# Patient Record
Sex: FEMALE | Race: WHITE | NOT HISPANIC OR LATINO | Employment: OTHER | URBAN - METROPOLITAN AREA
[De-identification: names, ages, dates, MRNs, and addresses within clinical notes are randomized per-mention and may not be internally consistent; named-entity substitution may affect disease eponyms.]

---

## 2017-01-11 ENCOUNTER — OFFICE VISIT (OUTPATIENT)
Dept: BEHAVIORAL HEALTH | Facility: PHYSICIAN GROUP | Age: 77
End: 2017-01-11
Payer: MEDICARE

## 2017-01-11 DIAGNOSIS — F31.77 BIPOLAR DISORDER, IN PARTIAL REMISSION, MOST RECENT EPISODE MIXED (HCC): ICD-10-CM

## 2017-01-11 PROCEDURE — 90834 PSYTX W PT 45 MINUTES: CPT | Performed by: SOCIAL WORKER

## 2017-01-11 NOTE — BH THERAPY
" [facility]  Therapy Progress Note    Patient Name: Azeb Major  Patient MRN: [id number]  Today's Date: 1/11/2017     Type of session:Individual psychotherapy  Length of session: 50  Persons in attendance:Patient    Subjective/New Info: Patient reports no success with getting Norma to respect her wishes or boundaries; believes the only option is to stay quiet or to leave the room.  Still wanting to talk with Dr. MACK About the Seroquel, which she believes is causing her hands to shake and causing her to gain weight, beyond a healthy weight (140# vs. 120#); plans to try to talk with him without her children present.  Also would like children to feel comfortable with her lighting a very stable fancy candle.  Still utilizing Senior , who can drive her places, but feeling lonely and would like to socialize more.  One issue is that \"house is a mess\", so that she doesn't feel comfortable inviting people over; wants to figure out with family how to get the house presentable, is willing to sell some of her jewelry to make the renovations needed.  Has a male friend who calls her and occasionally has taken her out, but for whom she has sexual feelings; unsure what to do with those feelings, feels it would be better not to see him in person.  Less grieving about sister.    Objective/Observations:   Participation: Active, but resistant to suggestions about setting boundaries with Norma.   Grooming: Good   Cognition: Alert and Fully Oriented   Eye contact: Good   Mood: Serious   Affect: Flexible, Full range and Congruent with content   Thought process: Logical and somewhat perseverative   Speech: Rate within normal limits and Volume within normal limits.  Still having trouble hearing.   Other:     Diagnoses: No diagnosis found.     Current risk:   SUICIDE: Low   Homicide: Low   Self-harm: Low   Relapse: Not applicable   Other:    Safety Plan reviewed? Not Indicated   If evidence of imminent risk is " present, intervention/plan:     Therapeutic Intervention(s): Cognitive modification, Communication skills, Conflict resolution skills, Interpersonal effectiveness skills, Leisure and recreation skills, Parenting/familial roles addressed, Stressors assessed and Supportive psychotherapy    Treatment Goal(s)/Objective(s) addressed: Mood stabilization, problem-solving, setting boundaries    Progress toward Treatment Goals: Mild improvement    Plan:  - Continue Individual therapy and Medication management.  Wants to talk with Dr. MACK Re: possibly lowering the Seroquel.  Patient to talk with family about what would be needed to get home in good enough shape to have people over: either on own or in next session.    SELWYN Nicole  1/11/2017

## 2017-01-17 DIAGNOSIS — N39.46 MIXED INCONTINENCE: ICD-10-CM

## 2017-01-18 RX ORDER — LEVOTHYROXINE SODIUM 0.12 MG/1
125 TABLET ORAL
Qty: 90 TAB | Refills: 3 | Status: SHIPPED | OUTPATIENT
Start: 2017-01-18 | End: 2017-02-06 | Stop reason: SDUPTHER

## 2017-01-18 RX ORDER — TOLTERODINE 4 MG/1
4 CAPSULE, EXTENDED RELEASE ORAL
Qty: 90 CAP | Refills: 3 | Status: SHIPPED | OUTPATIENT
Start: 2017-01-18 | End: 2017-02-06 | Stop reason: SDUPTHER

## 2017-01-18 RX ORDER — OMEPRAZOLE 20 MG/1
20 CAPSULE, DELAYED RELEASE ORAL
Qty: 90 CAP | Refills: 3 | Status: SHIPPED | OUTPATIENT
Start: 2017-01-18 | End: 2017-12-12 | Stop reason: SDUPTHER

## 2017-01-18 RX ORDER — QUETIAPINE 200 MG/1
200 TABLET, EXTENDED RELEASE ORAL
Qty: 30 TAB | Refills: 4 | Status: SHIPPED | OUTPATIENT
Start: 2017-01-18 | End: 2017-09-14

## 2017-01-30 ENCOUNTER — NON-PROVIDER VISIT (OUTPATIENT)
Dept: BEHAVIORAL HEALTH | Facility: PHYSICIAN GROUP | Age: 77
End: 2017-01-30
Payer: MEDICARE

## 2017-01-30 PROCEDURE — 90834 PSYTX W PT 45 MINUTES: CPT | Performed by: SOCIAL WORKER

## 2017-02-01 ENCOUNTER — OFFICE VISIT (OUTPATIENT)
Dept: BEHAVIORAL HEALTH | Facility: PHYSICIAN GROUP | Age: 77
End: 2017-02-01
Payer: MEDICARE

## 2017-02-01 VITALS
HEART RATE: 80 BPM | TEMPERATURE: 98.1 F | HEIGHT: 66 IN | SYSTOLIC BLOOD PRESSURE: 140 MMHG | RESPIRATION RATE: 18 BRPM | WEIGHT: 146 LBS | BODY MASS INDEX: 23.46 KG/M2 | DIASTOLIC BLOOD PRESSURE: 72 MMHG

## 2017-02-01 DIAGNOSIS — F31.60 BIPOLAR 1 DISORDER, MIXED (HCC): ICD-10-CM

## 2017-02-01 PROCEDURE — G8420 CALC BMI NORM PARAMETERS: HCPCS | Performed by: PSYCHIATRY & NEUROLOGY

## 2017-02-01 PROCEDURE — 99213 OFFICE O/P EST LOW 20 MIN: CPT | Performed by: PSYCHIATRY & NEUROLOGY

## 2017-02-01 PROCEDURE — 1100F PTFALLS ASSESS-DOCD GE2>/YR: CPT | Performed by: PSYCHIATRY & NEUROLOGY

## 2017-02-01 PROCEDURE — 0518F FALL PLAN OF CARE DOCD: CPT | Mod: 8P | Performed by: PSYCHIATRY & NEUROLOGY

## 2017-02-01 PROCEDURE — 3288F FALL RISK ASSESSMENT DOCD: CPT | Performed by: PSYCHIATRY & NEUROLOGY

## 2017-02-01 PROCEDURE — 1036F TOBACCO NON-USER: CPT | Performed by: PSYCHIATRY & NEUROLOGY

## 2017-02-01 PROCEDURE — G8432 DEP SCR NOT DOC, RNG: HCPCS | Performed by: PSYCHIATRY & NEUROLOGY

## 2017-02-01 PROCEDURE — G8482 FLU IMMUNIZE ORDER/ADMIN: HCPCS | Performed by: PSYCHIATRY & NEUROLOGY

## 2017-02-01 PROCEDURE — 4040F PNEUMOC VAC/ADMIN/RCVD: CPT | Performed by: PSYCHIATRY & NEUROLOGY

## 2017-02-01 RX ORDER — VENLAFAXINE HYDROCHLORIDE 37.5 MG/1
37.5 TABLET, EXTENDED RELEASE ORAL DAILY
Qty: 30 TAB | Refills: 5 | Status: SHIPPED | OUTPATIENT
Start: 2017-02-01 | End: 2017-02-07 | Stop reason: SDUPTHER

## 2017-02-01 NOTE — PROGRESS NOTES
"[facility]  PSYCHIATRIC FOLLOW-UP NOTE    Name: Azeb Major  MRN: [id number]  : 1940  Age: 76 y.o.  Date of assessment: 2017  PCP: Josy Arteaga M.D.  Persons in attendance: Patient    REASON FOR VISIT/CHIEF COMPLAINT (as stated by Patient):  Azeb Major is a 76 y.o., White female, attending follow-up appointment for   Chief Complaint   Patient presents with   • Other     The patient is seen today for follow up and she has mild tremor on both hands. The patient has been emptionally stable.   .      HISTORY OF PRESENT ILLNESS:    The patient was seen today for follow up on her bipolar disorder and she is doing well. The patient has bilateral hand tremor and it could be from depakote. The patient has been stable on her medications. The patient denied depression hypomania, and psychosis.    PSYCHOSOCIAL CHANGES SINCE PREVIOUS CONTACT:  The patient denied depression and hypomania.    RESPONSE TO TREATMENT:  Good.    MEDICATION SIDE EFFECTS:  Tremor.    REVIEW OF SYSTEMS:        Constitutional negative   Eyes negative   Ears/Nose/Mouth/Throat negative   Cardiovascular positive - hypertension.   Respiratory negative   Gastrointestinal negative   Genitourinary positive - stress incontinence and frequent UTI, and chronic kidney disease.   Muscular negative   Integumentary negative   Neurological negative   Endocrine positive - hypothyroid.   Hematologic/Lymphatic negative       PSYCHIATRIC EXAMINATION/MENTAL STATUS  /72 mmHg  Pulse 80  Temp(Src) 36.7 °C (98.1 °F)  Resp 18  Ht 1.676 m (5' 5.98\")  Wt 66.225 kg (146 lb)  BMI 23.58 kg/m2  Participation: Active verbal participation, Attentive and Engaged  Grooming:Casual  Orientation: Alert and Fully Oriented  Eye contact: Good  Behavior:Calm   Mood: Anxious  Affect: Flexible and Full range  Thought process: Logical and Goal-directed  Thought content:  Within normal limits  Speech: Rate within normal limits and Volume " within normal limits  Perception:  Within normal limits  Memory:  No gross evidence of memory deficits  Insight: Good  Judgment: Good  Family/couple interaction observations:   Other:    Current risk:    Suicide: Low   Homicide: Low   Self-harm: Low  Relapse: Low  Other:   Crisis Safety Plan reviewed?Yes  If evidence of imminent risk is present, intervention/plan:    Medical Records/Labs/Diagnostic Tests Reviewed:     The patient did blood work and her LFT unremarkable and VA level 78.    Medical Records/Labs/Diagnostic Tests Ordered: none.    DIAGNOSTIC IMPRESSION(S):  1. Bipolar 1 disorder, mixed (CMS-[facility])           ASSESSMENT AND PLAN:    Bipolar 1    Continue same medications    Seroquel  mg at night  Divalproex  mg three at night  Venlafaxine ER 37.5 mg per day    Follow up in two months.    More than 50% of face-to-face time in this 30 minute visit was spent in psychotherapy/counseling.    Topics addressed include:    Anthony Fowler M.D.

## 2017-02-06 DIAGNOSIS — N39.46 MIXED INCONTINENCE: ICD-10-CM

## 2017-02-06 RX ORDER — VENLAFAXINE HYDROCHLORIDE 37.5 MG/1
37.5 TABLET, EXTENDED RELEASE ORAL DAILY
Qty: 30 TAB | Refills: 4 | Status: CANCELLED | OUTPATIENT
Start: 2017-02-06

## 2017-02-06 RX ORDER — TOLTERODINE 4 MG/1
4 CAPSULE, EXTENDED RELEASE ORAL
Qty: 90 CAP | Refills: 3 | Status: SHIPPED | OUTPATIENT
Start: 2017-02-06 | End: 2018-01-14 | Stop reason: SDUPTHER

## 2017-02-06 RX ORDER — LEVOTHYROXINE SODIUM 0.12 MG/1
125 TABLET ORAL
Qty: 90 TAB | Refills: 4 | Status: SHIPPED | OUTPATIENT
Start: 2017-02-06 | End: 2018-02-06 | Stop reason: SDUPTHER

## 2017-02-06 NOTE — TELEPHONE ENCOUNTER
Was the patient seen in the last year in this department? Yes     Does patient have an active prescription for medications requested? Yes     Received Request Via: Pharmacy      FYI: Pharmacy is requesting a 90 day supply

## 2017-02-07 RX ORDER — VENLAFAXINE HYDROCHLORIDE 37.5 MG/1
37.5 TABLET, EXTENDED RELEASE ORAL DAILY
Qty: 90 TAB | Refills: 1 | Status: SHIPPED | OUTPATIENT
Start: 2017-02-07 | End: 2017-09-28

## 2017-03-20 ENCOUNTER — NON-PROVIDER VISIT (OUTPATIENT)
Dept: BEHAVIORAL HEALTH | Facility: PHYSICIAN GROUP | Age: 77
End: 2017-03-20
Payer: MEDICARE

## 2017-03-20 PROCEDURE — 90834 PSYTX W PT 45 MINUTES: CPT | Performed by: SOCIAL WORKER

## 2017-03-20 RX ORDER — UBIDECARENONE 75 MG
100 CAPSULE ORAL DAILY
COMMUNITY
End: 2020-11-02

## 2017-03-20 NOTE — BH THERAPY
[facility]  Therapy Progress Note    Patient Name: Azeb Major  Patient MRN: [id number]  Today's Date: 3/20/2017     Type of session:Individual psychotherapy  Length of session: *45  Persons in attendance:Patient and Daughter Karissa    Subjective/New Info: Patient reports feeling more comfortable with her current meds, but is again concerned about daughter Norma's anger and demands; does not want to upset her, but wants to find a way to have more comfort with their interaction.  Karissa & I reinforced that Azeb has a right to watch TV where she wishes and to ask Norma to leave.  Azeb says she no longer is feeling urgent with getting some changes in the house, asked for clarification from daughter about status of the trust, option of having a garage sale.    Objective/Observations:   Participation: Active verbal participation, Attentive, Engaged and Open to feedback   Grooming: Good   Cognition: Alert and Fully Oriented   Eye contact: Good   Mood: Euthymic and Anxious   Affect: Flexible, Full range and Congruent with content   Thought process: Logical and Goal-directed   Speech: Rate within normal limits and Volume within normal limits   Other:     Diagnoses: No diagnosis found.     Current risk:   SUICIDE: Low   Homicide: Low   Self-harm: Low   Relapse: Not applicable   Other:    Safety Plan reviewed? Not Indicated   If evidence of imminent risk is present, intervention/plan:     Therapeutic Intervention(s): Cognitive modification, Communication skills, Interpersonal effectiveness skills, Parenting/familial roles addressed, Stressors assessed and Supportive psychotherapy    Treatment Goal(s)/Objective(s) addressed: Stabilize mood, increase comfort asserting herself & setting boundaries.    Progress toward Treatment Goals: Mild improvement    Plan:  - Continue Individual therapy and Medication management    SELWYN Nicole  3/20/2017

## 2017-03-27 DIAGNOSIS — N39.46 MIXED INCONTINENCE: ICD-10-CM

## 2017-03-27 RX ORDER — ESTRADIOL 0.1 MG/G
0.5 CREAM VAGINAL
Qty: 1 TUBE | Refills: 3 | Status: SHIPPED | OUTPATIENT
Start: 2017-03-27 | End: 2018-08-15

## 2017-03-27 RX ORDER — ESTRADIOL 0.1 MG/G
0.5 CREAM VAGINAL
Qty: 1 TUBE | Refills: 0 | Status: SHIPPED | OUTPATIENT
Start: 2017-03-27 | End: 2017-03-27 | Stop reason: SDUPTHER

## 2017-04-03 ENCOUNTER — OFFICE VISIT (OUTPATIENT)
Dept: BEHAVIORAL HEALTH | Facility: PHYSICIAN GROUP | Age: 77
End: 2017-04-03
Payer: MEDICARE

## 2017-04-03 ENCOUNTER — APPOINTMENT (OUTPATIENT)
Dept: BEHAVIORAL HEALTH | Facility: PHYSICIAN GROUP | Age: 77
End: 2017-04-03
Payer: MEDICARE

## 2017-04-03 DIAGNOSIS — F31.30 BIPOLAR I DISORDER DEPRESSED WITH ATYPICAL FEATURES (HCC): ICD-10-CM

## 2017-04-03 PROCEDURE — G8432 DEP SCR NOT DOC, RNG: HCPCS | Performed by: SOCIAL WORKER

## 2017-04-03 PROCEDURE — 90834 PSYTX W PT 45 MINUTES: CPT | Performed by: SOCIAL WORKER

## 2017-04-03 PROCEDURE — 1036F TOBACCO NON-USER: CPT | Performed by: SOCIAL WORKER

## 2017-04-03 RX ORDER — DIVALPROEX SODIUM 250 MG/1
TABLET, EXTENDED RELEASE ORAL
Qty: 90 TAB | Refills: 4 | Status: SHIPPED | OUTPATIENT
Start: 2017-04-03 | End: 2017-07-03 | Stop reason: SDUPTHER

## 2017-04-03 NOTE — BH THERAPY
" [facility]  Therapy Progress Note    Patient Name: Azeb Major  Patient MRN: [id number]  Today's Date: 4/3/2017     Type of session:Individual psychotherapy  Length of session: 45  Persons in attendance:Patient and Daughter Karissa    Subjective/New Info: Patient reports relationship with Norma improving, and that Norma is \"felix\" to her, so she doesn't mind listening if Norma needs to talk.  Had Olive & family over for dinner.  Still knitting, has not yet gotten back to [facility].  Daughters Karissa & Olive will take patient and Norma to her sister's memorial in May; hopeful of seeing many family members they haven't seen for awhile, some concern about issues from the past resurfacing.  Patient reports still having some hand tremors, had hoped to talk with Dr. FRANK about possibly reducing the Depakote, but did not get here in time for that appointment; asked about [facility] as an option.    Objective/Observations:   Participation: Active verbal participation, Attentive, Engaged, Open to feedback and Focused somewhat on Norma's problems.   Grooming: Good   Cognition: Alert, fully oriented   Eye contact: Good   Mood: Euthymic, slightly anxious   Affect: Flexible, Full range and Congruent with content   Thought process: Logical and Goal-directed   Speech: Rate within normal limits and Volume within normal limits   Other:     Diagnoses: No diagnosis found.     Current risk:   SUICIDE: Low   Homicide: Low   Self-harm: Low   Relapse: Not applicable   Other:    Safety Plan reviewed? Not Indicated   If evidence of imminent risk is present, intervention/plan:     Therapeutic Intervention(s): Cognitive modification, Interpersonal effectiveness skills, Parenting/familial roles addressed, Stressors assessed and Supportive psychotherapy    Treatment Goal(s)/Objective(s) addressed: Mood stabilization, boundary setting, communication skills    Progress toward Treatment Goals: Moderate " improvement    Plan:  - Continue Individual therapy and Medication management    Linda Rivera-SELWYN Frazier  4/3/2017

## 2017-04-04 ENCOUNTER — HOSPITAL ENCOUNTER (OUTPATIENT)
Facility: MEDICAL CENTER | Age: 77
End: 2017-04-04
Attending: FAMILY MEDICINE
Payer: MEDICARE

## 2017-04-04 ENCOUNTER — OFFICE VISIT (OUTPATIENT)
Dept: INTERNAL MEDICINE | Facility: IMAGING CENTER | Age: 77
End: 2017-04-04
Payer: MEDICARE

## 2017-04-04 VITALS
HEIGHT: 66 IN | TEMPERATURE: 97.4 F | BODY MASS INDEX: 23.78 KG/M2 | HEART RATE: 74 BPM | WEIGHT: 148 LBS | OXYGEN SATURATION: 95 % | DIASTOLIC BLOOD PRESSURE: 68 MMHG | RESPIRATION RATE: 14 BRPM | SYSTOLIC BLOOD PRESSURE: 132 MMHG

## 2017-04-04 DIAGNOSIS — E53.8 VITAMIN B12 DEFICIENCY: ICD-10-CM

## 2017-04-04 DIAGNOSIS — E03.9 HYPOTHYROIDISM, UNSPECIFIED TYPE: ICD-10-CM

## 2017-04-04 DIAGNOSIS — R25.2 MUSCLE CRAMPS: ICD-10-CM

## 2017-04-04 DIAGNOSIS — M81.0 OSTEOPOROSIS: ICD-10-CM

## 2017-04-04 DIAGNOSIS — L57.0 AK (ACTINIC KERATOSIS): ICD-10-CM

## 2017-04-04 LAB
25(OH)D3 SERPL-MCNC: 26 NG/ML (ref 30–100)
ANION GAP SERPL CALC-SCNC: 4 MMOL/L (ref 0–11.9)
BUN SERPL-MCNC: 28 MG/DL (ref 8–22)
CALCIUM SERPL-MCNC: 9.4 MG/DL (ref 8.5–10.5)
CHLORIDE SERPL-SCNC: 105 MMOL/L (ref 96–112)
CO2 SERPL-SCNC: 26 MMOL/L (ref 20–33)
CREAT SERPL-MCNC: 1.09 MG/DL (ref 0.5–1.4)
GFR SERPL CREATININE-BSD FRML MDRD: 48 ML/MIN/1.73 M 2
GLUCOSE SERPL-MCNC: 88 MG/DL (ref 65–99)
POTASSIUM SERPL-SCNC: 5.3 MMOL/L (ref 3.6–5.5)
SODIUM SERPL-SCNC: 135 MMOL/L (ref 135–145)
T4 FREE SERPL-MCNC: 0.95 NG/DL (ref 0.53–1.43)
TSH SERPL DL<=0.005 MIU/L-ACNC: 0.53 UIU/ML (ref 0.3–3.7)
VIT B12 SERPL-MCNC: >1500 PG/ML (ref 211–911)

## 2017-04-04 PROCEDURE — 17000 DESTRUCT PREMALG LESION: CPT | Performed by: FAMILY MEDICINE

## 2017-04-04 PROCEDURE — 4040F PNEUMOC VAC/ADMIN/RCVD: CPT | Performed by: FAMILY MEDICINE

## 2017-04-04 PROCEDURE — 17003 DESTRUCT PREMALG LES 2-14: CPT | Performed by: FAMILY MEDICINE

## 2017-04-04 PROCEDURE — 0518F FALL PLAN OF CARE DOCD: CPT | Mod: 8P | Performed by: FAMILY MEDICINE

## 2017-04-04 PROCEDURE — 80048 BASIC METABOLIC PNL TOTAL CA: CPT

## 2017-04-04 PROCEDURE — G8432 DEP SCR NOT DOC, RNG: HCPCS | Performed by: FAMILY MEDICINE

## 2017-04-04 PROCEDURE — G8420 CALC BMI NORM PARAMETERS: HCPCS | Performed by: FAMILY MEDICINE

## 2017-04-04 PROCEDURE — 3288F FALL RISK ASSESSMENT DOCD: CPT | Performed by: FAMILY MEDICINE

## 2017-04-04 PROCEDURE — 84439 ASSAY OF FREE THYROXINE: CPT

## 2017-04-04 PROCEDURE — 82607 VITAMIN B-12: CPT

## 2017-04-04 PROCEDURE — 84443 ASSAY THYROID STIM HORMONE: CPT

## 2017-04-04 PROCEDURE — 1036F TOBACCO NON-USER: CPT | Performed by: FAMILY MEDICINE

## 2017-04-04 PROCEDURE — 82306 VITAMIN D 25 HYDROXY: CPT

## 2017-04-04 PROCEDURE — 99214 OFFICE O/P EST MOD 30 MIN: CPT | Mod: 25 | Performed by: FAMILY MEDICINE

## 2017-04-04 PROCEDURE — 1100F PTFALLS ASSESS-DOCD GE2>/YR: CPT | Performed by: FAMILY MEDICINE

## 2017-04-04 RX ORDER — VENLAFAXINE HYDROCHLORIDE 37.5 MG/1
CAPSULE, EXTENDED RELEASE ORAL
COMMUNITY
Start: 2017-01-17 | End: 2017-04-04

## 2017-04-04 NOTE — MR AVS SNAPSHOT
"        Azeb Sanders Pedrito   2017 8:30 AM   Office Visit   MRN: [id number]    Department:  [facility]   Dept Phone:  [phone]    Description:  Female : 1940   Provider:  Josy Arteaga M.D.           Reason for Visit     Follow-Up 3 month, thyroid, skin      Allergies as of 2017     Allergen Noted Reactions    Banana 2014       Stomach upset.    [place] Oil 2014       Anything with corn; stomach upset.    Levofloxacin 2014       Unidentified reaction.    Sorbitol 2014       Stomach upset.      You were diagnosed with     Osteoporosis   [4249642]       Vitamin B12 deficiency   [702978]       Hypothyroidism, unspecified type   [6164655]       Muscle cramps   [600948]       AK (actinic keratosis)   [645767]         Vital Signs     Blood Pressure Pulse Temperature Respirations Height Weight    132/68 mmHg 74 36.3 °C (97.4 °F) 14 1.676 m (5' 5.98\") 67.132 kg (148 lb)    Body Mass Index Oxygen Saturation Smoking Status             23.90 kg/m2 95% Former Smoker         Basic Information     Date Of Birth Sex Race Ethnicity Preferred Language    1940 Female White Non- English      Your appointments     May 15, 2017  9:00 AM   Individual Therapy with Linda Rick [facility]   BEHAVIORAL HEALTH [address] [place] [address])    [address] [place] [address]   [phone]            2017  9:00 AM   Individual Therapy with Linda Rick [facility]   BEHAVIORAL HEALTH [address] [facility])    [address] [place] [address]   [phone]            2017 10:00 AM   Follow Up Med Management with Anthony Fowler M.D.   BEHAVIORAL HEALTH [address] [place] [address]   [phone]            2017  4:00 PM   Individual Therapy with Linda Rick [facility]   BEHAVIORAL HEALTH [address] [place] [address])    [address] [place] [address]   [phone]              Problem " List              ICD-10-CM Priority Class Noted - Resolved    Bipolar affective disorder (CMS-HCC) F31.9 Low  12/1/2010 - Present    UI (urinary incontinence) R32   12/1/2010 - Present    Frequent UTI N39.0   12/1/2010 - Present    Hypothyroid E03.9 Low  12/1/2010 - Present    Seasonal allergies J30.2   12/1/2010 - Present    Hypertension I10 Low  6/20/2011 - Present    CKD (chronic kidney disease) stage 3, GFR 30-59 ml N18.3 Low  9/18/2011 - Present    Osteoporosis M81.0   12/7/2011 - Present    Hyperparathyroidism, secondary (CMS-HCC) N25.81   2/8/2012 - Present    Poor balance R26.89   8/7/2014 - Present    Hard of hearing H91.90   12/12/2014 - Present    Macular degeneration H35.30   12/11/2016 - Present      Health Maintenance        Date Due Completion Dates    IMM ZOSTER VACCINE 6/4/1998 ---    BONE DENSITY 11/16/2016 11/16/2011    IMM DTaP/Tdap/Td Vaccine (2 - Td) 9/9/2024 9/9/2014            Current Immunizations     13-VALENT PCV PREVNAR 12/7/2016    Influenza TIV (IM) 1/1/2012    Influenza Vaccine Adult HD 12/7/2016, 10/20/2015, 1/22/2014    Influenza Vaccine Quad Inj (Preserved) 12/9/2014    Pneumococcal polysaccharide vaccine (PPSV-23) 1/1/2010    Tdap Vaccine 9/9/2014  9:16 AM      Below and/or attached are the medications your provider expects you to take. Review all of your home medications and newly ordered medications with your provider and/or pharmacist. Follow medication instructions as directed by your provider and/or pharmacist. Please keep your medication list with you and share with your provider. Update the information when medications are discontinued, doses are changed, or new medications (including over-the-counter products) are added; and carry medication information at all times in the event of emergency situations     Allergies:  BANANA - (reactions not documented)     CORN OIL - (reactions not documented)     LEVOFLOXACIN - (reactions not documented)     SORBITOL - (reactions not  documented)               Medications  Valid as of: April 04, 2017 -  1:14 PM    Generic Name Brand Name Tablet Size Instructions for use    Ascorbic Acid (Tab) VITAMIN C 500 MG Take 1 Tab by mouth 2 Times a Day.        Cyanocobalamin (Tab) VITAMIN B-12 100 MCG Take 100 mcg by mouth every day.        Divalproex Sodium (TABLET SR 24 HR) DEPAKOTE  MG TAKE 3 TABLETS AT BEDTIME        Estradiol (Cream) ESTRACE 0.1 MG/GM Insert 0.5 g in vagina every 3 days.        Levothyroxine Sodium (Tab) SYNTHROID 125 MCG Take 1 Tab by mouth Every morning on an empty stomach.        Omeprazole (CAPSULE DELAYED RELEASE) PRILOSEC 20 MG Take 1 Cap by mouth every day.        QUEtiapine Fumarate (TABLET SR 24 HR) SEROQUEL  MG Take 1 Tab by mouth every day.        Tolterodine Tartrate (CAPSULE SR 24 HR) DETROL LA 4 MG Take 1 Cap by mouth every day.        Venlafaxine HCl (TABLET SR 24 HR) Venlafaxine HCl 37.5 MG Take 1 Tab by mouth every day.        .                 Medicines prescribed today were sent to:     [facility] - [place], [place] - [address] [place], [place] [id number]    Phone: [phone] Fax: [phone]    Open 24 Hours?: No    [id number] [place] - [address] [address]    Phone: [phone] Fax: [phone]    Open 24 Hours?: No    [facility] [place] - [address] [address]    Phone: [phone] Fax: [phone]    Open 24 Hours?: No    [facility] - [place], [place] - [address] [address]    Phone: [phone] Fax: [phone]    Open 24 Hours?: No    [id number] - [place] - [address] [address]    Phone: [phone] Fax: [phone]    Open 24 Hours?: No      Medication refill instructions:       If your prescription bottle indicates you have medication refills left, it is not  necessary to call your provider’s office. Please contact your pharmacy and they will refill your medication.    If your prescription bottle indicates you do not have any refills left, you may request refills at any time through one of the following ways: The online Sw[employer] system (except Urgent Care), by calling your provider’s office, or by asking your pharmacy to contact your provider’s office with a refill request. Medication refills are processed only during regular business hours and may not be available until the next business day. Your provider may request additional information or to have a follow-up visit with you prior to refilling your medication.   *Please Note: Medication refills are assigned a new Rx number when refilled electronically. Your pharmacy may indicate that no refills were authorized even though a new prescription for the same medication is available at the pharmacy. Please request the medicine by name with the pharmacy before contacting your provider for a refill.        Your To Do List     Future Labs/Procedures Complete By Expires    BASIC METABOLIC PANEL  As directed 4/5/2018    DS-BONE DENSITY STUDY (DEXA)  As directed 10/5/2017    FREE THYROXINE  As directed 4/5/2018    TSH  As directed 4/5/2018    VITAMIN B12  As directed 4/5/2018    VITAMIN D,25 HYDROXY  As directed 4/5/2018      Other Notes About Your Plan     DAUGHTER PAPA [phone]  Psych:  Dr Sim  Nephrology:  [facility]  Urology: Dr. Nitin Golden Access Code: Activation code not generated  Current Sw[employer]board Status: Active

## 2017-04-04 NOTE — PROGRESS NOTES
Chief Complaint   Patient presents with   • Follow-Up     3 month, thyroid, skin       HISTORY OF PRESENT ILLNESS: Patient is a 76 y.o. female established patient who presents today for three-month follow-up on thyroid and some other issues.    She is generally doing well. She still lives independently. She has a senior caregiver who spends a couple days with her and then she has family in town. She is here today with her daughter Becca.    At her last visit her TSH was suppressed. We decreased her Synthroid to 125. She feels good at that level. Denies palpitations. No diarrhea. Her weight has been stable.    She also did a little bit of physical therapy to work on her balance. She is trying to do some of the home exercises. She has had no recent falls.    She does have history of osteoporosis. Her last DEXA scan was 2011. She did have osteoporosis at that time. She has not taken medication. No history of fracture.    Also has a history of GERD. At one time she was having quite a few GI symptoms. She has been on omeprazole but is having no heartburn. Appetite has been good. Bowels are mostly normal. No dark stools. No blood.    She has a couple spots on her nose and on the backs of her hands that she would like checked. They're red and scaly.        Patient Active Problem List    Diagnosis Date Noted   • CKD (chronic kidney disease) stage 3, GFR 30-59 ml 09/18/2011     Priority: Low   • Hypertension 06/20/2011     Priority: Low   • Bipolar affective disorder (CMS-HCC) 12/01/2010     Priority: Low   • Hypothyroid 12/01/2010     Priority: Low   • Macular degeneration 12/11/2016   • Hard of hearing 12/12/2014   • Poor balance 08/07/2014   • Hyperparathyroidism, secondary (CMS-HCC) 02/08/2012   • Osteoporosis 12/07/2011   • UI (urinary incontinence) 12/01/2010   • Frequent UTI 12/01/2010   • Seasonal allergies 12/01/2010     Current Outpatient Prescriptions on File Prior to Visit   Medication Sig Dispense Refill   •  divalproex ER (DEPAKOTE ER) 250 MG TABLET SR 24 HR TAKE 3 TABLETS AT BEDTIME 90 Tab 4   • Venlafaxine HCl 37.5 MG TABLET SR 24 HR Take 1 Tab by mouth every day. 90 Tab 1   • levothyroxine (SYNTHROID) 125 MCG Tab Take 1 Tab by mouth Every morning on an empty stomach. 90 Tab 4   • tolterodine ER (DETROL LA) 4 MG CAPSULE SR 24 HR Take 1 Cap by mouth every day. 90 Cap 3   • omeprazole (PRILOSEC) 20 MG delayed-release capsule Take 1 Cap by mouth every day. 90 Cap 3   • SEROQUEL  MG XR tablet Take 1 Tab by mouth every day. 30 Tab 4   • ascorbic acid (VITAMIN C) 500 MG tablet Take 1 Tab by mouth 2 Times a Day. 30 Tab 3   • estradiol (ESTRACE) 0.1 MG/GM vaginal cream Insert 0.5 g in vagina every 3 days. 1 Tube 3   • cyanocobalamin (VITAMIN B-12) 100 MCG Tab Take 100 mcg by mouth every day.       No current facility-administered medications on file prior to visit.       Past medical, surgical, family, and social history is reviewed and updated in Epic chart by me today.   Medications and allergies reviewed and updated in Epic chart by me today.     REVIEW OF SYSTEMS:  GENERAL: No fatigue, no weight loss.  HEENT:  Ears--no earache, no change in hearing, no dizziness, no tinnitus.                 Eyes--no blurred vision, no discharge or pain                 Throat--No sore throat, no dysphagia, no hoarseness  CV:  No chest pain,dyspnea,palpitations or edema.  RESP:  No sob,cough,wheezing or hemoptysis.  GI: No dysphagia, heartburn,abdominal pain, nausea, vomiting, diarrhea or constipation.       No melena, jaundice, bleeding, incontinence or change in bowel habits.  :  No dysuria, polyuria, hematuria, incontinence, or nocturia.  MS:  No joint swelling, myalgias, or arthralgias. She does complain of muscle cramps in her legs, worse at night.  NEURO:  No seizures, syncope, paralysis, tremor, or weakness.  SKIN: As above.  PSYCH: Mood fine.    Filed Vitals:    04/04/17 0800   BP: 132/68   Pulse: 74   Temp: 36.3 °C (97.4  "°F)   Resp: 14   Height: 1.676 m (5' 5.98\")   Weight: 67.132 kg (148 lb)   SpO2: 95%     Physical Exam:  Gen: Well developed, well nourished. No acute distress.  Alert and oriented x 3.  Neck:  Supple, no adenopathy or thyromegaly.  Heart:  Regular rate and rhythm.  Normal S1, S2. No murmur, gallop or rub.  Lungs:  Clear, No wheezes,rales or rhonchi.  Extremities:  No edema.  Psych: Mood and affect are appropriate.  Skin: She has 2 small lesions on her nose, both prior to proximally 2 mm in size slightly erythematous and rough. Consistent with actinic keratoses  She has 3 lesions on her right hand which are also approximately 2-3 mm in size, erythematous and scaling consistent with actinic keratoses. One lesion 3 mm in size on her left hand also consistent with an actinic keratosis.              Assessment/Plan:  1. Osteoporosis . Will monitor vitamin D. Get an updated DEXA scan. Encouraged her to continue with diet, exercise. Consider treatment.  VITAMIN D,25 HYDROXY    DS-BONE DENSITY STUDY (DEXA)   2. Vitamin B12 deficiency  VITAMIN B12   3. Hypothyroidism, unspecified type . Presently on 125 µg of levothyroxine. Monitor lab work.  TSH    FREE THYROXINE   4. Muscle cramps . Encouraged adequate fluids. Electrolytes and December were fine. She does have a history of hyperparathyroidism . Her calcium level has been fine.   BASIC METABOLIC PANEL   5. AK (actinic keratosis) . All above lesions were treated today with nitrogen. She is instructed in wound care. Follow-up in 3 months        6. GERD. She is having no symptoms. We'll have her taper off the omeprazole over the next few weeks. If her GERD symptoms return she may resume.  "

## 2017-05-16 ENCOUNTER — OFFICE VISIT (OUTPATIENT)
Dept: INTERNAL MEDICINE | Facility: IMAGING CENTER | Age: 77
End: 2017-05-16
Payer: MEDICARE

## 2017-05-16 ENCOUNTER — HOSPITAL ENCOUNTER (OUTPATIENT)
Facility: MEDICAL CENTER | Age: 77
End: 2017-05-16
Attending: FAMILY MEDICINE
Payer: MEDICARE

## 2017-05-16 DIAGNOSIS — R31.9 HEMATURIA: ICD-10-CM

## 2017-05-16 LAB
APPEARANCE UR: NORMAL
BILIRUB UR STRIP-MCNC: NEGATIVE MG/DL
COLOR UR AUTO: YELLOW
GLUCOSE UR STRIP.AUTO-MCNC: NEGATIVE MG/DL
KETONES UR STRIP.AUTO-MCNC: NEGATIVE MG/DL
LEUKOCYTE ESTERASE UR QL STRIP.AUTO: NORMAL
NITRITE UR QL STRIP.AUTO: NEGATIVE
PH UR STRIP.AUTO: 6.5 [PH] (ref 5–8)
PROT UR QL STRIP: 300 MG/DL
RBC UR QL AUTO: NORMAL
SP GR UR STRIP.AUTO: 1.02
UROBILINOGEN UR STRIP-MCNC: NEGATIVE MG/DL

## 2017-05-16 PROCEDURE — 87086 URINE CULTURE/COLONY COUNT: CPT

## 2017-05-16 PROCEDURE — 87186 SC STD MICRODIL/AGAR DIL: CPT

## 2017-05-16 PROCEDURE — 81002 URINALYSIS NONAUTO W/O SCOPE: CPT | Performed by: FAMILY MEDICINE

## 2017-05-16 PROCEDURE — 87077 CULTURE AEROBIC IDENTIFY: CPT

## 2017-05-16 RX ORDER — NITROFURANTOIN 25; 75 MG/1; MG/1
100 CAPSULE ORAL 2 TIMES DAILY
Qty: 12 CAP | Refills: 0 | Status: SHIPPED | OUTPATIENT
Start: 2017-05-16 | End: 2017-09-02

## 2017-05-16 NOTE — MR AVS SNAPSHOT
Azeb Sanders Pedrito   2017 10:30 AM   Office Visit   MRN: [id number]    Department:  [facility]   Dept Phone:  [phone]    Description:  Female : 1940   Provider:  Josy Arteaga M.D.           Reason for Visit     Dysuria           Allergies as of 2017     Allergen Noted Reactions    Banana 2014       Stomach upset.    [place] Oil 2014       Anything with corn; stomach upset.    Levofloxacin 2014       Unidentified reaction.    Sorbitol 2014       Stomach upset.      You were diagnosed with     Hematuria   [[id number]]         Vital Signs     Smoking Status                   Former Smoker           Basic Information     Date Of Birth Sex Race Ethnicity Preferred Language    1940 Female White Non- English      Your appointments     2017  9:00 AM   Individual Therapy with Linda Rick [facility]   BEHAVIORAL HEALTH [address] [facility])    [address] [place] [id number]   [phone]            2017 10:00 AM   Follow Up Med Management with Anthony Fowler M.D.   BEHAVIORAL HEALTH [address] [place] [address])    [address] [place] [address]   [phone]            2017  4:00 PM   Individual Therapy with Linda Rick [facility]   BEHAVIORAL HEALTH [address] [place] [address])    [address] [place] [address]   [phone]              Problem List              ICD-10-CM Priority Class Noted - Resolved    Bipolar affective disorder (CMS-[facility]) F31.9 Low  2010 - Present    UI (urinary incontinence) R32   2010 - Present    Frequent UTI N39.0   2010 - Present    Hypothyroid E03.9 Low  2010 - Present    Seasonal allergies J30.2   2010 - Present    Hypertension I10 Low  2011 - Present    CKD (chronic kidney disease) stage 3, GFR 30-59 ml N18.3 Low  2011 - Present    Osteoporosis M81.0   2011 - Present    Hyperparathyroidism, secondary (CMS-HCC) N25.81    2/8/2012 - Present    Poor balance R26.89   8/7/2014 - Present    Hard of hearing H91.90   12/12/2014 - Present    Macular degeneration H35.30   12/11/2016 - Present      Health Maintenance        Date Due Completion Dates    IMM ZOSTER VACCINE 6/4/1998 ---    BONE DENSITY 11/16/2016 11/16/2011    IMM DTaP/Tdap/Td Vaccine (2 - Td) 9/9/2024 9/9/2014            Results     POCT Urinalysis      Component Value Standard Range & Units    POC Color yellow Negative    POC Appearance cloudy Negative    POC Leukocyte Esterase large Negative    POC Nitrites negative Negative    POC Urobiligen negative Negative (0.2) mg/dL    POC Protein 300 Negative mg/dL    POC Urine PH 6.5 5.0 - 8.0    POC Blood large Negative    POC Specific Gravity 1.025 <1.005 - >1.030    POC Ketones negative Negative mg/dL    POC Biliruben negative Negative mg/dL    POC Glucose negative Negative mg/dL                        Current Immunizations     13-VALENT PCV PREVNAR 12/7/2016    Influenza TIV (IM) 1/1/2012    Influenza Vaccine Adult HD 12/7/2016, 10/20/2015, 1/22/2014    Influenza Vaccine Quad Inj (Preserved) 12/9/2014    Pneumococcal polysaccharide vaccine (PPSV-23) 1/1/2010    Tdap Vaccine 9/9/2014  9:16 AM      Below and/or attached are the medications your provider expects you to take. Review all of your home medications and newly ordered medications with your provider and/or pharmacist. Follow medication instructions as directed by your provider and/or pharmacist. Please keep your medication list with you and share with your provider. Update the information when medications are discontinued, doses are changed, or new medications (including over-the-counter products) are added; and carry medication information at all times in the event of emergency situations     Allergies:  BANANA - (reactions not documented)     CORN OIL - (reactions not documented)     LEVOFLOXACIN - (reactions not documented)     SORBITOL - (reactions not documented)                  Medications  Valid as of: May 16, 2017 - 12:37 PM    Generic Name Brand Name Tablet Size Instructions for use    Ascorbic Acid (Tab) VITAMIN C 500 MG Take 1 Tab by mouth 2 Times a Day.        Cyanocobalamin (Tab) VITAMIN B-12 100 MCG Take 100 mcg by mouth every day.        Divalproex Sodium (TABLET SR 24 HR) DEPAKOTE  MG TAKE 3 TABLETS AT BEDTIME        Estradiol (Cream) ESTRACE 0.1 MG/GM Insert 0.5 g in vagina every 3 days.        Levothyroxine Sodium (Tab) SYNTHROID 125 MCG Take 1 Tab by mouth Every morning on an empty stomach.        Nitrofurantoin Monohyd Macro (Cap) MACROBID 100 MG Take 1 Cap by mouth 2 times a day.        Omeprazole (CAPSULE DELAYED RELEASE) PRILOSEC 20 MG Take 1 Cap by mouth every day.        QUEtiapine Fumarate (TABLET SR 24 HR) SEROQUEL  MG Take 1 Tab by mouth every day.        Tolterodine Tartrate (CAPSULE SR 24 HR) DETROL LA 4 MG Take 1 Cap by mouth every day.        Venlafaxine HCl (TABLET SR 24 HR) Venlafaxine HCl 37.5 MG Take 1 Tab by mouth every day.        .                 Medicines prescribed today were sent to:     [facility] - [place], [place] - [address] [place] [address]    Phone: [phone] Fax: [phone]    Open 24 Hours?: No    [id number] - [place] - [address] [address]    Phone: [phone] Fax: [phone]    Open 24 Hours?: No    [facility] - [place] - [address] [address]    Phone: [phone] Fax: [phone]    Open 24 Hours?: No    [facility] - [place], [place] - [address] [place] [address]    Phone: [phone] Fax: [phone]    Open 24 Hours?: No    [id number] - [place] - [address] [address]    Phone: [phone] Fax: [phone]    Open 24 Hours?: No      Medication refill instructions:       If your  prescription bottle indicates you have medication refills left, it is not necessary to call your provider’s office. Please contact your pharmacy and they will refill your medication.    If your prescription bottle indicates you do not have any refills left, you may request refills at any time through one of the following ways: The online Zbird system (except Urgent Care), by calling your provider’s office, or by asking your pharmacy to contact your provider’s office with a refill request. Medication refills are processed only during regular business hours and may not be available until the next business day. Your provider may request additional information or to have a follow-up visit with you prior to refilling your medication.   *Please Note: Medication refills are assigned a new Rx number when refilled electronically. Your pharmacy may indicate that no refills were authorized even though a new prescription for the same medication is available at the pharmacy. Please request the medicine by name with the pharmacy before contacting your provider for a refill.        Your To Do List     Future Labs/Procedures Complete By Expires    URINE CULTURE(NEW)  As directed 5/16/2018      Other Notes About Your Plan     DAUGHTER PAPA [phone]  Psych:  Dr Sim  Nephrology:  [facility]  Urology: Dr. Nitin Golden Access Code: Activation code not generated  Current Zbird Status: Active

## 2017-05-18 LAB
BACTERIA UR CULT: ABNORMAL
SIGNIFICANT IND 70042: ABNORMAL
SOURCE SOURCE: ABNORMAL

## 2017-05-26 ENCOUNTER — TELEPHONE (OUTPATIENT)
Dept: BEHAVIORAL HEALTH | Facility: PHYSICIAN GROUP | Age: 77
End: 2017-05-26

## 2017-05-30 ENCOUNTER — HOSPITAL ENCOUNTER (OUTPATIENT)
Dept: LAB | Facility: MEDICAL CENTER | Age: 77
End: 2017-05-30
Attending: FAMILY MEDICINE
Payer: MEDICARE

## 2017-05-30 DIAGNOSIS — N30.00 ACUTE CYSTITIS WITHOUT HEMATURIA: ICD-10-CM

## 2017-05-30 PROCEDURE — 87086 URINE CULTURE/COLONY COUNT: CPT

## 2017-05-30 PROCEDURE — 87186 SC STD MICRODIL/AGAR DIL: CPT

## 2017-05-30 PROCEDURE — 87077 CULTURE AEROBIC IDENTIFY: CPT

## 2017-05-31 RX ORDER — SULFAMETHOXAZOLE AND TRIMETHOPRIM 800; 160 MG/1; MG/1
1 TABLET ORAL EVERY 12 HOURS
Qty: 14 TAB | Refills: 0 | Status: SHIPPED | OUTPATIENT
Start: 2017-05-31 | End: 2017-09-02

## 2017-06-01 LAB
BACTERIA UR CULT: ABNORMAL
SIGNIFICANT IND 70042: ABNORMAL
SITE SITE: ABNORMAL
SOURCE SOURCE: ABNORMAL

## 2017-06-05 ENCOUNTER — OFFICE VISIT (OUTPATIENT)
Dept: BEHAVIORAL HEALTH | Facility: PHYSICIAN GROUP | Age: 77
End: 2017-06-05
Payer: MEDICARE

## 2017-06-05 VITALS
WEIGHT: 148 LBS | RESPIRATION RATE: 16 BRPM | BODY MASS INDEX: 23.78 KG/M2 | DIASTOLIC BLOOD PRESSURE: 70 MMHG | HEART RATE: 78 BPM | TEMPERATURE: 97.9 F | HEIGHT: 66 IN | SYSTOLIC BLOOD PRESSURE: 136 MMHG

## 2017-06-05 DIAGNOSIS — F31.60 BIPOLAR 1 DISORDER, MIXED (HCC): ICD-10-CM

## 2017-06-05 DIAGNOSIS — F31.30 BIPOLAR I DISORDER DEPRESSED WITH ATYPICAL FEATURES (HCC): ICD-10-CM

## 2017-06-05 PROCEDURE — 1036F TOBACCO NON-USER: CPT | Performed by: SOCIAL WORKER

## 2017-06-05 PROCEDURE — 0518F FALL PLAN OF CARE DOCD: CPT | Mod: 8P | Performed by: PSYCHIATRY & NEUROLOGY

## 2017-06-05 PROCEDURE — 99213 OFFICE O/P EST LOW 20 MIN: CPT | Performed by: PSYCHIATRY & NEUROLOGY

## 2017-06-05 PROCEDURE — 3288F FALL RISK ASSESSMENT DOCD: CPT | Performed by: PSYCHIATRY & NEUROLOGY

## 2017-06-05 PROCEDURE — 90834 PSYTX W PT 45 MINUTES: CPT | Performed by: SOCIAL WORKER

## 2017-06-05 PROCEDURE — G8432 DEP SCR NOT DOC, RNG: HCPCS | Performed by: PSYCHIATRY & NEUROLOGY

## 2017-06-05 PROCEDURE — G8420 CALC BMI NORM PARAMETERS: HCPCS | Performed by: PSYCHIATRY & NEUROLOGY

## 2017-06-05 PROCEDURE — G8432 DEP SCR NOT DOC, RNG: HCPCS | Performed by: SOCIAL WORKER

## 2017-06-05 PROCEDURE — 4040F PNEUMOC VAC/ADMIN/RCVD: CPT | Performed by: PSYCHIATRY & NEUROLOGY

## 2017-06-05 PROCEDURE — 1100F PTFALLS ASSESS-DOCD GE2>/YR: CPT | Performed by: PSYCHIATRY & NEUROLOGY

## 2017-06-05 PROCEDURE — 1036F TOBACCO NON-USER: CPT | Performed by: PSYCHIATRY & NEUROLOGY

## 2017-06-05 NOTE — PROGRESS NOTES
"[facility]  PSYCHIATRIC FOLLOW-UP NOTE    Name: Azeb Major  MRN: [id number]  : 1940  Age: 77 y.o.  Date of assessment: 2017  PCP: Josy Arteaga M.D.  Persons in attendance: Patient      REASON FOR VISIT/CHIEF COMPLAINT (as stated by Patient):  Azeb Major is a 77 y.o., White female, attending follow-up appointment for   Chief Complaint   Patient presents with   • Manic Behavior     The patient had few days of altered mental status and she had urinary trck infection but she is getting better.   • Sleep Problem     The patient has been taking seroquel  mg at night and it is helping with sleep.   .      HISTORY OF PRESENT ILLNESS:    This is 78 yo female, , seen today for follow with her two daughters. The patient has UTI and she is bactrim and it is helping her. The patients hypomania improved. The patient has been sleeping well. The patient denied depression and panic attacks.      PSYCHOSOCIAL CHANGES SINCE PREVIOUS CONTACT:  Stable     RESPONSE TO TREATMENT:  Good.    MEDICATION SIDE EFFECTS:  Denied.    REVIEW OF SYSTEMS:        Constitutional negative   Eyes negative   Ears/Nose/Mouth/Throat negative   Cardiovascular positive - hypertension   Respiratory negative   Gastrointestinal negative   Genitourinary positive - urinary incontinence   Muscular negative   Integumentary negative   Neurological negative   Endocrine positive - hypothyroid   Hematologic/Lymphatic negative       PSYCHIATRIC EXAMINATION/MENTAL STATUS  /70 mmHg  Pulse 78  Temp(Src) 36.6 °C (97.9 °F)  Resp 16  Ht 1.676 m (5' 5.98\")  Wt 67.132 kg (148 lb)  BMI 23.90 kg/m2  Participation: Active verbal participation, Attentive and Engaged  Grooming:Casual  Orientation: Alert and Fully Oriented  Eye contact: Good  Behavior:Calm   Mood: Anxious  Affect: Anxious  Thought process: Logical and Goal-directed  Thought content:  Within normal limits  Speech: Rate within normal limits and " Volume within normal limits  Perception:  Within normal limits  Memory:  Poor memory for chronology of events  Insight: Good  Judgment: Good  Family/couple interaction observations:   Other:    Current risk:    Suicide: Low   Homicide: Low   Self-harm: Low  Relapse: Low  Other:   Crisis Safety Plan reviewed?Yes  If evidence of imminent risk is present, intervention/plan:    Medical Records/Labs/Diagnostic Tests Reviewed: yes.    Medical Records/Labs/Diagnostic Tests Ordered: none.    DIAGNOSTIC IMPRESSION(S):  1. Bipolar 1 disorder, mixed (CMS-[facility])           ASSESSMENT AND PLAN:  Bipolar 1, Mixed.    Continue same medications  Follow up in two months    16 minutes were spent in psychotherapy.  (If greater than 16 minutes, add 43478 code)   Topics addressed in psychotherapy include:    Anthony Fowler M.D.

## 2017-06-05 NOTE — BH THERAPY
" [facility]  Therapy Progress Note    Patient Name: Azeb Major  Patient MRN: [id number]  Today's Date: 6/5/2017     Type of session:Individual psychotherapy  Length of session: 50 minutes  Persons in attendance:Patient    Subjective/New Info: Patient reports a good trip to [place] with her three daughters, to attend the internment of sister's ashes: were able to visit some beautiful places and rekindle some fond memories, and everyone--even her & youngest sister, who are \"opposites\" and with whom there has been conflict--got along.  Feeling better now that she has weaned self off the anti-depressant, with help of daughter and Dr. FRANK.  Still occasionally \"walking on eggshells\" with daughter Norma, but not as bothered by Norma's behavior.  Has also decided she will not call sister, will be cordial if sister calls her.  Reports walking around the block, cooking for self, eating healthfully.    Objective/Observations:   Participation: Active verbal participation, Attentive, Engaged and Open to feedback   Grooming: Good   Cognition: Alert and Fully Oriented   Eye contact: Good   Mood: Euthymic   Affect: Flexible, Full range and Congruent with content   Thought process: Logical and Goal-directed   Speech: Rate within normal limits and Volume within normal limits   Other:     Diagnoses: No diagnosis found.     Current risk:   SUICIDE: Low   Homicide: Low   Self-harm: Low   Relapse: Not applicable   Other:    Safety Plan reviewed? Not Indicated   If evidence of imminent risk is present, intervention/plan:     Therapeutic Intervention(s): Cognitive modification, Communication skills, Interpersonal effectiveness skills, Parenting/familial roles addressed, Positive behavior reinforced, Self-care skills, Stressors assessed and Supportive psychotherapy    Treatment Goal(s)/Objective(s) addressed: Stabilize mood, improve communication skills and boundaries     Progress toward Treatment Goals: Moderate " improvement    Plan:  - Continue Individual therapy and Medication management    Linda Rivera-SELWYN Frazier  6/5/2017

## 2017-06-26 ENCOUNTER — OFFICE VISIT (OUTPATIENT)
Dept: BEHAVIORAL HEALTH | Facility: PHYSICIAN GROUP | Age: 77
End: 2017-06-26
Payer: MEDICARE

## 2017-06-26 DIAGNOSIS — F31.77 BIPOLAR DISORDER, IN PARTIAL REMISSION, MOST RECENT EPISODE MIXED (HCC): ICD-10-CM

## 2017-06-26 PROCEDURE — 90834 PSYTX W PT 45 MINUTES: CPT | Performed by: SOCIAL WORKER

## 2017-06-26 NOTE — BH THERAPY
[facility]  Therapy Progress Note    Patient Name: Azeb Major  Patient MRN: [id number]  Today's Date: 6/26/2017     Type of session:Individual psychotherapy  Length of session: 50 minutes  Persons in attendance:Patient    Subjective/New Info: Patient reports social activities--lunch with friend Luis Enrique, knitting group--but would like to do more, perhaps helping children again.  She & Norma had a mutual birthday celebration earlier this month, but she became upset with Norma, because Norma refused her gifts, even though Norma may need more clothes, etc.  She did say no to Norma once, when Norma wanted to come on an outing at last minute, but wants to help Norma, if Norma would just communicate her needs/wants.  Patient also reported a recent severe nosebleed, which was especially scary because neither granddaughter nor Norma (who live closest) were willing to come help her (although Norma did suggest something which got the nosebleed to stop); will talk with PCP or allergist about this occurrence.  She also reports missing one night of Seroquel, with resultant awakening during the night & inability to return to sleep; however, will often sleep 12 hours with combination of Seroquel & Depakote.    Objective/Observations:   Participation: Active verbal participation, Attentive, Engaged, Open to feedback and had difficulty hearing me.   Grooming: Good   Cognition: Alert and Fully Oriented   Eye contact: Good   Mood: Depressed and Anxious, slightly   Affect: Flexible, Full range and Congruent with content   Thought process: Logical and Goal-directed   Speech: Rate within normal limits and Volume within normal limits   Other:     Diagnoses: No diagnosis found.     Current risk:   SUICIDE: Low   Homicide: Low   Self-harm: Low   Relapse: Not applicable   Other:    Safety Plan reviewed? Not Indicated   If evidence of imminent risk is present, intervention/plan:     Therapeutic Intervention(s): Cognitive  modification, Communication skills, Conflict clarification, Conflict resolution skills, Interpersonal effectiveness skills, Parenting/familial roles addressed, Stressors assessed and Supportive psychotherapy    Treatment Goal(s)/Objective(s) addressed: Stabilize/improve mood & ability to set boundaries, express needs clearly    Progress toward Treatment Goals: Mild improvement    Plan:  - Continue Individual therapy and Medication management    SELWYN Nicole  6/26/2017

## 2017-07-03 RX ORDER — DIVALPROEX SODIUM 250 MG/1
TABLET, EXTENDED RELEASE ORAL
Qty: 90 TAB | Refills: 5 | Status: SHIPPED | OUTPATIENT
Start: 2017-07-03 | End: 2017-12-31 | Stop reason: SDUPTHER

## 2017-08-07 ENCOUNTER — HOSPITAL ENCOUNTER (OUTPATIENT)
Facility: MEDICAL CENTER | Age: 77
End: 2017-08-07
Attending: FAMILY MEDICINE
Payer: MEDICARE

## 2017-08-07 ENCOUNTER — NON-PROVIDER VISIT (OUTPATIENT)
Dept: INTERNAL MEDICINE | Facility: IMAGING CENTER | Age: 77
End: 2017-08-07
Payer: MEDICARE

## 2017-08-07 DIAGNOSIS — N39.0 FREQUENT UTI: ICD-10-CM

## 2017-08-07 LAB
APPEARANCE UR: NORMAL
BILIRUB UR STRIP-MCNC: NEGATIVE MG/DL
COLOR UR AUTO: YELLOW
GLUCOSE UR STRIP.AUTO-MCNC: NEGATIVE MG/DL
KETONES UR STRIP.AUTO-MCNC: NEGATIVE MG/DL
LEUKOCYTE ESTERASE UR QL STRIP.AUTO: NORMAL
NITRITE UR QL STRIP.AUTO: POSITIVE
PH UR STRIP.AUTO: 5 [PH] (ref 5–8)
PROT UR QL STRIP: NORMAL MG/DL
RBC UR QL AUTO: NORMAL
SP GR UR STRIP.AUTO: 1.01
UROBILINOGEN UR STRIP-MCNC: NEGATIVE MG/DL

## 2017-08-07 PROCEDURE — 87086 URINE CULTURE/COLONY COUNT: CPT

## 2017-08-07 PROCEDURE — 81002 URINALYSIS NONAUTO W/O SCOPE: CPT | Performed by: FAMILY MEDICINE

## 2017-08-07 PROCEDURE — 87186 SC STD MICRODIL/AGAR DIL: CPT

## 2017-08-07 PROCEDURE — 87077 CULTURE AEROBIC IDENTIFY: CPT

## 2017-08-07 RX ORDER — SULFAMETHOXAZOLE AND TRIMETHOPRIM 800; 160 MG/1; MG/1
1 TABLET ORAL EVERY 12 HOURS
Qty: 14 TAB | Refills: 0 | Status: SHIPPED | OUTPATIENT
Start: 2017-08-07 | End: 2017-09-02

## 2017-08-10 LAB
BACTERIA UR CULT: ABNORMAL
SIGNIFICANT IND 70042: ABNORMAL
SOURCE SOURCE: ABNORMAL

## 2017-08-10 RX ORDER — NITROFURANTOIN 25; 75 MG/1; MG/1
100 CAPSULE ORAL 2 TIMES DAILY
Qty: 14 CAP | Refills: 0 | OUTPATIENT
Start: 2017-08-10 | End: 2017-09-02

## 2017-08-10 NOTE — PROGRESS NOTES
Colin Becerra,  Can you call Becca --Anne-Marie daughter and let her know Anne-Marie  Urine culture was positive--e coli.  But it is resisitant to the bactrim.  So please call in macrobid as ordered.   I am not sure which pharmacy.  Recheck urine culture in 2-3 weeks.   Thank you.

## 2017-08-21 ENCOUNTER — OFFICE VISIT (OUTPATIENT)
Dept: BEHAVIORAL HEALTH | Facility: PHYSICIAN GROUP | Age: 77
End: 2017-08-21
Payer: MEDICARE

## 2017-08-21 VITALS
HEART RATE: 76 BPM | HEIGHT: 66 IN | DIASTOLIC BLOOD PRESSURE: 70 MMHG | WEIGHT: 150.35 LBS | BODY MASS INDEX: 24.16 KG/M2 | RESPIRATION RATE: 16 BRPM | TEMPERATURE: 98.1 F | SYSTOLIC BLOOD PRESSURE: 134 MMHG

## 2017-08-21 DIAGNOSIS — F31.60 BIPOLAR 1 DISORDER, MIXED (HCC): ICD-10-CM

## 2017-08-21 DIAGNOSIS — F31.30 BIPOLAR I DISORDER DEPRESSED WITH ATYPICAL FEATURES (HCC): ICD-10-CM

## 2017-08-21 PROCEDURE — 90834 PSYTX W PT 45 MINUTES: CPT | Performed by: SOCIAL WORKER

## 2017-08-21 PROCEDURE — 99213 OFFICE O/P EST LOW 20 MIN: CPT | Performed by: PSYCHIATRY & NEUROLOGY

## 2017-08-21 PROCEDURE — 90833 PSYTX W PT W E/M 30 MIN: CPT | Performed by: PSYCHIATRY & NEUROLOGY

## 2017-08-21 RX ORDER — QUETIAPINE 150 MG/1
TABLET, FILM COATED, EXTENDED RELEASE ORAL
Qty: 90 TAB | Refills: 1 | Status: SHIPPED | OUTPATIENT
Start: 2017-08-21 | End: 2018-01-29 | Stop reason: SDUPTHER

## 2017-08-21 NOTE — PROGRESS NOTES
"[facility]  PSYCHIATRIC FOLLOW-UP NOTE    Name: Azeb Major  MRN: [id number]  : 1940  Age: 77 y.o.  Date of assessment: 2017  PCP: Josy Arteaga M.D.  Persons in attendance: Patient and Children  REASON FOR VISIT/CHIEF COMPLAINT (as stated by Patient and Children):  Azeb Major is a 77 y.o., White female, attending follow-up appointment for   Chief Complaint   Patient presents with   • Medication Management     The patient is seen today for follow up with her children and she reported sleeping fourteen hours per day.   .      HISTORY OF PRESENT ILLNESS:    This is a 76 yo female, single, lives alone, seen today for follow up. The patient came with two of her daughters. The patient reported that she has been sleeping twelve hours at night and two hours nap at noon. The patient denied depression. The patient is going to the swimming pool with her helper. The patient is keepin herself active. The patient is recovering from a UTI.       PSYCHOSOCIAL CHANGES SINCE PREVIOUS CONTACT:  sleeping too much but denied depression.    RESPONSE TO TREATMENT:  Good.    MEDICATION SIDE EFFECTS:  Feeling tired and sleeping too much.    REVIEW OF SYSTEMS:        Constitutional negative   Eyes negative   Ears/Nose/Mouth/Throat negative   Cardiovascular positive - hypertension   Respiratory negative   Gastrointestinal negative   Genitourinary positive - frequent UTI   Muscular positive - chronic kidney disease   Integumentary negative   Neurological negative   Endocrine positive - hypothyroid   Hematologic/Lymphatic negative       PSYCHIATRIC EXAMINATION/MENTAL STATUS  /70 mmHg  Pulse 76  Temp(Src) 36.7 °C (98.1 °F)  Resp 16  Ht 1.676 m (5' 5.98\")  Wt 68.2 kg (150 lb 5.7 oz)  BMI 24.28 kg/m2  Participation: Active verbal participation, Attentive and Engaged  Grooming:Casual  Orientation: Alert and Fully Oriented  Eye contact: Good  Behavior:Calm   Mood: Anxious  Affect: Flexible " and Full range  Thought process: Logical and Goal-directed  Thought content:  Within normal limits  Speech: Rate within normal limits and Volume within normal limits  Perception:  Within normal limits  Memory:  No gross evidence of memory deficits  Insight: Good  Judgment: Good  Family/couple interaction observations:   Other:    Current risk:    Suicide: Low   Homicide: Low   Self-harm: Low  Relapse: Low  Other:   Crisis Safety Plan reviewed?No  If evidence of imminent risk is present, intervention/plan:    Medical Records/Labs/Diagnostic Tests Reviewed: yes.    Medical Records/Labs/Diagnostic Tests Ordered: none.    DIAGNOSTIC IMPRESSION(S):  1. Bipolar 1 disorder, mixed (CMS-[facility])           ASSESSMENT AND PLAN:    1. Bipolar 1, Mixed.  Divalproex  mg three at night.  Decrease seroquel 150 mg one at night.    2. Follow up in three months.    16 minutes were spent in psychotherapy.  (If greater than 16 minutes, add 11652 code)   Topics addressed in psychotherapy include:  The patient is getting help from her children.  The patients mood changes if happen they will call the writer.  The patient has a daily routine and her helper takes for shopping and other activities.  The patient has good coping skills and manage her stress very well.  Anthony Fowler M.D.

## 2017-08-21 NOTE — BH THERAPY
" [facility]  Therapy Progress Note    Patient Name: Azeb Major  Patient MRN: [id number]  Today's Date: 8/21/2017     Type of session:Individual psychotherapy  Length of session: 45 minutes  Persons in attendance:Patient    Subjective/New Info: Patient reports \"doing terribly\" re: oversleeping (12-14 hours at night, occasional napping) and problem with dentures (causing pain 5-6/10).  She is also \"lonely\" and would like more time with family and/or more opportunities to volunteer, though needs help with transportation.  Walking occasionally, enjoyed recent outing swimming (and used to enjoy going to [employer]), reading, going to [employer] & interacting with some people there.  Got hearing aids adjusted, feeling \"stronger\".    Objective/Observations:   Participation: Active verbal participation, Attentive, Engaged and Open to feedback   Grooming: Good   Cognition: Alert and Fully Oriented   Eye contact: Good   Mood: Euthymic   Affect: Flexible, Full range and Congruent with content   Thought process: Logical and Goal-directed   Speech: Rate within normal limits and Volume within normal limits   Other:     Diagnoses: No diagnosis found.     Current risk:   SUICIDE: Low   Homicide: Low   Self-harm: Low   Relapse: Not applicable   Other:    Safety Plan reviewed? Not Indicated   If evidence of imminent risk is present, intervention/plan:     Therapeutic Intervention(s): Communication skills, Leisure and recreation skills, Pain addressed, Parenting/familial roles addressed, Self-care skills, Stressors assessed and Supportive psychotherapy    Treatment Goal(s)/Objective(s) addressed: Stabilize mood, increase sense of purpose & meaningful activities, communicate needs assertively     Progress toward Treatment Goals: Mild improvement    Plan:  - Continue Individual therapy and Medication management    SELWYN Nicole  8/21/2017                                   "

## 2017-08-22 ENCOUNTER — HOSPITAL ENCOUNTER (OUTPATIENT)
Facility: MEDICAL CENTER | Age: 77
End: 2017-08-22
Attending: FAMILY MEDICINE
Payer: MEDICARE

## 2017-08-22 DIAGNOSIS — N39.0 FREQUENT UTI: ICD-10-CM

## 2017-08-22 LAB
APPEARANCE UR: NORMAL
BILIRUB UR STRIP-MCNC: NEGATIVE MG/DL
COLOR UR AUTO: NORMAL
GLUCOSE UR STRIP.AUTO-MCNC: NEGATIVE MG/DL
KETONES UR STRIP.AUTO-MCNC: NEGATIVE MG/DL
LEUKOCYTE ESTERASE UR QL STRIP.AUTO: NORMAL
NITRITE UR QL STRIP.AUTO: POSITIVE
PH UR STRIP.AUTO: 6 [PH] (ref 5–8)
PROT UR QL STRIP: NEGATIVE MG/DL
RBC UR QL AUTO: NORMAL
SP GR UR STRIP.AUTO: 1.03
UROBILINOGEN UR STRIP-MCNC: NEGATIVE MG/DL

## 2017-08-22 PROCEDURE — 81002 URINALYSIS NONAUTO W/O SCOPE: CPT | Performed by: FAMILY MEDICINE

## 2017-08-22 PROCEDURE — 87186 SC STD MICRODIL/AGAR DIL: CPT

## 2017-08-22 PROCEDURE — 87077 CULTURE AEROBIC IDENTIFY: CPT

## 2017-08-22 PROCEDURE — 87086 URINE CULTURE/COLONY COUNT: CPT

## 2017-08-22 RX ORDER — CEFDINIR 300 MG/1
300 CAPSULE ORAL 2 TIMES DAILY
Qty: 14 CAP | Refills: 0 | Status: SHIPPED | OUTPATIENT
Start: 2017-08-22 | End: 2017-09-02

## 2017-08-24 LAB
BACTERIA UR CULT: ABNORMAL
SIGNIFICANT IND 70042: ABNORMAL
SOURCE SOURCE: ABNORMAL

## 2017-09-02 ENCOUNTER — TELEPHONE (OUTPATIENT)
Dept: INTERNAL MEDICINE | Facility: IMAGING CENTER | Age: 77
End: 2017-09-02

## 2017-09-02 RX ORDER — SULFAMETHOXAZOLE AND TRIMETHOPRIM 800; 160 MG/1; MG/1
1 TABLET ORAL EVERY 12 HOURS
Qty: 14 TAB | Refills: 0 | Status: SHIPPED | OUTPATIENT
Start: 2017-09-02 | End: 2017-09-14

## 2017-09-02 NOTE — TELEPHONE ENCOUNTER
Spoke with Olive.  Azeb just finished cefnidir for UTI. Woke today with sx.   Will start  Bactrim.   Urine culture tuesday if not improved. If sx worse, to UC or ER this weekend.

## 2017-09-06 ENCOUNTER — HOSPITAL ENCOUNTER (OUTPATIENT)
Dept: RADIOLOGY | Facility: MEDICAL CENTER | Age: 77
End: 2017-09-06
Attending: FAMILY MEDICINE
Payer: MEDICARE

## 2017-09-06 DIAGNOSIS — N39.0 FREQUENT UTI: ICD-10-CM

## 2017-09-06 PROCEDURE — 76775 US EXAM ABDO BACK WALL LIM: CPT

## 2017-09-07 ENCOUNTER — TELEPHONE (OUTPATIENT)
Dept: INTERNAL MEDICINE | Facility: IMAGING CENTER | Age: 77
End: 2017-09-07

## 2017-09-07 NOTE — TELEPHONE ENCOUNTER
Discussed renal US results.  Encourage timed bladder emptying and attempt at complete emptying.   Now on bactrim.   Culture when off abx x 7 days and then consider prophylaxis.

## 2017-09-11 ENCOUNTER — OFFICE VISIT (OUTPATIENT)
Dept: BEHAVIORAL HEALTH | Facility: PHYSICIAN GROUP | Age: 77
End: 2017-09-11
Payer: MEDICARE

## 2017-09-11 DIAGNOSIS — F31.77 BIPOLAR DISORDER, IN PARTIAL REMISSION, MOST RECENT EPISODE MIXED (HCC): ICD-10-CM

## 2017-09-11 PROCEDURE — 90834 PSYTX W PT 45 MINUTES: CPT | Performed by: SOCIAL WORKER

## 2017-09-11 NOTE — BH THERAPY
[facility]  Therapy Progress Note    Patient Name: Azeb Major  Patient MRN: [id number]  Today's Date: 9/11/2017     Type of session:Individual psychotherapy  Length of session: 50 minutes  Persons in attendance:Patient    Subjective/New Info: Patient reports some continuing concerns with daughter Norma, as well as concerns about caregiver George.  Still somewhat lonely & over-sleeping, looking for opportunities to volunteer.  Walking. Some continued dental pain, but has plans to see dentist she prefers.    Objective/Observations:   Participation: Active verbal participation, Attentive, Engaged and Open to feedback   Grooming: Good   Cognition: Alert and Fully Oriented   Eye contact: Good   Mood: Euthymic   Affect: Flexible, Full range and Congruent with content   Thought process: Logical and Goal-directed   Speech: Rate within normal limits and Volume within normal limits   Other:     Diagnoses: No diagnosis found.     Current risk:   SUICIDE: Low   Homicide: Low   Self-harm: Low   Relapse: Not applicable   Other:    Safety Plan reviewed? Not Indicated   If evidence of imminent risk is present, intervention/plan:     Therapeutic Intervention(s): Cognitive modification, Communication skills, Interpersonal effectiveness skills, Leisure and recreation skills, Pain addressed, Parenting/familial roles addressed, Positive behavior reinforced, Self-care skills, Stressors assessed and Supportive psychotherapy    Treatment Goal(s)/Objective(s) addressed: Stabilize/improve mood, boundaries, sense of purpose     Progress toward Treatment Goals: Moderate improvement    Plan:  - Continue Individual therapy and Medication management    Linda Rick, SELWYN  9/11/2017

## 2017-09-14 ENCOUNTER — OFFICE VISIT (OUTPATIENT)
Dept: INTERNAL MEDICINE | Facility: IMAGING CENTER | Age: 77
End: 2017-09-14
Payer: MEDICARE

## 2017-09-14 ENCOUNTER — HOSPITAL ENCOUNTER (OUTPATIENT)
Facility: MEDICAL CENTER | Age: 77
End: 2017-09-14
Attending: FAMILY MEDICINE
Payer: MEDICARE

## 2017-09-14 VITALS
TEMPERATURE: 97.3 F | DIASTOLIC BLOOD PRESSURE: 75 MMHG | HEART RATE: 80 BPM | RESPIRATION RATE: 14 BRPM | BODY MASS INDEX: 24.11 KG/M2 | HEIGHT: 66 IN | OXYGEN SATURATION: 97 % | SYSTOLIC BLOOD PRESSURE: 132 MMHG | WEIGHT: 150 LBS

## 2017-09-14 DIAGNOSIS — Z23 NEED FOR INFLUENZA VACCINATION: ICD-10-CM

## 2017-09-14 DIAGNOSIS — N39.0 FREQUENT UTI: ICD-10-CM

## 2017-09-14 PROCEDURE — G0008 ADMIN INFLUENZA VIRUS VAC: HCPCS | Performed by: FAMILY MEDICINE

## 2017-09-14 PROCEDURE — 90662 IIV NO PRSV INCREASED AG IM: CPT | Performed by: FAMILY MEDICINE

## 2017-09-14 PROCEDURE — 99214 OFFICE O/P EST MOD 30 MIN: CPT | Mod: 25 | Performed by: FAMILY MEDICINE

## 2017-09-14 PROCEDURE — 87086 URINE CULTURE/COLONY COUNT: CPT

## 2017-09-14 RX ORDER — QUETIAPINE 150 MG/1
TABLET, FILM COATED, EXTENDED RELEASE ORAL
COMMUNITY
End: 2018-08-15

## 2017-09-14 ASSESSMENT — PATIENT HEALTH QUESTIONNAIRE - PHQ9: CLINICAL INTERPRETATION OF PHQ2 SCORE: 0

## 2017-09-14 NOTE — PROGRESS NOTES
Chief Complaint   Patient presents with   • UTI     started this am       HISTORY OF PRESENT ILLNESS: Patient is a 77 y.o. female established patient who presents todayComplaining of some burning with urination that started this morning. She took a Pyridium and it did help. She has been fighting frequent urinary tract infections. She was treated on September 2 for UTI. Finished Bactrim on September 9. And then symptoms started this morning. Prior to that she had been on Omnicef and nitrofurantoin. She had 2 positive cultures in August. Both positive for Escherichia coli.  She is trying to drink more fluids.  Renal ultrasound showed:  1.  No hydronephrosis.    2.  Atrophy of the right kidney.    3.  Large cyst in the left kidney.    4.  Post void residual in the urinary bladder is 225 mL.    She denies any back pain or fevers.  She is using estrogen cream topically around the urethra 3 times a week.      Patient Active Problem List    Diagnosis Date Noted   • CKD (chronic kidney disease) stage 3, GFR 30-59 ml 09/18/2011     Priority: Low   • Hypertension 06/20/2011     Priority: Low   • Bipolar affective disorder (CMS-HCC) 12/01/2010     Priority: Low   • Hypothyroid 12/01/2010     Priority: Low   • Macular degeneration 12/11/2016   • Hard of hearing 12/12/2014   • Poor balance 08/07/2014   • Hyperparathyroidism, secondary (CMS-HCC) 02/08/2012   • Osteoporosis 12/07/2011   • UI (urinary incontinence) 12/01/2010   • Frequent UTI 12/01/2010   • Seasonal allergies 12/01/2010     Current Outpatient Prescriptions on File Prior to Visit   Medication Sig Dispense Refill   • QUEtiapine Fumarate (SEROQUEL XR) 150 MG TABLET SR 24 HR One tab by mouth at bed time 90 Tab 1   • divalproex ER (DEPAKOTE ER) 250 MG TABLET SR 24 HR TAKE 3 TABLETS AT BEDTIME 90 Tab 5   • estradiol (ESTRACE) 0.1 MG/GM vaginal cream Insert 0.5 g in vagina every 3 days. 1 Tube 3   • cyanocobalamin (VITAMIN B-12) 100 MCG Tab Take 100 mcg by mouth every  "day.     • Venlafaxine HCl 37.5 MG TABLET SR 24 HR Take 1 Tab by mouth every day. 90 Tab 1   • levothyroxine (SYNTHROID) 125 MCG Tab Take 1 Tab by mouth Every morning on an empty stomach. 90 Tab 4   • tolterodine ER (DETROL LA) 4 MG CAPSULE SR 24 HR Take 1 Cap by mouth every day. 90 Cap 3   • omeprazole (PRILOSEC) 20 MG delayed-release capsule Take 1 Cap by mouth every day. 90 Cap 3   • ascorbic acid (VITAMIN C) 500 MG tablet Take 1 Tab by mouth 2 Times a Day. 30 Tab 3     No current facility-administered medications on file prior to visit.        Past medical, surgical, family, and social history is reviewed and updated in Epic chart by me today.   Medications and allergies reviewed and updated in Epic chart by me today.     REVIEW OF SYSTEMS:  GENERAL: No fatigue, no weight loss.  CV:  No chest pain,dyspnea,palpitations or edema.  RESP:  No sob,cough,wheezing or hemoptysis.  GI: Her bowels are normal. She denies diarrhea. She has a bowel movement every 1-2 days. Sometimes 2 in a day. She is wiping appropriately.  : As above. No back pain. No hematuria.  MS:  No joint swelling, myalgias, or arthralgias.  NEURO:  No seizures, syncope, paralysis, tremor, or weakness.  PSYCH: Mood fine.    Vitals:    09/14/17 1300   BP: 132/75   Pulse: 80   Resp: 14   Temp: 36.3 °C (97.3 °F)   SpO2: 97%   Weight: 68 kg (150 lb)   Height: 1.676 m (5' 5.98\")     Physical Exam:  Gen: Well developed, well nourished. No acute distress.  Neck:  Supple, no adenopathy or thyromegaly.  Heart:  Regular rate and rhythm.  Normal S1, S2. No murmur, gallop or rub.  Lungs:  Clear, No wheezes,rales or rhonchi.  Extremities:  No edema.  Psych: Mood and affect are appropriate.        Assessment/Plan:  1. Frequent UTI . Urinalysis is not performed to to Pyridium. Will obtain urine culture. Encouraged her to continue fluids. If she does become more symptomatic we'll add an antibiotic but he would like to wait for the culture. Discussed proper wiping, " increasing fluids, timed voiding. Making sure that she gets herself time to try and empty her bladder completely. Anticipate daily low-dose Macrobid once this infection has cleared.  URINE CULTURE(NEW)   2. Need for influenza vaccination  INFLUENZA VACCINE, HIGH DOSE (65+ ONLY)      27 minutes Was spent face-to-face with patient, greater than 50% of time in counseling, education and developing a treatment plan.  Follow up by phone with culture results.

## 2017-09-17 LAB
BACTERIA UR CULT: NORMAL
SIGNIFICANT IND 70042: NORMAL
SOURCE SOURCE: NORMAL

## 2017-09-18 ENCOUNTER — TELEPHONE (OUTPATIENT)
Dept: INTERNAL MEDICINE | Facility: IMAGING CENTER | Age: 77
End: 2017-09-18

## 2017-09-18 RX ORDER — NITROFURANTOIN MACROCRYSTALS 50 MG/1
50 CAPSULE ORAL DAILY
Qty: 30 CAP | Refills: 2 | Status: SHIPPED | OUTPATIENT
Start: 2017-09-18 | End: 2017-10-24 | Stop reason: SDUPTHER

## 2017-09-18 NOTE — TELEPHONE ENCOUNTER
I spoke with  Dr. Schaefer. Urine culture was negative. Then that is still feeling tired and weak. No other specific symptoms except frequent urination.  Recommend checking some blood work, follow-up tomorrow.  She is to call if anything changes. For worsening symptoms may go to emergency room for immediate care.

## 2017-09-19 ENCOUNTER — TELEPHONE (OUTPATIENT)
Dept: INTERNAL MEDICINE | Facility: IMAGING CENTER | Age: 77
End: 2017-09-19

## 2017-09-19 DIAGNOSIS — E03.9 HYPOTHYROIDISM, UNSPECIFIED TYPE: ICD-10-CM

## 2017-09-19 DIAGNOSIS — R53.83 FATIGUE, UNSPECIFIED TYPE: ICD-10-CM

## 2017-09-19 DIAGNOSIS — R53.1 WEAKNESS: ICD-10-CM

## 2017-09-19 NOTE — TELEPHONE ENCOUNTER
----- Message from Carmela Woodard R.N. sent at 9/19/2017  9:07 AM PDT -----  Azeb does not want to be seen today but she was wondering if you could put some labs in for her and she will get them drawn today.    THanks.

## 2017-09-21 LAB
ALBUMIN SERPL-MCNC: 3.6 G/DL (ref 3.5–4.8)
ALBUMIN/GLOB SERPL: 1.3 {RATIO} (ref 1.2–2.2)
ALP SERPL-CCNC: 77 IU/L (ref 39–117)
ALT SERPL-CCNC: 13 IU/L (ref 0–32)
AST SERPL-CCNC: 21 IU/L (ref 0–40)
BASOPHILS # BLD AUTO: 0 X10E3/UL (ref 0–0.2)
BASOPHILS NFR BLD AUTO: 1 %
BILIRUB SERPL-MCNC: 0.3 MG/DL (ref 0–1.2)
BUN SERPL-MCNC: 29 MG/DL (ref 8–27)
BUN/CREAT SERPL: 30 (ref 12–28)
CALCIUM SERPL-MCNC: 9.1 MG/DL (ref 8.7–10.3)
CHLORIDE SERPL-SCNC: 107 MMOL/L (ref 96–106)
CO2 SERPL-SCNC: 21 MMOL/L (ref 18–29)
CREAT SERPL-MCNC: 0.97 MG/DL (ref 0.57–1)
EOSINOPHIL # BLD AUTO: 0.2 X10E3/UL (ref 0–0.4)
EOSINOPHIL NFR BLD AUTO: 5 %
ERYTHROCYTE [DISTWIDTH] IN BLOOD BY AUTOMATED COUNT: 15.1 % (ref 12.3–15.4)
GLOBULIN SER CALC-MCNC: 2.8 G/DL (ref 1.5–4.5)
GLUCOSE SERPL-MCNC: 75 MG/DL (ref 65–99)
HCT VFR BLD AUTO: 37.7 % (ref 34–46.6)
HGB BLD-MCNC: 12.4 G/DL (ref 11.1–15.9)
IMM GRANULOCYTES # BLD: 0 X10E3/UL (ref 0–0.1)
IMM GRANULOCYTES NFR BLD: 0 %
IMMATURE CELLS  115398: ABNORMAL
LYMPHOCYTES # BLD AUTO: 2 X10E3/UL (ref 0.7–3.1)
LYMPHOCYTES NFR BLD AUTO: 50 %
MCH RBC QN AUTO: 31.2 PG (ref 26.6–33)
MCHC RBC AUTO-ENTMCNC: 32.9 G/DL (ref 31.5–35.7)
MCV RBC AUTO: 95 FL (ref 79–97)
MONOCYTES # BLD AUTO: 0.6 X10E3/UL (ref 0.1–0.9)
MONOCYTES NFR BLD AUTO: 14 %
MORPHOLOGY BLD-IMP: ABNORMAL
NEUTROPHILS # BLD AUTO: 1.2 X10E3/UL (ref 1.4–7)
NEUTROPHILS NFR BLD AUTO: 30 %
NRBC BLD AUTO-RTO: ABNORMAL %
PLATELET # BLD AUTO: 149 X10E3/UL (ref 150–379)
POTASSIUM SERPL-SCNC: 5.1 MMOL/L (ref 3.5–5.2)
PROT SERPL-MCNC: 6.4 G/DL (ref 6–8.5)
RBC # BLD AUTO: 3.98 X10E6/UL (ref 3.77–5.28)
SODIUM SERPL-SCNC: 143 MMOL/L (ref 134–144)
T4 FREE SERPL-MCNC: 1.3 NG/DL (ref 0.82–1.77)
TSH SERPL DL<=0.005 MIU/L-ACNC: 0.91 UIU/ML (ref 0.45–4.5)
WBC # BLD AUTO: 4 X10E3/UL (ref 3.4–10.8)

## 2017-09-28 ENCOUNTER — OFFICE VISIT (OUTPATIENT)
Dept: INTERNAL MEDICINE | Facility: IMAGING CENTER | Age: 77
End: 2017-09-28
Payer: MEDICARE

## 2017-09-28 ENCOUNTER — HOSPITAL ENCOUNTER (OUTPATIENT)
Facility: MEDICAL CENTER | Age: 77
End: 2017-09-28
Attending: PSYCHIATRY & NEUROLOGY
Payer: MEDICARE

## 2017-09-28 VITALS
BODY MASS INDEX: 23.63 KG/M2 | TEMPERATURE: 97.9 F | RESPIRATION RATE: 14 BRPM | HEIGHT: 66 IN | SYSTOLIC BLOOD PRESSURE: 104 MMHG | HEART RATE: 73 BPM | WEIGHT: 147 LBS | OXYGEN SATURATION: 92 % | DIASTOLIC BLOOD PRESSURE: 62 MMHG

## 2017-09-28 DIAGNOSIS — R26.89 POOR BALANCE: ICD-10-CM

## 2017-09-28 DIAGNOSIS — R53.83 FATIGUE, UNSPECIFIED TYPE: ICD-10-CM

## 2017-09-28 DIAGNOSIS — R53.1 WEAKNESS: ICD-10-CM

## 2017-09-28 DIAGNOSIS — N39.0 FREQUENT UTI: ICD-10-CM

## 2017-09-28 LAB
APPEARANCE UR: CLEAR
BILIRUB UR STRIP-MCNC: NEGATIVE MG/DL
COLOR UR AUTO: YELLOW
GLUCOSE UR STRIP.AUTO-MCNC: NEGATIVE MG/DL
KETONES UR STRIP.AUTO-MCNC: NEGATIVE MG/DL
LEUKOCYTE ESTERASE UR QL STRIP.AUTO: NEGATIVE
NITRITE UR QL STRIP.AUTO: NEGATIVE
PH UR STRIP.AUTO: 6 [PH] (ref 5–8)
PROT UR QL STRIP: NEGATIVE MG/DL
RBC UR QL AUTO: NEGATIVE
SP GR UR STRIP.AUTO: 1
UROBILINOGEN UR STRIP-MCNC: NEGATIVE MG/DL

## 2017-09-28 PROCEDURE — 81002 URINALYSIS NONAUTO W/O SCOPE: CPT | Performed by: FAMILY MEDICINE

## 2017-09-28 PROCEDURE — 80164 ASSAY DIPROPYLACETIC ACD TOT: CPT

## 2017-09-28 PROCEDURE — 99214 OFFICE O/P EST MOD 30 MIN: CPT | Performed by: FAMILY MEDICINE

## 2017-09-29 LAB — VALPROATE SERPL-MCNC: 73.5 UG/ML (ref 50–100)

## 2017-09-30 NOTE — PROGRESS NOTES
Chief Complaint   Patient presents with   • Fatigue     feels weaker       HISTORY OF PRESENT ILLNESS: Patient is a 77 y.o. female established patient who presents today With her daughter, Becca, complaining of increased fatigue and weakness over the past 3-4 weeks. No particular other complaints. She has had  Frequent urinary tract infections. Her most recent urine culture was negative. She is now on Macrodantin 50 mg once daily for 3 months. She's trying to drink plenty of fluids.  She states she feels more tired and weaker and her balance is worse. No recent falls. She does walk with a cane. She also has a walker if needed.    Psychiatry has decreased her Seroquel and discontinue the Effexor. She is wondering if she might be depressed. She denies thoughts of hopelessness or suicidal. She is mostly feeling more tired and not as strong.    Lab work has been normal.      Patient Active Problem List    Diagnosis Date Noted   • CKD (chronic kidney disease) stage 3, GFR 30-59 ml 09/18/2011     Priority: Low   • Hypertension 06/20/2011     Priority: Low   • Bipolar affective disorder (CMS-HCC) 12/01/2010     Priority: Low   • Hypothyroid 12/01/2010     Priority: Low   • Macular degeneration 12/11/2016   • Hard of hearing 12/12/2014   • Poor balance 08/07/2014   • Hyperparathyroidism, secondary (CMS-HCC) 02/08/2012   • Osteoporosis 12/07/2011   • UI (urinary incontinence) 12/01/2010   • Frequent UTI 12/01/2010   • Seasonal allergies 12/01/2010     Current Outpatient Prescriptions on File Prior to Visit   Medication Sig Dispense Refill   • nitrofurantoin (MACRODANTIN) 50 MG Cap Take 1 Cap by mouth every day. 30 Cap 2   • QUEtiapine Fumarate (SEROQUEL XR) 150 MG TABLET SR 24 HR Take  by mouth.     • QUEtiapine Fumarate (SEROQUEL XR) 150 MG TABLET SR 24 HR One tab by mouth at bed time 90 Tab 1   • divalproex ER (DEPAKOTE ER) 250 MG TABLET SR 24 HR TAKE 3 TABLETS AT BEDTIME 90 Tab 5   • estradiol (ESTRACE) 0.1 MG/GM  "vaginal cream Insert 0.5 g in vagina every 3 days. 1 Tube 3   • cyanocobalamin (VITAMIN B-12) 100 MCG Tab Take 100 mcg by mouth every day.     • levothyroxine (SYNTHROID) 125 MCG Tab Take 1 Tab by mouth Every morning on an empty stomach. 90 Tab 4   • tolterodine ER (DETROL LA) 4 MG CAPSULE SR 24 HR Take 1 Cap by mouth every day. 90 Cap 3   • omeprazole (PRILOSEC) 20 MG delayed-release capsule Take 1 Cap by mouth every day. 90 Cap 3   • ascorbic acid (VITAMIN C) 500 MG tablet Take 1 Tab by mouth 2 Times a Day. 30 Tab 3     No current facility-administered medications on file prior to visit.          Past medical, surgical, family, and social history is reviewed and updated in Epic chart by me today.   Medications and allergies reviewed and updated in Epic chart by me today.     REVIEW OF SYSTEMS:  GENERAL: increased fatigue, no weight loss.  HEENT:  Ears--no earache, no change in hearing, no dizziness, no tinnitus.                 Eyes--no blurred vision, no discharge or pain                 Throat--No sore throat, no dysphagia, no hoarseness                  She has had issues with her teeth and is working with her dentist for denture adjustment.  CV:  No chest pain,dyspnea,palpitations or edema.  RESP:  No sob,cough,wheezing or hemoptysis.  GI: No dysphagia, heartburn,abdominal pain, nausea, vomiting, diarrhea or constipation.       No melena, jaundice, bleeding, incontinence or change in bowel habits.  :  No dysuria, polyuria, hematuria, incontinence. Wakes x2.   MS:  No joint swelling, myalgias, or arthralgias.  NEURO:  No seizures, syncope, paralysis, tremor. Generalized weakness  SKIN: No new or concerning skin lesions or changes.   PSYCH: Mood as above.     Vitals:    09/28/17 1400   BP: 104/62   Pulse: 73   Resp: 14   Temp: 36.6 °C (97.9 °F)   SpO2: 92%   Weight: 66.7 kg (147 lb)   Height: 1.676 m (5' 5.98\")     Physical Exam:  Gen: Well developed, well nourished. No acute distress.  Alert and oriented x " 3.  Neck:  Supple, no adenopathy or thyromegaly.  Heart:  Regular rate and rhythm.  Normal S1, S2. No murmur, gallop or rub.  Lungs:  Clear, No wheezes,rales or rhonchi.  Abdomen: Soft, nontender, nondistended. Normal bowel sounds. No hepatosplenomegaly or masses, or hernias. No rebound or guarding.  Extremities:  No edema.  Gait: poor balance  Psych: Mood and affect are appropriate.    Lab Results   Component Value Date/Time    CHOLSTRLTOT 162 01/07/2011 11:35 AM    LDL 89 01/07/2011 11:35 AM    HDL 42 01/07/2011 11:35 AM    TRIGLYCERIDE 154 (H) 01/07/2011 11:35 AM       Lab Results   Component Value Date/Time    SODIUM 143 09/20/2017 08:11 AM    SODIUM 135 04/04/2017 09:15 AM    POTASSIUM 5.1 09/20/2017 08:11 AM    POTASSIUM 5.3 04/04/2017 09:15 AM    CHLORIDE 107 (H) 09/20/2017 08:11 AM    CHLORIDE 105 04/04/2017 09:15 AM    CO2 21 09/20/2017 08:11 AM    CO2 26 04/04/2017 09:15 AM    GLUCOSE 75 09/20/2017 08:11 AM    GLUCOSE 88 04/04/2017 09:15 AM    BUN 29 (H) 09/20/2017 08:11 AM    BUN 28 (H) 04/04/2017 09:15 AM    CREATININE 0.97 09/20/2017 08:11 AM    CREATININE 1.09 04/04/2017 09:15 AM    CREATININE 1.38 (H) 03/14/2011 11:05 AM    BUNCREATRAT 30 (H) 09/20/2017 08:11 AM    BUNCREATRAT 22 03/14/2011 11:05 AM    GLOMRATE 38 (L) 03/14/2011 11:05 AM     Lab Results   Component Value Date/Time    ALKPHOSPHAT 77 09/20/2017 08:11 AM    ALKPHOSPHAT 70 12/07/2016 09:50 AM    ASTSGOT 21 09/20/2017 08:11 AM    ASTSGOT 16 12/07/2016 09:50 AM    ALTSGPT 13 09/20/2017 08:11 AM    ALTSGPT 12 12/07/2016 09:50 AM    TBILIRUBIN 0.3 09/20/2017 08:11 AM    TBILIRUBIN 0.3 12/07/2016 09:50 AM        Lab Results   Component Value Date/Time    WBC 4.0 09/20/2017 08:11 AM    WBC 3.8 (L) 12/07/2016 09:50 AM    WBC 4.8 03/14/2011 11:05 AM    RBC 3.98 09/20/2017 08:11 AM    RBC 4.36 12/07/2016 09:50 AM    RBC 4.34 03/14/2011 11:05 AM    HEMOGLOBIN 12.4 09/20/2017 08:11 AM    HEMOGLOBIN 13.6 12/07/2016 09:50 AM    HEMATOCRIT 37.7  09/20/2017 08:11 AM    HEMATOCRIT 42.1 12/07/2016 09:50 AM    MCV 95 09/20/2017 08:11 AM    MCV 96.6 12/07/2016 09:50 AM    MCV 95 03/14/2011 11:05 AM    MCH 31.2 09/20/2017 08:11 AM    MCH 31.2 12/07/2016 09:50 AM    MCH 31.6 03/14/2011 11:05 AM    MCHC 32.9 09/20/2017 08:11 AM    MCHC 32.3 (L) 12/07/2016 09:50 AM    MPV 11.5 12/07/2016 09:50 AM    NEUTSPOLYS 30 09/20/2017 08:11 AM    NEUTSPOLYS 47.70 12/07/2016 09:50 AM    LYMPHOCYTES 50 09/20/2017 08:11 AM    LYMPHOCYTES 34.60 12/07/2016 09:50 AM    MONOCYTES 14 09/20/2017 08:11 AM    MONOCYTES 14.60 (H) 12/07/2016 09:50 AM    EOSINOPHILS 5 09/20/2017 08:11 AM    EOSINOPHILS 1.60 12/07/2016 09:50 AM    BASOPHILS 1 09/20/2017 08:11 AM    BASOPHILS 1.00 12/07/2016 09:50 AM    ANISOCYTOSIS 1+ 01/08/2015 03:00 AM      TSH:  0.910      Assessment/Plan:  1. Frequent UTI . Urinalysis today is clear. Continue Macrodantin, 50 mg daily for 3 months. Monitor urinary tract infections. Increase fluids. POCT Urinalysis   2. Weakness . Referral to physical therapy to work on strengthening and balance. REFERRAL TO PHYSICAL THERAPY Reason for Therapy: Eval/Treat/Report   3. Poor balance  REFERRAL TO PHYSICAL THERAPY Reason for Therapy: Eval/Treat/Report   4. Fatigue, unspecified type . Uncertain etiology. Suspect multifactorial. She does have improved with psychiatry in the next month as well. Encouraged her to call for any change or worsening of her symptoms.    30 minutes was spent face to face with patient, > 50% of that time spent in counseling, education and developing a treatment plan.    Followup here three-month

## 2017-10-02 ENCOUNTER — OFFICE VISIT (OUTPATIENT)
Dept: BEHAVIORAL HEALTH | Facility: PHYSICIAN GROUP | Age: 77
End: 2017-10-02
Payer: MEDICARE

## 2017-10-02 DIAGNOSIS — F31.30 BIPOLAR I DISORDER DEPRESSED WITH ATYPICAL FEATURES (HCC): ICD-10-CM

## 2017-10-02 PROCEDURE — 90834 PSYTX W PT 45 MINUTES: CPT | Performed by: SOCIAL WORKER
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
## 2017-10-23 ENCOUNTER — OFFICE VISIT (OUTPATIENT)
Dept: BEHAVIORAL HEALTH | Facility: PHYSICIAN GROUP | Age: 77
End: 2017-10-23
Payer: MEDICARE

## 2017-10-23 VITALS
HEART RATE: 74 BPM | DIASTOLIC BLOOD PRESSURE: 68 MMHG | HEIGHT: 66 IN | TEMPERATURE: 97.9 F | RESPIRATION RATE: 16 BRPM | WEIGHT: 150 LBS | BODY MASS INDEX: 24.11 KG/M2 | SYSTOLIC BLOOD PRESSURE: 112 MMHG

## 2017-10-23 DIAGNOSIS — F31.60 BIPOLAR 1 DISORDER, MIXED (HCC): ICD-10-CM

## 2017-10-23 PROCEDURE — 90833 PSYTX W PT W E/M 30 MIN: CPT | Performed by: PSYCHIATRY & NEUROLOGY

## 2017-10-23 PROCEDURE — 99213 OFFICE O/P EST LOW 20 MIN: CPT | Performed by: PSYCHIATRY & NEUROLOGY

## 2017-10-23 ASSESSMENT — PATIENT HEALTH QUESTIONNAIRE - PHQ9
8. MOVING OR SPEAKING SO SLOWLY THAT OTHER PEOPLE COULD HAVE NOTICED. OR THE OPPOSITE, BEING SO FIGETY OR RESTLESS THAT YOU HAVE BEEN MOVING AROUND A LOT MORE THAN USUAL: 0
7. TROUBLE CONCENTRATING ON THINGS, SUCH AS READING THE NEWSPAPER OR WATCHING TELEVISION: 1
2. FEELING DOWN, DEPRESSED, IRRITABLE, OR HOPELESS: 1
5. POOR APPETITE OR OVEREATING: 1
4. FEELING TIRED OR HAVING LITTLE ENERGY: 1
9. THOUGHTS THAT YOU WOULD BE BETTER OFF DEAD, OR OF HURTING YOURSELF: 0
1. LITTLE INTEREST OR PLEASURE IN DOING THINGS: 1
6. FEELING BAD ABOUT YOURSELF - OR THAT YOU ARE A FAILURE OR HAVE LET YOURSELF OR YOUR FAMILY DOWN: 1
SUM OF ALL RESPONSES TO PHQ QUESTIONS 1-9: 7
SUM OF ALL RESPONSES TO PHQ9 QUESTIONS 1 AND 2: 2
3. TROUBLE FALLING OR STAYING ASLEEP OR SLEEPING TOO MUCH: 1

## 2017-10-23 NOTE — PROGRESS NOTES
"[facility]  PSYCHIATRIC FOLLOW-UP NOTE    Name: Azeb Major  MRN: [id number]  : 1940  Age: 77 y.o.  Date of assessment: 10/23/2017  PCP: Josy Arteaga M.D.  Persons in attendance: Patient and Children  REASON FOR VISIT/CHIEF COMPLAINT (as stated by Patient and Children):  Azeb Major is a 77 y.o., White female, attending follow-up appointment for   Chief Complaint   Patient presents with   • Medication Management     The patient came with her two daughters and she is doing well on medications.   .      HISTORY OF PRESENT ILLNESS:    The patient is seen today for follow up with her two daughters. The patient has been taking depakote and quetiapine. The patient has bene sleeping well and she denied depression. The patient has been suffering from UTI and she is on antibiotics. The patient has been sleeping alone at home and her children live close to her home.     PSYCHOSOCIAL CHANGES SINCE PREVIOUS CONTACT:  The patient denied depression, and hypomania.    RESPONSE TO TREATMENT:  The patient is taking Divalproex  mg three at night, seroquel   mg one at night.    MEDICATION SIDE EFFECTS:  Denied.    REVIEW OF SYSTEMS:        Constitutional negative   Eyes negative   Ears/Nose/Mouth/Throat negative   Cardiovascular positive - hypertension   Respiratory negative   Gastrointestinal negative   Genitourinary positive - frequent UTI, chronic kidney disease   Muscular negative   Integumentary negative   Neurological negative   Endocrine positive - hypothyroidism.   Hematologic/Lymphatic negative       PSYCHIATRIC EXAMINATION/MENTAL STATUS  /68   Pulse 74   Temp 36.6 °C (97.9 °F)   Resp 16   Ht 1.676 m (5' 5.98\")   Wt 68 kg (150 lb)   BMI 24.22 kg/m²   Participation: Active verbal participation, Attentive and Engaged  Grooming:Neat  Orientation: Alert and Fully Oriented  Eye contact: Good  Behavior:Calm   Mood: Anxious  Affect: Flexible and Full range  Thought " process: Logical and Goal-directed  Thought content:  Within normal limits  Speech: Rate within normal limits and Volume within normal limits  Perception:  Within normal limits  Memory:  Poor memory for chronology of events  Insight: Good  Judgment: Good  Family/couple interaction observations:   Other:    Current risk:    Suicide: Low   Homicide: Low   Self-harm: Low  Relapse: Low  Other:   Crisis Safety Plan reviewed?Yes  If evidence of imminent risk is present, intervention/plan:    Medical Records/Labs/Diagnostic Tests Reviewed: yes.    Medical Records/Labs/Diagnostic Tests Ordered: none.    DIAGNOSTIC IMPRESSION(S):  1. Bipolar 1 disorder, mixed (CMS-[facility])           ASSESSMENT AND PLAN:    1. Bipolar 1, disorder mixed.  Divalproex  mg three at night  Seroquel  mg one at night.    2. Follow up in two months.    16 minutes were spent in psychotherapy.  (If greater than 16 minutes, add 67976 code)   Topics addressed in psychotherapy include:  she is able to live alone and her children are helping with her meals and medications.   The patient is able to keep up a daily routine and a good sleep cycle.  The patient has emergency phone numbers to call for help if she needed.  Anthony Fowler M.D.

## 2017-10-24 RX ORDER — NITROFURANTOIN MACROCRYSTALS 50 MG/1
50 CAPSULE ORAL 4 TIMES DAILY
Qty: 40 CAP | Refills: 0 | Status: SHIPPED | OUTPATIENT
Start: 2017-10-24 | End: 2018-01-08 | Stop reason: SDUPTHER

## 2017-12-01 DIAGNOSIS — L60.0 INGROWN TOENAIL: ICD-10-CM

## 2017-12-11 ENCOUNTER — OFFICE VISIT (OUTPATIENT)
Dept: BEHAVIORAL HEALTH | Facility: PHYSICIAN GROUP | Age: 77
End: 2017-12-11
Payer: MEDICARE

## 2017-12-11 VITALS
HEIGHT: 66 IN | SYSTOLIC BLOOD PRESSURE: 110 MMHG | RESPIRATION RATE: 16 BRPM | WEIGHT: 150 LBS | BODY MASS INDEX: 24.11 KG/M2 | HEART RATE: 78 BPM | TEMPERATURE: 97.5 F | DIASTOLIC BLOOD PRESSURE: 68 MMHG

## 2017-12-11 DIAGNOSIS — F31.77 BIPOLAR DISORDER, IN PARTIAL REMISSION, MOST RECENT EPISODE MIXED (HCC): ICD-10-CM

## 2017-12-11 DIAGNOSIS — F31.9 BIPOLAR 1 DISORDER, DEPRESSED (HCC): ICD-10-CM

## 2017-12-11 PROCEDURE — 90833 PSYTX W PT W E/M 30 MIN: CPT | Performed by: PSYCHIATRY & NEUROLOGY

## 2017-12-11 PROCEDURE — 90834 PSYTX W PT 45 MINUTES: CPT | Performed by: SOCIAL WORKER

## 2017-12-11 PROCEDURE — 99213 OFFICE O/P EST LOW 20 MIN: CPT | Performed by: PSYCHIATRY & NEUROLOGY

## 2017-12-11 RX ORDER — VENLAFAXINE HYDROCHLORIDE 37.5 MG/1
37.5 TABLET, EXTENDED RELEASE ORAL DAILY
Qty: 30 TAB | Refills: 0 | Status: SHIPPED | OUTPATIENT
Start: 2017-12-11 | End: 2018-02-07

## 2017-12-11 RX ORDER — VENLAFAXINE HYDROCHLORIDE 75 MG/1
75 CAPSULE, EXTENDED RELEASE ORAL DAILY
Qty: 90 CAP | Refills: 1 | Status: SHIPPED | OUTPATIENT
Start: 2017-12-11 | End: 2018-02-07

## 2017-12-11 ASSESSMENT — PATIENT HEALTH QUESTIONNAIRE - PHQ9
7. TROUBLE CONCENTRATING ON THINGS, SUCH AS READING THE NEWSPAPER OR WATCHING TELEVISION: 1
SUM OF ALL RESPONSES TO PHQ9 QUESTIONS 1 AND 2: 4
6. FEELING BAD ABOUT YOURSELF - OR THAT YOU ARE A FAILURE OR HAVE LET YOURSELF OR YOUR FAMILY DOWN: 2
SUM OF ALL RESPONSES TO PHQ QUESTIONS 1-9: 12
1. LITTLE INTEREST OR PLEASURE IN DOING THINGS: 2
2. FEELING DOWN, DEPRESSED, IRRITABLE, OR HOPELESS: 2
4. FEELING TIRED OR HAVING LITTLE ENERGY: 1
9. THOUGHTS THAT YOU WOULD BE BETTER OFF DEAD, OR OF HURTING YOURSELF: 0
8. MOVING OR SPEAKING SO SLOWLY THAT OTHER PEOPLE COULD HAVE NOTICED. OR THE OPPOSITE, BEING SO FIGETY OR RESTLESS THAT YOU HAVE BEEN MOVING AROUND A LOT MORE THAN USUAL: 1
3. TROUBLE FALLING OR STAYING ASLEEP OR SLEEPING TOO MUCH: 1
5. POOR APPETITE OR OVEREATING: 2

## 2017-12-11 NOTE — PROGRESS NOTES
"[facility]  PSYCHIATRIC FOLLOW-UP NOTE    Name: Azeb Major  MRN: [id number]  : 1940  Age: 77 y.o.  Date of assessment: 2017  PCP: Josy Arteaga M.D.  Persons in attendance: Patient and Children  REASON FOR VISIT/CHIEF COMPLAINT (as stated by Patient and Children):  Azeb Major is a 77 y.o., White female, attending follow-up appointment for   Chief Complaint   Patient presents with   • Medication Management     I have been dpressed all the time.    .      HISTORY OF PRESENT ILLNESS:    This is a 78 yo female, , disabled, seen today for follow up with her two daughters. The patient reported that she has no motivation and sleeping all the time. The patient lives alone and her daughter and son  Are helping her at home. The patient has a care taker coming home to help her. The patient denied hypomania.     PSYCHOSOCIAL CHANGES SINCE PREVIOUS CONTACT:  The patient has bene feeling tired all the time and feeling alone and depressed.    RESPONSE TO TREATMENT:  The patient takes divalproex  mg three at night, seroquel  mg one at night.    MEDICATION SIDE EFFECTS:  Denied.    REVIEW OF SYSTEMS:        Constitutional negative   Eyes negative   Ears/Nose/Mouth/Throat negative   Cardiovascular positive - hypertension   Respiratory negative   Gastrointestinal negative   Genitourinary positive - frequent UTI, chronic kidney disease.   Muscular negative   Integumentary positive - none.   Neurological negative   Endocrine positive - hypothyroidism   Hematologic/Lymphatic negative       PSYCHIATRIC EXAMINATION/MENTAL STATUS  /68   Pulse 78   Temp 36.4 °C (97.5 °F)   Resp 16   Ht 1.676 m (5' 5.98\")   Wt 68 kg (150 lb)   BMI 24.22 kg/m²   Participation: Active verbal participation, Attentive and Engaged  Grooming:Neat  Orientation: Alert and Fully Oriented  Eye contact: Good  Behavior:Calm   Mood: Anxious  Affect: Flexible and Full range  Thought process: " Logical  Thought content:  Within normal limits  Speech: Rate within normal limits  Perception:  Within normal limits  Memory:  Poor memory for chronology of events  Insight: Good  Judgment: Good  Family/couple interaction observations:   Other:    Current risk:    Suicide: Low   Homicide: Low   Self-harm: Low  Relapse: Low  Other:   Crisis Safety Plan reviewed?Yes  If evidence of imminent risk is present, intervention/plan:    Medical Records/Labs/Diagnostic Tests Reviewed: yes.    Medical Records/Labs/Diagnostic Tests Ordered:   CBC, Comp Panel, and BILL.    DIAGNOSTIC IMPRESSION(S):  1. Bipolar 1 disorder, depressed (CMS-[facility])           ASSESSMENT AND PLAN:    1. Bipolar 1, depressed.  Start venlafaxine ER 37.5 mg one a day for one month # 30 NR  Then venlafaxine ER 75 mg one a day # 90 refills one.  Divalproex  mg Three at night.  seroquel  mg one at night.    2. CBC, Comp Panel, and Valproic acid level.    3. Follow up in six weeks.      16 minutes were spent in psychotherapy.  (If greater than 16 minutes, add 93831 code)   Topics addressed in psychotherapy include:  The patient is living alone but has good family support.  She has a daily routine and exercise.  Improving her self esteem  Anthony Fowler M.D.

## 2017-12-11 NOTE — BH THERAPY
" [facility]  Therapy Progress Note    Patient Name: Azeb Major  Patient MRN: [id number]  Today's Date: 12/11/2017     Type of session:Individual psychotherapy  Length of session: 50 minutes  Persons in attendance:Patient    Subjective/New Info: Patient reports doing \"terribly\": has had mice in her living room & kitchen, so doesn't feel comfortable sitting in her chair or using the kitchen unless someone else is there; son sprayed D-Con and  will be coming tomorrow.  Also having trouble with her balance, perhaps re: macular degeneration and need for bifocals to be adjusted, so that she has not felt able to get out and walk; plans to start using walker and also to resume physical therapy.  Somewhat lonely, mildly depressed; children have offered to come if she calls them, but she hates to bother them.  Was able to enjoy Thanksgiving.  Relationship with Norma improving.  Dentures fitting better.    Objective/Observations:   Participation: Active verbal participation, Attentive, Engaged and Open to feedback   Grooming: Good   Cognition: Alert and Fully Oriented   Eye contact: Good   Mood: Slightly anxious, slightly depressed.   Affect: Flexible, Full range and Congruent with content   Thought process: Logical and Goal-directed   Speech: Rate within normal limits and Volume within normal limits   Other:     Diagnoses: No diagnosis found. Bipolar I, depressed.    Current risk:   SUICIDE: Low   Homicide: Low   Self-harm: Low   Relapse: Not applicable   Other:    Safety Plan reviewed? Not Indicated   If evidence of imminent risk is present, intervention/plan:     Therapeutic Intervention(s): Clarify:  Clarify feelings and Clarify thoughts, Cognitive modification, Communication skills, Distress tolerance skills, Leisure and recreation skills, Parenting/familial roles addressed, Self-care skills, Stressors assessed and Supportive psychotherapy    Treatment Goal(s)/Objective(s) addressed: " Stabilize mood, communicate needs more effectively, resolve old traumas    Progress toward Treatment Goals: Mild improvement    Plan:  - Continue Individual therapy and Medication management; [facility]; walk more with walker.    Linda Rick  12/11/2017

## 2017-12-13 RX ORDER — OMEPRAZOLE 20 MG/1
CAPSULE, DELAYED RELEASE ORAL
Qty: 90 CAP | Refills: 3 | Status: SHIPPED | OUTPATIENT
Start: 2017-12-13 | End: 2018-11-19 | Stop reason: SDUPTHER

## 2017-12-20 ENCOUNTER — HOSPITAL ENCOUNTER (OUTPATIENT)
Facility: MEDICAL CENTER | Age: 77
End: 2017-12-20
Attending: PSYCHIATRY & NEUROLOGY
Payer: MEDICARE

## 2017-12-20 ENCOUNTER — NON-PROVIDER VISIT (OUTPATIENT)
Dept: INTERNAL MEDICINE | Facility: IMAGING CENTER | Age: 77
End: 2017-12-20
Payer: MEDICARE

## 2017-12-20 DIAGNOSIS — F31.9 BIPOLAR 1 DISORDER, DEPRESSED (HCC): ICD-10-CM

## 2017-12-20 LAB
ALBUMIN SERPL BCP-MCNC: 3.9 G/DL (ref 3.2–4.9)
ALBUMIN/GLOB SERPL: 1.2 G/DL
ALP SERPL-CCNC: 74 U/L (ref 30–99)
ALT SERPL-CCNC: 13 U/L (ref 2–50)
ANION GAP SERPL CALC-SCNC: 7 MMOL/L (ref 0–11.9)
AST SERPL-CCNC: 20 U/L (ref 12–45)
BASOPHILS # BLD AUTO: 0.9 % (ref 0–1.8)
BASOPHILS # BLD: 0.04 K/UL (ref 0–0.12)
BILIRUB SERPL-MCNC: 0.3 MG/DL (ref 0.1–1.5)
BUN SERPL-MCNC: 30 MG/DL (ref 8–22)
CALCIUM SERPL-MCNC: 9 MG/DL (ref 8.5–10.5)
CHLORIDE SERPL-SCNC: 107 MMOL/L (ref 96–112)
CO2 SERPL-SCNC: 23 MMOL/L (ref 20–33)
CREAT SERPL-MCNC: 1.11 MG/DL (ref 0.5–1.4)
EOSINOPHIL # BLD AUTO: 0.08 K/UL (ref 0–0.51)
EOSINOPHIL NFR BLD: 1.7 % (ref 0–6.9)
ERYTHROCYTE [DISTWIDTH] IN BLOOD BY AUTOMATED COUNT: 46.5 FL (ref 35.9–50)
GFR SERPL CREATININE-BSD FRML MDRD: 47 ML/MIN/1.73 M 2
GLOBULIN SER CALC-MCNC: 3.2 G/DL (ref 1.9–3.5)
GLUCOSE SERPL-MCNC: 99 MG/DL (ref 65–99)
HCT VFR BLD AUTO: 42.6 % (ref 37–47)
HGB BLD-MCNC: 13.6 G/DL (ref 12–16)
IMM GRANULOCYTES # BLD AUTO: 0.01 K/UL (ref 0–0.11)
IMM GRANULOCYTES NFR BLD AUTO: 0.2 % (ref 0–0.9)
LYMPHOCYTES # BLD AUTO: 1.72 K/UL (ref 1–4.8)
LYMPHOCYTES NFR BLD: 37 % (ref 22–41)
MCH RBC QN AUTO: 31 PG (ref 27–33)
MCHC RBC AUTO-ENTMCNC: 31.9 G/DL (ref 33.6–35)
MCV RBC AUTO: 97 FL (ref 81.4–97.8)
MONOCYTES # BLD AUTO: 0.49 K/UL (ref 0–0.85)
MONOCYTES NFR BLD AUTO: 10.5 % (ref 0–13.4)
NEUTROPHILS # BLD AUTO: 2.31 K/UL (ref 2–7.15)
NEUTROPHILS NFR BLD: 49.7 % (ref 44–72)
NRBC # BLD AUTO: 0 K/UL
NRBC BLD-RTO: 0 /100 WBC
PLATELET # BLD AUTO: 125 K/UL (ref 164–446)
PMV BLD AUTO: 11.7 FL (ref 9–12.9)
POTASSIUM SERPL-SCNC: 5.2 MMOL/L (ref 3.6–5.5)
PROT SERPL-MCNC: 7.1 G/DL (ref 6–8.2)
RBC # BLD AUTO: 4.39 M/UL (ref 4.2–5.4)
SODIUM SERPL-SCNC: 137 MMOL/L (ref 135–145)
VALPROATE SERPL-MCNC: 88.6 UG/ML (ref 50–100)
WBC # BLD AUTO: 4.7 K/UL (ref 4.8–10.8)

## 2017-12-20 PROCEDURE — 80164 ASSAY DIPROPYLACETIC ACD TOT: CPT

## 2017-12-20 PROCEDURE — 80053 COMPREHEN METABOLIC PANEL: CPT

## 2017-12-20 PROCEDURE — 85025 COMPLETE CBC W/AUTO DIFF WBC: CPT

## 2017-12-29 ENCOUNTER — TELEPHONE (OUTPATIENT)
Dept: BEHAVIORAL HEALTH | Facility: PHYSICIAN GROUP | Age: 77
End: 2017-12-29

## 2018-01-02 RX ORDER — DIVALPROEX SODIUM 250 MG/1
TABLET, EXTENDED RELEASE ORAL
Qty: 90 TAB | Refills: 5 | OUTPATIENT
Start: 2018-01-02 | End: 2018-01-05 | Stop reason: SDUPTHER

## 2018-01-05 RX ORDER — DIVALPROEX SODIUM 250 MG/1
TABLET, EXTENDED RELEASE ORAL
Qty: 270 TAB | Refills: 1 | Status: SHIPPED | OUTPATIENT
Start: 2018-01-05 | End: 2018-07-04 | Stop reason: SDUPTHER

## 2018-01-08 ENCOUNTER — OFFICE VISIT (OUTPATIENT)
Dept: BEHAVIORAL HEALTH | Facility: PHYSICIAN GROUP | Age: 78
End: 2018-01-08
Payer: MEDICARE

## 2018-01-08 DIAGNOSIS — N39.0 FREQUENT UTI: ICD-10-CM

## 2018-01-08 DIAGNOSIS — F31.9 BIPOLAR 1 DISORDER, DEPRESSED (HCC): ICD-10-CM

## 2018-01-08 PROCEDURE — 90834 PSYTX W PT 45 MINUTES: CPT | Performed by: SOCIAL WORKER

## 2018-01-08 RX ORDER — NITROFURANTOIN MACROCRYSTALS 50 MG/1
50 CAPSULE ORAL DAILY
Qty: 90 CAP | Refills: 0 | Status: SHIPPED | OUTPATIENT
Start: 2018-01-08 | End: 2018-04-24 | Stop reason: SDUPTHER

## 2018-01-08 NOTE — BH THERAPY
" [facility]  Therapy Progress Note    Patient Name: Azeb Major  Patient MRN: [id number]  Today's Date: 1/8/2018     Type of session:Individual psychotherapy  Length of session: 50 minutes  Persons in attendance:Patient and daughter Karissa at end.    Subjective/New Info: Patient reports doing \"terribly\": more depressed, having more trouble with teeth (difficulty putting in lower teeth, eating), feeling too weak & weather too bad to walk,  available only twice/week.  She plans to call dentist today, admits she could call the [employer] re: having someone visit and/or getting involved with activities there; might be willing to explore [facility], but not until teeth issue resolved.  Also admits feeling more anxious with son Wilfred (who lives across the street & is readily available) gone for several days. Daughter Karissa reinforced readiness to help Azeb get to dentist and activities, if  not available; encouraged mother to spend more time on the porch (in the sun, where can interact with neighbors).   Objective/Observations:   Participation: Active verbal participation, Attentive, Engaged and Open to feedback   Grooming: Good   Cognition: Alert and Fully Oriented   Eye contact: Good   Mood: Depressed   Affect: Constricted   Thought process: Logical and Goal-directed   Speech: Rate within normal limits and Volume within normal limits   Other:     Diagnoses: No diagnosis found. Bipolar disorder, depressed    Current risk:   SUICIDE: Low   Homicide: Low   Self-harm: Low   Relapse: Not applicable   Other:    Safety Plan reviewed? Not Indicated   If evidence of imminent risk is present, intervention/plan:     Therapeutic Intervention(s): Behavior:  Behavior analysis, Clarify:  Clarify feelings and Clarify thoughts, Cognitive modification, Goal-setting, Leisure and recreation skills, Self-care skills, Stressors assessed and Supportive psychotherapy    Treatment " Goal(s)/Objective(s) addressed: Stabilize/improve mood & sense of purpose     Progress toward Treatment Goals: No change    Plan:  - Continue Individual therapy and Medication management    Linda Rick  1/8/2018

## 2018-01-14 DIAGNOSIS — N39.46 MIXED INCONTINENCE: ICD-10-CM

## 2018-01-15 RX ORDER — TOLTERODINE 4 MG/1
CAPSULE, EXTENDED RELEASE ORAL
Qty: 90 CAP | Refills: 1 | Status: SHIPPED | OUTPATIENT
Start: 2018-01-15 | End: 2018-07-14 | Stop reason: SDUPTHER

## 2018-01-29 ENCOUNTER — OFFICE VISIT (OUTPATIENT)
Dept: BEHAVIORAL HEALTH | Facility: PHYSICIAN GROUP | Age: 78
End: 2018-01-29
Payer: MEDICARE

## 2018-01-29 VITALS
BODY MASS INDEX: 24.11 KG/M2 | RESPIRATION RATE: 16 BRPM | HEIGHT: 66 IN | HEART RATE: 76 BPM | WEIGHT: 150 LBS | SYSTOLIC BLOOD PRESSURE: 108 MMHG | TEMPERATURE: 98.1 F | DIASTOLIC BLOOD PRESSURE: 66 MMHG

## 2018-01-29 DIAGNOSIS — F31.9 BIPOLAR 1 DISORDER, DEPRESSED (HCC): ICD-10-CM

## 2018-01-29 PROCEDURE — 90833 PSYTX W PT W E/M 30 MIN: CPT | Performed by: PSYCHIATRY & NEUROLOGY

## 2018-01-29 PROCEDURE — 99213 OFFICE O/P EST LOW 20 MIN: CPT | Performed by: PSYCHIATRY & NEUROLOGY

## 2018-01-29 RX ORDER — VENLAFAXINE HYDROCHLORIDE 150 MG/1
150 CAPSULE, EXTENDED RELEASE ORAL DAILY
Qty: 90 CAP | Refills: 1 | Status: SHIPPED | OUTPATIENT
Start: 2018-01-29 | End: 2018-03-07

## 2018-01-29 ASSESSMENT — PATIENT HEALTH QUESTIONNAIRE - PHQ9
9. THOUGHTS THAT YOU WOULD BE BETTER OFF DEAD, OR OF HURTING YOURSELF: 0
4. FEELING TIRED OR HAVING LITTLE ENERGY: 2
6. FEELING BAD ABOUT YOURSELF - OR THAT YOU ARE A FAILURE OR HAVE LET YOURSELF OR YOUR FAMILY DOWN: 2
SUM OF ALL RESPONSES TO PHQ9 QUESTIONS 1 AND 2: 3
2. FEELING DOWN, DEPRESSED, IRRITABLE, OR HOPELESS: 2
7. TROUBLE CONCENTRATING ON THINGS, SUCH AS READING THE NEWSPAPER OR WATCHING TELEVISION: 1
5. POOR APPETITE OR OVEREATING: 1
1. LITTLE INTEREST OR PLEASURE IN DOING THINGS: 1
8. MOVING OR SPEAKING SO SLOWLY THAT OTHER PEOPLE COULD HAVE NOTICED. OR THE OPPOSITE, BEING SO FIGETY OR RESTLESS THAT YOU HAVE BEEN MOVING AROUND A LOT MORE THAN USUAL: 1
SUM OF ALL RESPONSES TO PHQ QUESTIONS 1-9: 11
3. TROUBLE FALLING OR STAYING ASLEEP OR SLEEPING TOO MUCH: 1

## 2018-01-29 NOTE — PROGRESS NOTES
"[facility]  PSYCHIATRIC FOLLOW-UP NOTE    Name: Azeb Major  MRN: [id number]  : 1940  Age: 77 y.o.  Date of assessment: 2018  PCP: Josy Arteaga M.D.  Persons in attendance: Patient and Children  REASON FOR VISIT/CHIEF COMPLAINT (as stated by Patient and Children):  Azeb Major is a 77 y.o., White female, attending follow-up appointment for   Chief Complaint   Patient presents with   • Medication Management     I am feeling down and depressed all the time.    .      HISTORY OF PRESENT ILLNESS:    This is a 76 yo female, came with her children for follow up. The patient has bene feeling depressed, sad for no reason and feeling anxiety att the time. The patient lives alone and her two daughters are helping her to get through the day. The patient is on antibiotic every day for her chronic UTI.The patient is taking he rmedications as prescribed.       PSYCHOSOCIAL CHANGES SINCE PREVIOUS CONTACT:  The patient ha sbene feeling depressed and anxious all the time.    RESPONSE TO TREATMENT:  She is taking Divalproex  mg three at night, seroquel  mg one at night.    MEDICATION SIDE EFFECTS:  Denied.    REVIEW OF SYSTEMS:        Constitutional negative   Eyes negative   Ears/Nose/Mouth/Throat negative   Cardiovascular positive - hypertension   Respiratory positive - frequent UTI, chronic kidney disease   Gastrointestinal negative   Genitourinary negative   Muscular negative   Integumentary negative   Neurological negative   Endocrine positive - hypothyroidism.   Hematologic/Lymphatic negative       PSYCHIATRIC EXAMINATION/MENTAL STATUS  /66   Pulse 76   Temp 36.7 °C (98.1 °F)   Resp 16   Ht 1.676 m (5' 5.98\")   Wt 68 kg (150 lb)   BMI 24.22 kg/m²   Participation: Active verbal participation, Attentive and Engaged  Grooming:Neat  Orientation: Alert and Fully Oriented  Eye contact: Good  Behavior:Calm   Mood: Depressed and Anxious  Affect: Sad and " Anxious  Thought process: Logical and Goal-directed  Thought content:  Within normal limits  Speech: Rate within normal limits and Volume within normal limits  Perception:  Within normal limits  Memory:  Poor memory for chronology of events  Insight: Good  Judgment: Good  Family/couple interaction observations:   Other:    Current risk:    Suicide: Low   Homicide: Low   Self-harm: Low  Relapse: Low  Other:   Crisis Safety Plan reviewed?Yes  If evidence of imminent risk is present, intervention/plan:    Medical Records/Labs/Diagnostic Tests Reviewed: yes.  Depakote level 88, abnormal kidney functions    Medical Records/Labs/Diagnostic Tests Ordered: none.    DIAGNOSTIC IMPRESSION(S):  1. Bipolar 1 disorder, depressed (CMS-[facility])           ASSESSMENT AND PLAN:    1. Bipolar disorder, depressed.  Increase Venlafaxine  mg per day.  Divalproex  mg three at night.  Seroquel  mg at night.    2. follow up in four weeks.    16 minutes were spent in psychotherapy.  (If greater than 16 minutes, add 20347 code)   Topics addressed in psychotherapy include:  The patient has a good family support.  She is able to take care of herself.  She has a daily routine and good sleep cycle.     Anthony Fowler M.D.

## 2018-01-30 RX ORDER — QUETIAPINE 150 MG/1
TABLET, EXTENDED RELEASE ORAL
Qty: 90 TAB | Refills: 1 | Status: SHIPPED | OUTPATIENT
Start: 2018-01-30 | End: 2018-08-15

## 2018-02-01 ENCOUNTER — NON-PROVIDER VISIT (OUTPATIENT)
Dept: INTERNAL MEDICINE | Facility: IMAGING CENTER | Age: 78
End: 2018-02-01
Payer: MEDICARE

## 2018-02-01 ENCOUNTER — HOSPITAL ENCOUNTER (OUTPATIENT)
Facility: MEDICAL CENTER | Age: 78
End: 2018-02-01
Attending: FAMILY MEDICINE
Payer: MEDICARE

## 2018-02-01 DIAGNOSIS — N39.0 FREQUENT UTI: ICD-10-CM

## 2018-02-01 LAB
APPEARANCE UR: NORMAL
BILIRUB UR STRIP-MCNC: NEGATIVE MG/DL
COLOR UR AUTO: YELLOW
GLUCOSE UR STRIP.AUTO-MCNC: NEGATIVE MG/DL
KETONES UR STRIP.AUTO-MCNC: NEGATIVE MG/DL
LEUKOCYTE ESTERASE UR QL STRIP.AUTO: NORMAL
NITRITE UR QL STRIP.AUTO: NEGATIVE
PH UR STRIP.AUTO: 6 [PH] (ref 5–8)
PROT UR QL STRIP: NEGATIVE MG/DL
RBC UR QL AUTO: NORMAL
SP GR UR STRIP.AUTO: 1
UROBILINOGEN UR STRIP-MCNC: NEGATIVE MG/DL

## 2018-02-01 PROCEDURE — 81002 URINALYSIS NONAUTO W/O SCOPE: CPT | Performed by: FAMILY MEDICINE

## 2018-02-01 PROCEDURE — 87077 CULTURE AEROBIC IDENTIFY: CPT

## 2018-02-01 PROCEDURE — 87086 URINE CULTURE/COLONY COUNT: CPT

## 2018-02-01 PROCEDURE — 87186 SC STD MICRODIL/AGAR DIL: CPT

## 2018-02-01 RX ORDER — SULFAMETHOXAZOLE AND TRIMETHOPRIM 800; 160 MG/1; MG/1
1 TABLET ORAL EVERY 12 HOURS
Qty: 14 TAB | Refills: 0 | Status: SHIPPED | OUTPATIENT
Start: 2018-02-01 | End: 2018-02-03

## 2018-02-03 ENCOUNTER — TELEPHONE (OUTPATIENT)
Dept: INTERNAL MEDICINE | Facility: IMAGING CENTER | Age: 78
End: 2018-02-03

## 2018-02-03 LAB
BACTERIA UR CULT: ABNORMAL
BACTERIA UR CULT: ABNORMAL
SIGNIFICANT IND 70042: ABNORMAL
SITE SITE: ABNORMAL
SOURCE SOURCE: ABNORMAL

## 2018-02-03 RX ORDER — CEFDINIR 300 MG/1
300 CAPSULE ORAL 2 TIMES DAILY
Qty: 14 CAP | Refills: 0 | Status: SHIPPED | OUTPATIENT
Start: 2018-02-03 | End: 2018-02-08 | Stop reason: SDUPTHER

## 2018-02-05 ENCOUNTER — TELEPHONE (OUTPATIENT)
Dept: INTERNAL MEDICINE | Facility: IMAGING CENTER | Age: 78
End: 2018-02-05

## 2018-02-05 NOTE — TELEPHONE ENCOUNTER
Good morning,     My mom is very depressed and weak.  She said she’s not sure she could go to outpatient PT.  Is she eligible for home health PT?   Although, I was thinking maybe it would be good to push her a little to try outpatient PT.  I think a lot of it is depression.    Could you let me know if she still has an active referral for PT?    Thanks so much!    Olive Boateng

## 2018-02-05 NOTE — TELEPHONE ENCOUNTER
I replied back:  Gustavo Schaefer,  Did you get my message yesterday about changing her antibiotic?  She probably needs a new PT referral---her last one was September.  If she is too week to go out, I will need to see her to order home PT. Let me know and we will get things going.  Geo

## 2018-02-07 ENCOUNTER — OFFICE VISIT (OUTPATIENT)
Dept: INTERNAL MEDICINE | Facility: IMAGING CENTER | Age: 78
End: 2018-02-07
Payer: MEDICARE

## 2018-02-07 VITALS
BODY MASS INDEX: 23.19 KG/M2 | WEIGHT: 143.6 LBS | DIASTOLIC BLOOD PRESSURE: 72 MMHG | HEART RATE: 70 BPM | OXYGEN SATURATION: 95 % | TEMPERATURE: 98.7 F | SYSTOLIC BLOOD PRESSURE: 118 MMHG

## 2018-02-07 DIAGNOSIS — Z91.81 AT RISK FOR FALLS: ICD-10-CM

## 2018-02-07 DIAGNOSIS — F31.75 BIPOLAR DISORDER, IN PARTIAL REMISSION, MOST RECENT EPISODE DEPRESSED (HCC): ICD-10-CM

## 2018-02-07 DIAGNOSIS — R53.1 WEAKNESS: ICD-10-CM

## 2018-02-07 DIAGNOSIS — N30.00 ACUTE CYSTITIS WITHOUT HEMATURIA: ICD-10-CM

## 2018-02-07 DIAGNOSIS — R26.89 POOR BALANCE: ICD-10-CM

## 2018-02-07 PROCEDURE — 99213 OFFICE O/P EST LOW 20 MIN: CPT | Performed by: FAMILY MEDICINE

## 2018-02-07 RX ORDER — LEVOTHYROXINE SODIUM 0.12 MG/1
TABLET ORAL
Qty: 90 TAB | Refills: 4 | Status: SHIPPED | OUTPATIENT
Start: 2018-02-07 | End: 2019-05-03 | Stop reason: SDUPTHER

## 2018-02-08 ENCOUNTER — PHYSICAL THERAPY (OUTPATIENT)
Dept: PHYSICAL THERAPY | Facility: REHABILITATION | Age: 78
End: 2018-02-08
Attending: FAMILY MEDICINE
Payer: MEDICARE

## 2018-02-08 DIAGNOSIS — Z91.81 PERSONAL HISTORY OF FALL: ICD-10-CM

## 2018-02-08 DIAGNOSIS — R53.1 WEAKNESS GENERALIZED: ICD-10-CM

## 2018-02-08 DIAGNOSIS — R26.89 SCISSOR GAIT: ICD-10-CM

## 2018-02-08 PROCEDURE — G8978 MOBILITY CURRENT STATUS: HCPCS | Mod: CL

## 2018-02-08 PROCEDURE — G8979 MOBILITY GOAL STATUS: HCPCS | Mod: CK

## 2018-02-08 PROCEDURE — 97110 THERAPEUTIC EXERCISES: CPT

## 2018-02-08 PROCEDURE — 97162 PT EVAL MOD COMPLEX 30 MIN: CPT

## 2018-02-08 RX ORDER — CEFDINIR 300 MG/1
300 CAPSULE ORAL 2 TIMES DAILY
Qty: 14 CAP | Refills: 0 | Status: SHIPPED | OUTPATIENT
Start: 2018-02-08 | End: 2018-02-26 | Stop reason: SDUPTHER

## 2018-02-08 ASSESSMENT — ACTIVITIES OF DAILY LIVING (ADL): POOR_BALANCE: 1

## 2018-02-08 NOTE — OP THERAPY EVALUATION
Outpatient Physical Therapy  INITIAL EVALUATION    [facility] Physical Therapy [place] [address]  [address]  [place] [address]  Phone:  [phone]  Fax:  [phone]    Date of Evaluation: 02/08/2018    Patient: Azeb Major  YOB: 1940  MRN: [id number]     Referring Provider: Josy Arteaga M.D.  [address] [place] [address] [place] [address]   Referring Diagnosis Weakness [R53.1];At risk for falls [Z91.81];Poor balance [R26.89]     Time Calculation  Start time: 1100  Stop time: 1145 Time Calculation (min): 45 minutes     Physical Therapy Occurrence Codes    Date physical therapy care plan established or reviewed:  2/8/18          Chief Complaint: Loss Of Balance and Difficulty Walking    Visit Diagnoses     ICD-10-CM   1. Weakness generalized R53.1   2. Personal history of fall Z91.81   3. Scissor gait R26.89         Subjective:   History of Present Illness:     Mechanism of injury:  68 y/o female reports she has been experiencing gradually increasing weakness especially since major depressive episode 12/25 17 Denies fall to floor but has had near falls  Social Support:     Lives with:  Alone  Activities of Daily Living:     Patient reported ADL status: Multiple steps into home. States occasional trouble getting in/out shower  Patient Goals:     Other patient goals:  Inprove strength and balance      Past Medical History:   Diagnosis Date   • Bipolar disorder (CMS-[facility])    • Bronchitis 02-26-13    in the past, not recent   • CATARACT     bilat removed    • Dental disorder    • Frequent UTI 12/1/2010   • Heart burn    • History of falling     r/t vertigo   • Hypertension    • Hypothyroid 12/1/2010   • Other specified symptom associated with female genital organs    • Pneumonia     x2   • Psychiatric disorder     bipolar   • Renal disorder     daughter reports some renal insufficiency   • Seasonal allergic reaction    • Stress incontinence    • Urinary bladder disorder      Past  "Surgical History:   Procedure Laterality Date   • HYSTERECTOMY ROBOTIC  3/18/2013    Performed by Juju Gonzales M.D. at [facility]   • COLPOSACROPEXY ROBOTIC  3/18/2013    Performed by Juju Gonzales M.D. at [facility]   • BLADDER SLING FEMALE  3/18/2013    Performed by Juju Gonzales M.D. at [facility]   • CYSTOSCOPY  3/18/2013    Performed by Juju Gonzales M.D. at [facility]   • VENTRAL HERNIA REPAIR  3/18/2010    Performed by TRVAIS PATTERSON at [facility]   • TONSILLECTOMY       Social History   Substance Use Topics   • Smoking status: Former Smoker     Packs/day: 0.25     Years: 1.00   • Smokeless tobacco: Never Used   • Alcohol use No     Family and Occupational History     Social History   • Marital status:      Spouse name: N/A   • Number of children: N/A   • Years of education: N/A       Objective     Static Posture     Comments  Mild tremors. Genu varus (R)> (L)knee. ROM grossly WFL. Strength grossly 4/5 but endurance limited. Sensation grossly intact. Impaired hearing  And states hx macular degeneration. Rhomberg Mod sway E/C TUG score 25\" ( fall risk) SaO2 95%, HR 83  Ambulation     Comments   Ambulates SPC but has walker        Therapeutic Exercises (CPT 26849): , All in standing 10 for 10\" 1-2 / day    2. Toe/heel raise    3. Marching    4. Hip abd    5. Hip ext    6. Mini squats        Assessment, Response and Plan:   Impairments: abnormal gait, activity intolerance, impaired functional mobility, impaired balance, impaired physical strength, lacks appropriate home exercise program, limited ADL's and limited mobility    Barriers to therapy:  Psychosocial  Prognosis: fair    Goals:   Short Term Goals:   1) return demonstration HEP 2) Pt will be able get in/out chair easier 3) Pt will be able get in/out car easier 4) improve TUG score by 3-5\"  Short term goal time span:  2-4 weeks      Long Term Goals:    1) " Improve LEFI score by 10% points 2) fall risk per improved TUG score 3) less episodes of near falls 4) Pt will be able to perform ADL safer  Long term goal time span:  4-6 weeks    Plan:   Planned therapy interventions:  Gait Training (CPT 42455), Neuromuscular Re-education (CPT 63593) and Therapeutic Exercise (CPT 95279)  Plan details:  2x4-5 weeks      Functional Limitation G-Codes and Severity Modifiers  PT Functional Assessment Tool Used: LEFI  PT Functional Assessment Score: 78%  Current: Mobility: Current (): CL   Goal: Mobility: Goal (): CK     Referring provider co-signature:  I have reviewed this plan of care and my co-signature certifies the need for services.  Certification Dates:   From 18    To 18    Physician Signature: ________________________________ Date: ______________

## 2018-02-09 ENCOUNTER — TELEPHONE (OUTPATIENT)
Dept: BEHAVIORAL HEALTH | Facility: PHYSICIAN GROUP | Age: 78
End: 2018-02-09

## 2018-02-09 NOTE — PROGRESS NOTES
Chief Complaint   Patient presents with   • Fatigue     weakness and depression       HISTORY OF PRESENT ILLNESS: Patient is a 77 y.o. female established patient who presents today with her daughter Olive complaining of increased fatigue, weakness and depression. All of these are worse over the past month. She felt she was doing better at [employer].    She has been less active. She is feeling more depressed and therefore doing less. She is followed closely by Dr. Mcgowan and is has been started on effexor and is slowly increasing the dose. She is not sure if it is helping. She admits to depression and feelings of hopelessness. No thoughts of suicide. She has a good support system. But finds that she is less motivated to exercise or get out much.  In the process, she is feeling weaker --more difficult to get out of chair. Balance is poor. Sometimes using cane. No recent falls.     Recent UTI and is now on omnicef. Symptoms have improved.     labwork in December reviewd.       Patient Active Problem List    Diagnosis Date Noted   • CKD (chronic kidney disease) stage 3, GFR 30-59 ml 09/18/2011     Priority: Low   • Hypertension 06/20/2011     Priority: Low   • Bipolar affective disorder (CMS-HCC) 12/01/2010     Priority: Low   • Hypothyroid 12/01/2010     Priority: Low   • Macular degeneration 12/11/2016   • Hard of hearing 12/12/2014   • Poor balance 08/07/2014   • Hyperparathyroidism, secondary (CMS-HCC) 02/08/2012   • Osteoporosis 12/07/2011   • UI (urinary incontinence) 12/01/2010   • Frequent UTI 12/01/2010   • Seasonal allergies 12/01/2010       Current Outpatient Prescriptions:   •  cefdinir (OMNICEF) 300 MG Cap, Take 1 Cap by mouth 2 times a day., Disp: 14 Cap, Rfl: 0  •  levothyroxine (SYNTHROID) 125 MCG Tab, TAKE 1 TABLET EVERY MORNING ON AN EMPTY STOMACH, Disp: 90 Tab, Rfl: 4  •  SEROQUEL  MG TABLET SR 24 HR, TAKE 1 TABLET AT BEDTIME, Disp: 90 Tab, Rfl: 1  •  venlafaxine (EFFEXOR-XR) 150 MG  extended-release capsule, Take 1 Cap by mouth every day., Disp: 90 Cap, Rfl: 1  •  tolterodine ER (DETROL LA) 4 MG CAPSULE SR 24 HR, TAKE 1 CAPSULE DAILY, Disp: 90 Cap, Rfl: 1  •  nitrofurantoin (MACRODANTIN) 50 MG Cap, Take 1 Cap by mouth every day., Disp: 90 Cap, Rfl: 0  •  divalproex ER (DEPAKOTE ER) 250 MG TABLET SR 24 HR, TAKE 3 TABLETS AT BEDTIME, Disp: 270 Tab, Rfl: 1  •  omeprazole (PRILOSEC) 20 MG delayed-release capsule, TAKE 1 CAPSULE DAILY, Disp: 90 Cap, Rfl: 3  •  QUEtiapine Fumarate (SEROQUEL XR) 150 MG TABLET SR 24 HR, Take  by mouth., Disp: , Rfl:   •  estradiol (ESTRACE) 0.1 MG/GM vaginal cream, Insert 0.5 g in vagina every 3 days., Disp: 1 Tube, Rfl: 3  •  cyanocobalamin (VITAMIN B-12) 100 MCG Tab, Take 100 mcg by mouth every day., Disp: , Rfl:   •  ascorbic acid (VITAMIN C) 500 MG tablet, Take 1 Tab by mouth 2 Times a Day., Disp: 30 Tab, Rfl: 3      Past medical, surgical, family, and social history is reviewed and updated in Epic chart by me today.   Medications and allergies reviewed and updated in Epic chart by me today.     REVIEW OF SYSTEMS:  GENERAL: increased fatigue, Weight down 5 lbs. ----also still having problems with her dentures.  HEENT:  Ears--no earache, no change in hearing, no dizziness, no tinnitus.                 Eyes--no blurred vision, no discharge or pain                 Throat--No sore throat, no dysphagia, no hoarseness  CV:  No chest pain,dyspnea,palpitations or edema.  RESP:  No sob,cough,wheezing or hemoptysis.  GI: No dysphagia, heartburn,abdominal pain, nausea, vomiting, diarrhea or constipation.       No melena, jaundice, bleeding, incontinence or change in bowel habits.  :  Uti sx improved  MS:  No joint swelling, myalgias, or arthralgias.  NEURO:  No seizures, syncope, paralysis, tremor, or weakness.  SKIN: No new or concerning skin lesions or changes.   PSYCH: Mood as above    Vitals:    02/07/18 1041   BP: 118/72   Pulse: 70   Temp: 37.1 °C (98.7 °F)   SpO2:  95%   Weight: 65.1 kg (143 lb 9.6 oz)     Physical Exam:  Gen: Well developed, well nourished. No acute distress.  Alert and oriented x 3.  Neck:  Supple, no adenopathy or thyromegaly.  Heart:  Regular rate and rhythm.  Normal S1, S2. No murmur, gallop or rub.  Lungs:  Clear, No wheezes,rales or rhonchi.  Extremities:  No edema.  MS: Gait slow and deliberate. Poor balance.  Psych: Mood and affect are appropriate.      Assessment/Plan:  1. Weakness . I recommend PT for strengthening and balance.  REFERRAL TO PHYSICAL THERAPY Reason for Therapy: Eval/Treat/Report   2. At risk for falls  REFERRAL TO PHYSICAL THERAPY Reason for Therapy: Eval/Treat/Report   3. Poor balance  REFERRAL TO PHYSICAL THERAPY Reason for Therapy: Eval/Treat/Report   4. Bipolar disorder, in partial remission, most recent episode depressed (CMS-HCC) . Continue with present medications and follow up with psych. She has both psychologist and psychiatrist.     5. Acute cystitis without hematuria . Finish omnicef.  Recheck urine in 2 weeks.       followup  1 months

## 2018-02-12 ENCOUNTER — OFFICE VISIT (OUTPATIENT)
Dept: BEHAVIORAL HEALTH | Facility: PHYSICIAN GROUP | Age: 78
End: 2018-02-12
Payer: MEDICARE

## 2018-02-12 DIAGNOSIS — F31.9 BIPOLAR 1 DISORDER, DEPRESSED (HCC): ICD-10-CM

## 2018-02-12 PROCEDURE — 90834 PSYTX W PT 45 MINUTES: CPT | Performed by: SOCIAL WORKER

## 2018-02-12 NOTE — BH THERAPY
[facility]  Therapy Progress Note    Patient Name: Azeb Major  Patient MRN: [id number]  Today's Date: 2/12/2018     Type of session:Individual psychotherapy  Length of session: 50 minutes  Persons in attendance:Patient and Daughter Karissa at end    Subjective/New Info: Patient reports feeling weak and depressed, though is making an effort to eat more (Ensure, peanut butter) and to use walker, so she doesn't get weaker. Also having issues with hearing aids (which will be resolved tomorrow, when new hearing aids are put in) and teeth still not comfortable, so that she hasn't been going to [employer] for last couple weeks and doesn't feel up to getting to [employer].  Had a meeting with all the children, and agreement was reached that someone from the family will be with her for dinner & evening, to make sure she eats & gets some exercise.  Reports increased trouble falling back to sleep, if she wakes up, although gets to sleep without difficulty.  Somewhat hopeful that increase in Effexor will help her depression.  No falls, though reports some near-falls.  Did call a couple people when she needed help.    Objective/Observations:   Participation: Active verbal participation, Attentive, Engaged and Open to feedback   Grooming: Good   Cognition: Alert and Fully Oriented   Eye contact: Good   Mood: Depressed   Affect: Flexible, Full range and Congruent with content   Thought process: Logical and Goal-directed   Speech: Rate within normal limits and Volume within normal limits   Other:     Diagnoses: No diagnosis found. Bipolar I, depressed    Current risk:   SUICIDE: Low   Homicide: Low   Self-harm: Low   Relapse: Not applicable   Other:    Safety Plan reviewed? Not Indicated   If evidence of imminent risk is present, intervention/plan:     Therapeutic Intervention(s): Behavior:  Behavior analysis and Behavior modification, Clarify:  Clarify feelings and Clarify thoughts, Cognitive modification,  Communication skills, Interpersonal effectiveness skills, Positive behavior reinforced, Self-care skills, Stressors assessed and Supportive psychotherapy    Treatment Goal(s)/Objective(s) addressed: Improve mood, sense of purpose, communication skills    Progress toward Treatment Goals: Mild improvement    Plan:  - Continue Individual therapy and Medication management    Linda Rick  2/12/2018

## 2018-02-15 ENCOUNTER — PHYSICAL THERAPY (OUTPATIENT)
Dept: PHYSICAL THERAPY | Facility: REHABILITATION | Age: 78
End: 2018-02-15
Attending: FAMILY MEDICINE
Payer: MEDICARE

## 2018-02-15 DIAGNOSIS — Z91.81 PERSONAL HISTORY OF FALL: ICD-10-CM

## 2018-02-15 DIAGNOSIS — R26.89 SCISSOR GAIT: ICD-10-CM

## 2018-02-15 DIAGNOSIS — R53.1 WEAKNESS GENERALIZED: ICD-10-CM

## 2018-02-15 PROCEDURE — 97110 THERAPEUTIC EXERCISES: CPT

## 2018-02-15 NOTE — OP THERAPY DAILY TREATMENT
Outpatient Physical Therapy  DAILY TREATMENT     [facility] Physical [address]  [address]  [place] [address]  Phone:  [phone]  Fax:  [phone]    Date: 02/15/2018    Patient: Azeb Major  YOB: 1940  MRN: [id number]     Time Calculation  Start time: 0930  Stop time: 1000 Time Calculation (min): 30 minutes     Chief Complaint: Loss Of Balance    Visit #: 2    SUBJECTIVE:  Gets  A little dizzy sit to stand. Advised wait a few seconds before walking    OBJECTIVE:  Current objective measures: Pt was able to perform all exercises with appropriate rest. Mild tremors         Therapeutic Exercises (CPT 89396):     Total treatment time spent on Therapeutic Exercises: 30 minutes.      1. Supine bridge with ball, 10x2    2. Swiss ball roll ups, 20    3.  hooklying t-band abd, 20    4.  nu step , (1) 10'              ASSESSMENT:   Response to treatment:  Pt was willing to participate al exercises    PLAN/RECOMMENDATIONS:   Plan for treatment: therapy treatment to continue next visit.  Planned interventions for next visit: gait training (CPT 67110), neuromuscular re-education (CPT 39424) and therapeutic exercise (CPT 72574).

## 2018-02-20 ENCOUNTER — APPOINTMENT (OUTPATIENT)
Dept: PHYSICAL THERAPY | Facility: REHABILITATION | Age: 78
End: 2018-02-20
Attending: FAMILY MEDICINE
Payer: MEDICARE

## 2018-02-22 ENCOUNTER — PHYSICAL THERAPY (OUTPATIENT)
Dept: PHYSICAL THERAPY | Facility: REHABILITATION | Age: 78
End: 2018-02-22
Attending: FAMILY MEDICINE
Payer: MEDICARE

## 2018-02-22 DIAGNOSIS — R53.1 WEAKNESS GENERALIZED: ICD-10-CM

## 2018-02-22 DIAGNOSIS — R26.89 SCISSOR GAIT: ICD-10-CM

## 2018-02-22 DIAGNOSIS — Z91.81 PERSONAL HISTORY OF FALL: ICD-10-CM

## 2018-02-22 PROCEDURE — 97112 NEUROMUSCULAR REEDUCATION: CPT

## 2018-02-22 PROCEDURE — 97110 THERAPEUTIC EXERCISES: CPT

## 2018-02-22 NOTE — OP THERAPY DAILY TREATMENT
"  Outpatient Physical Therapy  DAILY TREATMENT     [facility] Physical Therapy [address]  [address]  [place] [address]  Phone:  [phone]  Fax:  [phone]    Date: 02/22/2018    Patient: Azeb Major  YOB: 1940  MRN: [id number]     Time Calculation  Start time: 0815  Stop time: 0855 Time Calculation (min): 40 minutes     Chief Complaint: Dizziness; Loss Of Balance; and Hip Injury    Visit #: 3    SUBJECTIVE:  States having trouble eating b/c of her teeth. Somewhat complient with HEP. States her bipolar/depression is interfferring today    OBJECTIVE:  Current objective measures:  Mild tremors with exercise. Able to perform all ther ex without fatigue         Therapeutic Exercises (CPT 02677):     1. Nu step , level 3, 10'    2. 1/2 foam step overs for pro-active balance, 6'    3. Disc balance , 5'    4. Shuttle for LE strength, 5 cords, 8'    5. 4\" lateral step ups , 25x, 6'    6. Marching, 20x, 5'      Time-based treatments/modalities:          Pain rating before treatment:  No pain, but states depressed      ASSESSMENT:   Response to treatment: willing to participate with all exercises    PLAN/RECOMMENDATIONS:   Plan for treatment: therapy treatment to continue next visit.  Planned interventions for next visit: gait training (CPT 03823), neuromuscular re-education (CPT 44325) and therapeutic exercise (CPT 72522).      "

## 2018-02-26 ENCOUNTER — TELEPHONE (OUTPATIENT)
Dept: INTERNAL MEDICINE | Facility: IMAGING CENTER | Age: 78
End: 2018-02-26

## 2018-02-26 RX ORDER — CEFDINIR 300 MG/1
300 CAPSULE ORAL 2 TIMES DAILY
Qty: 14 CAP | Refills: 0 | OUTPATIENT
Start: 2018-02-26 | End: 2018-03-07

## 2018-02-26 NOTE — TELEPHONE ENCOUNTER
Becca (daughter) called stating that she is severely symptomatic with a UTI.  Burning with urination and frequency.  Becca stated that they forgot to put her on her maintenance dose of macrodantin after she finished her Omnicef from a recent UTI.  She was finished on those antibiotics on 2/15/18.  She will bring in a urine sample before starting any new antibiotics.

## 2018-02-27 ENCOUNTER — HOSPITAL ENCOUNTER (OUTPATIENT)
Facility: MEDICAL CENTER | Age: 78
End: 2018-02-27
Attending: FAMILY MEDICINE
Payer: MEDICARE

## 2018-02-27 ENCOUNTER — NON-PROVIDER VISIT (OUTPATIENT)
Dept: INTERNAL MEDICINE | Facility: IMAGING CENTER | Age: 78
End: 2018-02-27
Payer: MEDICARE

## 2018-02-27 DIAGNOSIS — N39.0 FREQUENT UTI: ICD-10-CM

## 2018-02-27 LAB
APPEARANCE UR: NORMAL
BILIRUB UR STRIP-MCNC: NEGATIVE MG/DL
COLOR UR AUTO: NORMAL
GLUCOSE UR STRIP.AUTO-MCNC: NEGATIVE MG/DL
KETONES UR STRIP.AUTO-MCNC: NEGATIVE MG/DL
LEUKOCYTE ESTERASE UR QL STRIP.AUTO: NORMAL
NITRITE UR QL STRIP.AUTO: POSITIVE
PH UR STRIP.AUTO: 6 [PH] (ref 5–8)
PROT UR QL STRIP: 30 MG/DL
RBC UR QL AUTO: NORMAL
SP GR UR STRIP.AUTO: 1.01
UROBILINOGEN UR STRIP-MCNC: NEGATIVE MG/DL

## 2018-02-27 PROCEDURE — 81002 URINALYSIS NONAUTO W/O SCOPE: CPT | Performed by: FAMILY MEDICINE

## 2018-02-27 PROCEDURE — 87086 URINE CULTURE/COLONY COUNT: CPT

## 2018-02-27 PROCEDURE — 87186 SC STD MICRODIL/AGAR DIL: CPT

## 2018-02-27 PROCEDURE — 87077 CULTURE AEROBIC IDENTIFY: CPT

## 2018-03-01 ENCOUNTER — APPOINTMENT (OUTPATIENT)
Dept: PHYSICAL THERAPY | Facility: REHABILITATION | Age: 78
End: 2018-03-01
Attending: FAMILY MEDICINE
Payer: MEDICARE

## 2018-03-05 ENCOUNTER — APPOINTMENT (OUTPATIENT)
Dept: BEHAVIORAL HEALTH | Facility: PHYSICIAN GROUP | Age: 78
End: 2018-03-05
Payer: MEDICARE

## 2018-03-05 ENCOUNTER — OFFICE VISIT (OUTPATIENT)
Dept: BEHAVIORAL HEALTH | Facility: PHYSICIAN GROUP | Age: 78
End: 2018-03-05
Payer: MEDICARE

## 2018-03-05 VITALS
HEART RATE: 72 BPM | HEIGHT: 66 IN | DIASTOLIC BLOOD PRESSURE: 70 MMHG | RESPIRATION RATE: 18 BRPM | SYSTOLIC BLOOD PRESSURE: 110 MMHG | WEIGHT: 143 LBS | BODY MASS INDEX: 22.98 KG/M2 | TEMPERATURE: 98.2 F

## 2018-03-05 DIAGNOSIS — F31.9 BIPOLAR 1 DISORDER, DEPRESSED (HCC): ICD-10-CM

## 2018-03-05 PROCEDURE — 99213 OFFICE O/P EST LOW 20 MIN: CPT | Performed by: PSYCHIATRY & NEUROLOGY

## 2018-03-05 PROCEDURE — 90833 PSYTX W PT W E/M 30 MIN: CPT | Performed by: PSYCHIATRY & NEUROLOGY

## 2018-03-05 RX ORDER — VENLAFAXINE HYDROCHLORIDE 225 MG/1
225 TABLET, EXTENDED RELEASE ORAL DAILY
Qty: 90 TAB | Refills: 1 | Status: SHIPPED | OUTPATIENT
Start: 2018-03-05 | End: 2018-08-15

## 2018-03-05 ASSESSMENT — PATIENT HEALTH QUESTIONNAIRE - PHQ9
5. POOR APPETITE OR OVEREATING: 1
2. FEELING DOWN, DEPRESSED, IRRITABLE, OR HOPELESS: 1
6. FEELING BAD ABOUT YOURSELF - OR THAT YOU ARE A FAILURE OR HAVE LET YOURSELF OR YOUR FAMILY DOWN: 1
8. MOVING OR SPEAKING SO SLOWLY THAT OTHER PEOPLE COULD HAVE NOTICED. OR THE OPPOSITE, BEING SO FIGETY OR RESTLESS THAT YOU HAVE BEEN MOVING AROUND A LOT MORE THAN USUAL: 1
7. TROUBLE CONCENTRATING ON THINGS, SUCH AS READING THE NEWSPAPER OR WATCHING TELEVISION: 1
9. THOUGHTS THAT YOU WOULD BE BETTER OFF DEAD, OR OF HURTING YOURSELF: 0
3. TROUBLE FALLING OR STAYING ASLEEP OR SLEEPING TOO MUCH: 1
4. FEELING TIRED OR HAVING LITTLE ENERGY: 1
1. LITTLE INTEREST OR PLEASURE IN DOING THINGS: 1
SUM OF ALL RESPONSES TO PHQ QUESTIONS 1-9: 8
SUM OF ALL RESPONSES TO PHQ9 QUESTIONS 1 AND 2: 2

## 2018-03-05 NOTE — PROGRESS NOTES
"[facility]  PSYCHIATRIC FOLLOW-UP NOTE    Name: Azeb Major  MRN: [id number]  : 1940  Age: 77 y.o.  Date of assessment: 3/5/2018  PCP: Josy Arteaga M.D.  Persons in attendance: Patient and Children  REASON FOR VISIT/CHIEF COMPLAINT (as stated by Patient and Children):  Azeb Major is a 77 y.o., White female, attending follow-up appointment for   Chief Complaint   Patient presents with   • Medication Management     The patient came with her two daughters for follow up and the patient is stressed out and depressed.   .      HISTORY OF PRESENT ILLNESS:    This is 78 yo female, lives alone, came with her two daughters, seen today for follow up. The patients daughter called few weeks ago about her moms depression and venlafaxine  mg increased. The patients kids are checking on her very day. The patient is suffering from frequent UTI and she is referred to a urologist. The patient is not eating well because she can not use her dentures. The patient is taking her medications.      PSYCHOSOCIAL CHANGES SINCE PREVIOUS CONTACT:  The patient is concerned about her depression and the patients children are overwhelmed.    RESPONSE TO TREATMENT:  She is taking divalproex  mg three at night, seroquel 150 mg one at night, venlafaxine  mg every day.    MEDICATION SIDE EFFECTS:  Denied.    REVIEW OF SYSTEMS:        Constitutional negative   Eyes negative   Ears/Nose/Mouth/Throat negative   Cardiovascular  hypertension   Respiratory negative   Gastrointestinal negative   Genitourinary positive - frequent UTI, and chronic kidney disease   Muscular negative   Integumentary negative   Neurological negative   Endocrine positive - hypothyroidism.   Hematologic/Lymphatic negative       PSYCHIATRIC EXAMINATION/MENTAL STATUS  /70   Pulse 72   Temp 36.8 °C (98.2 °F)   Resp 18   Ht 1.676 m (5' 5.98\")   Wt 64.9 kg (143 lb)   BMI 23.09 kg/m²   Participation: Active verbal " participation, Attentive and Engaged  Grooming:Neat  Orientation: Alert and Fully Oriented  Eye contact: Good  Behavior:Calm   Mood: Anxious  Affect: Flexible and Full range  Thought process: Logical and Goal-directed  Thought content:  Within normal limits  Speech: Rate within normal limits and Volume within normal limits  Perception:  Within normal limits  Memory:  Poor memory for chronology of events  Insight: Good  Judgment: Good  Family/couple interaction observations:   Other:    Current risk:    Suicide: Low   Homicide: Low   Self-harm: Low  Relapse: Low  Other:   Crisis Safety Plan reviewed?Yes  If evidence of imminent risk is present, intervention/plan:    Medical Records/Labs/Diagnostic Tests Reviewed: yes.    Medical Records/Labs/Diagnostic Tests Ordered: none.    DIAGNOSTIC IMPRESSION(S):  1. Bipolar 1 disorder, depressed (CMS-[facility])           ASSESSMENT AND PLAN:    1. Bipolar 1 disorder, depressed.  Increase venlafaxine  mg one a day # 90 refills one.  Divalproex  mg three at night   Seroquel  mg one at night.    2. The patient is referred to [facility] and her daughter agree to call.  The patient agreed to go if her symptoms do not improve    3. Follow up up in four weeks.  Call if needed.    16 minutes were spent in psychotherapy.  (If greater than 16 minutes, add 41454 code)   Topics addressed in psychotherapy include:  The patient lives alone and her children are helping her.  She has a daily routine and a good sleep cycle.  Cognitive restructuring and behavioral changes.  Anthony Fowler M.D.

## 2018-03-07 ENCOUNTER — OFFICE VISIT (OUTPATIENT)
Dept: INTERNAL MEDICINE | Facility: IMAGING CENTER | Age: 78
End: 2018-03-07
Payer: MEDICARE

## 2018-03-07 ENCOUNTER — TELEPHONE (OUTPATIENT)
Dept: PHYSICAL THERAPY | Facility: REHABILITATION | Age: 78
End: 2018-03-07

## 2018-03-07 VITALS
TEMPERATURE: 98.2 F | SYSTOLIC BLOOD PRESSURE: 112 MMHG | HEIGHT: 66 IN | RESPIRATION RATE: 14 BRPM | BODY MASS INDEX: 23.63 KG/M2 | DIASTOLIC BLOOD PRESSURE: 70 MMHG | WEIGHT: 147 LBS | HEART RATE: 76 BPM | OXYGEN SATURATION: 94 %

## 2018-03-07 DIAGNOSIS — R53.1 GENERALIZED WEAKNESS: ICD-10-CM

## 2018-03-07 DIAGNOSIS — N39.0 FREQUENT UTI: ICD-10-CM

## 2018-03-07 DIAGNOSIS — F31.75 BIPOLAR DISORDER, IN PARTIAL REMISSION, MOST RECENT EPISODE DEPRESSED (HCC): ICD-10-CM

## 2018-03-07 DIAGNOSIS — L82.1 SK (SEBORRHEIC KERATOSIS): ICD-10-CM

## 2018-03-07 DIAGNOSIS — R26.89 POOR BALANCE: ICD-10-CM

## 2018-03-07 DIAGNOSIS — K06.8 PAIN IN GUMS: ICD-10-CM

## 2018-03-07 PROCEDURE — 99214 OFFICE O/P EST MOD 30 MIN: CPT | Performed by: FAMILY MEDICINE

## 2018-03-07 NOTE — PROGRESS NOTES
Chief Complaint   Patient presents with   • Extremity Weakness     doing pt   • Depression     Saw Dr. FRANK and he increased effexor   • Oral Pain     struggling with dentures   • UTI     finished omnicef today       HISTORY OF PRESENT ILLNESS: Patient is a 77 y.o. female established patient who presents today to follow-up on the above concerns.    She has started physical therapy. Unfortunately she has only gone to a couple of visits. She does feel like it's going to be helpful. She states it easier to get up out of a chair. She has some home exercises as well.  No falls. She uses either a cane or a walker.    She also saw her psychiatrist for bipolar with depression. He increased her Effexor to 225 mg. She continues also on Seroquel and Depakote. He thinks her mood is a little better. She lives alone, has good family involvement. She has a senior helper who comes in 3 days a week. She does feel like she does better when she is out more and getting more social stimulation.    She also was recently treated for urinary tract infection. She does have frequent urinary tract infections. She had Escherichia coli and was treated with Omnicef. She feels like her symptoms are better. She took her last pill today. She will then start low-dose nitrofurantoin tomorrow. Denies back pain. No fevers or chills.    She also struggles with her dentures. This has been an ongoing problem for the past several months. Her gums become very tender. She has been wearing her dentures less and less. That makes her more self-conscious getting out. She has maintained her weight and has even gained a couple pounds.    She has a small skin lesion on her left jawline that itches. She has scratched it a few times.      Patient Active Problem List    Diagnosis Date Noted   • CKD (chronic kidney disease) stage 3, GFR 30-59 ml 09/18/2011     Priority: Low   • Hypertension 06/20/2011     Priority: Low   • Bipolar affective disorder (CMS-[facility]) 12/01/2010      Priority: Low   • Hypothyroid 12/01/2010     Priority: Low   • Macular degeneration 12/11/2016   • Hard of hearing 12/12/2014   • Poor balance 08/07/2014   • Hyperparathyroidism, secondary (CMS-HCC) 02/08/2012   • Osteoporosis 12/07/2011   • UI (urinary incontinence) 12/01/2010   • Frequent UTI 12/01/2010   • Seasonal allergies 12/01/2010       Current Outpatient Prescriptions:   •  venlafaxine ER (EFFEXOR) 225 MG TABLET SR 24 HR tablet, Take 1 Tab by mouth every day., Disp: 90 Tab, Rfl: 1  •  levothyroxine (SYNTHROID) 125 MCG Tab, TAKE 1 TABLET EVERY MORNING ON AN EMPTY STOMACH, Disp: 90 Tab, Rfl: 4  •  SEROQUEL  MG TABLET SR 24 HR, TAKE 1 TABLET AT BEDTIME, Disp: 90 Tab, Rfl: 1  •  tolterodine ER (DETROL LA) 4 MG CAPSULE SR 24 HR, TAKE 1 CAPSULE DAILY, Disp: 90 Cap, Rfl: 1  •  nitrofurantoin (MACRODANTIN) 50 MG Cap, Take 1 Cap by mouth every day., Disp: 90 Cap, Rfl: 0  •  divalproex ER (DEPAKOTE ER) 250 MG TABLET SR 24 HR, TAKE 3 TABLETS AT BEDTIME, Disp: 270 Tab, Rfl: 1  •  omeprazole (PRILOSEC) 20 MG delayed-release capsule, TAKE 1 CAPSULE DAILY, Disp: 90 Cap, Rfl: 3  •  QUEtiapine Fumarate (SEROQUEL XR) 150 MG TABLET SR 24 HR, Take  by mouth., Disp: , Rfl:   •  estradiol (ESTRACE) 0.1 MG/GM vaginal cream, Insert 0.5 g in vagina every 3 days., Disp: 1 Tube, Rfl: 3  •  cyanocobalamin (VITAMIN B-12) 100 MCG Tab, Take 100 mcg by mouth every day., Disp: , Rfl:   •  ascorbic acid (VITAMIN C) 500 MG tablet, Take 1 Tab by mouth 2 Times a Day., Disp: 30 Tab, Rfl: 3      Past medical, surgical, family, and social history is reviewed and updated in Epic chart by me today.   Medications and allergies reviewed and updated in Epic chart by me today.     REVIEW OF SYSTEMS:  GENERAL: No fatigue, no weight loss.  HEENT:  Ears--no earache, no change in hearing, no dizziness, no tinnitus.                 Eyes--no blurred vision, no discharge or pain                 Throat--No sore throat, no dysphagia, no hoarseness      "             Gum tenderness. Difficulty wearing her dentures.  CV:  No chest pain,dyspnea,palpitations or edema.  RESP:  No sob,cough,wheezing or hemoptysis.  GI: No dysphagia, heartburn,abdominal pain, nausea, vomiting, diarrhea or constipation.       No melena, jaundice, bleeding, incontinence or change in bowel habits.  :  No dysuria, polyuria, hematuria, incontinence, or nocturia.  MS:  No joint swelling, myalgias, or arthralgias.  NEURO:  No seizures, syncope, paralysis, tremor. Generalized weakness as above.  SKIN: Lesion left jawline.  PSYCH: Mood as above. She states her motivation is a little better. Her mood is a little better. Her daughter agrees. No thoughts of hopelessness or suicide.    Vitals:    03/07/18 1300   BP: 112/70   Pulse: 76   Resp: 14   Temp: 36.8 °C (98.2 °F)   SpO2: 94%   Weight: 66.7 kg (147 lb)   Height: 1.676 m (5' 5.98\")     Physical Exam:  Gen: Well developed, well nourished. No acute distress.  Alert and oriented x 3.  Mouth:  She does have some erythema around the post sites on her lower gums.  Neck:  Supple, no adenopathy or thyromegaly.  Heart:  Regular rate and rhythm.  Normal S1, S2. No murmur, gallop or rub.  Lungs:  Clear, No wheezes,rales or rhonchi.  Extremities:  No edema.  Her gait does seem a little steadier today.  Psych: Mood and affect are appropriate.  Skin: She has a 3 mm rough grey lesion on her left cheek, consistent with a seborrheic keratosis.        Assessment/Plan:  1. Frequent UTI . She finished Omnicef. Will check a urine culture in 2 weeks. Encouraged frequent fluids. Discussed prevention. Resume nitrofurantoin 50 mg tomorrow evening.     2. Bipolar disorder, in partial remission, most recent episode depressed (CMS-HCC) . Continue with both psychology and psychiatry. I did encourage her to try and get out a little more. We discussed the [facility], going out to lunch with her caregiver.     3. Pain in gums . Encouraged Orajel prior to her dentures " and some retraining on her dentures were she tries to keep them in for 3 hours at a time and slowly increasing this.     4. Generalized weakness . Continue physical therapy.     5. Poor balance. Continue physical therapy.     6. SK (seborrheic keratosis). Lesion on her left cheek at the jaw line. Treated today with liquid nitrogen. She is instructed in wound care and will call for any concerns.      Follow-up in 6 weeks.

## 2018-03-08 ENCOUNTER — TELEPHONE (OUTPATIENT)
Dept: PHYSICAL THERAPY | Facility: REHABILITATION | Age: 78
End: 2018-03-08

## 2018-03-08 ENCOUNTER — PHYSICAL THERAPY (OUTPATIENT)
Dept: PHYSICAL THERAPY | Facility: REHABILITATION | Age: 78
End: 2018-03-08
Attending: FAMILY MEDICINE
Payer: MEDICARE

## 2018-03-08 DIAGNOSIS — R53.1 WEAKNESS GENERALIZED: ICD-10-CM

## 2018-03-08 DIAGNOSIS — Z91.81 PERSONAL HISTORY OF FALL: ICD-10-CM

## 2018-03-08 PROCEDURE — 97110 THERAPEUTIC EXERCISES: CPT

## 2018-03-08 PROCEDURE — 97112 NEUROMUSCULAR REEDUCATION: CPT

## 2018-03-08 NOTE — OP THERAPY DAILY TREATMENT
"  Outpatient Physical Therapy  DAILY TREATMENT     [facility] Physical [address].  [address]  [place] [address]  Phone:  [phone]  Fax:  [phone]    Date: 03/08/2018    Patient: Azeb Major  YOB: 1940  MRN: [id number]     Time Calculation    1300  1330  30'     Chief Complaint: Loss Of Balance and Difficulty Walking    Visit #: 5    SUBJECTIVE:  States went for long walk ay [employer] yesterday. Now tired    OBJECTIVE:  Current objective measures: TUG 16\"          Therapeutic Exercises (CPT 31077):     1. Nu step (3) , 10'    2. Balance board, 20    3. Foam step overs, 20    4. TUG score 16\"    5. Shuttle, 5 cords, 30      Time-based treatments/modalities: ther ex.15', Neuro 15' [id number]               ASSESSMENT:   Response to treatment: Slightly improved TUG    PLAN/RECOMMENDATIONS:   Plan for treatment: therapy treatment to continue next visit.  Planned interventions for next visit: gait training (CPT 84768), neuromuscular re-education (CPT 73277) and therapeutic exercise (CPT 73293).      "

## 2018-03-09 ENCOUNTER — APPOINTMENT (OUTPATIENT)
Dept: BEHAVIORAL HEALTH | Facility: PHYSICIAN GROUP | Age: 78
End: 2018-03-09
Payer: MEDICARE

## 2018-03-15 ENCOUNTER — PHYSICAL THERAPY (OUTPATIENT)
Dept: PHYSICAL THERAPY | Facility: REHABILITATION | Age: 78
End: 2018-03-15
Attending: FAMILY MEDICINE
Payer: MEDICARE

## 2018-03-15 DIAGNOSIS — R53.1 WEAKNESS GENERALIZED: ICD-10-CM

## 2018-03-15 DIAGNOSIS — Z91.81 PERSONAL HISTORY OF FALL: ICD-10-CM

## 2018-03-15 PROCEDURE — 97110 THERAPEUTIC EXERCISES: CPT

## 2018-03-15 NOTE — OP THERAPY DAILY TREATMENT
Outpatient Physical Therapy  DAILY TREATMENT     [facility] Physical [address].  [address]  [place] [address]  Phone:  [phone]  Fax:  [phone]    Date: 03/15/2018    Patient: Azeb Major  YOB: 1940  MRN: [id number]     Time Calculation  Start time: 1030  Stop time: 1100 Time Calculation (min): 30 minutes     Chief Complaint: Loss Of Balance    Visit #: 5    SUBJECTIVE:  No reported falls    OBJECTIVE:  Current objective measures: Little rest between exercise          Therapeutic Exercises (CPT 77511):     1. Nu step , 3 10'    2. Rocker board A/p lateral, for reactive balance    3. Foam step overs for proactive balace    4. Swiss ball seated leg lifts    5. Shuttle , 5 cords      Time-based treatments/modalities:  Therapeutic exercise minutes (CPT 61743): 30 minutes             ASSESSMENT:   Response to treatment:  S/ contact A    PLAN/RECOMMENDATIONS:   Plan for treatment: therapy treatment to continue next visit.  Planned interventions for next visit: gait training (CPT 41127) Ther ex.

## 2018-03-22 ENCOUNTER — APPOINTMENT (OUTPATIENT)
Dept: PHYSICAL THERAPY | Facility: REHABILITATION | Age: 78
End: 2018-03-22
Attending: FAMILY MEDICINE
Payer: MEDICARE

## 2018-03-29 ENCOUNTER — PHYSICAL THERAPY (OUTPATIENT)
Dept: PHYSICAL THERAPY | Facility: REHABILITATION | Age: 78
End: 2018-03-29
Attending: FAMILY MEDICINE
Payer: MEDICARE

## 2018-03-29 DIAGNOSIS — Z91.81 PERSONAL HISTORY OF FALL: ICD-10-CM

## 2018-03-29 DIAGNOSIS — R53.1 WEAKNESS GENERALIZED: ICD-10-CM

## 2018-03-29 PROCEDURE — 97110 THERAPEUTIC EXERCISES: CPT

## 2018-03-29 NOTE — OP THERAPY DAILY TREATMENT
Outpatient Physical Therapy  DAILY TREATMENT     [facility] Physical Therapy [place] [address] [address]  [address]  [place] [address]  Phone:  [phone]  Fax:  [phone]    Date: 03/29/2018    Patient: Azeb Major  YOB: 1940  MRN: [id number]     Time Calculation  Start time: 1015  Stop time: 1100 Time Calculation (min): 45 minutes     Chief Complaint: Difficulty Walking    Visit #: 6    SUBJECTIVE:  States doing some exercise at home. Wearing her teeth more often so eating more    OBJECTIVE:  Current objective measures: states occasional dizziness. Testing for BPPV (-)          Therapeutic Exercises (CPT 08799):     1. Nu step 10'  4    2. Balance board with head turns    3. Balance board E/C    4. Shuttle 6 cords    5. HEP: D1 with t-band    6. Triceps ext t-band      Time-based treatments/modalities:  Therapeutic exercise minutes (CPT 64801): 45 minutes           ASSESSMENT:   Response to treatment: Only minimal rest required today.    PLAN/RECOMMENDATIONS:   Plan for treatment: therapy treatment to continue next visit.  Planned interventions for next visit: gait training (CPT 96522) and therapeutic exercise (CPT 71305).

## 2018-04-02 ENCOUNTER — OFFICE VISIT (OUTPATIENT)
Dept: BEHAVIORAL HEALTH | Facility: PHYSICIAN GROUP | Age: 78
End: 2018-04-02
Payer: MEDICARE

## 2018-04-02 DIAGNOSIS — F31.9 BIPOLAR 1 DISORDER, DEPRESSED (HCC): ICD-10-CM

## 2018-04-02 PROCEDURE — 90834 PSYTX W PT 45 MINUTES: CPT | Performed by: SOCIAL WORKER

## 2018-04-02 NOTE — BH THERAPY
[facility]  Therapy Progress Note    Patient Name: Azeb aMjor  Patient MRN: [id number]  Today's Date: 4/2/2018     Type of session:Individual psychotherapy  Length of session: 45 minutes  Persons in attendance:Patient    Subjective/New Info: Patient reports feeling better, doing better.  She is now going to physical therapy every week: likes the therapist and feels she is getting stronger.  She also is able to tolerate having her teeth in for three hours at a time, so is eating a more varied diet and gaining some weight.  She plans to resume her [employer] group tomorrow, reports enjoying reading & knitting & sports on TV, but would like more to do and more social interaction; [facility]. Her senior aide is now available three days/week, so she is able to go out more.    Objective/Observations:   Participation: Active verbal participation   Grooming: Good   Cognition: Alert and Fully Oriented   Eye contact: Good   Mood: Euthymic   Affect: Flexible, Full range and Congruent with content   Thought process: Logical and Goal-directed   Speech: Rate within normal limits and Volume within normal limits   Other:     Diagnoses:  Bipolar I    Current risk:   SUICIDE: Low   Homicide: Low   Self-harm: Low   Relapse: Not applicable   Other:    Safety Plan reviewed? Not Indicated   If evidence of imminent risk is present, intervention/plan:     Therapeutic Intervention(s): Behavior:  Behavior analysis, Clarify:  Clarify feelings and Clarify thoughts, Communication skills, Interpersonal effectiveness skills, Leisure and recreation skills, Parenting/familial roles addressed, Positive behavior reinforced, Self-care skills, Stressors assessed and Supportive psychotherapy    Treatment Goal(s)/Objective(s) addressed: Stabilize/improve mood, boundaries, and sense of worth/purpose.     Progress toward Treatment Goals: Moderate improvement    Plan:  - Continue Individual therapy and Medication  Problem: Physical Therapy Goal  Goal: Physical Therapy Goal  Goals to be met by: 17     Patient will increase functional independence with mobility by performin. Gait > 300 feet independently.   2. Ascend/descend 12 stairs with unilateral handrails with supervision.   -    Comments:   Good participation in therapy.  Pt appears safe.  Will continue to follow.   management    Linda Rick  4/2/2018

## 2018-04-05 ENCOUNTER — PHYSICAL THERAPY (OUTPATIENT)
Dept: PHYSICAL THERAPY | Facility: REHABILITATION | Age: 78
End: 2018-04-05
Attending: FAMILY MEDICINE
Payer: MEDICARE

## 2018-04-05 DIAGNOSIS — Z91.81 PERSONAL HISTORY OF FALL: ICD-10-CM

## 2018-04-05 DIAGNOSIS — R53.1 WEAKNESS GENERALIZED: ICD-10-CM

## 2018-04-05 PROCEDURE — 97112 NEUROMUSCULAR REEDUCATION: CPT

## 2018-04-05 PROCEDURE — 97110 THERAPEUTIC EXERCISES: CPT

## 2018-04-05 NOTE — OP THERAPY DAILY TREATMENT
"  Outpatient Physical Therapy  DAILY TREATMENT     [facility] Physical Therapy [address].  [address]  [place] [address]  Phone:  [phone]  Fax:  [phone]    Date: 04/05/2018    Patient: Azeb Major  YOB: 1940  MRN: [id number]     Time Calculation  Start time: 1030  Stop time: 1100 Time Calculation (min): 30 minutes     Chief Complaint: Loss Of Balance    Visit #: 7    SUBJECTIVE:  Pt has new dentures so she is eating more and feels a little stronger    OBJECTIVE:  Current objective measures: Able to foam stand with (S) vs. Contact           Therapeutic Exercises (CPT 72127):     1. Nu step , 3, 6'    2. 6\" step ups, 10x2 each side    3. Foam balance E/C    4. Braiding    5. Cone walking    6. Cone steps      Time-based treatments/modalities:  Therapeutic exercise minutes (CPT 87370): 15 minutes  Neuromusc re-ed, balance, coor, post minutes (CPT 92548): 15 minutes      ASSESSMENT:   Response to treatment: occasional contact (A) with balance activities    PLAN/RECOMMENDATIONS:   Plan for treatment: therapy treatment to continue next visit.         "

## 2018-04-12 ENCOUNTER — PHYSICAL THERAPY (OUTPATIENT)
Dept: PHYSICAL THERAPY | Facility: REHABILITATION | Age: 78
End: 2018-04-12
Attending: FAMILY MEDICINE
Payer: MEDICARE

## 2018-04-12 DIAGNOSIS — R53.1 WEAKNESS GENERALIZED: ICD-10-CM

## 2018-04-12 DIAGNOSIS — Z91.81 PERSONAL HISTORY OF FALL: ICD-10-CM

## 2018-04-12 PROCEDURE — 97110 THERAPEUTIC EXERCISES: CPT

## 2018-04-12 PROCEDURE — 97112 NEUROMUSCULAR REEDUCATION: CPT

## 2018-04-12 NOTE — OP THERAPY DAILY TREATMENT
Outpatient Physical Therapy  DAILY TREATMENT     [facility] Physical [address].  [address]  [place] [address]  Phone:  [phone]  Fax:  [phone]    Date: 04/12/2018    Patient: Azeb Major  YOB: 1940  MRN: [id number]     Time Calculation  Start time: 1030  Stop time: 1100 Time Calculation (min): 30 minutes     Chief Complaint: Difficulty Walking    Visit #: 8    SUBJECTIVE:  No falls    OBJECTIVE:  Current objective measures:           Therapeutic Exercises (CPT 63775):     1. Nu step , 8' level 4    2. Towel kick for balance    3. Shuttle with duel task ball toss, 5cords    4. Tramp walking    5. 1/2 foam balance tandem      Time-based treatments/modalities:  Therapeutic exercise minutes (CPT 17708): 15 minutes  Neuromusc re-ed, balance, coor, post minutes (CPT 32901): 15 minutes           ASSESSMENT:   Response to treatment: Pt reports she feels noticibly stronger  PLAN/RECOMMENDATIONS:   Plan for treatment: therapy treatment to continue next visit.  Planned interventions for next visit: gait training (CPT 58386), neuromuscular re-education (CPT 96761) and therapeutic exercise (CPT 07091).

## 2018-04-13 DIAGNOSIS — N39.0 FREQUENT UTI: ICD-10-CM

## 2018-04-19 ENCOUNTER — PHYSICAL THERAPY (OUTPATIENT)
Dept: PHYSICAL THERAPY | Facility: REHABILITATION | Age: 78
End: 2018-04-19
Attending: FAMILY MEDICINE
Payer: MEDICARE

## 2018-04-19 DIAGNOSIS — R53.1 WEAKNESS GENERALIZED: ICD-10-CM

## 2018-04-19 DIAGNOSIS — Z91.81 PERSONAL HISTORY OF FALL: ICD-10-CM

## 2018-04-19 PROCEDURE — 97112 NEUROMUSCULAR REEDUCATION: CPT

## 2018-04-19 PROCEDURE — 97110 THERAPEUTIC EXERCISES: CPT

## 2018-04-23 ENCOUNTER — OFFICE VISIT (OUTPATIENT)
Dept: BEHAVIORAL HEALTH | Facility: PHYSICIAN GROUP | Age: 78
End: 2018-04-23
Payer: MEDICARE

## 2018-04-23 DIAGNOSIS — F31.9 BIPOLAR 1 DISORDER, DEPRESSED (HCC): ICD-10-CM

## 2018-04-23 PROCEDURE — 90834 PSYTX W PT 45 MINUTES: CPT | Performed by: SOCIAL WORKER

## 2018-04-23 NOTE — BH THERAPY
" [facility]  Therapy Progress Note    Patient Name: Azeb Major  Patient MRN: [id number]  Today's Date: 4/23/2018     Type of session:Individual psychotherapy  Length of session: 45 minutes  Persons in attendance:Patient    Subjective/New Info: Patient reports doing \"much better\", feeling more positive, more able to get self up each day; attributes the change, at least in part, to the current medications.  Also reports that the children have been very helpful: bringing her food several days/week, taking her places.  She resumed [employer] group and is enjoying it.  Went back to the dentist (preferred one) and feels more comfortable with her dentures.  Doing physical therapy weekly, plus doing exercises at home 1-2x/day; would like to get strong enough to walk around the block.  Only concern is that her children don't get along and sometimes have fought (verbally) in her presence; she says she tried once to get them to stop, but was unsuccessful.  We talked about additional communication skills, and I offered to meet with the whole family (which she said she would like, but wasn't sure they would come).     Objective/Observations:   Participation: Active verbal participation, responsive to feedback   Grooming: Good   Cognition: Alert and Fully Oriented   Eye contact: Good   Mood: Euthymic and Anxious   Affect: Flexible, Full range and Congruent with content   Thought process: Logical and Goal-directed   Speech: Rate within normal limits and Volume within normal limits   Other:     Diagnoses:  Bipolar I    Current risk:   SUICIDE: Low   Homicide: Low   Self-harm: Low   Relapse: Not applicable   Other:    Safety Plan reviewed? Not Indicated   If evidence of imminent risk is present, intervention/plan:     Therapeutic Intervention(s): Behavior:  Behavior analysis and Behavior modification, Clarify:  Clarify feelings and Clarify thoughts, Communication skills, Conflict resolution skills, Leisure and " recreation skills, Parenting/familial roles addressed, Self-care skills, Stressors assessed and Supportive psychotherapy    Treatment Goal(s)/Objective(s) addressed: Improve/stabilize mood and sense of purpose; improve ability to set boundaries & take care of self.     Progress toward Treatment Goals: Moderate improvement    Plan:  - Continue Individual therapy and Medication management    Linda Rick  4/23/2018

## 2018-04-24 DIAGNOSIS — N39.0 FREQUENT UTI: ICD-10-CM

## 2018-04-24 RX ORDER — NITROFURANTOIN MACROCRYSTALS 50 MG/1
CAPSULE ORAL
Qty: 90 CAP | Refills: 1 | Status: SHIPPED | OUTPATIENT
Start: 2018-04-24 | End: 2018-11-03 | Stop reason: SDUPTHER

## 2018-05-03 ENCOUNTER — PHYSICAL THERAPY (OUTPATIENT)
Dept: PHYSICAL THERAPY | Facility: REHABILITATION | Age: 78
End: 2018-05-03
Attending: FAMILY MEDICINE
Payer: MEDICARE

## 2018-05-03 DIAGNOSIS — R53.1 WEAKNESS GENERALIZED: ICD-10-CM

## 2018-05-03 DIAGNOSIS — Z91.81 PERSONAL HISTORY OF FALL: ICD-10-CM

## 2018-05-03 PROCEDURE — G8979 MOBILITY GOAL STATUS: HCPCS | Mod: CK

## 2018-05-03 PROCEDURE — G8980 MOBILITY D/C STATUS: HCPCS | Mod: CK

## 2018-05-03 PROCEDURE — 97112 NEUROMUSCULAR REEDUCATION: CPT

## 2018-05-03 NOTE — OP THERAPY DAILY TREATMENT
"  Outpatient Physical Therapy  DAILY TREATMENT     [facility] Physical [address].  [address]  [place] [address]  Phone:  [phone]  Fax:  [phone]    Date: 05/03/2018    Patient: Azeb Major  YOB: 1940  MRN: [id number]     Time Calculation  Start time: 1045  Stop time: 1115 Time Calculation (min): 30 minutes     Chief Complaint: Difficulty Walking    Visit #: 10    SUBJECTIVE:  States she has not had any falls. States doing HEP    OBJECTIVE:  Current objective measures: TUG 13\"(decreased fall risk) 5x sit to stand 12\"( WNL for her age)          Therapeutic Exercises (CPT 64214):     1. Beam step overs for balance, 20x2    2. Duel task ball toss, walking, 100'x2    3. 6\" step ups    4. Nu step 8\"  (3)    5. Retest tug, 5xsit  stand      Time-based treatments/modalities:  Neuromusc re-ed, balance, coor, post minutes (CPT 49688): 30 minutes             ASSESSMENT:   Response to treatment: Goals met   PLAN/RECOMMENDATIONS: [id number]  Plan for treatment: discharge patient due to accomplished goals.      "

## 2018-05-03 NOTE — OP THERAPY DISCHARGE SUMMARY
"[facility]  Outpatient Physical Therapy  DISCHARGE SUMMARY NOTE      [facility] Physical Therapy [address].  [address]  [place] [address]  Phone:  [phone]  Fax:  [phone]    Date of Visit: 05/03/2018    Patient: Azeb Major  YOB: 1940  MRN: [id number]     Referring Provider: Josy Arteaga M.D.  [address] [facility]  [address]  [place] [address]   Referring Diagnosis Weakness [R53.1];History of falling [Z91.81];Other abnormalities of gait and mobility [R26.89]     Physical Therapy Occurrence Codes    Date of onset of impairment:  2/8/18   Date physical therapy care plan established or reviewed:  5/3/18   Date physical therapy treatment started:  2/8/18          Functional Limitation G-Codes and Severity Modifiers      Goal:  [id number]   Discharge:  [id number]       Your patient is being discharged from Physical Therapy with the following comments:   · Goals met    Comments:  SEE daily note. TUG 13\" (decreased fall risk) 5x sit to stand 12\" Denies any recent falls    Limitations Remaining:HR is frequently elevated      Recommendations:  D/C with MITZI Blevins, PT    Date: 5/3/2018  "

## 2018-05-21 ENCOUNTER — OFFICE VISIT (OUTPATIENT)
Dept: BEHAVIORAL HEALTH | Facility: PHYSICIAN GROUP | Age: 78
End: 2018-05-21
Payer: MEDICARE

## 2018-05-21 VITALS
RESPIRATION RATE: 16 BRPM | HEART RATE: 74 BPM | TEMPERATURE: 97.9 F | DIASTOLIC BLOOD PRESSURE: 68 MMHG | WEIGHT: 147 LBS | HEIGHT: 66 IN | BODY MASS INDEX: 23.63 KG/M2 | SYSTOLIC BLOOD PRESSURE: 116 MMHG

## 2018-05-21 DIAGNOSIS — F31.9 BIPOLAR 1 DISORDER, DEPRESSED (HCC): ICD-10-CM

## 2018-05-21 PROCEDURE — 90834 PSYTX W PT 45 MINUTES: CPT | Performed by: SOCIAL WORKER

## 2018-05-21 PROCEDURE — 99213 OFFICE O/P EST LOW 20 MIN: CPT | Performed by: PSYCHIATRY & NEUROLOGY

## 2018-05-21 RX ORDER — VENLAFAXINE HYDROCHLORIDE 150 MG/1
150 CAPSULE, EXTENDED RELEASE ORAL DAILY
Qty: 90 CAP | Refills: 0 | Status: SHIPPED | OUTPATIENT
Start: 2018-05-21 | End: 2018-08-15

## 2018-05-21 ASSESSMENT — PATIENT HEALTH QUESTIONNAIRE - PHQ9
1. LITTLE INTEREST OR PLEASURE IN DOING THINGS: 0
2. FEELING DOWN, DEPRESSED, IRRITABLE, OR HOPELESS: 0
SUM OF ALL RESPONSES TO PHQ9 QUESTIONS 1 AND 2: 0

## 2018-05-21 NOTE — PROGRESS NOTES
"[facility]  PSYCHIATRIC FOLLOW-UP NOTE    Name: Azeb Major  MRN: [id number]  : 1940  Age: 77 y.o.  Date of assessment: 2018  PCP: Josy Arteaga M.D.  Persons in attendance: Patient and Children  REASON FOR VISIT/CHIEF COMPLAINT (as stated by Patient and Children):  Azeb Major is a 77 y.o., White female, attending follow-up appointment for   Chief Complaint   Patient presents with   • Medication Management     I have been feeling well on my medications.   .      HISTORY OF PRESENT ILLNESS:    This is a 78 yo female, lives alone, seen today for follow up. The patient came with her two daughters and the patient did not go to [facility] [employer]. The patient started to feel better with a higher dose of venlafaxine. The patient call her children before and after she takes a shower. The patients children are checking on her and the patient started to feel better faster. The patient is eating better and she is sleeping well.     PSYCHOSOCIAL CHANGES SINCE PREVIOUS CONTACT:  The patients depression improved and she is sleeping well at night.     RESPONSE TO TREATMENT:  Divalproex  mg three at night, seroquel  mg one at night, venlafaxine  mg one a day.    MEDICATION SIDE EFFECTS:  Denied.    REVIEW OF SYSTEMS:        Constitutional negative   Eyes negative   Ears/Nose/Mouth/Throat negative   Cardiovascular positive - hypertension.   Respiratory negative   Gastrointestinal negative   Genitourinary positive - history of frequent UTI, chronic kidney disease.   Muscular negative   Integumentary negative   Neurological negative   Endocrine positive - hypothyroidism.   Hematologic/Lymphatic negative       PSYCHIATRIC EXAMINATION/MENTAL STATUS  /68   Pulse 74   Temp 36.6 °C (97.9 °F)   Resp 16   Ht 1.676 m (5' 5.98\")   Wt 66.7 kg (147 lb)   BMI 23.74 kg/m²   Participation: Active verbal participation and Attentive  Grooming:Neat  Orientation: Alert and Fully " Oriented  Eye contact: Good  Behavior:Calm   Mood: Anxious  Affect: Flexible and Full range  Thought process: Logical and Goal-directed  Thought content:  Within normal limits  Speech: Rate within normal limits and Volume within normal limits  Perception:  Within normal limits  Memory:  No gross evidence of memory deficits  Insight: Good  Judgment: Good  Family/couple interaction observations:   Other:    Current risk:    Suicide: Low   Homicide: Low   Self-harm: Low  Relapse: Low  Other:   Crisis Safety Plan reviewed?Yes  If evidence of imminent risk is present, intervention/plan:    Medical Records/Labs/Diagnostic Tests Reviewed: yes.    Medical Records/Labs/Diagnostic Tests Ordered:   CBC, Com Panel, and valproic acid level.    DIAGNOSTIC IMPRESSION(S):  1. Bipolar 1 disorder, depressed (HCC)           ASSESSMENT AND PLAN:    1. Bipolar 1 disorder.  Decrease venlafaxine  mg one a day # 90 NR.  Divalproex  mg three at night.  Seroquel  mg one a day .    2. CBC,  Panel, and Valproic acid level in two weeks.    3. Follow up in two months     16. minutes were spent in psychotherapy.  (If greater than 16 minutes, add 62027 code)   Topics addressed in psychotherapy include:  The patient has regular activities and a good sleep cycle.  The patient is able to work through her daily stress.  She has good family support.   Anthony Fowler M.D.

## 2018-05-21 NOTE — BH THERAPY
" [facility]  Therapy Progress Note    Patient Name: Azeb Major  Patient MRN: [id number]  Today's Date: 5/21/2018     Type of session:Individual psychotherapy  Length of session: 45 minutes  Persons in attendance:Patient    Subjective/New Info: Patient reports \"I have been happy for four weeks\", attributes improvement mostly to medication \"kicking in\".  She also graduated from physical therapy, is doing home exercises and some walking, though still for short distances.  She continues knitting and going to [employer] group, also enjoys alone time to read.  Children have continued to be supportive, and she now has a second senior helper.  [facility] and looks forward to participating in some of their activities.  No more flashbacks about 's behavior.  Objective/Observations:   Participation: Active verbal participation, Attentive, Engaged and Open to feedback   Grooming: Good   Cognition: Alert and Fully Oriented   Eye contact: Good   Mood: Euthymic   Affect: Flexible, Full range and Congruent with content   Thought process: Logical and Goal-directed   Speech: Rate within normal limits and Volume within normal limits   Other:     Diagnoses:  Bipolar I, most recently depressed    Current risk:   SUICIDE: Low   Homicide: Low   Self-harm: Low   Relapse: Not applicable   Other:    Safety Plan reviewed? Not Indicated   If evidence of imminent risk is present, intervention/plan:     Therapeutic Intervention(s): Behavior:  Behavior analysis, Clarify:  Clarify feelings and Clarify thoughts, Communication skills, Interpersonal effectiveness skills, Leisure and recreation skills, Parenting/familial roles addressed, Positive behavior reinforced, Self-care skills, Stressors assessed and Supportive psychotherapy    Treatment Goal(s)/Objective(s) addressed: Stabilize/improve mood and ability to set boundaries, find more purpose in life.  Resolve issues from marriage.    Progress toward " Treatment Goals: Significant improvement    Plan:  - Continue Individual therapy and Medication management    Linda Rick  5/21/2018

## 2018-06-05 ENCOUNTER — TELEPHONE (OUTPATIENT)
Dept: BEHAVIORAL HEALTH | Facility: PHYSICIAN GROUP | Age: 78
End: 2018-06-05

## 2018-06-26 ENCOUNTER — TELEPHONE (OUTPATIENT)
Dept: BEHAVIORAL HEALTH | Facility: PHYSICIAN GROUP | Age: 78
End: 2018-06-26

## 2018-07-05 RX ORDER — DIVALPROEX SODIUM 250 MG/1
TABLET, EXTENDED RELEASE ORAL
Qty: 270 TAB | Refills: 1 | Status: SHIPPED | OUTPATIENT
Start: 2018-07-05 | End: 2019-01-03 | Stop reason: SDUPTHER

## 2018-07-13 ENCOUNTER — OFFICE VISIT (OUTPATIENT)
Dept: BEHAVIORAL HEALTH | Facility: PHYSICIAN GROUP | Age: 78
End: 2018-07-13
Payer: MEDICARE

## 2018-07-13 VITALS
HEART RATE: 76 BPM | DIASTOLIC BLOOD PRESSURE: 68 MMHG | WEIGHT: 148 LBS | HEIGHT: 66 IN | BODY MASS INDEX: 23.78 KG/M2 | RESPIRATION RATE: 16 BRPM | TEMPERATURE: 97.9 F | SYSTOLIC BLOOD PRESSURE: 114 MMHG

## 2018-07-13 DIAGNOSIS — F31.60 BIPOLAR 1 DISORDER, MIXED (HCC): ICD-10-CM

## 2018-07-13 PROCEDURE — 99213 OFFICE O/P EST LOW 20 MIN: CPT | Performed by: PSYCHIATRY & NEUROLOGY

## 2018-07-13 PROCEDURE — 90833 PSYTX W PT W E/M 30 MIN: CPT | Performed by: PSYCHIATRY & NEUROLOGY

## 2018-07-13 RX ORDER — QUETIAPINE 200 MG/1
200 TABLET, FILM COATED, EXTENDED RELEASE ORAL
Qty: 30 TAB | Refills: 1 | Status: SHIPPED | OUTPATIENT
Start: 2018-07-13 | End: 2018-10-30 | Stop reason: SDUPTHER

## 2018-07-13 NOTE — PROGRESS NOTES
"[facility]  PSYCHIATRIC FOLLOW-UP NOTE    Name: Azeb Major  MRN: [id number]  : 1940  Age: 78 y.o.  Date of assessment: 2018  PCP: Josy Arteaga M.D.  Persons in attendance: Patient  REASON FOR VISIT/CHIEF COMPLAINT (as stated by Patient):  Azeb Major is a 78 y.o., White female, attending follow-up appointment for   Chief Complaint   Patient presents with   • Agitation     The patient came with her daughter and senior . The patient is getting agitated easily and not sleeping well.   .      HISTORY OF PRESENT ILLNESS:    This is a 79 yo female, lives alone, seen today for follow up with her daughter. The patient has been agitated and not sleeping well for two months and her venlafaxine was discontinued. The patient   continue to be agitated and has been waking up early in the morning. The patient gained weight recently and she is eating better.      PSYCHOSOCIAL CHANGES SINCE PREVIOUS CONTACT:  Less sleep and frequent agitation.    RESPONSE TO TREATMENT:  Divalproex  mg three at bed time, seroquel  mg one at bed time, stopped effexor.    MEDICATION SIDE EFFECTS:  Denied.    REVIEW OF SYSTEMS:        Constitutional negative   Eyes negative   Ears/Nose/Mouth/Throat negative   Cardiovascular positive - hypertension   Respiratory negative   Gastrointestinal negative   Genitourinary positive - chronic kidney disease.   Muscular negative   Integumentary negative   Neurological negative   Endocrine positive - hypothyroidism.   Hematologic/Lymphatic negative       PSYCHIATRIC EXAMINATION/MENTAL STATUS  /68   Pulse 76   Temp 36.6 °C (97.9 °F)   Resp 16   Ht 1.676 m (5' 5.98\")   Wt 67.1 kg (148 lb)   BMI 23.90 kg/m²   Participation: Active verbal participation and Attentive  Grooming:Neat  Orientation: Alert and Fully Oriented  Eye contact: Good  Behavior:Tense   Mood: Anxious  Affect: Flexible and Full range  Thought process: Logical and " Goal-directed  Thought content:  Within normal limits  Speech: Hypertalkative  Perception:  Within normal limits  Memory:  No gross evidence of memory deficits  Insight: Good  Judgment: Good  Family/couple interaction observations:   Other:    Current risk:    Suicide: Low   Homicide: Low   Self-harm: Low  Relapse: Low  Other:   Crisis Safety Plan reviewed?Yes  If evidence of imminent risk is present, intervention/plan:    Medical Records/Labs/Diagnostic Tests Reviewed: yes.    Medical Records/Labs/Diagnostic Tests Ordered: none.    DIAGNOSTIC IMPRESSION(S):  1. Bipolar 1 disorder, mixed ([facility])           ASSESSMENT AND PLAN:    1. Bipolar 1 disorder, mixed.  Increase seroquel  mg one at night # 30 refills one.  Divalproex  mg three at bed time.  Stopped venlafaxine .    2. CBC, Comp Panel, and BILL in one week    3. Follow up in three weeks.      16 minutes were spent in psychotherapy.  (If greater than 16 minutes, add 47431 code)   Topics addressed in psychotherapy include:  The patient agreed to keep a daily routine and a good sleep cycle.  Agreed to use meditation and relaxation method.  Good family support.  Anthony Fowler M.D.

## 2018-07-14 DIAGNOSIS — N39.46 MIXED INCONTINENCE: ICD-10-CM

## 2018-07-16 RX ORDER — TOLTERODINE TARTRATE 4 MG
CAPSULE, EXT RELEASE 24 HR ORAL
Qty: 90 CAP | Refills: 1 | Status: SHIPPED | OUTPATIENT
Start: 2018-07-16 | End: 2019-01-11 | Stop reason: SDUPTHER

## 2018-07-26 ENCOUNTER — HOSPITAL ENCOUNTER (OUTPATIENT)
Dept: LAB | Facility: MEDICAL CENTER | Age: 78
End: 2018-07-26
Attending: PSYCHIATRY & NEUROLOGY
Payer: MEDICARE

## 2018-07-26 DIAGNOSIS — F31.9 BIPOLAR 1 DISORDER, DEPRESSED (HCC): ICD-10-CM

## 2018-07-26 LAB
ALBUMIN SERPL BCP-MCNC: 3.8 G/DL (ref 3.2–4.9)
ALBUMIN/GLOB SERPL: 1.5 G/DL
ALP SERPL-CCNC: 58 U/L (ref 30–99)
ALT SERPL-CCNC: 17 U/L (ref 2–50)
ANION GAP SERPL CALC-SCNC: 11 MMOL/L (ref 0–11.9)
AST SERPL-CCNC: 23 U/L (ref 12–45)
BASOPHILS # BLD AUTO: 0.4 % (ref 0–1.8)
BASOPHILS # BLD: 0.02 K/UL (ref 0–0.12)
BILIRUB SERPL-MCNC: 0.4 MG/DL (ref 0.1–1.5)
BUN SERPL-MCNC: 35 MG/DL (ref 8–22)
CALCIUM SERPL-MCNC: 9 MG/DL (ref 8.5–10.5)
CHLORIDE SERPL-SCNC: 111 MMOL/L (ref 96–112)
CO2 SERPL-SCNC: 20 MMOL/L (ref 20–33)
CREAT SERPL-MCNC: 1.49 MG/DL (ref 0.5–1.4)
EOSINOPHIL # BLD AUTO: 0.07 K/UL (ref 0–0.51)
EOSINOPHIL NFR BLD: 1.4 % (ref 0–6.9)
ERYTHROCYTE [DISTWIDTH] IN BLOOD BY AUTOMATED COUNT: 47.3 FL (ref 35.9–50)
GLOBULIN SER CALC-MCNC: 2.6 G/DL (ref 1.9–3.5)
GLUCOSE SERPL-MCNC: 104 MG/DL (ref 65–99)
HCT VFR BLD AUTO: 38.3 % (ref 37–47)
HGB BLD-MCNC: 12.3 G/DL (ref 12–16)
IMM GRANULOCYTES # BLD AUTO: 0.02 K/UL (ref 0–0.11)
IMM GRANULOCYTES NFR BLD AUTO: 0.4 % (ref 0–0.9)
LYMPHOCYTES # BLD AUTO: 1.69 K/UL (ref 1–4.8)
LYMPHOCYTES NFR BLD: 34.5 % (ref 22–41)
MCH RBC QN AUTO: 31.2 PG (ref 27–33)
MCHC RBC AUTO-ENTMCNC: 32.1 G/DL (ref 33.6–35)
MCV RBC AUTO: 97.2 FL (ref 81.4–97.8)
MONOCYTES # BLD AUTO: 0.49 K/UL (ref 0–0.85)
MONOCYTES NFR BLD AUTO: 10 % (ref 0–13.4)
NEUTROPHILS # BLD AUTO: 2.61 K/UL (ref 2–7.15)
NEUTROPHILS NFR BLD: 53.3 % (ref 44–72)
NRBC # BLD AUTO: 0 K/UL
NRBC BLD-RTO: 0 /100 WBC
PLATELET # BLD AUTO: 94 K/UL (ref 164–446)
PMV BLD AUTO: 11.3 FL (ref 9–12.9)
POTASSIUM SERPL-SCNC: 5.3 MMOL/L (ref 3.6–5.5)
PROT SERPL-MCNC: 6.4 G/DL (ref 6–8.2)
RBC # BLD AUTO: 3.94 M/UL (ref 4.2–5.4)
SODIUM SERPL-SCNC: 142 MMOL/L (ref 135–145)
VALPROATE SERPL-MCNC: 91.5 UG/ML (ref 50–100)
WBC # BLD AUTO: 4.9 K/UL (ref 4.8–10.8)

## 2018-07-26 PROCEDURE — 80053 COMPREHEN METABOLIC PANEL: CPT

## 2018-07-26 PROCEDURE — 85025 COMPLETE CBC W/AUTO DIFF WBC: CPT

## 2018-07-26 PROCEDURE — 36415 COLL VENOUS BLD VENIPUNCTURE: CPT

## 2018-07-26 PROCEDURE — 80164 ASSAY DIPROPYLACETIC ACD TOT: CPT

## 2018-07-30 ENCOUNTER — OFFICE VISIT (OUTPATIENT)
Dept: BEHAVIORAL HEALTH | Facility: PHYSICIAN GROUP | Age: 78
End: 2018-07-30
Payer: MEDICARE

## 2018-07-30 VITALS
HEART RATE: 78 BPM | BODY MASS INDEX: 23.46 KG/M2 | TEMPERATURE: 98.1 F | RESPIRATION RATE: 14 BRPM | HEIGHT: 66 IN | WEIGHT: 146 LBS | SYSTOLIC BLOOD PRESSURE: 110 MMHG | DIASTOLIC BLOOD PRESSURE: 70 MMHG

## 2018-07-30 DIAGNOSIS — F31.60 BIPOLAR 1 DISORDER, MIXED (HCC): ICD-10-CM

## 2018-07-30 PROCEDURE — 99213 OFFICE O/P EST LOW 20 MIN: CPT | Performed by: PSYCHIATRY & NEUROLOGY

## 2018-07-30 PROCEDURE — 90833 PSYTX W PT W E/M 30 MIN: CPT | Performed by: PSYCHIATRY & NEUROLOGY

## 2018-07-30 PROCEDURE — 90834 PSYTX W PT 45 MINUTES: CPT | Performed by: SOCIAL WORKER

## 2018-07-30 ASSESSMENT — PATIENT HEALTH QUESTIONNAIRE - PHQ9
2. FEELING DOWN, DEPRESSED, IRRITABLE, OR HOPELESS: 0
1. LITTLE INTEREST OR PLEASURE IN DOING THINGS: 0
SUM OF ALL RESPONSES TO PHQ9 QUESTIONS 1 AND 2: 0

## 2018-07-30 NOTE — PROGRESS NOTES
"[facility]  PSYCHIATRIC FOLLOW-UP NOTE    Name: Azeb Major  MRN: [id number]  : 1940  Age: 78 y.o.  Date of assessment: 2018  PCP: Josy Arteaga M.D.  Persons in attendance: Patient and her children  REASON FOR VISIT/CHIEF COMPLAINT (as stated by Patient and her children.):  Azeb Major is a 78 y.o., White female, attending follow-up appointment for   Chief Complaint   Patient presents with   • Medication Management     I have been feeling tired and I forgot to turn of stove few days ago.   .      HISTORY OF PRESENT ILLNESS:    This is a 79 yo female, lives alone, seen today for follow up with her two daughters. The patient is taking depakote and quetiapine and her seroquel was increased to seroquel  mg one at bed time few weeks ago. The patient is sleeping well but she is reported feeling tired during the day. The patinet forgot to turn of the stove few days ago and her son woke her up. The patient denied depression and her anxiety improved.      PSYCHOSOCIAL CHANGES SINCE PREVIOUS CONTACT:  The patient felt tired during the day. She denied depression.     RESPONSE TO TREATMENT:  She takes divalproex  mg three at bed time, quetiapine  mg one at bed time, and stopped venlafaxine.      MEDICATION SIDE EFFECTS:  Denied.    REVIEW OF SYSTEMS:        Constitutional negative   Eyes negative   Ears/Nose/Mouth/Throat negative   Cardiovascular positive - hypertension   Respiratory negative   Gastrointestinal negative   Genitourinary positive - chronic kidney disease.   Muscular negative   Integumentary negative   Neurological negative   Endocrine positive - hypothyroidism.   Hematologic/Lymphatic negative       PSYCHIATRIC EXAMINATION/MENTAL STATUS  /70   Pulse 78   Temp 36.7 °C (98.1 °F)   Resp 14   Ht 1.676 m (5' 5.98\")   Wt 66.2 kg (146 lb)   BMI 23.58 kg/m²   Participation: Active verbal participation and Attentive  Grooming:Neat  Orientation: " Alert and Fully Oriented  Eye contact: Good  Behavior:Calm   Mood: Anxious  Affect: Flexible and Full range  Thought process: Logical and Goal-directed  Thought content:  Within normal limits  Speech: Rate within normal limits and Volume within normal limits  Perception:  Within normal limits  Memory:  No gross evidence of memory deficits  Insight: Good  Judgment: Good  Family/couple interaction observations:   Other:    Current risk:    Suicide: Low   Homicide: Low   Self-harm: Low  Relapse: Low  Other:   Crisis Safety Plan reviewed?Yes  If evidence of imminent risk is present, intervention/plan:    Medical Records/Labs/Diagnostic Tests Reviewed: yes.  Change in renal functions, unremarkable LFT and BILL 90.    Medical Records/Labs/Diagnostic Tests Ordered: none.    DIAGNOSTIC IMPRESSION(S):  1. Bipolar 1 disorder, mixed ([facility])           ASSESSMENT AND PLAN:    1. Bipolar 1 disorder, mixed.  Divalproex  mg three at bed time.  quetiapine  m gone at bed time.    2. Follow up in one month.    16 minutes were spent in psychotherapy.  (If greater than 16 minutes, add 85584 code)   Topics addressed in psychotherapy include:  We discussed her unresolved emotional issues and helped her with emotional skills.  Helped her with cognitive clarifications and emotional clarifications.  Agreed to keep her daily routine and her sleep cycle.  Anthony Fowler M.D.

## 2018-07-30 NOTE — BH THERAPY
" [facility]  Therapy Progress Note    Patient Name: Azeb Major  Patient MRN: [id number]  Today's Date: 7/30/2018     Type of session:Individual psychotherapy  Length of session: 50 minutes  Persons in attendance:Patient    Subjective/New Info: Patient reports concern about her medication: sleeping \"too much at night\", falling asleep watching TV, feeling \"like a zombie\", having \"weird dreams\"; also having more allergy problems.  Otherwise doing ok, continuing knitting group, doing her PT exercises, walking around neighborhood, meeting more people, appreciating family support.  Making effort to get along with only sister. Getting house more in order.  Objective/Observations:   Participation: Active verbal participation, but had trouble hearing, unsure about much of her medication.   Grooming: Good   Cognition: Alert and Fully Oriented   Eye contact: Good   Mood: Serious, euthymic   Affect: Flexible, Full range and Congruent with content   Thought process: Logical and Goal-directed   Speech: Rate within normal limits and Volume within normal limits   Other:     Diagnoses:  Bipolar I    Current risk:   SUICIDE: Low   Homicide: Low   Self-harm: Low   Relapse: Not applicable   Other:    Safety Plan reviewed? Not Indicated   If evidence of imminent risk is present, intervention/plan:     Therapeutic Intervention(s): Behavior:  Behavior analysis, Clarify:  Clarify feelings, Clarify thoughts and Clarify values, Cognitive modification, Leisure and recreation skills, Positive behavior reinforced, Self-care skills, Stressors assessed and Supportive psychotherapy    Treatment Goal(s)/Objective(s) addressed: Stabilize/improve mood, sense of purpose, ability to set boundaries and communicate needs clearly.     Progress toward Treatment Goals: Moderate improvement    Plan:  - Continue Individual therapy and Medication management; will talk with Dr. FRANK and Dr. Arteaga about concerns.    Linda RODAS" Prabhjot  7/30/2018

## 2018-08-15 ENCOUNTER — OFFICE VISIT (OUTPATIENT)
Dept: INTERNAL MEDICINE | Facility: IMAGING CENTER | Age: 78
End: 2018-08-15
Payer: MEDICARE

## 2018-08-15 VITALS
HEART RATE: 80 BPM | HEIGHT: 66 IN | DIASTOLIC BLOOD PRESSURE: 75 MMHG | OXYGEN SATURATION: 94 % | RESPIRATION RATE: 14 BRPM | TEMPERATURE: 97.8 F | BODY MASS INDEX: 23.63 KG/M2 | SYSTOLIC BLOOD PRESSURE: 118 MMHG | WEIGHT: 147 LBS

## 2018-08-15 DIAGNOSIS — E53.8 B12 DEFICIENCY: ICD-10-CM

## 2018-08-15 DIAGNOSIS — E55.9 VITAMIN D DEFICIENCY: ICD-10-CM

## 2018-08-15 DIAGNOSIS — N28.9 RENAL INSUFFICIENCY: ICD-10-CM

## 2018-08-15 DIAGNOSIS — I10 ESSENTIAL HYPERTENSION: ICD-10-CM

## 2018-08-15 DIAGNOSIS — E03.9 HYPOTHYROIDISM, UNSPECIFIED TYPE: ICD-10-CM

## 2018-08-15 DIAGNOSIS — Z78.0 POSTMENOPAUSE: ICD-10-CM

## 2018-08-15 DIAGNOSIS — R53.1 WEAKNESS: ICD-10-CM

## 2018-08-15 DIAGNOSIS — F31.60 BIPOLAR 1 DISORDER, MIXED (HCC): ICD-10-CM

## 2018-08-15 DIAGNOSIS — M81.0 AGE RELATED OSTEOPOROSIS, UNSPECIFIED PATHOLOGICAL FRACTURE PRESENCE: ICD-10-CM

## 2018-08-15 DIAGNOSIS — N39.0 FREQUENT UTI: ICD-10-CM

## 2018-08-15 PROCEDURE — 99214 OFFICE O/P EST MOD 30 MIN: CPT | Performed by: FAMILY MEDICINE

## 2018-08-15 NOTE — PROGRESS NOTES
Chief Complaint   Patient presents with   • Other     followup on weakness. finished PT   • Depression     doing better         HISTORY OF PRESENT ILLNESS: Patient is a 78 y.o. female established patient who presents today to follow-up on several chronic conditions.      At her last visit she is feeling Quite weak and depressed.  She has done physical therapy and they have discharged her.  She is stronger.  She is moving much easier.  She does use a walker intermittently.    She also is feeling quite depressed.  She is followed closely by Dr. Lewis  He has discontinued Effexor and increased her Seroquel.  She does have a follow-up with him next month.  Her daughter thinks her mood is doing  better as well.    She has been having some problems with her teeth.  She got dentures that were quite uncomfortable.  She has been working with that.  She questions what she can take for pain.    He has a history of frequent urinary tract infections.  Is now on nitrofurantoin preventatively and has not had a infection for several months.    She is overdue for a DEXA scan.  Her last one was 2011 it did show osteoporosis.  She has not fractured.  She is taking calcium and vitamin D.  Has not had prescription treatment.    She also has hypothyroidism.  She is taking levothyroxine 125 mcg daily.  She feels like she is at a pretty good place.  No palpitations.  No constipation.  Her daughter does think she is a little manic and she is working with Dr. Schwartz on that.              Patient Active Problem List    Diagnosis Date Noted   • CKD (chronic kidney disease) stage 3, GFR 30-59 ml 09/18/2011     Priority: Low   • Hypertension 06/20/2011     Priority: Low   • Hypothyroid 12/01/2010     Priority: Low   • Macular degeneration 12/11/2016   • Bipolar 1 disorder, mixed ([facility]) 01/04/2015   • Hard of hearing 12/12/2014   • Poor balance 08/07/2014   • Hyperparathyroidism, secondary ([facility]) 02/08/2012   • Osteoporosis 12/07/2011   • UI  "(urinary incontinence) 12/01/2010   • Frequent UTI 12/01/2010   • Seasonal allergies 12/01/2010         Past medical, surgical, family, and social history is reviewed and updated in Epic chart by me today.   Medications and allergies reviewed and updated in Epic chart by me today.     REVIEW OF SYSTEMS:  GENERAL: No fatigue, no weight loss.  HEENT:  Ears--no earache, no change in hearing, no dizziness, no tinnitus.                 Eyes--no blurred vision, no discharge or pain                 Throat--No sore throat, no dysphagia, no hoarseness.  Sore gums due to dentures  CV:  No chest pain,dyspnea,palpitations or edema.  RESP:  No sob,cough,wheezing or hemoptysis.  GI: No dysphagia, heartburn,abdominal pain, nausea, vomiting, diarrhea or constipation.       No melena, jaundice, bleeding, incontinence or change in bowel habits.  :  No dysuria, polyuria, hematuria, incontinence, or nocturia.  MS:  No joint swelling, myalgias, or arthralgias.  NEURO:  No seizures, syncope, paralysis, tremor, or weakness.  SKIN: Check skin lesions on her nose.  PSYCH: Mood fine.--She states her mood is improved.  Again her daughter thinks she may be a little on the manic side.  She denies thoughts of hopelessness or suicide.    Vitals:    08/15/18 1000   BP: 118/75   Pulse: 80   Resp: 14   Temp: 36.6 °C (97.8 °F)   SpO2: 94%   Weight: 66.7 kg (147 lb)   Height: 1.676 m (5' 5.98\")     Physical Exam:  Gen: Well developed, well nourished. No acute distress.  Alert and oriented x 3.  Neck:  Supple, no adenopathy or thyromegaly.  Heart:  Regular rate and rhythm.  Normal S1, S2. No murmur, gallop or rub.  Lungs:  Clear, No wheezes,rales or rhonchi.  Extremities:  No edema.  Psych: Mood and affect are appropriate.    Reviewed labs from 7/26.  Her creatinine is a little elevated at 1.49.  GFR of 34.    Assessment/Plan:  1. Postmenopause.  Recommend updated bone density scan and then discussed treatment.  Continue calcium and vitamin D. " DS-BONE DENSITY STUDY (DEXA)   2. Essential hypertension.  Off medication.  Monitor lipids. COMP METABOLIC PANEL    LIPID PROFILE   3. Hypothyroidism, unspecified type.  Continue same replacement therapy. TSH    FREE THYROXINE    T3 FREE   4. B12 deficiency.  Monitor lab work.    5. Renal insufficiency.  Monitor lab work.  Avoid anti-inflammatories.  She may take Tylenol for pain.  Encouraged fluids. COMP METABOLIC PANEL    VITAMIN B12   6. Vitamin D deficiency.  Continue same replacement therapy. VITAMIN D,25 HYDROXY   7. Age related osteoporosis, unspecified pathological fracture presence     8. Bipolar 1 disorder, mixed (HCC)     9. Weakness.  Much improved.  Encouraged her to continue a home exercise program.  We also talked about some senior classes that are available if she is interested.    10. Frequent UTI.  Continue nitrofurantoin 50 mg daily.      Follow-up in 3 months.

## 2018-08-27 ENCOUNTER — OFFICE VISIT (OUTPATIENT)
Dept: BEHAVIORAL HEALTH | Facility: PHYSICIAN GROUP | Age: 78
End: 2018-08-27
Payer: MEDICARE

## 2018-08-27 DIAGNOSIS — F43.10 PTSD (POST-TRAUMATIC STRESS DISORDER): ICD-10-CM

## 2018-08-27 DIAGNOSIS — F31.60 BIPOLAR 1 DISORDER, MIXED (HCC): ICD-10-CM

## 2018-08-27 PROCEDURE — 90834 PSYTX W PT 45 MINUTES: CPT | Performed by: SOCIAL WORKER
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
## 2018-08-29 ENCOUNTER — OFFICE VISIT (OUTPATIENT)
Dept: BEHAVIORAL HEALTH | Facility: PHYSICIAN GROUP | Age: 78
End: 2018-08-29
Payer: MEDICARE

## 2018-08-29 VITALS
BODY MASS INDEX: 23.46 KG/M2 | HEART RATE: 78 BPM | SYSTOLIC BLOOD PRESSURE: 116 MMHG | DIASTOLIC BLOOD PRESSURE: 72 MMHG | TEMPERATURE: 97.9 F | WEIGHT: 146 LBS | RESPIRATION RATE: 14 BRPM | HEIGHT: 66 IN

## 2018-08-29 DIAGNOSIS — F31.60 BIPOLAR 1 DISORDER, MIXED (HCC): ICD-10-CM

## 2018-08-29 PROCEDURE — 99213 OFFICE O/P EST LOW 20 MIN: CPT | Performed by: PSYCHIATRY & NEUROLOGY

## 2018-08-29 PROCEDURE — 90833 PSYTX W PT W E/M 30 MIN: CPT | Performed by: PSYCHIATRY & NEUROLOGY

## 2018-08-29 ASSESSMENT — PATIENT HEALTH QUESTIONNAIRE - PHQ9
SUM OF ALL RESPONSES TO PHQ9 QUESTIONS 1 AND 2: 0
2. FEELING DOWN, DEPRESSED, IRRITABLE, OR HOPELESS: 0
1. LITTLE INTEREST OR PLEASURE IN DOING THINGS: 0

## 2018-08-29 NOTE — PROGRESS NOTES
"[facility]  PSYCHIATRIC FOLLOW-UP NOTE    Name: Azeb Major  MRN: [id number]  : 1940  Age: 78 y.o.  Date of assessment: 2018  PCP: Josy Arteaga M.D.  Persons in attendance: Patient  REASON FOR VISIT/CHIEF COMPLAINT (as stated by Patient):  Azeb Major is a 78 y.o., White female, attending follow-up appointment for   Chief Complaint   Patient presents with   • Sleep Problem     I am sleeping too much and feel tired all the time.    .      HISTORY OF PRESENT ILLNESS:    This is a 79 yo female, lives alone, came with her daughter, seen today for follow up after one month. The patient complained of sleeping too much and she reported that she is sleeping 11 to 12 hours every day. The patient denied any depression and she is not taking venlafaxine. The patient is concerned about changing her therapist and going back to her previous therapist. The patient is getting help from her kids and she is able to manage her meals.     PSYCHOSOCIAL CHANGES SINCE PREVIOUS CONTACT:  The patient denied depression but sleeping 11 to 12 hours.    RESPONSE TO TREATMENT:  She is taking divalproex  mg three at bed time, quetiapine  mg at bed time.    MEDICATION SIDE EFFECTS:  Denied.    REVIEW OF SYSTEMS:        Constitutional negative   Eyes negative   Ears/Nose/Mouth/Throat negative   Cardiovascular positive - hypertension.   Respiratory negative   Gastrointestinal negative   Genitourinary positive - chronic kidney disease.   Muscular negative   Integumentary negative   Neurological negative   Endocrine positive - hypothyroidism.   Hematologic/Lymphatic negative       PSYCHIATRIC EXAMINATION/MENTAL STATUS  /72   Pulse 78   Temp 36.6 °C (97.9 °F)   Resp 14   Ht 1.676 m (5' 5.98\")   Wt 66.2 kg (146 lb)   BMI 23.58 kg/m²   Participation: Active verbal participation, Attentive and Engaged  Grooming:Neat  Orientation: Alert and Fully Oriented  Eye contact: " Good  Behavior:Calm   Mood: Anxious  Affect: Flexible and Full range  Thought process: Logical and Goal-directed  Thought content:  Within normal limits  Speech: Rate within normal limits and Volume within normal limits  Perception:  Within normal limits  Memory:  No gross evidence of memory deficits  Insight: Good  Judgment: Good  Family/couple interaction observations:   Other:    Current risk:    Suicide: Low   Homicide: Low   Self-harm: Low  Relapse: Low  Other:   Crisis Safety Plan reviewed?Yes  If evidence of imminent risk is present, intervention/plan:    Medical Records/Labs/Diagnostic Tests Reviewed: yes.    Medical Records/Labs/Diagnostic Tests Ordered: none.    DIAGNOSTIC IMPRESSION(S):  1. Bipolar 1 disorder, mixed (HCC)           ASSESSMENT AND PLAN:    1. Bipolar 1 disorder, mixed  Divalproex  mg three at bed time.  Decrease quetiapine  mg at bed time on patients request.    2. Follow up in one month.    16. minutes were spent in psychotherapy.  (If greater than 16 minutes, add 41222 code)   Topics addressed in psychotherapy include:  Psycho education and cognitive clarifications.  We discussed her negative automatic thoughts and helped her to process it.  Continue meditation and relaxation method.  Anthony Fowler M.D.

## 2018-09-11 RX ORDER — QUETIAPINE 150 MG/1
TABLET, EXTENDED RELEASE ORAL
Qty: 90 TAB | Refills: 1 | Status: SHIPPED | OUTPATIENT
Start: 2018-09-11 | End: 2019-01-30

## 2018-10-05 ENCOUNTER — OFFICE VISIT (OUTPATIENT)
Dept: BEHAVIORAL HEALTH | Facility: CLINIC | Age: 78
End: 2018-10-05
Payer: MEDICARE

## 2018-10-05 VITALS
HEART RATE: 78 BPM | WEIGHT: 148 LBS | SYSTOLIC BLOOD PRESSURE: 116 MMHG | BODY MASS INDEX: 23.78 KG/M2 | HEIGHT: 66 IN | RESPIRATION RATE: 16 BRPM | DIASTOLIC BLOOD PRESSURE: 74 MMHG

## 2018-10-05 DIAGNOSIS — F31.60 BIPOLAR 1 DISORDER, MIXED (HCC): ICD-10-CM

## 2018-10-05 PROCEDURE — 99213 OFFICE O/P EST LOW 20 MIN: CPT | Performed by: PSYCHIATRY & NEUROLOGY

## 2018-10-05 PROCEDURE — 90833 PSYTX W PT W E/M 30 MIN: CPT | Performed by: PSYCHIATRY & NEUROLOGY

## 2018-10-05 ASSESSMENT — PATIENT HEALTH QUESTIONNAIRE - PHQ9: CLINICAL INTERPRETATION OF PHQ2 SCORE: 0

## 2018-10-05 NOTE — BH THERAPY
"[facility]  PSYCHIATRIC FOLLOW-UP NOTE    Name: Azeb Major  MRN: [id number]  : 1940  Age: 78 y.o.  Date of assessment: 10/5/2018  PCP: Josy Arteaga M.D.  Persons in attendance: Patient and Children  REASON FOR VISIT/CHIEF COMPLAINT (as stated by Spouse/Partner and Children):  Azeb Major is a 78 y.o., White female, attending follow-up appointment for   Chief Complaint   Patient presents with   • Medication Management     I am doing well and my tiredness improved.   .      HISTORY OF PRESENT ILLNESS:    This is 79 yo female, came with her two daughters for follow up. The patient is taking seroquel  mg at bed time and her tiredness improved. The patients one daughter reported that the patient has mild confusion in the morning for some times and it clears by itself. The patient denied any confusion. The patient admitted to taking a nap during the day as needed. The patient is sleeping well at nit and she denied depression.     PSYCHOSOCIAL CHANGES SINCE PREVIOUS CONTACT:  The patient is less tired and denied any confusion.    RESPONSE TO TREATMENT:  Seroquel  mg one at bed time, divalproex  mg three at bed time.    MEDICATION SIDE EFFECTS:  Denied.    REVIEW OF SYSTEMS:        Constitutional negative   Eyes negative   Ears/Nose/Mouth/Throat negative   Cardiovascular positive - hypertension   Respiratory negative   Gastrointestinal negative   Genitourinary positive - chronic kidney disease.   Muscular negative   Integumentary negative   Neurological negative   Endocrine positive - hypothyroidism.   Hematologic/Lymphatic negative       PSYCHIATRIC EXAMINATION/MENTAL STATUS  /74   Pulse 78   Resp 16   Ht 1.676 m (5' 5.98\")   Wt 67.1 kg (148 lb)   BMI 23.90 kg/m²   Participation: Active verbal participation, Attentive and Engaged  Grooming:Neat  Orientation: Alert and Fully Oriented  Eye contact: Good  Behavior:Calm   Mood: Anxious  Affect: Flexible " and Full range  Thought process: Logical and Goal-directed  Thought content:  Within normal limits  Speech: Rate within normal limits and Volume within normal limits  Perception:  Within normal limits  Memory:  No gross evidence of memory deficits  Insight: Good  Judgment: Good  Family/couple interaction observations:   Other:    Current risk:    Suicide: Low   Homicide: Low   Self-harm: Low  Relapse: Low  Other:   Crisis Safety Plan reviewed?Yes  If evidence of imminent risk is present, intervention/plan:    Medical Records/Labs/Diagnostic Tests Reviewed: yes.    Medical Records/Labs/Diagnostic Tests Ordered: none.    DIAGNOSTIC IMPRESSION(S):  1. Bipolar 1 disorder, mixed (HCC)           ASSESSMENT AND PLAN:    1. Bipolar 1 disorder, mixed.  Divalproex  mg three at bed time.  Seroquel  mg one at bed time.    2. Follow up in six weeks.    16 minutes were spent in psychotherapy.  (If greater than 16 minutes, add 22116 code)   Topics addressed in psychotherapy include:  We focused on family session today and educated the children .  Cognitive restructuring and behavioral changes.  Agreed to keep her daily routine and a good sleep cycle.   Anthony Fowler M.D.

## 2018-10-09 ENCOUNTER — OFFICE VISIT (OUTPATIENT)
Dept: BEHAVIORAL HEALTH | Facility: CLINIC | Age: 78
End: 2018-10-09
Payer: MEDICARE

## 2018-10-09 DIAGNOSIS — F31.60 BIPOLAR 1 DISORDER, MIXED (HCC): ICD-10-CM

## 2018-10-09 PROCEDURE — 90834 PSYTX W PT 45 MINUTES: CPT | Performed by: PSYCHOLOGIST

## 2018-10-09 NOTE — BH THERAPY
[facility]  Therapy Progress Note    Patient Name: Azeb Major  Patient MRN: [id number]  Today's Date: 10/9/2018      Type of session:Individual psychotherapy  Length of session: 45 minutes  Persons in attendance:Patient    Subjective/New Info: Pt transferred [facility] and was a former patient of this therapist before that. Pt disc life events that have happened: the releif she felt after her husb , the process of cleaning up his hoarding problem in the house, the stress of not having enough in finances, and the desire to get  again and find work or something meaningful to do in life. Pt expressed some concern over conflict between her children - there seems to be some resentment that some are doing most of the work caring for Pt. 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 Pt is frail and her balance and gait is not as good these days, although this is not recognized by Pt.     Objective/Observations:   Participation: Active verbal participation and Engaged   Grooming: Neat   Cognition: Alert and Fully Oriented   Eye contact: Good   Mood: Euthymic   Affect: Full range   Thought process: Logical and Goal-directed   Speech: Hypertalkative   Other:     Diagnoses:   1. Bipolar 1 disorder, mixed (HCC)         Current risk:   SUICIDE: Not applicable   Homicide: Not applicable   Self-harm: Not applicable   Relapse: Not applicable   Other:    Safety Plan reviewed? Not Indicated   If evidence of imminent risk is present, intervention/plan:     Therapeutic Intervention(s): Establish rapport, Goal-setting, Stressors assessed and Supportive psychotherapy    Treatment Goal(s)/Objective(s) addressed: Tx Goals:  - Learn to successfully challenge & change distortions in thinking  - Identify goals/values and cultivate a meaningful life  - Learn to be more emotionally flexible/resilient in times of stress/challenges  - Successfully learn to regulate impulsive  behaviors  - Process and reduce the effects of past trauma on life, functioning, & sense of self       Progress toward Treatment Goals: No change    Plan:  - Continue Individual therapy and Medication management    Linda Iglesias, Ph.D.  10/9/2018

## 2018-10-25 ENCOUNTER — OFFICE VISIT (OUTPATIENT)
Dept: BEHAVIORAL HEALTH | Facility: CLINIC | Age: 78
End: 2018-10-25
Payer: MEDICARE

## 2018-10-25 VITALS
BODY MASS INDEX: 23.63 KG/M2 | RESPIRATION RATE: 18 BRPM | DIASTOLIC BLOOD PRESSURE: 76 MMHG | HEART RATE: 80 BPM | HEIGHT: 66 IN | WEIGHT: 147 LBS | SYSTOLIC BLOOD PRESSURE: 118 MMHG

## 2018-10-25 DIAGNOSIS — F31.60 BIPOLAR 1 DISORDER, MIXED (HCC): ICD-10-CM

## 2018-10-25 PROCEDURE — 90833 PSYTX W PT W E/M 30 MIN: CPT | Performed by: PSYCHIATRY & NEUROLOGY

## 2018-10-25 PROCEDURE — 99213 OFFICE O/P EST LOW 20 MIN: CPT | Performed by: PSYCHIATRY & NEUROLOGY

## 2018-10-25 ASSESSMENT — PATIENT HEALTH QUESTIONNAIRE - PHQ9: CLINICAL INTERPRETATION OF PHQ2 SCORE: 0

## 2018-10-26 NOTE — BH THERAPY
"[facility]  PSYCHIATRIC FOLLOW-UP NOTE    Name: Azeb Major  MRN: [id number]  : 1940  Age: 78 y.o.  Date of assessment: 10/25/2018  PCP: Josy Arteaga M.D.  Persons in attendance: Patient  REASON FOR VISIT/CHIEF COMPLAINT (as stated by Patient):  Azeb Major is a 78 y.o., White female, attending follow-up appointment for   Chief Complaint   Patient presents with   • Agitation     The patient is seen today for follow up on emergency with her daughter. The patient is getting more agitated and her thioughts are disorganized.   .      HISTORY OF PRESENT ILLNESS:    This is a 79 yo female, , lives alone, seen today on emergency. The patients daughter made this appointment because the patient is getting more agitated and unreasonable in her thinking. The patient denied depression and she is sleeping well at night. The patient reported that she is taking her medications as prescribed. The patient reported that she is getting agitated very easily.    PSYCHOSOCIAL CHANGES SINCE PREVIOUS CONTACT:  patient is getting agitated very easily and her thoughts are disorganized.    RESPONSE TO TREATMENT:  Divalproex  mg one in am and two at bed time.  Seroquel  mg one at bed time.      MEDICATION SIDE EFFECTS:  Denied.    REVIEW OF SYSTEMS:        Constitutional negative   Eyes negative   Ears/Nose/Mouth/Throat negative   Cardiovascular positive - hypertension   Respiratory negative   Gastrointestinal negative   Genitourinary positive - chronic kidney disease.   Muscular negative   Integumentary negative   Neurological negative   Endocrine positive - hypothyroidism.   Hematologic/Lymphatic negative       PSYCHIATRIC EXAMINATION/MENTAL STATUS  /76   Pulse 80   Resp 18   Ht 1.676 m (5' 5.98\")   Wt 66.7 kg (147 lb)   BMI 23.74 kg/m²   Participation: Active verbal participation and Attentive  Grooming:Neat  Orientation: Alert and Fully Oriented  Eye contact: " Good  Behavior:Tense   Mood: Anxious  Affect: Flexible and Full range  Thought process: Logical and Goal-directed  Thought content:  Within normal limits  Speech: Rate within normal limits and Volume within normal limits  Perception:  Within normal limits  Memory:  No gross evidence of memory deficits  Insight: Good  Judgment: Good  Family/couple interaction observations:   Other:    Current risk:    Suicide: Low   Homicide: Low   Self-harm: Low  Relapse: Low  Other:   Crisis Safety Plan reviewed?Yes  If evidence of imminent risk is present, intervention/plan:    Medical Records/Labs/Diagnostic Tests Reviewed: yes.    Medical Records/Labs/Diagnostic Tests Ordered: none.    DIAGNOSTIC IMPRESSION(S):  1. Bipolar 1 disorder, mixed.       ASSESSMENT AND PLAN:    1. Bipolar 1 disorder, mixed.  Increase seroquel  mg one at bed time  Divalproex  mg one in am and two at bed time.    2. Follow up in two weeks.      16 minutes were spent in psychotherapy.  (If greater than 16 minutes, add 72055 code)   Topics addressed in psychotherapy include:  Focused on family session and the patients daughter agreed to check on her every day.  Psycho education and cognitive clarification  Agreed to keep a daily routine and a good sleep cycle.  Anthony Fowler M.D.

## 2018-10-31 RX ORDER — QUETIAPINE 200 MG/1
TABLET, EXTENDED RELEASE ORAL
Qty: 90 TAB | Refills: 0 | Status: SHIPPED | OUTPATIENT
Start: 2018-10-31 | End: 2019-02-01 | Stop reason: SDUPTHER

## 2018-11-03 DIAGNOSIS — N39.0 FREQUENT UTI: ICD-10-CM

## 2018-11-05 RX ORDER — QUETIAPINE 200 MG/1
200 TABLET, FILM COATED, EXTENDED RELEASE ORAL
Qty: 7 TAB | Refills: 0 | Status: SHIPPED | OUTPATIENT
Start: 2018-11-05 | End: 2018-11-12

## 2018-11-05 RX ORDER — NITROFURANTOIN MACROCRYSTALS 50 MG/1
CAPSULE ORAL
Qty: 90 CAP | Refills: 1 | Status: SHIPPED | OUTPATIENT
Start: 2018-11-05 | End: 2019-04-12 | Stop reason: SDUPTHER

## 2018-11-15 ENCOUNTER — HOSPITAL ENCOUNTER (OUTPATIENT)
Facility: MEDICAL CENTER | Age: 78
End: 2018-11-15
Attending: FAMILY MEDICINE
Payer: MEDICARE

## 2018-11-15 ENCOUNTER — NON-PROVIDER VISIT (OUTPATIENT)
Dept: INTERNAL MEDICINE | Facility: IMAGING CENTER | Age: 78
End: 2018-11-15
Payer: MEDICARE

## 2018-11-15 DIAGNOSIS — N39.0 FREQUENT UTI: ICD-10-CM

## 2018-11-15 LAB
APPEARANCE UR: ABNORMAL
BACTERIA #/AREA URNS HPF: ABNORMAL /HPF
BILIRUB UR QL STRIP.AUTO: NEGATIVE
COLOR UR: YELLOW
EPI CELLS #/AREA URNS HPF: NEGATIVE /HPF
GLUCOSE UR STRIP.AUTO-MCNC: NEGATIVE MG/DL
HYALINE CASTS #/AREA URNS LPF: ABNORMAL /LPF
KETONES UR STRIP.AUTO-MCNC: NEGATIVE MG/DL
LEUKOCYTE ESTERASE UR QL STRIP.AUTO: ABNORMAL
MICRO URNS: ABNORMAL
NITRITE UR QL STRIP.AUTO: POSITIVE
PH UR STRIP.AUTO: 6 [PH]
PROT UR QL STRIP: NEGATIVE MG/DL
RBC # URNS HPF: ABNORMAL /HPF
RBC UR QL AUTO: ABNORMAL
SP GR UR STRIP.AUTO: 1.01
UROBILINOGEN UR STRIP.AUTO-MCNC: 0.2 MG/DL
WBC #/AREA URNS HPF: ABNORMAL /HPF

## 2018-11-15 PROCEDURE — 87077 CULTURE AEROBIC IDENTIFY: CPT

## 2018-11-15 PROCEDURE — 87186 SC STD MICRODIL/AGAR DIL: CPT

## 2018-11-15 PROCEDURE — 81001 URINALYSIS AUTO W/SCOPE: CPT

## 2018-11-15 PROCEDURE — 87086 URINE CULTURE/COLONY COUNT: CPT

## 2018-11-16 RX ORDER — CEFDINIR 300 MG/1
300 CAPSULE ORAL 2 TIMES DAILY
Qty: 10 CAP | Refills: 0 | Status: SHIPPED | OUTPATIENT
Start: 2018-11-16 | End: 2019-01-30

## 2018-11-20 RX ORDER — OMEPRAZOLE 20 MG/1
CAPSULE, DELAYED RELEASE ORAL
Qty: 90 CAP | Refills: 0 | Status: SHIPPED | OUTPATIENT
Start: 2018-11-20 | End: 2019-02-18 | Stop reason: SDUPTHER

## 2018-11-30 ENCOUNTER — HOSPITAL ENCOUNTER (OUTPATIENT)
Facility: MEDICAL CENTER | Age: 78
End: 2018-11-30
Attending: FAMILY MEDICINE
Payer: MEDICARE

## 2018-11-30 ENCOUNTER — NON-PROVIDER VISIT (OUTPATIENT)
Dept: INTERNAL MEDICINE | Facility: IMAGING CENTER | Age: 78
End: 2018-11-30
Payer: MEDICARE

## 2018-11-30 DIAGNOSIS — N39.0 FREQUENT UTI: ICD-10-CM

## 2018-11-30 LAB
APPEARANCE UR: NORMAL
BILIRUB UR STRIP-MCNC: NEGATIVE MG/DL
COLOR UR AUTO: YELLOW
GLUCOSE UR STRIP.AUTO-MCNC: NEGATIVE MG/DL
KETONES UR STRIP.AUTO-MCNC: NEGATIVE MG/DL
LEUKOCYTE ESTERASE UR QL STRIP.AUTO: NORMAL
NITRITE UR QL STRIP.AUTO: NEGATIVE
PH UR STRIP.AUTO: 6 [PH] (ref 5–8)
PROT UR QL STRIP: NEGATIVE MG/DL
RBC UR QL AUTO: NORMAL
SP GR UR STRIP.AUTO: 1.01
UROBILINOGEN UR STRIP-MCNC: 0.2 MG/DL

## 2018-11-30 PROCEDURE — 81002 URINALYSIS NONAUTO W/O SCOPE: CPT | Performed by: FAMILY MEDICINE

## 2018-11-30 PROCEDURE — 87077 CULTURE AEROBIC IDENTIFY: CPT

## 2018-11-30 PROCEDURE — 87086 URINE CULTURE/COLONY COUNT: CPT

## 2018-11-30 PROCEDURE — 87186 SC STD MICRODIL/AGAR DIL: CPT

## 2018-12-03 ENCOUNTER — TELEPHONE (OUTPATIENT)
Dept: INTERNAL MEDICINE | Facility: IMAGING CENTER | Age: 78
End: 2018-12-03

## 2018-12-03 DIAGNOSIS — N30.00 ACUTE CYSTITIS WITHOUT HEMATURIA: ICD-10-CM

## 2018-12-03 RX ORDER — CIPROFLOXACIN 500 MG/1
500 TABLET, FILM COATED ORAL 2 TIMES DAILY
Qty: 14 TAB | Refills: 0 | Status: SHIPPED | OUTPATIENT
Start: 2018-12-03 | End: 2019-01-30

## 2018-12-03 NOTE — TELEPHONE ENCOUNTER
Spoke with Olive regarding her mom's urine culture.  Did come back positive for E. coli.  Recently on Omnicef.  Resistant to both nitrofurantoin and Bactrim.  Recommend Cipro for 7 days.  She has not nonspecific allergy to Levaquin.  Her daughter thinks she just did not like the way she feels on it..  Will try Cipro twice daily for 7 days and watch for any side effects.

## 2018-12-06 ENCOUNTER — OFFICE VISIT (OUTPATIENT)
Dept: BEHAVIORAL HEALTH | Facility: CLINIC | Age: 78
End: 2018-12-06
Payer: MEDICARE

## 2018-12-06 VITALS
DIASTOLIC BLOOD PRESSURE: 74 MMHG | HEIGHT: 66 IN | SYSTOLIC BLOOD PRESSURE: 122 MMHG | WEIGHT: 147 LBS | HEART RATE: 82 BPM | BODY MASS INDEX: 23.63 KG/M2 | RESPIRATION RATE: 16 BRPM

## 2018-12-06 DIAGNOSIS — F31.61 BIPOLAR DISORDER, CURRENT EPISODE MIXED, MILD (HCC): ICD-10-CM

## 2018-12-06 PROCEDURE — 99213 OFFICE O/P EST LOW 20 MIN: CPT | Performed by: PSYCHIATRY & NEUROLOGY

## 2018-12-06 PROCEDURE — 90833 PSYTX W PT W E/M 30 MIN: CPT | Performed by: PSYCHIATRY & NEUROLOGY

## 2018-12-06 ASSESSMENT — PATIENT HEALTH QUESTIONNAIRE - PHQ9
5. POOR APPETITE OR OVEREATING: 2 - MORE THAN HALF THE DAYS
SUM OF ALL RESPONSES TO PHQ QUESTIONS 1-9: 12
CLINICAL INTERPRETATION OF PHQ2 SCORE: 4

## 2018-12-06 NOTE — BH THERAPY
"[facility]  PSYCHIATRIC FOLLOW-UP NOTE    Name: Azeb Major  MRN: [id number]  : 1940  Age: 78 y.o.  Date of assessment: 2018  PCP: Josy Arteaga M.D.  Persons in attendance: Patient and daughter  Total face-to-face time: 30 minutes    REASON FOR VISIT/CHIEF COMPLAINT (as stated by Patient and daughter):  Azeb Major is a 78 y.o., White female, attending follow-up appointment for   Chief Complaint   Patient presents with   • Medication Management     The patient is seen today for follow up on her bipolar disorder, depression, irritability, and insomnia.   .      HISTORY OF PRESENT ILLNESS:    This is a 77 yo female, , lives alone, seen today for follow up after one month. The patient reported that she was hypomanic for some times and now she is feeling down and depressed. The patient is going through urinary tract infection and she is on antibiotics. The patient is taking seroquel  mg at night and it is helping her to calm down. The patient has been sleeping well and her symptoms of infections are getting better.    PSYCHOSOCIAL CHANGES SINCE PREVIOUS CONTACT:  Depressed and suffering from UTI.    RESPONSE TO TREATMENT:  She is taking divalproex  mg one in am and two at bed time, seroquel 200 mg one at bed time.    MEDICATION SIDE EFFECTS:  Denied.    REVIEW OF SYSTEMS:        Constitutional negative   Eyes negative   Ears/Nose/Mouth/Throat negative   Cardiovascular positive - hypertension   Respiratory negative   Gastrointestinal negative   Genitourinary positive - chronic kidney disease and frequent UTI.   Muscular negative   Integumentary negative   Neurological negative   Endocrine positive - hypothyroidism.   Hematologic/Lymphatic negative       PSYCHIATRIC EXAMINATION/MENTAL STATUS  /74   Pulse 82   Resp 16   Ht 1.676 m (5' 5.98\")   Wt 66.7 kg (147 lb)   BMI 23.74 kg/m²   Participation: Active verbal participation, Attentive and " Engaged  Grooming:Neat  Orientation: Alert and Fully Oriented  Eye contact: Good  Behavior:Calm   Mood: Depressed and Anxious  Affect: Sad and Anxious  Thought process: Logical and Goal-directed  Thought content:  Within normal limits  Speech: Rate within normal limits and Volume within normal limits  Perception:  Within normal limits  Memory:  No gross evidence of memory deficits  Insight: Good  Judgment: Good  Family/couple interaction observations:   Other:    Current risk:    Suicide: Low   Homicide: Low   Self-harm: Low  Relapse: Low  Other:   Crisis Safety Plan reviewed?Yes  If evidence of imminent risk is present, intervention/plan:    Medical Records/Labs/Diagnostic Tests Reviewed: yes.    Medical Records/Labs/Diagnostic Tests Ordered: none.    DIAGNOSTIC IMPRESSION(S):  1. Bipolar disorder, current episode mixed, mild (HCC)           ASSESSMENT AND PLAN:    1. Bipolar 1 disorder, mixed.  Divalproex  mg one in am and two at bed time  Seroquel  mg one at bed time.    2. Follow up in one month.    17. minutes were spent in psychotherapy.  (If greater than 16 minutes, add 22417 code)   Topics addressed in psychotherapy include:  Cognitive restructuring and behavioral changes.  We discussed her negative automatic thoughts and helped her to process it.  Helped her to improve self esteem.  Anthony Fowler M.D.

## 2018-12-07 ENCOUNTER — TELEPHONE (OUTPATIENT)
Dept: BEHAVIORAL HEALTH | Facility: CLINIC | Age: 78
End: 2018-12-07

## 2018-12-19 ENCOUNTER — NON-PROVIDER VISIT (OUTPATIENT)
Dept: INTERNAL MEDICINE | Facility: IMAGING CENTER | Age: 78
End: 2018-12-19
Payer: MEDICARE

## 2018-12-19 ENCOUNTER — HOSPITAL ENCOUNTER (OUTPATIENT)
Dept: RADIOLOGY | Facility: MEDICAL CENTER | Age: 78
End: 2018-12-19
Attending: FAMILY MEDICINE
Payer: MEDICARE

## 2018-12-19 DIAGNOSIS — Z23 NEED FOR INFLUENZA VACCINATION: ICD-10-CM

## 2018-12-19 DIAGNOSIS — Z78.0 POSTMENOPAUSE: ICD-10-CM

## 2018-12-19 PROCEDURE — 77080 DXA BONE DENSITY AXIAL: CPT

## 2018-12-19 PROCEDURE — G0008 ADMIN INFLUENZA VIRUS VAC: HCPCS | Performed by: FAMILY MEDICINE

## 2018-12-19 PROCEDURE — 90662 IIV NO PRSV INCREASED AG IM: CPT | Performed by: FAMILY MEDICINE

## 2019-01-11 DIAGNOSIS — N39.46 MIXED INCONTINENCE: ICD-10-CM

## 2019-01-14 ENCOUNTER — TELEPHONE (OUTPATIENT)
Dept: INTERNAL MEDICINE | Facility: IMAGING CENTER | Age: 79
End: 2019-01-14

## 2019-01-14 RX ORDER — TOLTERODINE TARTRATE 4 MG
CAPSULE, EXT RELEASE 24 HR ORAL
Qty: 90 CAP | Refills: 1 | Status: SHIPPED | OUTPATIENT
Start: 2019-01-14 | End: 2019-07-13 | Stop reason: SDUPTHER

## 2019-01-15 ENCOUNTER — APPOINTMENT (OUTPATIENT)
Dept: BEHAVIORAL HEALTH | Facility: CLINIC | Age: 79
End: 2019-01-15
Payer: MEDICARE

## 2019-01-30 ENCOUNTER — OFFICE VISIT (OUTPATIENT)
Dept: INTERNAL MEDICINE | Facility: IMAGING CENTER | Age: 79
End: 2019-01-30
Payer: MEDICARE

## 2019-01-30 VITALS
HEIGHT: 66 IN | DIASTOLIC BLOOD PRESSURE: 78 MMHG | OXYGEN SATURATION: 97 % | WEIGHT: 148 LBS | TEMPERATURE: 98 F | HEART RATE: 75 BPM | BODY MASS INDEX: 23.78 KG/M2 | RESPIRATION RATE: 14 BRPM | SYSTOLIC BLOOD PRESSURE: 138 MMHG

## 2019-01-30 DIAGNOSIS — N95.2 VAGINAL ATROPHY: ICD-10-CM

## 2019-01-30 DIAGNOSIS — N39.46 MIXED INCONTINENCE: ICD-10-CM

## 2019-01-30 DIAGNOSIS — B35.1 ONYCHOMYCOSIS: ICD-10-CM

## 2019-01-30 DIAGNOSIS — N39.0 FREQUENT UTI: ICD-10-CM

## 2019-01-30 DIAGNOSIS — M81.0 AGE-RELATED OSTEOPOROSIS WITHOUT CURRENT PATHOLOGICAL FRACTURE: ICD-10-CM

## 2019-01-30 PROCEDURE — 99214 OFFICE O/P EST MOD 30 MIN: CPT | Performed by: FAMILY MEDICINE

## 2019-01-30 RX ORDER — ALENDRONATE SODIUM 70 MG/1
70 TABLET ORAL
Qty: 12 TAB | Refills: 3 | Status: SHIPPED | OUTPATIENT
Start: 2019-01-30 | End: 2020-04-28

## 2019-01-30 RX ORDER — ESTRADIOL 0.1 MG/G
CREAM VAGINAL
Qty: 2 TUBE | Refills: 1 | Status: SHIPPED | OUTPATIENT
Start: 2019-01-30 | End: 2023-10-30

## 2019-02-01 NOTE — PROGRESS NOTES
Chief Complaint   Patient presents with   • Other     follow up on Dexa scan--osteoporosis   • Nail Problem       HISTORY OF PRESENT ILLNESS: Patient is a 78 y.o. female established patient who presents today with her daughter Olive. She had a Dexa scan done which does show osteoporosis. She is taking calcium and vit D.   No fractures. Uses cane or walker for balance. No recent falls.   Has not taken medication in past.     Complaining of bilat great toe pain. Nails are thickened and sometimes painful. She is unable to trim them herself but has tried. Hx of ingrown toenails in past.    Also follow up on frequent UTI's. On macrodantin prevention.   No symptoms.   Has not been using estrace cream. Needs refill. Does think it helps with urgency and incontinence sx.       Patient Active Problem List    Diagnosis Date Noted   • CKD (chronic kidney disease) stage 3, GFR 30-59 ml 09/18/2011     Priority: Low   • Hypertension 06/20/2011     Priority: Low   • Hypothyroid 12/01/2010     Priority: Low   • Macular degeneration 12/11/2016   • Bipolar 1 disorder, mixed ([facility]) 01/04/2015   • Hard of hearing 12/12/2014   • Poor balance 08/07/2014   • Hyperparathyroidism, secondary ([facility]) 02/08/2012   • Osteoporosis 12/07/2011   • UI (urinary incontinence) 12/01/2010   • Frequent UTI 12/01/2010   • Seasonal allergies 12/01/2010     Current Outpatient Prescriptions on File Prior to Visit   Medication Sig Dispense Refill   • DETROL LA 4 MG CAPSULE SR 24 HR TAKE 1 CAPSULE DAILY 90 Cap 1   • divalproex ER (DEPAKOTE ER) 250 MG TABLET SR 24 HR TAKE 3 TABLETS AT BEDTIME 270 Tab 1   • omeprazole (PRILOSEC) 20 MG delayed-release capsule TAKE 1 CAPSULE DAILY 90 Cap 0   • nitrofurantoin (MACRODANTIN) 50 MG Cap TAKE 1 CAPSULE DAILY 90 Cap 1   • SEROQUEL  MG XR tablet TAKE 1 TABLET DAILY AT BEDTIME 90 Tab 0   • levothyroxine (SYNTHROID) 125 MCG Tab TAKE 1 TABLET EVERY MORNING ON AN EMPTY STOMACH 90 Tab 4   • cyanocobalamin (VITAMIN B-12)  "100 MCG Tab Take 100 mcg by mouth every day.       No current facility-administered medications on file prior to visit.          Past medical, surgical, family, and social history is reviewed and updated in Epic chart by me today.   Medications and allergies reviewed and updated in Epic chart by me today.     REVIEW OF SYSTEMS:  GENERAL: No fatigue, no weight loss.  HEENT:  Ears--no earache, no change in hearing, no dizziness, no tinnitus.                 Eyes--no blurred vision, no discharge or pain                 Throat--No sore throat, no dysphagia, no hoarseness  CV:  No chest pain,dyspnea,palpitations or edema.  RESP:  No sob,cough,wheezing or hemoptysis.  GI: No dysphagia, heartburn,abdominal pain, nausea, vomiting, diarrhea or constipation.       No melena, jaundice, bleeding, incontinence or change in bowel habits.  :  No dysuria, polyuria, hematuria. Does have some urge and stress incont.  MS:  No joint swelling, myalgias, or arthralgias.  NEURO:  No seizures, syncope, paralysis, tremor, or weakness.  SKIN: No new or concerning skin lesions or changes.   PSYCH: Mood fine.--followed closely by psychiatry    Vitals:    01/30/19 0800   BP: 138/78   Pulse: 75   Resp: 14   Temp: 36.7 °C (98 °F)   TempSrc: Temporal   SpO2: 97%   Weight: 67.1 kg (148 lb)   Height: 1.676 m (5' 5.98\")       Physical Exam:  Gen: Well developed, well nourished. No acute distress.  Neck:  Supple, no adenopathy or thyromegaly.  Heart:  Regular rate and rhythm.  Normal S1, S2. No murmur, gallop or rub.  Lungs:  Clear, No wheezes,rales or rhonchi.  Extremities:  No edema.  Toenails:  Several on both feet are thickened and yellowing. No sig of infection.   Psych: Mood and affect are appropriate.    Assessment/Plan:  1. Age-related osteoporosis without current pathological fracture . Continue calcium, vit D, strength and balance exercise .   Reviewed fall prevention. Start alendronate 70mg . Call for concerns. Dexa in 18-24 months    2. " Mixed incontinence  . Resume estradiol three times each week.  estradiol (ESTRACE) 0.1 MG/GM vaginal cream   3. Frequent UTI  estradiol (ESTRACE) 0.1 MG/GM vaginal cream   4. Vaginal atrophy  estradiol (ESTRACE) 0.1 MG/GM vaginal cream   5. Onychomycosis . Referral to [facility] for nurse nail/foot care.        follow up 3 months.

## 2019-02-06 ENCOUNTER — HOSPITAL ENCOUNTER (OUTPATIENT)
Facility: MEDICAL CENTER | Age: 79
End: 2019-02-06
Attending: FAMILY MEDICINE
Payer: MEDICARE

## 2019-02-06 ENCOUNTER — NON-PROVIDER VISIT (OUTPATIENT)
Dept: INTERNAL MEDICINE | Facility: IMAGING CENTER | Age: 79
End: 2019-02-06
Payer: MEDICARE

## 2019-02-06 DIAGNOSIS — R30.0 DYSURIA: ICD-10-CM

## 2019-02-06 LAB
APPEARANCE UR: NORMAL
BILIRUB UR STRIP-MCNC: NEGATIVE MG/DL
COLOR UR AUTO: YELLOW
GLUCOSE UR STRIP.AUTO-MCNC: NEGATIVE MG/DL
KETONES UR STRIP.AUTO-MCNC: NEGATIVE MG/DL
LEUKOCYTE ESTERASE UR QL STRIP.AUTO: NORMAL
NITRITE UR QL STRIP.AUTO: NEGATIVE
PH UR STRIP.AUTO: 6 [PH] (ref 5–8)
PROT UR QL STRIP: 30 MG/DL
RBC UR QL AUTO: NORMAL
SP GR UR STRIP.AUTO: 1.01
UROBILINOGEN UR STRIP-MCNC: 0.2 MG/DL

## 2019-02-06 PROCEDURE — 87086 URINE CULTURE/COLONY COUNT: CPT

## 2019-02-06 PROCEDURE — 87077 CULTURE AEROBIC IDENTIFY: CPT

## 2019-02-06 PROCEDURE — 81002 URINALYSIS NONAUTO W/O SCOPE: CPT | Performed by: FAMILY MEDICINE

## 2019-02-06 PROCEDURE — 87186 SC STD MICRODIL/AGAR DIL: CPT

## 2019-02-06 RX ORDER — CEFDINIR 300 MG/1
300 CAPSULE ORAL 2 TIMES DAILY
Qty: 10 CAP | Refills: 0 | Status: SHIPPED | OUTPATIENT
Start: 2019-02-06 | End: 2019-06-13

## 2019-02-19 ENCOUNTER — OFFICE VISIT (OUTPATIENT)
Dept: BEHAVIORAL HEALTH | Facility: CLINIC | Age: 79
End: 2019-02-19
Payer: MEDICARE

## 2019-02-19 VITALS
BODY MASS INDEX: 23.63 KG/M2 | SYSTOLIC BLOOD PRESSURE: 124 MMHG | WEIGHT: 147 LBS | HEART RATE: 70 BPM | RESPIRATION RATE: 14 BRPM | DIASTOLIC BLOOD PRESSURE: 76 MMHG | HEIGHT: 66 IN

## 2019-02-19 DIAGNOSIS — F31.60 BIPOLAR 1 DISORDER, MIXED (HCC): ICD-10-CM

## 2019-02-19 PROCEDURE — 99213 OFFICE O/P EST LOW 20 MIN: CPT | Performed by: PSYCHIATRY & NEUROLOGY

## 2019-02-19 PROCEDURE — 90833 PSYTX W PT W E/M 30 MIN: CPT | Performed by: PSYCHIATRY & NEUROLOGY

## 2019-02-19 RX ORDER — OMEPRAZOLE 20 MG/1
CAPSULE, DELAYED RELEASE ORAL
Qty: 90 CAP | Refills: 3 | Status: SHIPPED | OUTPATIENT
Start: 2019-02-19 | End: 2020-03-13

## 2019-02-19 ASSESSMENT — PATIENT HEALTH QUESTIONNAIRE - PHQ9
5. POOR APPETITE OR OVEREATING: 1 - SEVERAL DAYS
SUM OF ALL RESPONSES TO PHQ QUESTIONS 1-9: 12
CLINICAL INTERPRETATION OF PHQ2 SCORE: 5

## 2019-02-19 NOTE — BH THERAPY
[facility]  PSYCHIATRIC FOLLOW-UP NOTE    Name: Azeb Major  MRN: [id number]  : 1940  Age: 78 y.o.  Date of assessment: 2019  PCP: Josy Arteaga M.D.  Persons in attendance: Patient and daughter.  Total face-to-face time: 60 minutes    REASON FOR VISIT/CHIEF COMPLAINT (as stated by Patient and daughter.):  Azeb Major is a 78 y.o., White female, attending follow-up appointment for   Chief Complaint   Patient presents with   • Medication Management     The patient is seen today for follow up on her bipolar disorder.   .      HISTORY OF PRESENT ILLNESS:    This is 77 yo female, , lives alone seen today for follow up with her daughter after two months. The patient reported that she was hypomanic for few weeks and she is feeling depressed and tearful now. The patient is recovering from a UTI. The patient has been taking a shower every day now. The patients kids are checking her every day. They are helping her with meals. The patients daughter reported that she wants to wait for one week before starts and antidepressant. The patient has effexor at home. The patient is sleeping well at bed time with quetiapine.        PSYCHOSOCIAL CHANGES SINCE PREVIOUS CONTACT:  Feeling down depressed. The patients wants to see a therapist.    RESPONSE TO TREATMENT:  She is taking divalproex  mg one in am and two at night, quetiapine 200 mg at bed time.     MEDICATION SIDE EFFECTS:  Denied.    REVIEW OF SYSTEMS:        Constitutional negative   Eyes negative   Ears/Nose/Mouth/Throat negative   Cardiovascular positive - hypertension.   Respiratory negative   Gastrointestinal negative   Genitourinary positive - chronic kidney disease and frequent UTI.    Muscular negative   Integumentary negative   Neurological negative   Endocrine positive - hypothyroidism.   Hematologic/Lymphatic positive - none.       PSYCHIATRIC EXAMINATION/MENTAL STATUS  /76   Pulse 70   Resp 14   Ht  "1.676 m (5' 5.98\")   Wt 66.7 kg (147 lb)   BMI 23.74 kg/m²   Participation: Active verbal participation and Attentive  Grooming:Neat  Orientation: Alert and Fully Oriented  Eye contact: Good  Behavior:Calm and Tense   Mood: Depressed and Anxious  Affect: Anxious and Tearful  Thought process: Logical and Goal-directed  Thought content:  Within normal limits  Speech: Rate within normal limits and Volume within normal limits  Perception:  Within normal limits  Memory:  No gross evidence of memory deficits  Insight: Good  Judgment: Good  Family/couple interaction observations:   Other:    Current risk:    Suicide: Low   Homicide: Low   Self-harm: Low  Relapse: Low  Other:   Crisis Safety Plan reviewed?Yes  If evidence of imminent risk is present, intervention/plan:    Medical Records/Labs/Diagnostic Tests Reviewed: yes.    Medical Records/Labs/Diagnostic Tests Ordered:   CBC, Comp Panel, and Valproic acid level.    DIAGNOSTIC IMPRESSION(S):  1. Bipolar 1 disorder, mixed (HCC)           ASSESSMENT AND PLAN:    1. Bipolar disorder, mixed.  Continue Valproex  mg one in amd and two at bed time.  Seroquel 200 mg one at bed time.    2. CBC, Comp Panel,  And valproic acid level.    3. Follow up in four weeks.  Restart therapy.    17. minutes were spent in psychotherapy.  (If greater than 16 minutes, add 71506 code)   Topics addressed in psychotherapy include:  We discussed her unresolved emotional issues and helped her with emotonal skills.  Psycho education and cognitive clarifications.  Helped her to improve social support.  Anthony Fowler M.D.                   "

## 2019-02-26 RX ORDER — VENLAFAXINE HYDROCHLORIDE 75 MG/1
75 CAPSULE, EXTENDED RELEASE ORAL DAILY
Qty: 30 CAP | Refills: 3 | Status: SHIPPED | OUTPATIENT
Start: 2019-02-26 | End: 2019-03-19 | Stop reason: SDUPTHER

## 2019-02-28 ENCOUNTER — HOSPITAL ENCOUNTER (OUTPATIENT)
Dept: LAB | Facility: MEDICAL CENTER | Age: 79
End: 2019-02-28
Attending: PSYCHIATRY & NEUROLOGY
Payer: MEDICARE

## 2019-02-28 DIAGNOSIS — F31.60 BIPOLAR 1 DISORDER, MIXED (HCC): ICD-10-CM

## 2019-02-28 LAB
ALBUMIN SERPL BCP-MCNC: 4 G/DL (ref 3.2–4.9)
ALBUMIN/GLOB SERPL: 1.4 G/DL
ALP SERPL-CCNC: 77 U/L (ref 30–99)
ALT SERPL-CCNC: 8 U/L (ref 2–50)
ANION GAP SERPL CALC-SCNC: 6 MMOL/L (ref 0–11.9)
AST SERPL-CCNC: 16 U/L (ref 12–45)
BASOPHILS # BLD AUTO: 0.9 % (ref 0–1.8)
BASOPHILS # BLD: 0.05 K/UL (ref 0–0.12)
BILIRUB SERPL-MCNC: 0.4 MG/DL (ref 0.1–1.5)
BUN SERPL-MCNC: 33 MG/DL (ref 8–22)
CALCIUM SERPL-MCNC: 9.3 MG/DL (ref 8.5–10.5)
CHLORIDE SERPL-SCNC: 108 MMOL/L (ref 96–112)
CO2 SERPL-SCNC: 24 MMOL/L (ref 20–33)
CREAT SERPL-MCNC: 1.01 MG/DL (ref 0.5–1.4)
EOSINOPHIL # BLD AUTO: 0.11 K/UL (ref 0–0.51)
EOSINOPHIL NFR BLD: 2 % (ref 0–6.9)
ERYTHROCYTE [DISTWIDTH] IN BLOOD BY AUTOMATED COUNT: 49.6 FL (ref 35.9–50)
GLOBULIN SER CALC-MCNC: 2.9 G/DL (ref 1.9–3.5)
GLUCOSE SERPL-MCNC: 80 MG/DL (ref 65–99)
HCT VFR BLD AUTO: 42.6 % (ref 37–47)
HGB BLD-MCNC: 13.5 G/DL (ref 12–16)
IMM GRANULOCYTES # BLD AUTO: 0.01 K/UL (ref 0–0.11)
IMM GRANULOCYTES NFR BLD AUTO: 0.2 % (ref 0–0.9)
LYMPHOCYTES # BLD AUTO: 2.49 K/UL (ref 1–4.8)
LYMPHOCYTES NFR BLD: 46.3 % (ref 22–41)
MCH RBC QN AUTO: 31 PG (ref 27–33)
MCHC RBC AUTO-ENTMCNC: 31.7 G/DL (ref 33.6–35)
MCV RBC AUTO: 97.9 FL (ref 81.4–97.8)
MONOCYTES # BLD AUTO: 0.66 K/UL (ref 0–0.85)
MONOCYTES NFR BLD AUTO: 12.3 % (ref 0–13.4)
NEUTROPHILS # BLD AUTO: 2.06 K/UL (ref 2–7.15)
NEUTROPHILS NFR BLD: 38.3 % (ref 44–72)
NRBC # BLD AUTO: 0 K/UL
NRBC BLD-RTO: 0 /100 WBC
PLATELET # BLD AUTO: 145 K/UL (ref 164–446)
PMV BLD AUTO: 11.4 FL (ref 9–12.9)
POTASSIUM SERPL-SCNC: 4.6 MMOL/L (ref 3.6–5.5)
PROT SERPL-MCNC: 6.9 G/DL (ref 6–8.2)
RBC # BLD AUTO: 4.35 M/UL (ref 4.2–5.4)
SODIUM SERPL-SCNC: 138 MMOL/L (ref 135–145)
VALPROATE SERPL-MCNC: 96.2 UG/ML (ref 50–100)
WBC # BLD AUTO: 5.4 K/UL (ref 4.8–10.8)

## 2019-02-28 PROCEDURE — 85025 COMPLETE CBC W/AUTO DIFF WBC: CPT

## 2019-02-28 PROCEDURE — 80164 ASSAY DIPROPYLACETIC ACD TOT: CPT

## 2019-02-28 PROCEDURE — 36415 COLL VENOUS BLD VENIPUNCTURE: CPT

## 2019-02-28 PROCEDURE — 80053 COMPREHEN METABOLIC PANEL: CPT

## 2019-03-19 ENCOUNTER — OFFICE VISIT (OUTPATIENT)
Dept: BEHAVIORAL HEALTH | Facility: CLINIC | Age: 79
End: 2019-03-19
Payer: MEDICARE

## 2019-03-19 ENCOUNTER — NON-PROVIDER VISIT (OUTPATIENT)
Dept: INTERNAL MEDICINE | Facility: IMAGING CENTER | Age: 79
End: 2019-03-19
Payer: MEDICARE

## 2019-03-19 ENCOUNTER — HOSPITAL ENCOUNTER (OUTPATIENT)
Facility: MEDICAL CENTER | Age: 79
End: 2019-03-19
Attending: FAMILY MEDICINE
Payer: MEDICARE

## 2019-03-19 VITALS
RESPIRATION RATE: 16 BRPM | HEART RATE: 68 BPM | WEIGHT: 146 LBS | BODY MASS INDEX: 23.46 KG/M2 | SYSTOLIC BLOOD PRESSURE: 126 MMHG | DIASTOLIC BLOOD PRESSURE: 74 MMHG | HEIGHT: 66 IN

## 2019-03-19 DIAGNOSIS — R30.0 DYSURIA: ICD-10-CM

## 2019-03-19 DIAGNOSIS — F31.9 BIPOLAR 1 DISORDER (HCC): ICD-10-CM

## 2019-03-19 DIAGNOSIS — R39.15 URINARY URGENCY: ICD-10-CM

## 2019-03-19 DIAGNOSIS — F31.9 BIPOLAR 1 DISORDER, DEPRESSED (HCC): ICD-10-CM

## 2019-03-19 LAB
APPEARANCE UR: CLEAR
BILIRUB UR STRIP-MCNC: NEGATIVE MG/DL
COLOR UR AUTO: YELLOW
GLUCOSE UR STRIP.AUTO-MCNC: NEGATIVE MG/DL
KETONES UR STRIP.AUTO-MCNC: NEGATIVE MG/DL
LEUKOCYTE ESTERASE UR QL STRIP.AUTO: NEGATIVE
NITRITE UR QL STRIP.AUTO: NEGATIVE
PH UR STRIP.AUTO: 6 [PH] (ref 5–8)
PROT UR QL STRIP: NEGATIVE MG/DL
RBC UR QL AUTO: NORMAL
SP GR UR STRIP.AUTO: 1.01
UROBILINOGEN UR STRIP-MCNC: 0.2 MG/DL

## 2019-03-19 PROCEDURE — 99213 OFFICE O/P EST LOW 20 MIN: CPT | Performed by: PSYCHIATRY & NEUROLOGY

## 2019-03-19 PROCEDURE — 81002 URINALYSIS NONAUTO W/O SCOPE: CPT | Performed by: FAMILY MEDICINE

## 2019-03-19 PROCEDURE — 90833 PSYTX W PT W E/M 30 MIN: CPT | Performed by: PSYCHIATRY & NEUROLOGY

## 2019-03-19 PROCEDURE — 87086 URINE CULTURE/COLONY COUNT: CPT

## 2019-03-19 PROCEDURE — 90834 PSYTX W PT 45 MINUTES: CPT | Performed by: PSYCHOLOGIST

## 2019-03-19 RX ORDER — VENLAFAXINE HYDROCHLORIDE 75 MG/1
75 CAPSULE, EXTENDED RELEASE ORAL DAILY
Qty: 90 CAP | Refills: 0 | Status: SHIPPED | OUTPATIENT
Start: 2019-03-19 | End: 2019-06-06 | Stop reason: SDUPTHER

## 2019-03-19 RX ORDER — NITROFURANTOIN 25; 75 MG/1; MG/1
100 CAPSULE ORAL 2 TIMES DAILY
Qty: 14 CAP | Refills: 0 | Status: SHIPPED | OUTPATIENT
Start: 2019-03-19 | End: 2019-06-06

## 2019-03-19 ASSESSMENT — PATIENT HEALTH QUESTIONNAIRE - PHQ9
CLINICAL INTERPRETATION OF PHQ2 SCORE: 3
SUM OF ALL RESPONSES TO PHQ QUESTIONS 1-9: 9
5. POOR APPETITE OR OVEREATING: 1 - SEVERAL DAYS

## 2019-03-19 NOTE — BH THERAPY
" [facility]  Therapy Progress Note    Patient Name: Azeb Major  Patient MRN: [id number]  Today's Date: 3/19/2019     Type of session:Individual psychotherapy  Length of session: 45 minutes  Persons in attendance:Patient    Subjective/New Info: transfer client.  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  This should not present a professional conflict.    Session focused on getting to know client and her concerns which include becoming psychologically engaged with commercials with client later figuring it out; she refers to this as a psychotic episode.  Client denies AVHs.  She also reports this has not happened for two months.  Client reports long hx of UTI's and advised client of connection sometimes between psychosis and UIT's.  Client reports hx of bipolar d/o.  Currently she is having difficulty sleeping achieving sleep ok but waking after about 3 hours.  Talked about getting out of bed after 15 minutes doing an activity then returning to bed to see if it helps with sleep.  Her caffeine use is minimal, does not use etoh.     Client also advises she  someone she should never have  b/c her mother wanted her to  because her bipolar d/o was so hard on the family.  She did not have sex with spouse for 40 years prior to his death 6 years ago.  She reports she is Worship and is working with her  because she has sexual fantacies about a man who is more of a \"bad boy\".   Talked about the her Restorationism beliefs and how they conflict with her human needs of touch and connection.     Objective/Observations:   Participation: Limited verbal participation, poor hearing affecting connnecting thoughts   Grooming: Casual   Cognition: Fully Oriented   Eye contact: Good   Mood: Depressed   Affect: Congruent with content, Sad, Anxious and Happy   Thought process: Logical   Speech: Rate within normal limits   Other:     Diagnoses:   1. Bipolar 1 disorder (HCC)  "        Current risk:   SUICIDE: Low, Ep[employer]opalian    Homicide: Not applicable   Self-harm: Not applicable   Relapse: Not applicable   Other:    Safety Plan reviewed? No   If evidence of imminent risk is present, intervention/plan:          Therapeutic Intervention(s): Reality-testing and Supportive psychotherapy    Treatment Goal(s)/Objective(s) addressed: improved mood. Process thoughts     Progress toward Treatment Goals: Mild improvement    Plan:  - client would like to meet in conjuction with [facility] for check in and realtiy checking    Juanita Vera, Ph.D.  3/19/2019

## 2019-03-19 NOTE — BH THERAPY
[facility]  PSYCHIATRIC FOLLOW-UP NOTE    Name: Azeb Major  MRN: [id number]  : 1940  Age: 78 y.o.  Date of assessment: 3/19/2019  PCP: Josy Arteaga M.D.  Persons in attendance: Patient and two daughters.  Total face-to-face time: 30 minutes    REASON FOR VISIT/CHIEF COMPLAINT (as stated by Patient and daughters.):  Azeb Major is a 78 y.o., White female, attending follow-up appointment for   Chief Complaint   Patient presents with   • Depressed     The patient is seen today for follow up on her bipolar depression. The patient reported thta she is not sleeping well at night and she is feeling anxious all the time.     .      HISTORY OF PRESENT ILLNESS:    This is a 77 yo female, lives alone seen today for follow up after one month. The patient reported that she is sleeping only few at night and she is feeling anxious all the time. The patient dd blood works and her valproic acid was 90 and she took depakote that morning before blood work. The patient also feel anxious and fear of living alone. The patient has trouble with her teeth and she mouth ulcer. The patient is trying to drink Ensure in am.     PSYCHOSOCIAL CHANGES SINCE PREVIOUS CONTACT:  The patient is sleeping only few hours at night, and she is feeling anxious all the time.  She reported fear of being alone.     RESPONSE TO TREATMENT:  She is taking divalproex  mg one in am and two at bed time, seroquel  mg one at bed time, and venlafaxine  mg at bed time.     MEDICATION SIDE EFFECTS:  Denied.    REVIEW OF SYSTEMS:        Constitutional negative   Eyes negative   Ears/Nose/Mouth/Throat negative - none.   Cardiovascular positive - hypertension   Respiratory negative   Gastrointestinal negative   Genitourinary positive - chronic kidney diease, frequent UTI.   Muscular negative   Integumentary negative   Neurological negative   Endocrine positive - hypothyroidism   Hematologic/Lymphatic negative  "      PSYCHIATRIC EXAMINATION/MENTAL STATUS  /74   Pulse 68   Resp 16   Ht 1.676 m (5' 5.98\")   Wt 66.2 kg (146 lb)   BMI 23.58 kg/m²   Participation: Active verbal participation and Attentive  Grooming:Casual  Orientation: Alert and Fully Oriented  Eye contact: Good  Behavior:Calm   Mood: Anxious  Affect: Anxious  Thought process: Logical and Goal-directed  Thought content:  Within normal limits  Speech: Rate within normal limits and Volume within normal limits  Perception:  Within normal limits  Memory:  No gross evidence of memory deficits  Insight: Good  Judgment: Good  Family/couple interaction observations:   Other:    Current risk:    Suicide: Low   Homicide: Low   Self-harm: Low  Relapse: Low  Other:   Crisis Safety Plan reviewed?Yes  If evidence of imminent risk is present, intervention/plan:    Medical Records/Labs/Diagnostic Tests Reviewed:yes.    Medical Records/Labs/Diagnostic Tests Ordered: none.    DIAGNOSTIC IMPRESSION(S):  1. Bipolar 1 disorder, depressed (HCC)           ASSESSMENT AND PLAN:    1. Bipolar 1 disorder, depressed.  Change venlafaxine  mg one in am for one week   then decrease to venlafaxine ER 75  Mg one a day # 90 NR.  Recommended OTC melatonin 3 to 5 mg at bed time for sleep.  Continue.  Divalproex  mg one am and two at bed time.  Seroquel  mg one at bed time    2. Follow up in six weeks.      17 minutes were spent in psychotherapy.  (If greater than 16 minutes, add 36706 code)   Topics addressed in psychotherapy include:  Helped the patient to improve social skills, self control, and problem solving skills.  Helped her with emotional regulations and distress tolerance.    Cognitive restructuring and behavioral changes.  Anthony Fowler M.D.                   "

## 2019-03-22 ENCOUNTER — TELEPHONE (OUTPATIENT)
Dept: INTERNAL MEDICINE | Facility: IMAGING CENTER | Age: 79
End: 2019-03-22

## 2019-03-22 LAB
BACTERIA UR CULT: NORMAL
SIGNIFICANT IND 70042: NORMAL
SITE SITE: NORMAL
SOURCE SOURCE: NORMAL

## 2019-03-22 NOTE — TELEPHONE ENCOUNTER
Informed urine culture was negative for UTI.  Discontinue antibiotic and resume macrodantin 50mg daily & good hydration.  If symptoms continue she will make appointment for Azeb.

## 2019-04-12 DIAGNOSIS — N39.0 FREQUENT UTI: ICD-10-CM

## 2019-04-15 RX ORDER — QUETIAPINE 200 MG/1
TABLET, FILM COATED, EXTENDED RELEASE ORAL
Qty: 90 TAB | Refills: 1 | Status: SHIPPED | OUTPATIENT
Start: 2019-04-15 | End: 2019-09-10 | Stop reason: SDUPTHER

## 2019-04-15 RX ORDER — NITROFURANTOIN MACROCRYSTALS 50 MG/1
CAPSULE ORAL
Qty: 90 CAP | Refills: 1 | Status: SHIPPED | OUTPATIENT
Start: 2019-04-15 | End: 2019-09-06 | Stop reason: SDUPTHER

## 2019-05-03 RX ORDER — LEVOTHYROXINE SODIUM 0.12 MG/1
TABLET ORAL
Qty: 90 TAB | Refills: 3 | Status: SHIPPED | OUTPATIENT
Start: 2019-05-03 | End: 2020-04-28

## 2019-05-07 ENCOUNTER — OFFICE VISIT (OUTPATIENT)
Dept: BEHAVIORAL HEALTH | Facility: CLINIC | Age: 79
End: 2019-05-07
Payer: MEDICARE

## 2019-05-07 VITALS
HEART RATE: 66 BPM | WEIGHT: 147 LBS | RESPIRATION RATE: 16 BRPM | HEIGHT: 66 IN | SYSTOLIC BLOOD PRESSURE: 124 MMHG | BODY MASS INDEX: 23.63 KG/M2 | DIASTOLIC BLOOD PRESSURE: 72 MMHG

## 2019-05-07 DIAGNOSIS — F31.9 BIPOLAR 1 DISORDER, DEPRESSED (HCC): ICD-10-CM

## 2019-05-07 PROCEDURE — 99213 OFFICE O/P EST LOW 20 MIN: CPT | Performed by: PSYCHIATRY & NEUROLOGY

## 2019-05-07 PROCEDURE — 90834 PSYTX W PT 45 MINUTES: CPT | Performed by: SOCIAL WORKER

## 2019-05-07 PROCEDURE — 90833 PSYTX W PT W E/M 30 MIN: CPT | Performed by: PSYCHIATRY & NEUROLOGY

## 2019-05-07 ASSESSMENT — PATIENT HEALTH QUESTIONNAIRE - PHQ9
SUM OF ALL RESPONSES TO PHQ QUESTIONS 1-9: 7
CLINICAL INTERPRETATION OF PHQ2 SCORE: 2
5. POOR APPETITE OR OVEREATING: 1 - SEVERAL DAYS

## 2019-05-07 NOTE — BH THERAPY
[facility]  Therapy Progress Note    Patient Name: Azeb Major  Patient MRN: [id number]  Today's Date: 2019     Type of session:Individual psychotherapy  Length of session: 45 minutes  Persons in attendance:Patient    Subjective/New Info: First therapy session w/this writer.  Pt previously seen by Juanita Christine PhD x 1, Linda Iglesias PhD x 1, and Linda Rivera [facility] for several years.  Pt's two daughters, Karissa and Olive, were with pt in waiting room, and accompanied her to her visit w/Dr FRANK.  Pt states she is depressed, has been feeling this way since . She denies SI. Reports no new stressors, except that her dentures hurt and she takes them out, making eating difficult.  Dentist reportedly wants pt to keep dentures in for two days so dentist can determine how to repair them.  Pt has been unable to do this b/c of the pain.  Pt lives alone in her own home and says she is grateful for this.  A woman from [facility] comes twice a week, takes pt shopping, and does some cooking and housecleaning.  Pt goes to a [employer] group twice a month, but hasn't lately b/c she is depressed.  She also enjoys reading.  Her five adult children visit her, each taking a different day of the week.  Pt's   in .    Pt agrees to return for monthly sessions.    Objective/Observations:   Participation: Active verbal participation, impeded somewhat by pt's hearing loss, necessitating talking loudly and repeating statements/questions   Grooming: Casual and Neat   Cognition: Fully Oriented   Eye contact: Good   Mood: Depressed   Affect: Constricted   Thought process: Goal-directed   Speech: Rate within normal limits and Volume within normal limits   Other:     Diagnoses:   1. Bipolar 1 disorder, depressed (HCC)         Current risk:   SUICIDE: Low   Homicide: Low   Self-harm: Low   Relapse: Not applicable   Other:    Safety Plan reviewed? No   If evidence of imminent risk is present,  intervention/plan:     Therapeutic Intervention(s): Establish rapport    Treatment Goal(s)/Objective(s) addressed: n/a     Progress toward Treatment Goals: n/a    Plan:  - Continue Individual therapy and Medication management    Georgiana Rush L.C.S.W.  5/7/2019

## 2019-05-07 NOTE — BH THERAPY
"[facility]  PSYCHIATRIC FOLLOW-UP NOTE    Name: Azeb Major  MRN: [id number]  : 1940  Age: 78 y.o.  Date of assessment: 2019  PCP: Josy Arteaga M.D.  Persons in attendance: Patient and Children  Total face-to-face time: 30 minutes    REASON FOR VISIT/CHIEF COMPLAINT (as stated by Patient and Children):  Azeb Major is a 78 y.o., White female, attending follow-up appointment for   Chief Complaint   Patient presents with   • Medication Management     The patient is seen today for follow up on her bipolar disorder, depression, and insomnia.   .      HISTORY OF PRESENT ILLNESS:    This is a 77 yo female, lives alone, seen today for follow up after two months. The patient has been sleeping well at night. The patient talked about missing her medications few times and she was unable to sleep well at night. The patient reported that her depression started to get better. The patient has refills on her medications.l    PSYCHOSOCIAL CHANGES SINCE PREVIOUS CONTACT:  The patient reported that her depression and insomnia got better and she is eating better.    RESPONSE TO TREATMENT:  She is taking divalproex  one in am and two at bed time, seroquel  mg one at bed time, venlafaxine  mg one at bed time.    MEDICATION SIDE EFFECTS:  Denied.    REVIEW OF SYSTEMS:        Constitutional negative   Eyes negative   Ears/Nose/Mouth/Throat negative   Cardiovascular positive - hypertension   Respiratory negative   Gastrointestinal negative   Genitourinary positive - chronic kidney disease, frequent UTI.    Muscular negative   Integumentary negative   Neurological negative   Endocrine positive - hypothyroidism.   Hematologic/Lymphatic negative       PSYCHIATRIC EXAMINATION/MENTAL STATUS  /72   Pulse 66   Resp 16   Ht 1.676 m (5' 5.98\")   Wt 66.7 kg (147 lb)   BMI 23.74 kg/m²   Participation: Active verbal participation  Grooming:Casual  Orientation: Alert and Fully " Oriented  Eye contact: Good  Behavior:Calm   Mood: Anxious  Affect: Flexible and Full range  Thought process: Logical and Goal-directed  Thought content:  Within normal limits  Speech: Rate within normal limits and Volume within normal limits  Perception:  Within normal limits  Memory:  No gross evidence of memory deficits  Insight: Good  Judgment: Good  Family/couple interaction observations:   Other:    Current risk:    Suicide: Low   Homicide: Low   Self-harm: Low  Relapse: Low  Other:   Crisis Safety Plan reviewed?Yes  If evidence of imminent risk is present, intervention/plan:    Medical Records/Labs/Diagnostic Tests Reviewed: yes.    Medical Records/Labs/Diagnostic Tests Ordered: none.    DIAGNOSTIC IMPRESSION(S):  1. Bipolar 1 disorder, depressed (HCC)           ASSESSMENT AND PLAN:  1. Bipolar 1 disorder  Venlafaxine ER 75 mg one a day   Divalproex  mg one in am and two at bed time.  Seroquel  mg one at bed time  Melatonin prn sleep.    2. Follow up in four weeks.    17 minutes were spent in psychotherapy.  (If greater than 16 minutes, add 98853 code)   Topics addressed in psychotherapy include:  Educated the family about bipolar disorder and they are there to help with her ADLs.  Psycho education and cognitive clarifications.  Helped her with emotional and behavioral regulations.  Anthony Fowler M.D.

## 2019-06-03 ENCOUNTER — HOSPITAL ENCOUNTER (OUTPATIENT)
Facility: MEDICAL CENTER | Age: 79
End: 2019-06-03
Attending: FAMILY MEDICINE
Payer: MEDICARE

## 2019-06-03 ENCOUNTER — NON-PROVIDER VISIT (OUTPATIENT)
Dept: INTERNAL MEDICINE | Facility: IMAGING CENTER | Age: 79
End: 2019-06-03
Payer: MEDICARE

## 2019-06-03 DIAGNOSIS — N39.0 FREQUENT UTI: ICD-10-CM

## 2019-06-03 LAB
APPEARANCE UR: CLEAR
BILIRUB UR STRIP-MCNC: NEGATIVE MG/DL
COLOR UR AUTO: YELLOW
GLUCOSE UR STRIP.AUTO-MCNC: NEGATIVE MG/DL
KETONES UR STRIP.AUTO-MCNC: NEGATIVE MG/DL
LEUKOCYTE ESTERASE UR QL STRIP.AUTO: NORMAL
NITRITE UR QL STRIP.AUTO: NEGATIVE
PH UR STRIP.AUTO: 7 [PH] (ref 5–8)
PROT UR QL STRIP: NEGATIVE MG/DL
RBC UR QL AUTO: NEGATIVE
SP GR UR STRIP.AUTO: 1.01
UROBILINOGEN UR STRIP-MCNC: 0.2 MG/DL

## 2019-06-03 PROCEDURE — 87186 SC STD MICRODIL/AGAR DIL: CPT

## 2019-06-03 PROCEDURE — 87077 CULTURE AEROBIC IDENTIFY: CPT

## 2019-06-03 PROCEDURE — 87086 URINE CULTURE/COLONY COUNT: CPT

## 2019-06-03 PROCEDURE — 81002 URINALYSIS NONAUTO W/O SCOPE: CPT | Performed by: FAMILY MEDICINE

## 2019-06-06 RX ORDER — VENLAFAXINE HYDROCHLORIDE 75 MG/1
CAPSULE, EXTENDED RELEASE ORAL
Qty: 90 CAP | Refills: 0 | Status: SHIPPED | OUTPATIENT
Start: 2019-06-06 | End: 2019-09-12

## 2019-06-06 RX ORDER — SULFAMETHOXAZOLE AND TRIMETHOPRIM 800; 160 MG/1; MG/1
1 TABLET ORAL EVERY 12 HOURS
Qty: 10 TAB | Refills: 0 | Status: SHIPPED | OUTPATIENT
Start: 2019-06-06 | End: 2019-06-13

## 2019-06-13 ENCOUNTER — OFFICE VISIT (OUTPATIENT)
Dept: INTERNAL MEDICINE | Facility: IMAGING CENTER | Age: 79
End: 2019-06-13
Payer: MEDICARE

## 2019-06-13 ENCOUNTER — HOSPITAL ENCOUNTER (OUTPATIENT)
Facility: MEDICAL CENTER | Age: 79
End: 2019-06-13
Attending: FAMILY MEDICINE
Payer: MEDICARE

## 2019-06-13 VITALS
HEIGHT: 66 IN | BODY MASS INDEX: 24.27 KG/M2 | DIASTOLIC BLOOD PRESSURE: 74 MMHG | SYSTOLIC BLOOD PRESSURE: 110 MMHG | TEMPERATURE: 97.6 F | RESPIRATION RATE: 14 BRPM | WEIGHT: 151 LBS | HEART RATE: 76 BPM | OXYGEN SATURATION: 96 %

## 2019-06-13 DIAGNOSIS — R30.0 DYSURIA: ICD-10-CM

## 2019-06-13 DIAGNOSIS — K13.79 ORAL PAIN: ICD-10-CM

## 2019-06-13 DIAGNOSIS — N39.0 FREQUENT UTI: ICD-10-CM

## 2019-06-13 DIAGNOSIS — K21.9 GASTROESOPHAGEAL REFLUX DISEASE, ESOPHAGITIS PRESENCE NOT SPECIFIED: ICD-10-CM

## 2019-06-13 LAB
APPEARANCE UR: CLEAR
BILIRUB UR STRIP-MCNC: NEGATIVE MG/DL
COLOR UR AUTO: YELLOW
GLUCOSE UR STRIP.AUTO-MCNC: NEGATIVE MG/DL
KETONES UR STRIP.AUTO-MCNC: NEGATIVE MG/DL
LEUKOCYTE ESTERASE UR QL STRIP.AUTO: NORMAL
NITRITE UR QL STRIP.AUTO: NEGATIVE
PH UR STRIP.AUTO: 5.5 [PH] (ref 5–8)
PROT UR QL STRIP: NEGATIVE MG/DL
RBC UR QL AUTO: NEGATIVE
SP GR UR STRIP.AUTO: 1.01
UROBILINOGEN UR STRIP-MCNC: 0.2 MG/DL

## 2019-06-13 PROCEDURE — 87086 URINE CULTURE/COLONY COUNT: CPT

## 2019-06-13 PROCEDURE — 81002 URINALYSIS NONAUTO W/O SCOPE: CPT | Performed by: FAMILY MEDICINE

## 2019-06-13 PROCEDURE — 99214 OFFICE O/P EST MOD 30 MIN: CPT | Performed by: FAMILY MEDICINE

## 2019-06-13 RX ORDER — CEFDINIR 300 MG/1
300 CAPSULE ORAL 2 TIMES DAILY
Qty: 14 CAP | Refills: 0 | Status: SHIPPED
Start: 2019-06-13 | End: 2019-12-19

## 2019-06-15 LAB
BACTERIA UR CULT: NORMAL
SIGNIFICANT IND 70042: NORMAL
SITE SITE: NORMAL
SOURCE SOURCE: NORMAL

## 2019-06-15 NOTE — PROGRESS NOTES
Chief Complaint   Patient presents with   • Dysuria     recent uti   • Pharyngitis       HISTORY OF PRESENT ILLNESS: Patient is a 79 y.o. female established patient who presents today complaining of ongoing symptoms of UTI. She gets these frequently and is on macrodantin 50mg daily.   Recently symptomatic and urine + for citrobacter . Treated with bactrim and just does not feel much better.   No fevers or chills. No blood. No flank pain.   Tired.     Also complaining of worsening GERD sx. Sometimes feels like food sticks. One episode of choking.   Ended up regurgitating. This was about a month ago. She is omeprazole.   She has been taking increased advil due to gum/oral pain from dentures. She has had these dentures for about a year and still uncomfortable.she is working with her dentist. Her weight has stayed stable.     Has had a dry scratchy throat. No pain.         Patient Active Problem List    Diagnosis Date Noted   • CKD (chronic kidney disease) stage 3, GFR 30-59 ml 09/18/2011     Priority: Low   • Hypertension 06/20/2011     Priority: Low   • Hypothyroid 12/01/2010     Priority: Low   • Macular degeneration 12/11/2016   • Bipolar 1 disorder, mixed ([facility]) 01/04/2015   • Hard of hearing 12/12/2014   • Poor balance 08/07/2014   • Hyperparathyroidism, secondary ([facility]) 02/08/2012   • Osteoporosis 12/07/2011   • UI (urinary incontinence) 12/01/2010   • Frequent UTI 12/01/2010   • Seasonal allergies 12/01/2010     Current Outpatient Prescriptions on File Prior to Visit   Medication Sig Dispense Refill   • venlafaxine XR (EFFEXOR XR) 75 MG CAPSULE SR 24 HR TAKE 1 CAPSULE DAILY 90 Cap 0   • levothyroxine (SYNTHROID) 125 MCG Tab TAKE 1 TABLET EVERY MORNING ON AN EMPTY STOMACH 90 Tab 3   • nitrofurantoin (MACRODANTIN) 50 MG Cap TAKE 1 CAPSULE DAILY 90 Cap 1   • quetiapine (SEROQUEL XR) 200 MG XR tablet TAKE 1 TABLET DAILY AT BEDTIME 90 Tab 1   • omeprazole (PRILOSEC) 20 MG delayed-release capsule TAKE 1 CAPSULE DAILY 90  "Cap 3   • alendronate (FOSAMAX) 70 MG Tab Take 1 Tab by mouth every 7 days. 12 Tab 3   • estradiol (ESTRACE) 0.1 MG/GM vaginal cream Apply pea sized amount to genital area three time each week. 2 Tube 1   • DETROL LA 4 MG CAPSULE SR 24 HR TAKE 1 CAPSULE DAILY 90 Cap 1   • divalproex ER (DEPAKOTE ER) 250 MG TABLET SR 24 HR TAKE 3 TABLETS AT BEDTIME 270 Tab 1   • cyanocobalamin (VITAMIN B-12) 100 MCG Tab Take 100 mcg by mouth every day.       No current facility-administered medications on file prior to visit.            Past medical, surgical, family, and social history is reviewed and updated in Epic chart by me today.   Medications and allergies reviewed and updated in Epic chart by me today.     REVIEW OF SYSTEMS:  GENERAL: No fatigue, no weight loss.  HEENT:  Ears--no earache, no change in hearing, no dizziness, no tinnitus.                 Eyes--no blurred vision, no discharge or pain                 Throat--as above.                   Oral pain due to denture irritaion  CV:  No chest pain,dyspnea,palpitations or edema.  RESP:  No sob,cough,wheezing or hemoptysis.  GI: as above. No nausea. No pain. Tends toward constipation. No dark stools. No blood.   :  Still with burning and frequency  MS:  No joint swelling, myalgias, or arthralgias.  NEURO:  No seizures, syncope, paralysis, tremor, or weakness.  SKIN: No new or concerning skin lesions or changes.   PSYCH: Mood down some---her psychiatrist Dr. FRANK is retiring and she is a little anxious about this.   She has been with him for 20 yr.     Vitals:    06/13/19 1500   BP: 110/74   Pulse: 76   Resp: 14   Temp: 36.4 °C (97.6 °F)   TempSrc: Temporal   SpO2: 96%   Weight: 68.5 kg (151 lb)   Height: 1.676 m (5' 5.98\")     Physical Exam:  GENERAL;  WD/WN, No acute distress.  HEENT:  PERRLA, EOMI, no conjuctival injection, no icterus.                 TM's normal bilat.                 Nasal mucosa erythematous and edematous.                 Pharynx clear.. No " exudate.  NECK: Supple with no adenopathy or thyromegaly.  LUNGS: Clear with no rales, rhonchi, or wheezes.  COR: RR with no murmur, gallop or rub.  Abdomen: Soft, nontender, nondistended. Normal bowel sounds. No hepatosplenomegaly or masses, or hernias. No rebound or guarding.  No CVAT  EXT: No edema.  Psych: Mood and affect are appropriate.      Assessment/Plan:  1. Frequent UTI . Start omnicef. Discussed prevention and not treating unless symptomatic.   Encouraged her to use her estrogen cream at least 3 times/week . Encouraged fluids and timed voids during day. POCT Urinalysis    URINE CULTURE(NEW)   2. Dysuria     3. Gastroesophageal reflux disease, esophagitis presence not specified . Continue omeprazole.     4. Oral pain . Encouraged tylenol instead of advil due to GI sx. Continue with dentist.        follow up 3 months.

## 2019-07-02 RX ORDER — DIVALPROEX SODIUM 250 MG/1
TABLET, EXTENDED RELEASE ORAL
Qty: 270 TAB | Refills: 1 | Status: SHIPPED | OUTPATIENT
Start: 2019-07-02 | End: 2019-09-25 | Stop reason: SDUPTHER

## 2019-07-08 ENCOUNTER — OFFICE VISIT (OUTPATIENT)
Dept: BEHAVIORAL HEALTH | Facility: CLINIC | Age: 79
End: 2019-07-08
Payer: MEDICARE

## 2019-07-08 VITALS
HEART RATE: 70 BPM | WEIGHT: 152 LBS | HEIGHT: 66 IN | SYSTOLIC BLOOD PRESSURE: 118 MMHG | BODY MASS INDEX: 24.43 KG/M2 | DIASTOLIC BLOOD PRESSURE: 68 MMHG | RESPIRATION RATE: 16 BRPM

## 2019-07-08 DIAGNOSIS — F31.9 BIPOLAR 1 DISORDER, DEPRESSED (HCC): ICD-10-CM

## 2019-07-08 PROCEDURE — 90833 PSYTX W PT W E/M 30 MIN: CPT | Performed by: PSYCHIATRY & NEUROLOGY

## 2019-07-08 PROCEDURE — 99213 OFFICE O/P EST LOW 20 MIN: CPT | Performed by: PSYCHIATRY & NEUROLOGY

## 2019-07-08 PROCEDURE — 90834 PSYTX W PT 45 MINUTES: CPT | Performed by: SOCIAL WORKER

## 2019-07-08 NOTE — BH THERAPY
[facility]  Therapy Progress Note    Patient Name: Azeb Major  Patient MRN: [id number]  Today's Date: 7/8/2019     Type of session:Individual psychotherapy  Length of session: 45 minutes  Persons in attendance:Patient    Subjective/New Info: Pt states her mood is much improved, less depressed than last visit.  Has had difficulty w/recurrent UTIs but now resolved.  Pt's adult children still visit her daily, each taking a different day of week.  Also has senior  come 3x/week.  Pt enjoying watching [employer] on TV, reading, walking a bit.      Objective/Observations:   Participation: Active verbal participation   Grooming: Casual and Neat   Cognition: Fully Oriented   Eye contact: Good   Mood: Euthymic   Affect: Congruent with content   Thought process: Goal-directed   Speech: Rate within normal limits and Volume within normal limits   Other:     Diagnoses:   1. Bipolar 1 disorder, depressed (HCC)         Current risk:   SUICIDE: Low   Homicide: Low   Self-harm: Low   Relapse: Not applicable   Other:    Safety Plan reviewed? Not Indicated   If evidence of imminent risk is present, intervention/plan:     Therapeutic Intervention(s): Cognitive modification, Positive behavior reinforced and Supportive psychotherapy    Treatment Goal(s)/Objective(s) addressed: Supportive therapy     Progress toward Treatment Goals: Moderate improvement    Plan:  - Continue Individual therapy and Medication management    Georgiana Rush L.C.S.W.  7/8/2019

## 2019-07-08 NOTE — BH THERAPY
"[facility]  PSYCHIATRIC FOLLOW-UP NOTE    Name: Azeb Major  MRN: [id number]  : 1940  Age: 79 y.o.  Date of assessment: 2019  PCP: Josy Arteaga M.D.  Persons in attendance: Patient and Children  Total face-to-face time: 30 minutes    REASON FOR VISIT/CHIEF COMPLAINT (as stated by Patient and Children):  Azeb Major is a 79 y.o., White female, attending follow-up appointment for   Chief Complaint   Patient presents with   • Medication Management     The patient is seen today for ofllow up on her bipolar disorder, insomnia, and irritability.   .      HISTORY OF PRESENT ILLNESS:    This is a 80 yo female, lives alone, seen today for follow up after two months. The patient has been taking venlafaxine ER 75 mg one day . The patient reported that she goes to sleep late and wakes up late at AM. The patient takes seroquel  mg at 8 pm. The patient denied depression. The patient is getting help from her children and they are checking on her The patient reported tremor for long time. The patient had a recent UTI and she recover from that. The patient has refills on her medications.    PSYCHOSOCIAL CHANGES SINCE PREVIOUS CONTACT:  Sleeps late at night but denied depression.    RESPONSE TO TREATMENT:  She is taking divalproex  mg one in am and two at bed time, seroquel  mg one at night, venlafaxine ER 75 mg one a day.    MEDICATION SIDE EFFECTS:  Denied.    REVIEW OF SYSTEMS:        Constitutional negative   Eyes negative   Ears/Nose/Mouth/Throat negative   Cardiovascular positive - hypertension   Respiratory negative   Gastrointestinal negative   Genitourinary positive - chronic kidney disease, frequent UTI.   Muscular negative   Integumentary negative   Neurological negative   Endocrine positive - hypothyroidism.   Hematologic/Lymphatic negative       PSYCHIATRIC EXAMINATION/MENTAL STATUS  /68   Pulse 70   Resp 16   Ht 1.676 m (5' 5.98\")   Wt 68.9 kg " (152 lb)   BMI 24.55 kg/m²   Participation: Active verbal participation  Grooming:Neat  Orientation: Alert and Fully Oriented  Eye contact: Good  Behavior:Calm   Mood: Anxious  Affect: Flexible and Full range  Thought process: Logical and Goal-directed  Thought content:  Within normal limits  Speech: Rate within normal limits and Volume within normal limits  Perception:  Within normal limits  Memory:  Poor memory for chronology of events  Insight: Good  Judgment: Good  Family/couple interaction observations:   Other:    Current risk:    Suicide: Low   Homicide: Low   Self-harm: Low  Relapse: Low  Other:   Crisis Safety Plan reviewed?Yes  If evidence of imminent risk is present, intervention/plan:    Medical Records/Labs/Diagnostic Tests Reviewed: yes.    Medical Records/Labs/Diagnostic Tests Ordered: none.    DIAGNOSTIC IMPRESSION(S):      1. Bipolar 1 disorder, depressed (HCC)           ASSESSMENT AND PLAN:     1. Bipolar 1 disorder, depressed type.  Take seroquel  mg at 6 pm.  Divalproex  mg one in am and two at night after dinner.  Venlafaxine ER 75 mg one a day.  melatonin prn.  encouraged a good sleep hygiene.    2. Follow up in one month.      17 minutes were spent in psychotherapy.  (If greater than 16 minutes, add 12863 code)   Topics addressed in psychotherapy include:  Cognitive restructuring and behavioral changes.  Family education we discussed her unresolved emotional issues and helped her with emotional skills.  Encouraged her to keep a day routine and a good sleep cycle.    Anthony Fowler M.D.

## 2019-07-13 DIAGNOSIS — N39.46 MIXED INCONTINENCE: ICD-10-CM

## 2019-07-15 RX ORDER — TOLTERODINE TARTRATE 4 MG
CAPSULE, EXT RELEASE 24 HR ORAL
Qty: 90 CAP | Refills: 1 | Status: SHIPPED | OUTPATIENT
Start: 2019-07-15 | End: 2020-01-13

## 2019-08-06 ENCOUNTER — OFFICE VISIT (OUTPATIENT)
Dept: BEHAVIORAL HEALTH | Facility: CLINIC | Age: 79
End: 2019-08-06
Payer: MEDICARE

## 2019-08-06 VITALS
DIASTOLIC BLOOD PRESSURE: 66 MMHG | WEIGHT: 152 LBS | BODY MASS INDEX: 24.43 KG/M2 | RESPIRATION RATE: 16 BRPM | SYSTOLIC BLOOD PRESSURE: 116 MMHG | HEART RATE: 72 BPM | HEIGHT: 66 IN

## 2019-08-06 DIAGNOSIS — F31.77 BIPOLAR DISORDER, IN PARTIAL REMISSION, MOST RECENT EPISODE MIXED (HCC): ICD-10-CM

## 2019-08-06 DIAGNOSIS — F31.9 BIPOLAR 1 DISORDER, DEPRESSED (HCC): ICD-10-CM

## 2019-08-06 PROCEDURE — 99213 OFFICE O/P EST LOW 20 MIN: CPT | Performed by: PSYCHIATRY & NEUROLOGY

## 2019-08-06 PROCEDURE — 90834 PSYTX W PT 45 MINUTES: CPT | Performed by: SOCIAL WORKER

## 2019-08-06 PROCEDURE — 90833 PSYTX W PT W E/M 30 MIN: CPT | Performed by: PSYCHIATRY & NEUROLOGY

## 2019-08-06 ASSESSMENT — PATIENT HEALTH QUESTIONNAIRE - PHQ9
SUM OF ALL RESPONSES TO PHQ QUESTIONS 1-9: 7
5. POOR APPETITE OR OVEREATING: 1 - SEVERAL DAYS
CLINICAL INTERPRETATION OF PHQ2 SCORE: 2

## 2019-08-06 NOTE — BH THERAPY
[facility]  Therapy Progress Note    Patient Name: Azeb Major  Patient MRN: [id number]  Today's Date: 8/6/2019     Type of session:Individual psychotherapy  Length of session: 45 minutes  Persons in attendance:Patient    Subjective/New Info: Pt states her depression has improved significantly.  Dr FRANK has adjusted meds since onset of this episode Dec 2018.  Pt making efforts to walk more.  Able to walk around her house w/o cane, uses cane when going out.  Physical therapy sessions were beneficial for balance; pt states she continues home exercises.  Pt's children continue to provide company and assistance.  Pt would like to schedule our next therapy session on same day as psychiatrist appt.      Objective/Observations:   Participation: Active verbal participation   Grooming: Casual and Neat   Cognition: Fully Oriented   Eye contact: Good   Mood: Euthymic   Affect: Congruent with content   Thought process: Goal-directed   Speech: Rate within normal limits and Volume within normal limits   Other:     Diagnoses:   1. Bipolar disorder, in partial remission, most recent episode mixed (HCC)         Current risk:   SUICIDE: Low   Homicide: Low   Self-harm: Low   Relapse: Not applicable   Other:    Safety Plan reviewed? Not Indicated   If evidence of imminent risk is present, intervention/plan:     Therapeutic Intervention(s): Cognitive modification, Positive behavior reinforced and Supportive psychotherapy    Treatment Goal(s)/Objective(s) addressed: Continue w/self-care, eg., moderate exercise, watching sports on TV, time w/family     Progress toward Treatment Goals: Moderate improvement    Plan:  - Continue Individual therapy and Medication management    Georgiana Rush L.C.S.W.  8/6/2019

## 2019-08-06 NOTE — BH THERAPY
[facility]  PSYCHIATRIC FOLLOW-UP NOTE    Name: Azeb Major  MRN: [id number]  : 1940  Age: 79 y.o.  Date of assessment: 2019  PCP: Josy Arteaga M.D.  Persons in attendance: Patient and Children  Total face-to-face time: 30 minutes    REASON FOR VISIT/CHIEF COMPLAINT (as stated by Patient and Children):  Azeb Major is a 79 y.o., White female, attending follow-up appointment for   Chief Complaint   Patient presents with   • Medication Management     The patient is seen today for follow up on her bipolar disorder   .      HISTORY OF PRESENT ILLNESS:    This is a 78 yo female, lives alone, seen today for follow up after one month. The patient has been getting hyper and she is not depressed. The patient has been using melatonin for sleep. The patient has been taking quetiapine and depakote after dinner and she goes to sleep after four hours later. The patient has been on venlafaxine 75 mg every day.  The patient has an appointment with new provider in one month. The patient has been seeing her therapist.     PSYCHOSOCIAL CHANGES SINCE PREVIOUS CONTACT:  The patient reported that her mood has been stable and she denied depression.    The patient is not sleeping well at night and she has pressured speech.    RESPONSE TO TREATMENT:  The patient is taking divalproex  mg one in am and two at bed time, seroquel  mg one at night, venlafaxine ER 75 mg one a day.    MEDICATION SIDE EFFECTS:  Denied.    REVIEW OF SYSTEMS:        Constitutional negative   Eyes negative   Ears/Nose/Mouth/Throat negative   Cardiovascular positive - hypertension   Respiratory negative   Gastrointestinal negative   Genitourinary positive - chronic kidney disease, frequent UTI.   Muscular negative   Integumentary negative   Neurological negative   Endocrine positive - hypothyroidism.   Hematologic/Lymphatic negative       PSYCHIATRIC EXAMINATION/MENTAL STATUS  /66   Pulse 72   Resp 16    "Ht 1.676 m (5' 5.98\")   Wt 68.9 kg (152 lb)   BMI 24.55 kg/m²   Participation: Active verbal participation, Attentive and Engaged  Grooming:Neat  Orientation: Alert and Fully Oriented  Eye contact: Good  Behavior:Calm   Mood: Anxious  Affect: Flexible and Full range  Thought process: Logical and Goal-directed  Thought content:  Within normal limits  Speech: Rate within normal limits and Volume within normal limits  Perception:  Within normal limits  Memory:  Poor memory for chronology of events  Insight: Good  Judgment: Good  Family/couple interaction observations:   Other:    Current risk:    Suicide: Low   Homicide: Low   Self-harm: Low  Relapse: Low  Other:   Crisis Safety Plan reviewed?Yes  If evidence of imminent risk is present, intervention/plan:    Medical Records/Labs/Diagnostic Tests Reviewed: yes.    Medical Records/Labs/Diagnostic Tests Ordered: none.    DIAGNOSTIC IMPRESSION(S):  1. Bipolar 1 disorder, depressed (HCC)           ASSESSMENT AND PLAN:    1. Bipolar 1 disorder, mixed.  Decrease venlafaxine ER 37.5 mg one a day.  Divalproex  mg one in am and two at bed time  Seroquel  mg at 6 pm.  Use melatonin for sleep    2. Follow up with the new provider in two months  We discussed termination of treatment with this provider today.      17 minutes were spent in psychotherapy.  (If greater than 16 minutes, add 57152 code)   Topics addressed in psychotherapy include:  Helped her with social skills, self control, and problem solving skills.  Psycho education and cognitive clarifications.  We discussed her negative automatic thoughts and helped her to process it.  Anthony Fowler M.D.                   "

## 2019-08-13 RX ORDER — VENLAFAXINE HYDROCHLORIDE 37.5 MG/1
37.5 CAPSULE, EXTENDED RELEASE ORAL DAILY
Qty: 30 CAP | Refills: 3 | Status: SHIPPED | OUTPATIENT
Start: 2019-08-13 | End: 2019-09-25 | Stop reason: SDUPTHER

## 2019-08-30 ENCOUNTER — HOSPITAL ENCOUNTER (OUTPATIENT)
Facility: MEDICAL CENTER | Age: 79
End: 2019-08-30
Attending: FAMILY MEDICINE
Payer: MEDICARE

## 2019-08-30 DIAGNOSIS — R30.0 DYSURIA: ICD-10-CM

## 2019-08-30 LAB
APPEARANCE UR: ABNORMAL
BACTERIA #/AREA URNS HPF: ABNORMAL /HPF
BILIRUB UR QL STRIP.AUTO: NEGATIVE
COLOR UR: YELLOW
EPI CELLS #/AREA URNS HPF: ABNORMAL /HPF
GLUCOSE UR STRIP.AUTO-MCNC: NEGATIVE MG/DL
KETONES UR STRIP.AUTO-MCNC: NEGATIVE MG/DL
LEUKOCYTE ESTERASE UR QL STRIP.AUTO: ABNORMAL
MICRO URNS: ABNORMAL
NITRITE UR QL STRIP.AUTO: NEGATIVE
PH UR STRIP.AUTO: 6 [PH] (ref 5–8)
PROT UR QL STRIP: 30 MG/DL
RBC # URNS HPF: ABNORMAL /HPF
RBC UR QL AUTO: ABNORMAL
SP GR UR STRIP.AUTO: 1.02
UROBILINOGEN UR STRIP.AUTO-MCNC: 0.2 MG/DL
WBC #/AREA URNS HPF: ABNORMAL /HPF

## 2019-08-30 PROCEDURE — 81001 URINALYSIS AUTO W/SCOPE: CPT

## 2019-08-30 PROCEDURE — 87086 URINE CULTURE/COLONY COUNT: CPT

## 2019-09-02 LAB
BACTERIA UR CULT: NORMAL
SIGNIFICANT IND 70042: NORMAL
SITE SITE: NORMAL
SOURCE SOURCE: NORMAL

## 2019-09-06 DIAGNOSIS — N39.0 FREQUENT UTI: ICD-10-CM

## 2019-09-06 RX ORDER — NITROFURANTOIN MACROCRYSTALS 50 MG/1
CAPSULE ORAL
Qty: 90 CAP | Refills: 4 | Status: SHIPPED | OUTPATIENT
Start: 2019-09-06 | End: 2020-12-08

## 2019-09-12 ENCOUNTER — OFFICE VISIT (OUTPATIENT)
Dept: INTERNAL MEDICINE | Facility: IMAGING CENTER | Age: 79
End: 2019-09-12
Payer: MEDICARE

## 2019-09-12 ENCOUNTER — HOSPITAL ENCOUNTER (OUTPATIENT)
Facility: MEDICAL CENTER | Age: 79
End: 2019-09-12
Attending: FAMILY MEDICINE
Payer: MEDICARE

## 2019-09-12 VITALS
HEART RATE: 84 BPM | BODY MASS INDEX: 24.59 KG/M2 | SYSTOLIC BLOOD PRESSURE: 124 MMHG | TEMPERATURE: 97.5 F | RESPIRATION RATE: 14 BRPM | HEIGHT: 66 IN | WEIGHT: 153 LBS | DIASTOLIC BLOOD PRESSURE: 75 MMHG | OXYGEN SATURATION: 96 %

## 2019-09-12 DIAGNOSIS — R30.0 DYSURIA: ICD-10-CM

## 2019-09-12 DIAGNOSIS — I10 ESSENTIAL HYPERTENSION: ICD-10-CM

## 2019-09-12 DIAGNOSIS — E03.9 HYPOTHYROIDISM, UNSPECIFIED TYPE: ICD-10-CM

## 2019-09-12 DIAGNOSIS — N39.46 MIXED STRESS AND URGE URINARY INCONTINENCE: ICD-10-CM

## 2019-09-12 DIAGNOSIS — D75.89 MACROCYTOSIS: ICD-10-CM

## 2019-09-12 DIAGNOSIS — M54.50 BILATERAL LOW BACK PAIN WITHOUT SCIATICA, UNSPECIFIED CHRONICITY: ICD-10-CM

## 2019-09-12 DIAGNOSIS — E55.9 VITAMIN D DEFICIENCY: ICD-10-CM

## 2019-09-12 DIAGNOSIS — L57.0 AK (ACTINIC KERATOSIS): ICD-10-CM

## 2019-09-12 LAB
ALBUMIN SERPL BCP-MCNC: 3.7 G/DL (ref 3.2–4.9)
ALBUMIN/GLOB SERPL: 1.2 G/DL
ALP SERPL-CCNC: 53 U/L (ref 30–99)
ALT SERPL-CCNC: 11 U/L (ref 2–50)
ANION GAP SERPL CALC-SCNC: 8 MMOL/L (ref 0–11.9)
APPEARANCE UR: NORMAL
AST SERPL-CCNC: 16 U/L (ref 12–45)
BASOPHILS # BLD AUTO: 0.4 % (ref 0–1.8)
BASOPHILS # BLD: 0.03 K/UL (ref 0–0.12)
BILIRUB SERPL-MCNC: 0.3 MG/DL (ref 0.1–1.5)
BILIRUB UR STRIP-MCNC: NEGATIVE MG/DL
BUN SERPL-MCNC: 31 MG/DL (ref 8–22)
CALCIUM SERPL-MCNC: 8.8 MG/DL (ref 8.5–10.5)
CHLORIDE SERPL-SCNC: 106 MMOL/L (ref 96–112)
CO2 SERPL-SCNC: 22 MMOL/L (ref 20–33)
COLOR UR AUTO: YELLOW
CREAT SERPL-MCNC: 1.02 MG/DL (ref 0.5–1.4)
EOSINOPHIL # BLD AUTO: 0.05 K/UL (ref 0–0.51)
EOSINOPHIL NFR BLD: 0.7 % (ref 0–6.9)
ERYTHROCYTE [DISTWIDTH] IN BLOOD BY AUTOMATED COUNT: 51.3 FL (ref 35.9–50)
GLOBULIN SER CALC-MCNC: 3.1 G/DL (ref 1.9–3.5)
GLUCOSE SERPL-MCNC: 111 MG/DL (ref 65–99)
GLUCOSE UR STRIP.AUTO-MCNC: NEGATIVE MG/DL
HCT VFR BLD AUTO: 40.1 % (ref 37–47)
HGB BLD-MCNC: 12.8 G/DL (ref 12–16)
IMM GRANULOCYTES # BLD AUTO: 0.04 K/UL (ref 0–0.11)
IMM GRANULOCYTES NFR BLD AUTO: 0.6 % (ref 0–0.9)
KETONES UR STRIP.AUTO-MCNC: NEGATIVE MG/DL
LEUKOCYTE ESTERASE UR QL STRIP.AUTO: NORMAL
LYMPHOCYTES # BLD AUTO: 1.49 K/UL (ref 1–4.8)
LYMPHOCYTES NFR BLD: 20.6 % (ref 22–41)
MCH RBC QN AUTO: 32.6 PG (ref 27–33)
MCHC RBC AUTO-ENTMCNC: 31.9 G/DL (ref 33.6–35)
MCV RBC AUTO: 102 FL (ref 81.4–97.8)
MONOCYTES # BLD AUTO: 0.62 K/UL (ref 0–0.85)
MONOCYTES NFR BLD AUTO: 8.6 % (ref 0–13.4)
NEUTROPHILS # BLD AUTO: 5.01 K/UL (ref 2–7.15)
NEUTROPHILS NFR BLD: 69.1 % (ref 44–72)
NITRITE UR QL STRIP.AUTO: NEGATIVE
NRBC # BLD AUTO: 0 K/UL
NRBC BLD-RTO: 0 /100 WBC
PH UR STRIP.AUTO: 5.5 [PH] (ref 5–8)
PLATELET # BLD AUTO: 121 K/UL (ref 164–446)
PMV BLD AUTO: 11.7 FL (ref 9–12.9)
POTASSIUM SERPL-SCNC: 5 MMOL/L (ref 3.6–5.5)
PROT SERPL-MCNC: 6.8 G/DL (ref 6–8.2)
PROT UR QL STRIP: 100 MG/DL
RBC # BLD AUTO: 3.93 M/UL (ref 4.2–5.4)
RBC UR QL AUTO: NORMAL
SODIUM SERPL-SCNC: 136 MMOL/L (ref 135–145)
SP GR UR STRIP.AUTO: 1.02
T4 FREE SERPL-MCNC: 0.95 NG/DL (ref 0.53–1.43)
TSH SERPL DL<=0.005 MIU/L-ACNC: 0.08 UIU/ML (ref 0.38–5.33)
UROBILINOGEN UR STRIP-MCNC: 0.2 MG/DL
WBC # BLD AUTO: 7.2 K/UL (ref 4.8–10.8)

## 2019-09-12 PROCEDURE — 87077 CULTURE AEROBIC IDENTIFY: CPT

## 2019-09-12 PROCEDURE — 85025 COMPLETE CBC W/AUTO DIFF WBC: CPT

## 2019-09-12 PROCEDURE — 82607 VITAMIN B-12: CPT

## 2019-09-12 PROCEDURE — 84443 ASSAY THYROID STIM HORMONE: CPT

## 2019-09-12 PROCEDURE — 81002 URINALYSIS NONAUTO W/O SCOPE: CPT | Performed by: FAMILY MEDICINE

## 2019-09-12 PROCEDURE — 17003 DESTRUCT PREMALG LES 2-14: CPT | Performed by: FAMILY MEDICINE

## 2019-09-12 PROCEDURE — 80053 COMPREHEN METABOLIC PANEL: CPT

## 2019-09-12 PROCEDURE — 87186 SC STD MICRODIL/AGAR DIL: CPT

## 2019-09-12 PROCEDURE — 87086 URINE CULTURE/COLONY COUNT: CPT

## 2019-09-12 PROCEDURE — 99214 OFFICE O/P EST MOD 30 MIN: CPT | Mod: 25 | Performed by: FAMILY MEDICINE

## 2019-09-12 PROCEDURE — 17000 DESTRUCT PREMALG LESION: CPT | Performed by: FAMILY MEDICINE

## 2019-09-12 PROCEDURE — 84439 ASSAY OF FREE THYROXINE: CPT

## 2019-09-12 PROCEDURE — 82306 VITAMIN D 25 HYDROXY: CPT

## 2019-09-12 RX ORDER — CEFDINIR 300 MG/1
300 CAPSULE ORAL 2 TIMES DAILY
Qty: 10 CAP | Refills: 0 | Status: SHIPPED | OUTPATIENT
Start: 2019-09-12 | End: 2019-09-13

## 2019-09-13 LAB
25(OH)D3 SERPL-MCNC: 54 NG/ML (ref 30–100)
VIT B12 SERPL-MCNC: >1500 PG/ML (ref 211–911)

## 2019-09-13 NOTE — PROGRESS NOTES
Chief Complaint   Patient presents with   • Dysuria     past 3 d   • Skin Lesion     dry red spots on nose   • Low Back Pain     worse in am       HISTORY OF PRESENT ILLNESS: Patient is a 79 y.o. female established patient who presents today with her daughter Olive complaining of symptoms of urinary tract infection for the past 3 days.  She does have a history of frequent UTIs.  She is on suppressive therapy with 50 mg of nitrofurantoin daily.  She had symptoms about 2 weeks ago and a culture was negative.  She now has worsening symptoms for the past 3 days with dysuria, frequency, hesitancy and urgency.  She has had some accidents.  Worse at night.  She does have urinary incontinence, both stress and urge and is on  Detrol.  No fevers or chills.  She is drinking fluids well.    She also is complaining of some low back pain and stiffness which is worse in the mornings for about the past 2 weeks.  No trauma.  It hurts to get out of bed.  No radicular symptoms.  When she gets up and starts moving around it feels better.  She denies pain in the flank area    She also has a couple spots on her nose she would like checked.  They have been there for about 6 months.  Dry and red and rough.    Patient Active Problem List    Diagnosis Date Noted   • CKD (chronic kidney disease) stage 3, GFR 30-59 ml 09/18/2011     Priority: Low   • Hypertension 06/20/2011     Priority: Low   • Hypothyroid 12/01/2010     Priority: Low   • Macular degeneration 12/11/2016   • Bipolar 1 disorder, mixed ([facility]) 01/04/2015   • Hard of hearing 12/12/2014   • Poor balance 08/07/2014   • Hyperparathyroidism, secondary ([facility]) 02/08/2012   • Osteoporosis 12/07/2011   • UI (urinary incontinence) 12/01/2010   • Frequent UTI 12/01/2010   • Seasonal allergies 12/01/2010     Current Outpatient Medications on File Prior to Visit   Medication Sig Dispense Refill   • quetiapine (SEROQUEL XR) 200 MG XR tablet Take 1 Tab by mouth every bedtime. 90 Tab 0   •  nitrofurantoin (MACRODANTIN) 50 MG Cap TAKE 1 CAPSULE DAILY 90 Cap 4   • venlafaxine XR (EFFEXOR XR) 37.5 MG CAPSULE SR 24 HR Take 1 Cap by mouth every day. 30 Cap 3   • DETROL LA 4 MG CAPSULE SR 24 HR TAKE 1 CAPSULE DAILY 90 Cap 1   • divalproex ER (DEPAKOTE ER) 250 MG TABLET SR 24 HR TAKE 3 TABLETS AT BEDTIME 270 Tab 1   • levothyroxine (SYNTHROID) 125 MCG Tab TAKE 1 TABLET EVERY MORNING ON AN EMPTY STOMACH 90 Tab 3   • omeprazole (PRILOSEC) 20 MG delayed-release capsule TAKE 1 CAPSULE DAILY 90 Cap 3   • alendronate (FOSAMAX) 70 MG Tab Take 1 Tab by mouth every 7 days. 12 Tab 3   • estradiol (ESTRACE) 0.1 MG/GM vaginal cream Apply pea sized amount to genital area three time each week. 2 Tube 1   • cyanocobalamin (VITAMIN B-12) 100 MCG Tab Take 100 mcg by mouth every day.     • cefdinir (OMNICEF) 300 MG Cap Take 1 Cap by mouth 2 times a day. 14 Cap 0     No current facility-administered medications on file prior to visit.          Past medical, surgical, family, and social history is reviewed and updated in Epic chart by me today.   Medications and allergies reviewed and updated in Epic chart by me today.     REVIEW OF SYSTEMS:  GENERAL: No fatigue, no weight loss.  HEENT:  Ears--no earache, no change in hearing, no dizziness, no tinnitus.                 Eyes--no blurred vision, no discharge or pain                 Throat--No sore throat, no dysphagia, no hoarseness  CV:  No chest pain,dyspnea,palpitations or edema.  RESP:  No sob,cough,wheezing or hemoptysis.  GI: No dysphagia, heartburn,abdominal pain, nausea, vomiting, diarrhea or constipation.       No melena, jaundice, bleeding, incontinence or change in bowel habits.  : as above  MS: low back pain  NEURO:  No seizures, syncope, paralysis, tremor, or weakness.  SKIN: as above  PSYCH: Mood fine.    Vitals:    09/12/19 1000   BP: 124/75   Pulse: 84   Resp: 14   Temp: 36.4 °C (97.5 °F)   TempSrc: Temporal   SpO2: 96%   Weight: 69.4 kg (153 lb)   Height: 1.676  "rimma (5' 5.98\")     Physical Exam:  Gen: Well developed, well nourished. No acute distress.  Alert and oriented x 3.  Neck:  Supple, no adenopathy or thyromegaly.  Heart:  Regular rate and rhythm.  Normal S1, S2. No murmur, gallop or rub.  Lungs:  Clear, No wheezes,rales or rhonchi.  Back: Her LS spine is nontender.  She does have some tenderness over both SI joints.  She has fairly good range of motion.  Neuro exam in her lower extremities is nonfocal and symmetric.  Extremities:  No edema.  Skin: She has 3 small lesions on her nose.  They are approximately 2 to 3 mm in size.  They are all erythematous and scaly, consistent with actinic keratoses.  Psych: Mood and affect are appropriate.          Assessment/Plan:  1. Dysuria.  Recommend starting Omnicef and will culture urine.  She is to call for any worsening of her symptoms. POCT Urinalysis    URINE CULTURE(NEW)   2. Bilateral low back pain without sciatica, unspecified chronicity.  I suspect this is mainly some arthritis.  She may try Tylenol 1000 mg twice daily.  May also try heat and gentle stretches.    3. Hypothyroidism, unspecified type.  She is due for lab work.  Will monitor T4 TSH.  She does continue on replacement therapy. TSH    FREE THYROXINE   4. Essential hypertension.  History of hypertension.  Presently on no medications.  Continue to monitor CBC WITH DIFFERENTIAL    Comp Metabolic Panel   5. Vitamin D deficiency.  History of vitamin D deficiency. VITAMIN D,25 HYDROXY   6. Mixed stress and urge urinary incontinence.  She will continue on Detrol.    7.  Actinic keratoses.  3 lesions above on her nose are treated today with cryotherapy and she is instructed in wound care.  Encouraged to call for any concerns.  Follow-up 3 months, sooner for any concerns  "

## 2019-09-23 NOTE — PROGRESS NOTES
"PSYCHIATRY FOLLOW-UP NOTE    Chief Complaint:    \" They tell me I need a new doctor.\"    History Of Present Illness:  Azeb Major is a 79 y.o. female with bipolar 1 disorder comes in today for follow up, was last seen in this office by Dr. FRANK on 8/6/2019.  She presents with her 2 daughters today.    The patient notes that she has been on her current medication regimen for the last 5 years, and it has seemed to be working well for her without side effects and keeping her out of a \"psychotic break.\"  She notes she has a long history of bipolar disorder since her early teen years and historically, has not done well getting off medications as this has led to detrimental outcomes.    She notes she is feeling only mildly depressed lately.  She gets sad and that she does not have the physical ability to get out of the house and walk like she used to love to do, which gets her down.  She has recently bought an exercise bike, which she has been using from time to time.  Her sleep has been stable lately, getting approximately 10 hours a night regularly.  Her appetite remains stable, and her 5 children make sure she is eating every single night.  Her anxiety runs hand-in-hand with her depression and she only feels anxious when she gets depressed.  Therefore, she is not been anxious lately as she has not really been feeling depressed in her mind.  She denies suicidal ideations today, passive or active.    She notes that she does have several periods of perry in the past, but it is been approximately over a year since she has felt this way.  When she does get manic, she likes to stay up until 3 AM many days in a row sorting out boxes in her house and staying busy with other similar activities.  Today, she denies auditory visual hallucinations, denies paranoia, denies homicidal ideations.    She has been tolerating her medication regimen well without side effects and requests to stay on it its current dosing.  She notes " "that for the last few years she is only had to have mild increases and decreases in her Effexor, which she does not need at this time.  She has been seeing Georgiana Rush in this clinic for therapy regularly, which she finds helpful and plans to continue to do.  Explained the need for metabolic monitoring as well as the need for a new Depakote level.  Patient is agreeable to get these done.    Current Psychotropic Medications:  Divalproex  mg p.o. every morning  Divalproex  mg p.o. Nightly  Seroquel  mg p.o. Nightly  Venlafaxine ER 37.5 mg p.o. Daily  Melatonin at night.    Past Psychotropic Medication Trials:  Thorazine - did not like  Lithium - worked, affected kidneys  Many others in the past    Past Psychiatric History:  Struggling with mental health issues since teen years  Historically, has not done well off of medications  No SAs    Social History (changes since last visit):   The patient lives alone in the house but has 5 children who visit her daily.  She notes each 1 of them takes care of her on a different night of the week.  Her son lives across the street.  She recently had to put her dog down.  She has a 1 week old great grandson whom she is just got to meet.    Substance Use:  Alcohol: denies  Nicotine: denies  Illicit drugs: denies  Caffeine: 2 cups of coffee/day    Depression Screening (PHQ-9 Score):   Depression Screen (PHQ-2/PHQ-9) 5/7/2019 7/8/2019 8/6/2019   PHQ-2 Total Score - - -   PHQ-2 Total Score - - -   PHQ-2 Total Score 2 2 2   PHQ-9 Total Score - - -   PHQ-9 Total Score 7 7 7     Interpretation of PHQ-9 Total Score   Score Severity   1-4 No Depression   5-9 Mild Depression   10-14 Moderate Depression   15-19 Moderately Severe Depression   20-27 Severe Depression    Physical Examination:   9/25/19 10:19 AM      BP  157/84     Pulse  94     Resp  18     Weight   68 kg (150 lb)     Height  1.676 m (5' 6\")      Review of Symptoms:  Constitutional: denies recent chills, " "fevers  Neuro: denies recent weakness, numbness, or headaches  HEENT: History of macular degeneration, hard of hearing, and seasonal allergies  Cardiovascular: History of hypertension  Respiratory:  denies recent shortness of breath, dyspnea, or cough  GI: denies recent nausea, vomiting, or diarrhea.   : History of chronic kidney disease and urinary incontinence, and frequent UTI.  Musculoskeletal: History of a poor balance and osteoporosis.  Walks with cane  Skin: denies recent rash or skin lesions  Endocrine: History of hyperparathyroidism  Hematologic: denies recent abnormal bleeding and bruising easily  Psychiatric: See HPI    Mental Status Examination:   Appearance: 79 y.o.  female, dressed in casual attire, nicely groomed.  2 daughters at side.  Behavior: cooperative, good eye contact, no psychomotor agitation or retardation noted  Participation: active verbal participation, engaged  Speech: spontaneous, regular rate, rhythm, and volume noted.  Language appropriate.  Mood: \" Nervous.\"  Affect: Anxious and mood congruent  Orientation: alert and oriented to person, place, situation, and time.  Attention/concentration: Fair.  Associations/Abstract reasoning:Adequate.   Thought Process: linear, logical, and goal-directed  Thought Content: denies passive/active suicidal or homicidal ideations, intent, or plan, denies homicidal ideations  Perception: denies auditory or visual hallucinations, no delusions noted  Fund of knowledge and vocabulary: Adequate.  Memory: No gross evidence of memory deficits  Insight: Fair.  Judgment: Fair.    Medical Records/Medications/Labs/Diagnostic Tests:   records reviewed, recent relevant provider encounters reviewed, recent relevant labs in record reviewed, all medications patient is taking reviewed.     Results for SHAMEKA TEJADA JOSE ANTONIODEWAYNE (MRN [id number]) as of 9/25/2019 08:42   Ref. Range 2/28/2019 08:15   Valproic Acid Latest Ref Range: 50.0 - 100.0 ug/mL 96.2 "     Results for SHAMEKA TEJADA (MRN [id number]) as of 9/25/2019 08:42   Ref. Range 9/12/2019 11:15   TSH Latest Ref Range: 0.380 - 5.330 uIU/mL 0.080 (L)   Free T-4 Latest Ref Range: 0.53 - 1.43 ng/dL 0.95     Results for SHAMEKA TEJAAD (MRN [id number]) as of 9/25/2019 08:42   Ref. Range 9/12/2019 11:15   Glucose Latest Ref Range: 65 - 99 mg/dL 111 (H)     Strengths/Assets:  Patient strengths identified included insight into problems, evidence of good judgment, self-awareness, family support, stable relationships, optimism, sense of humor, social skills, evidence of effective treatment, hopeful for future      Impression:  Bipolar 1 disorder, mixed  Long-term use of antipsychotic medication    Plan:  1. Medication options, alternatives (including no medications) and medication risks/benefits/side effects were discussed in detail.   2. Continue venlafaxine ER 37.5 mg p.o. Daily for stable depression.   3. Continue divalproex  mg p.o. every morning and 500 mg p.o. nightly for stable mood stability.  4. Continue Seroquel  mg p.o. nightly at 6 PM for stable mood stability.  5. Continue melatonin dosing via pharmacist recommendations for sleep disturbance.  6.   The patient is to continue therapy with their therapist at this clinic.  7.   Labs including, valproic acid level, lipid panel and hemoglobin A1c were ordered, the process for getting labs drawn was explained to the patient.  8.   The patient was counseled on the benefits of engaging in 30 minutes of aerobic physical exercise 3-5 times a week to the patient's ability to help with psychiatric symptoms, verbalized understanding.  9. The patient was advised to call, message provider on [employer], or come in to the clinic if symptoms worsen or if any future questions/issues regarding their medications arise; the patient verbalized understanding and agreement.    10. The patient was educated to call 911, call the suicide hotline, or go to local ER  if having thoughts of suicide or homicide; verbalized understanding.    Return to clinic in 2 months or sooner if symptoms worsen    The proposed treatment plan was discussed with the patient who was provided the opportunity to ask questions and make suggestions regarding alternative treatment. Patient verbalized understanding and expressed agreement with the plan.     Total face-to-face time: 30 minutes with more than 50% of face-to-face time spent in counseling and coordinating care as stated in the above plan.     CARL Bazzi.P.R.N.  09/23/19    This note was created using voice recognition software (Dragon). The accuracy of the dictation is limited by the abilities of the software. I have reviewed the note prior to signing, however some errors in grammar and context are still possible. If you have any questions related to this note please do not hesitate to contact our office.

## 2019-09-25 ENCOUNTER — OFFICE VISIT (OUTPATIENT)
Dept: BEHAVIORAL HEALTH | Facility: CLINIC | Age: 79
End: 2019-09-25
Payer: MEDICARE

## 2019-09-25 VITALS
RESPIRATION RATE: 18 BRPM | WEIGHT: 150 LBS | SYSTOLIC BLOOD PRESSURE: 157 MMHG | HEART RATE: 94 BPM | DIASTOLIC BLOOD PRESSURE: 84 MMHG | BODY MASS INDEX: 24.11 KG/M2 | HEIGHT: 66 IN

## 2019-09-25 DIAGNOSIS — F31.60 BIPOLAR 1 DISORDER, MIXED (HCC): ICD-10-CM

## 2019-09-25 DIAGNOSIS — F31.77 BIPOLAR DISORDER, IN PARTIAL REMISSION, MOST RECENT EPISODE MIXED (HCC): ICD-10-CM

## 2019-09-25 DIAGNOSIS — Z79.899 LONG TERM CURRENT USE OF ANTIPSYCHOTIC MEDICATION: ICD-10-CM

## 2019-09-25 PROCEDURE — 99214 OFFICE O/P EST MOD 30 MIN: CPT | Performed by: NURSE PRACTITIONER

## 2019-09-25 PROCEDURE — 90834 PSYTX W PT 45 MINUTES: CPT | Performed by: SOCIAL WORKER

## 2019-09-25 RX ORDER — DIVALPROEX SODIUM 250 MG/1
TABLET, EXTENDED RELEASE ORAL
Qty: 270 TAB | Refills: 0 | Status: SHIPPED | OUTPATIENT
Start: 2019-09-25 | End: 2019-11-27 | Stop reason: SDUPTHER

## 2019-09-25 RX ORDER — VENLAFAXINE HYDROCHLORIDE 37.5 MG/1
37.5 CAPSULE, EXTENDED RELEASE ORAL DAILY
Qty: 90 CAP | Refills: 0 | Status: SHIPPED | OUTPATIENT
Start: 2019-09-25 | End: 2019-11-27 | Stop reason: SDUPTHER

## 2019-09-25 NOTE — BH THERAPY
[facility]  Therapy Progress Note    Patient Name: Azeb Major  Patient MRN: [id number]  Today's Date: 9/25/2019     Type of session:Individual psychotherapy  Length of session: 45 minutes  Persons in attendance:Patient    Subjective/New Info: Pt says she met with psychiatry resident and this went well.  Pt's mood stable, improved.  Bought stationary bike and has started using it.  Feeling more like cooking lately.   Had difficult day yesterday; accompanied daughter to vet to euthanize daughter's dog.  Found this very upsetting; daughter seems to be doing ok and pt is relieved.      Objective/Observations:   Participation: Active verbal participation   Grooming: Casual and Neat   Cognition: Fully Oriented   Eye contact: Good   Mood: Euthymic   Affect: Congruent with content   Thought process: Goal-directed   Speech: Rate within normal limits and Volume within normal limits   Other:     Diagnoses:   1. Bipolar disorder, in partial remission, most recent episode mixed (HCC)         Current risk:   SUICIDE: Low   Homicide: Low   Self-harm: Low   Relapse: Not applicable   Other:    Safety Plan reviewed? Not Indicated   If evidence of imminent risk is present, intervention/plan:     Therapeutic Intervention(s): Cognitive modification, Positive behavior reinforced, Stressors assessed and Supportive psychotherapy    Treatment Goal(s)/Objective(s) addressed: Commended pt on self-care; supportive therapy     Progress toward Treatment Goals: Moderate improvement    Plan:  - Continue Individual therapy and Medication management    Georgiana Rush L.C.S.W.  9/25/2019

## 2019-09-26 ENCOUNTER — HOSPITAL ENCOUNTER (OUTPATIENT)
Facility: MEDICAL CENTER | Age: 79
End: 2019-09-26
Attending: FAMILY MEDICINE
Payer: MEDICARE

## 2019-09-26 ENCOUNTER — NON-PROVIDER VISIT (OUTPATIENT)
Dept: INTERNAL MEDICINE | Facility: IMAGING CENTER | Age: 79
End: 2019-09-26
Payer: MEDICARE

## 2019-09-26 DIAGNOSIS — N39.0 FREQUENT UTI: ICD-10-CM

## 2019-09-26 DIAGNOSIS — R30.0 DYSURIA: ICD-10-CM

## 2019-09-26 DIAGNOSIS — R82.90 ABNORMAL URINALYSIS: ICD-10-CM

## 2019-09-26 LAB
APPEARANCE UR: NORMAL
BILIRUB UR STRIP-MCNC: NORMAL MG/DL
COLOR UR AUTO: YELLOW
GLUCOSE UR STRIP.AUTO-MCNC: NORMAL MG/DL
KETONES UR STRIP.AUTO-MCNC: NORMAL MG/DL
LEUKOCYTE ESTERASE UR QL STRIP.AUTO: NORMAL
NITRITE UR QL STRIP.AUTO: NORMAL
PH UR STRIP.AUTO: 6 [PH] (ref 5–8)
PROT UR QL STRIP: NORMAL MG/DL
RBC UR QL AUTO: NORMAL
SP GR UR STRIP.AUTO: 1.01
UROBILINOGEN UR STRIP-MCNC: NORMAL MG/DL

## 2019-09-26 PROCEDURE — 87186 SC STD MICRODIL/AGAR DIL: CPT

## 2019-09-26 PROCEDURE — 87077 CULTURE AEROBIC IDENTIFY: CPT

## 2019-09-26 PROCEDURE — 87086 URINE CULTURE/COLONY COUNT: CPT

## 2019-09-26 PROCEDURE — 81002 URINALYSIS NONAUTO W/O SCOPE: CPT | Performed by: FAMILY MEDICINE

## 2019-09-30 ENCOUNTER — TELEPHONE (OUTPATIENT)
Dept: INTERNAL MEDICINE | Facility: IMAGING CENTER | Age: 79
End: 2019-09-30

## 2019-09-30 RX ORDER — SULFAMETHOXAZOLE AND TRIMETHOPRIM 800; 160 MG/1; MG/1
1 TABLET ORAL EVERY 12 HOURS
Qty: 10 TAB | Refills: 0 | Status: SHIPPED
Start: 2019-09-30 | End: 2019-12-19

## 2019-09-30 NOTE — TELEPHONE ENCOUNTER
Please let Azeb and Olive know that she has a mild UTI. I would like her to take bactrim for 5 days.

## 2019-11-14 ENCOUNTER — HOSPITAL ENCOUNTER (OUTPATIENT)
Dept: LAB | Facility: MEDICAL CENTER | Age: 79
End: 2019-11-14
Attending: NURSE PRACTITIONER
Payer: MEDICARE

## 2019-11-14 ENCOUNTER — HOSPITAL ENCOUNTER (OUTPATIENT)
Dept: LAB | Facility: MEDICAL CENTER | Age: 79
End: 2019-11-14
Attending: FAMILY MEDICINE
Payer: MEDICARE

## 2019-11-14 DIAGNOSIS — F31.60 BIPOLAR 1 DISORDER, MIXED (HCC): ICD-10-CM

## 2019-11-14 DIAGNOSIS — D75.89 MACROCYTOSIS: ICD-10-CM

## 2019-11-14 DIAGNOSIS — Z79.899 LONG TERM CURRENT USE OF ANTIPSYCHOTIC MEDICATION: ICD-10-CM

## 2019-11-14 LAB
CHOLEST SERPL-MCNC: 179 MG/DL (ref 100–199)
EST. AVERAGE GLUCOSE BLD GHB EST-MCNC: 105 MG/DL
FASTING STATUS PATIENT QL REPORTED: NORMAL
HBA1C MFR BLD: 5.3 % (ref 0–5.6)
HDLC SERPL-MCNC: 49 MG/DL
LDLC SERPL CALC-MCNC: 102 MG/DL
TRIGL SERPL-MCNC: 138 MG/DL (ref 0–149)
VALPROATE SERPL-MCNC: 84.9 UG/ML (ref 50–100)
VIT B12 SERPL-MCNC: >1500 PG/ML (ref 211–911)

## 2019-11-14 PROCEDURE — 82607 VITAMIN B-12: CPT

## 2019-11-14 PROCEDURE — 36415 COLL VENOUS BLD VENIPUNCTURE: CPT | Mod: GA

## 2019-11-14 PROCEDURE — 83036 HEMOGLOBIN GLYCOSYLATED A1C: CPT | Mod: GA

## 2019-11-14 PROCEDURE — 80061 LIPID PANEL: CPT

## 2019-11-14 PROCEDURE — 80164 ASSAY DIPROPYLACETIC ACD TOT: CPT

## 2019-11-18 NOTE — PROGRESS NOTES
"PSYCHIATRY FOLLOW-UP NOTE    Chief Complaint:    \" I am doing good.\"    History Of Present Illness:  Azeb Major is a 79 y.o. female with bipolar 1 disorder comes in today for follow up.  She presents today with her daughter at her side.     The patient notes she is feeling \"good\" lately.  She denies feeling depressed, she denies anhedonia.  She does admit to feeling lonely.  She notes that she continues to have a caregiver come to her house Monday through Friday, as well as sees family regularly as they live close by.  Her sleep has been regular and intact, getting approximately 10 hours of sleep at night when taking melatonin.  She denies staying up due to hypomania lately.  She notes she recently bought an exercise bike and has been using this every day, which does lift her mood.  She denies any overt symptoms of anxiety today.  She denies suicidal ideations, passive or active.  She continues to attend regular therapy in this clinic with Georgiana Rush.    Today, she denies symptoms of hypomania, auditory/visual hallucinations, paranoia, and homicidal ideations.  She denies irritability, although her daughter does note that she has been a little \"feisty\" at times.  She has gotten her labs drawn since her last meeting with this provider: Depakote level at 84.9, lipid panel grossly within normal limits, hemoglobin A1c 5.13.  She denies side effects from her current medication regimen.  She requests to stay on her same medications and eventually go down on her Seroquel as she feels \"I do not need that much\", but is agreeable that this is probably not the best time to do this due to the holiday season.       Current Psychotropic Medications:  Divalproex  mg p.o. every morning  Divalproex  mg p.o. Nightly  Seroquel  mg p.o. Nightly  Venlafaxine ER 37.5 mg p.o. Daily  Melatonin at night.     Past Psychotropic Medication Trials:  Thorazine - did not like  Lithium - worked, affected " "kidneys  Many others in the past     Past Psychiatric History:  Struggling with mental health issues since teen years  Historically, has not done well off of medications  No SAs     Social History (changes since last visit):   The patient lives alone in the house but has 5 children who visit her daily.  Each one of them takes care of her on a different night of the week.  Her son lives across the street.       Substance Use:  Alcohol: denies  Nicotine: denies  Illicit drugs: denies  Caffeine: 2 cups of coffee/day     Depression Screening (PHQ-9 Score):   Depression Screen (PHQ-2/PHQ-9) 5/7/2019 7/8/2019 8/6/2019   PHQ-2 Total Score - - -   PHQ-2 Total Score - - -   PHQ-2 Total Score 2 2 2   PHQ-9 Total Score - - -   PHQ-9 Total Score 7 7 7      Interpretation of PHQ-9 Total Score   Score Severity   1-4 No Depression   5-9 Mild Depression   10-14 Moderate Depression   15-19 Moderately Severe Depression   20-27 Severe Depression     Physical Examination:   11/27/19 9:40 AM      BP  134/84     Pulse  80     Resp  16     Weight   68 kg (150 lb)     Height  1.676 m (5' 6\")      Musculoskeletal: Slowed gait.  History of a poor balance and osteoporosis.  Walks with cane    Review of Symptoms:  Constitutional: denies recent chills, fevers  Neuro: denies recent weakness, numbness, or headaches  HEENT: History of macular degeneration, hard of hearing, and seasonal allergies  Cardiovascular: History of hypertension  Respiratory:  denies recent shortness of breath, dyspnea, or cough  GI: denies recent nausea, vomiting, or diarrhea.   : History of chronic kidney disease and urinary incontinence\  Skin: denies recent rash or skin lesions  Endocrine: History of hyperparathyroidism  Hematologic: denies recent abnormal bleeding and bruising easily  Psychiatric: See HPI     Mental Status Examination:   Appearance: 79 y.o.  female, dressed in casual attire, nicely groomed.  Behavior: cooperative, good eye contact, no " "psychomotor agitation or retardation noted  Participation: active verbal participation, engaged  Speech: spontaneous, regular rate, rhythm, and volume noted.  Language appropriate.  Mood: \"Good.\"  Affect:  Euthymic and mood congruent  Orientation: alert and oriented to person, place, situation, and time.  Attention/concentration: Fair.  Associations/Abstract reasoning:Adequate.   Thought Process: linear, logical, and goal-directed  Thought Content: denies passive/active suicidal or homicidal ideations, intent, or plan, denies homicidal ideations  Perception: denies auditory or visual hallucinations, no delusions noted  Fund of knowledge and vocabulary: Adequate.  Memory: No gross evidence of memory deficits  Insight: Fair.  Judgment: Fair.     Medical Records/Medications/Labs/Diagnostic Tests:   records reviewed, recent relevant provider encounters reviewed, recent relevant labs in record reviewed, all medications patient is taking reviewed.      Results for SHAMEKA TEJADA (MRN [id number]) as of 9/25/2019 08:42    Ref. Range 9/12/2019 11:15   TSH Latest Ref Range: 0.380 - 5.330 uIU/mL 0.080 (L)   Free T-4 Latest Ref Range: 0.53 - 1.43 ng/dL 0.95      Results for SHAMEKA TEJADA (MRN [id number]) as of 11/18/2019 13:26   Ref. Range 11/14/2019 09:51   Glycohemoglobin Latest Ref Range: 0.0 - 5.6 % 5.3   Estim. Avg Glu Latest Units: mg/dL 105   Fasting Status Unknown Fasting   Cholesterol,Tot Latest Ref Range: 100 - 199 mg/dL 179   Triglycerides Latest Ref Range: 0 - 149 mg/dL 138   HDL Latest Ref Range: >=40 mg/dL 49   LDL Latest Ref Range: <100 mg/dL 102 (H)   Valproic Acid Latest Ref Range: 50.0 - 100.0 ug/mL 84.9      Results for SHAMEKA TEJADA (MRN [id number]) as of 11/18/2019 13:26   Ref. Range 9/12/2019 11:15   Sodium Latest Ref Range: 135 - 145 mmol/L 136   Potassium Latest Ref Range: 3.6 - 5.5 mmol/L 5.0   Chloride Latest Ref Range: 96 - 112 mmol/L 106     Strengths/Assets:  Patient strengths " identified included insight into problems, evidence of good judgment, self-awareness, family support, stable relationships, optimism, sense of humor, social skills, evidence of effective treatment, hopeful for future                            Impression:  Bipolar 1 disorder     Plan:  1. Medication options, alternatives (including no medications) and medication risks/benefits/side effects were discussed in detail.   2. Continue venlafaxine ER 37.5 mg p.o. Daily for stable depression.   3. Continue divalproex  mg p.o. every morning and 500 mg p.o. nightly for stable mood stability.  4. Continue Seroquel  mg p.o. nightly at 6 PM for stable mood stability.  5. Continue melatonin dosing via pharmacist recommendations for sleep disturbance.  6. The patient is to continue therapy with their therapist at this clinic.  7. Labs went over with patient.   8. The patient was advised to call, message provider on [employer], or come in to the clinic if symptoms worsen or if any future questions/issues regarding their medications arise; the patient verbalized understanding and agreement.    9. The patient was educated to call 911, call the suicide hotline, or go to local ER if having thoughts of suicide or homicide; verbalized understanding.    Return to clinic in 4-6 weeks or sooner if symptoms worsen    The proposed treatment plan was discussed with the patient who was provided the opportunity to ask questions and make suggestions regarding alternative treatment. Patient verbalized understanding and expressed agreement with the plan.     17 minutes were spent in psychotherapy including negative automatic thoughts and how to process them better processing recent life events emotional and behavioral regulations    BONY Bazzi.    This note was created using voice recognition software (Dragon). The accuracy of the dictation is limited by the abilities of the software. I have reviewed the note prior to signing,  however some errors in grammar and context are still possible. If you have any questions related to this note please do not hesitate to contact our office.

## 2019-11-21 ENCOUNTER — NON-PROVIDER VISIT (OUTPATIENT)
Dept: INTERNAL MEDICINE | Facility: IMAGING CENTER | Age: 79
End: 2019-11-21
Payer: MEDICARE

## 2019-11-21 DIAGNOSIS — Z23 NEED FOR INFLUENZA VACCINATION: ICD-10-CM

## 2019-11-21 PROCEDURE — G0008 ADMIN INFLUENZA VIRUS VAC: HCPCS | Performed by: FAMILY MEDICINE

## 2019-11-21 PROCEDURE — 90662 IIV NO PRSV INCREASED AG IM: CPT | Performed by: FAMILY MEDICINE

## 2019-11-27 ENCOUNTER — OFFICE VISIT (OUTPATIENT)
Dept: BEHAVIORAL HEALTH | Facility: CLINIC | Age: 79
End: 2019-11-27
Payer: MEDICARE

## 2019-11-27 VITALS
RESPIRATION RATE: 16 BRPM | BODY MASS INDEX: 24.11 KG/M2 | SYSTOLIC BLOOD PRESSURE: 134 MMHG | WEIGHT: 150 LBS | HEIGHT: 66 IN | DIASTOLIC BLOOD PRESSURE: 84 MMHG | HEART RATE: 80 BPM

## 2019-11-27 DIAGNOSIS — F31.60 BIPOLAR 1 DISORDER, MIXED (HCC): ICD-10-CM

## 2019-11-27 PROCEDURE — 99214 OFFICE O/P EST MOD 30 MIN: CPT | Performed by: NURSE PRACTITIONER

## 2019-11-27 PROCEDURE — 90833 PSYTX W PT W E/M 30 MIN: CPT | Performed by: NURSE PRACTITIONER

## 2019-11-27 PROCEDURE — 90834 PSYTX W PT 45 MINUTES: CPT | Performed by: SOCIAL WORKER

## 2019-11-27 RX ORDER — VENLAFAXINE HYDROCHLORIDE 37.5 MG/1
37.5 CAPSULE, EXTENDED RELEASE ORAL DAILY
Qty: 90 CAP | Refills: 0 | Status: SHIPPED | OUTPATIENT
Start: 2019-11-27 | End: 2020-10-27 | Stop reason: SDUPTHER

## 2019-11-27 RX ORDER — DIVALPROEX SODIUM 250 MG/1
TABLET, EXTENDED RELEASE ORAL
Qty: 270 TAB | Refills: 0 | Status: SHIPPED | OUTPATIENT
Start: 2019-11-27 | End: 2020-04-28

## 2019-11-27 RX ORDER — QUETIAPINE 200 MG/1
200 TABLET, FILM COATED, EXTENDED RELEASE ORAL
Qty: 90 TAB | Refills: 0 | Status: SHIPPED | OUTPATIENT
Start: 2019-11-27 | End: 2020-01-14

## 2019-11-27 NOTE — BH THERAPY
[facility]  Therapy Progress Note    Patient Name: Azeb Major  Patient MRN: [id number]  Today's Date: 11/27/2019     Type of session:Individual psychotherapy  Length of session: 45 minutes  Persons in attendance:Patient    Subjective/New Info: Pt states she is doing well.  Cooking more.  Got an exercise bike and rides while she watches TV.  Mood is stable.  C/o dental pain, has been to dentist multiple times and still has pain w/dentures.   Was long time pt of Dr Shelby, started w/[facility] and thinks this will work.    Objective/Observations:   Participation: Active verbal participation   Grooming: Casual and Neat   Cognition: Fully Oriented   Eye contact: Good   Mood: Euthymic   Affect: Congruent with content   Thought process: Goal-directed   Speech: Rate within normal limits and Volume within normal limits   Other:     Diagnoses:   1. Bipolar 1 disorder, mixed (HCC)         Current risk:   SUICIDE: Low   Homicide: Low   Self-harm: Low   Relapse: Not applicable   Other:    Safety Plan reviewed? Not Indicated   If evidence of imminent risk is present, intervention/plan:      Therapeutic Intervention(s): Cognitive modification, positive behavior noted, supportive therapy    Treatment Goal(s)/Objective(s) addressed: Managing bipolar d/o, continue self-care     Progress toward Treatment Goals: No change    Plan:  - Continue Individual therapy and Medication management    Georgiana Rush L.C.S.W.  11/27/2019

## 2019-12-16 ENCOUNTER — HOSPITAL ENCOUNTER (OUTPATIENT)
Facility: MEDICAL CENTER | Age: 79
End: 2019-12-16
Attending: FAMILY MEDICINE
Payer: MEDICARE

## 2019-12-16 ENCOUNTER — NON-PROVIDER VISIT (OUTPATIENT)
Dept: INTERNAL MEDICINE | Facility: IMAGING CENTER | Age: 79
End: 2019-12-16
Payer: MEDICARE

## 2019-12-16 DIAGNOSIS — R30.0 DYSURIA: ICD-10-CM

## 2019-12-16 LAB
APPEARANCE UR: NORMAL
BILIRUB UR STRIP-MCNC: NEGATIVE MG/DL
COLOR UR AUTO: YELLOW
GLUCOSE UR STRIP.AUTO-MCNC: NEGATIVE MG/DL
KETONES UR STRIP.AUTO-MCNC: NEGATIVE MG/DL
LEUKOCYTE ESTERASE UR QL STRIP.AUTO: NORMAL
NITRITE UR QL STRIP.AUTO: NEGATIVE
PH UR STRIP.AUTO: 6 [PH] (ref 5–8)
PROT UR QL STRIP: 100 MG/DL
RBC UR QL AUTO: NORMAL
SP GR UR STRIP.AUTO: 1.02
UROBILINOGEN UR STRIP-MCNC: 0.2 MG/DL

## 2019-12-16 PROCEDURE — 87186 SC STD MICRODIL/AGAR DIL: CPT

## 2019-12-16 PROCEDURE — 87086 URINE CULTURE/COLONY COUNT: CPT

## 2019-12-16 PROCEDURE — 87077 CULTURE AEROBIC IDENTIFY: CPT

## 2019-12-16 PROCEDURE — 81002 URINALYSIS NONAUTO W/O SCOPE: CPT | Performed by: FAMILY MEDICINE

## 2019-12-19 RX ORDER — SULFAMETHOXAZOLE AND TRIMETHOPRIM 800; 160 MG/1; MG/1
1 TABLET ORAL EVERY 12 HOURS
Qty: 10 TAB | Refills: 0 | Status: SHIPPED
Start: 2019-12-19 | End: 2020-01-01

## 2019-12-24 ENCOUNTER — TELEPHONE (OUTPATIENT)
Dept: INTERNAL MEDICINE | Facility: IMAGING CENTER | Age: 79
End: 2019-12-24

## 2019-12-24 RX ORDER — SULFAMETHOXAZOLE AND TRIMETHOPRIM 800; 160 MG/1; MG/1
1 TABLET ORAL 2 TIMES DAILY
Qty: 14 TAB | Refills: 0 | Status: SHIPPED | OUTPATIENT
Start: 2019-12-24 | End: 2020-01-01

## 2019-12-31 ENCOUNTER — OFFICE VISIT (OUTPATIENT)
Dept: INTERNAL MEDICINE | Facility: IMAGING CENTER | Age: 79
End: 2019-12-31
Payer: MEDICARE

## 2019-12-31 VITALS
RESPIRATION RATE: 14 BRPM | OXYGEN SATURATION: 98 % | WEIGHT: 152.12 LBS | SYSTOLIC BLOOD PRESSURE: 128 MMHG | HEART RATE: 87 BPM | DIASTOLIC BLOOD PRESSURE: 85 MMHG | TEMPERATURE: 96.9 F | HEIGHT: 66 IN | BODY MASS INDEX: 24.45 KG/M2

## 2019-12-31 DIAGNOSIS — J02.9 SORE THROAT: ICD-10-CM

## 2019-12-31 DIAGNOSIS — J02.9 VIRAL PHARYNGITIS: ICD-10-CM

## 2019-12-31 DIAGNOSIS — L82.1 SK (SEBORRHEIC KERATOSIS): ICD-10-CM

## 2019-12-31 LAB
INT CON NEG: NORMAL
INT CON POS: NORMAL
S PYO AG THROAT QL: NEGATIVE

## 2019-12-31 PROCEDURE — 17110 DESTRUCTION B9 LES UP TO 14: CPT | Performed by: FAMILY MEDICINE

## 2019-12-31 PROCEDURE — 87880 STREP A ASSAY W/OPTIC: CPT | Performed by: FAMILY MEDICINE

## 2019-12-31 PROCEDURE — 99213 OFFICE O/P EST LOW 20 MIN: CPT | Mod: 25 | Performed by: FAMILY MEDICINE

## 2020-01-01 NOTE — PROGRESS NOTES
Chief Complaint   Patient presents with   • Pharyngitis   • Skin Lesion     right eyebrow       HISTORY OF PRESENT ILLNESS: Patient is a 79 y.o. female established patient who presents today complaining of a sore throat for about the past 5 days.  She has had a little runny nose.  No cough or chest congestion.  She is concerned she might have strep.  She is drinking fluids well.  No fevers or chills.  Recently finished Bactrim for UTI.    She also has a lesion on her left eyebrow that we have treated once before with liquid nitrogen.  She states it got a little smaller but it still there.  It is crusted and itchy.        Patient Active Problem List    Diagnosis Date Noted   • CKD (chronic kidney disease) stage 3, GFR 30-59 ml 09/18/2011     Priority: Low   • Hypertension 06/20/2011     Priority: Low   • Hypothyroid 12/01/2010     Priority: Low   • Macular degeneration 12/11/2016   • Bipolar 1 disorder, mixed ([facility]) 01/04/2015   • Hard of hearing 12/12/2014   • Poor balance 08/07/2014   • Hyperparathyroidism, secondary ([facility]) 02/08/2012   • Osteoporosis 12/07/2011   • UI (urinary incontinence) 12/01/2010   • Frequent UTI 12/01/2010   • Seasonal allergies 12/01/2010     Current Outpatient Medications on File Prior to Visit   Medication Sig Dispense Refill   • venlafaxine XR (EFFEXOR XR) 37.5 MG CAPSULE SR 24 HR Take 1 Cap by mouth every day. 90 Cap 0   • divalproex ER (DEPAKOTE ER) 250 MG TABLET SR 24 HR TAKE 3 TABLETS AT BEDTIME 270 Tab 0   • quetiapine (SEROQUEL XR) 200 MG XR tablet Take 1 Tab by mouth every bedtime. 90 Tab 0   • nitrofurantoin (MACRODANTIN) 50 MG Cap TAKE 1 CAPSULE DAILY 90 Cap 4   • DETROL LA 4 MG CAPSULE SR 24 HR TAKE 1 CAPSULE DAILY 90 Cap 1   • levothyroxine (SYNTHROID) 125 MCG Tab TAKE 1 TABLET EVERY MORNING ON AN EMPTY STOMACH 90 Tab 3   • omeprazole (PRILOSEC) 20 MG delayed-release capsule TAKE 1 CAPSULE DAILY 90 Cap 3   • alendronate (FOSAMAX) 70 MG Tab Take 1 Tab by mouth every 7 days. 12  "Tab 3   • estradiol (ESTRACE) 0.1 MG/GM vaginal cream Apply pea sized amount to genital area three time each week. 2 Tube 1   • cyanocobalamin (VITAMIN B-12) 100 MCG Tab Take 100 mcg by mouth every day.       No current facility-administered medications on file prior to visit.          Past medical, surgical, family, and social history is reviewed and updated in Epic chart by me today.   Medications and allergies reviewed and updated in Epic chart by me today.     REVIEW OF SYSTEMS:  GENERAL: No fatigue, no weight loss.  HEENT:  Ears--no earache, no change in hearing, no dizziness, no tinnitus.                 Eyes--no blurred vision, no discharge or pain                 Throat--moderate sore throat, no dysphagia, no hoarseness  CV:  No chest pain,dyspnea,palpitations or edema.  RESP:  No sob,cough,wheezing or hemoptysis.  GI: No dysphagia, heartburn,abdominal pain, nausea, vomiting, diarrhea or constipation.       No melena, jaundice, bleeding, incontinence or change in bowel habits.  :  No dysuria, polyuria, hematuria, incontinence, or nocturia.  MS:  No joint swelling, myalgias, or arthralgias.  NEURO:  No seizures, syncope, paralysis, tremor, or weakness.  SKIN: Lesion as above.  PSYCH: Mood fine.    Vitals:    12/31/19 1000   BP: 128/85   Pulse: 87   Resp: 14   Temp: 36.1 °C (96.9 °F)   TempSrc: Temporal   SpO2: 98%   Weight: 69 kg (152 lb 1.9 oz)   Height: 1.676 m (5' 5.98\")     Physical Exam:  GENERAL;  WD/WN, No acute distress.  HEENT:  PERRLA, EOMI, no conjuctival injection, no icterus.                 TM's normal bilat.                 Nasal mucosa erythematous and edematous.                 Pharynx injected. No exudate.  NECK: Supple with no adenopathy or thyromegaly.  LUNGS: Clear with no rales, rhonchi, or wheezes.  COR: RR with no murmur, gallop or rub.  Skin: She has a 6 mm crusted lesion on her left eyebrow.  EXT: No edema.      Assessment/Plan:  1. Sore throat.  Rapid strep test is negative.  " Encouraged warm saline gargles.  Tylenol if needed for pain.  Fluids and call for worsening of her symptoms. POCT Rapid Strep A   2. Viral pharyngitis     3. SK (seborrheic keratosis).  The crusted component was easily lifted with a scalpel.  Base was treated today with cryotherapy.  I like to see it again in 1 month.  She is instructed in wound care and will call for any concerns.

## 2020-01-08 NOTE — PROGRESS NOTES
"PSYCHIATRY FOLLOW-UP NOTE    Chief Complaint:    \" I want to get off some of my meds.\"    History Of Present Illness:  Azeb Major is a 79 y.o. female with bipolar 1 disorder comes in today for follow up.  She presents today with her daughter at her side.    The patient notes that she has been feeling \"pretty good\" lately.  She denies all symptoms of depression.  Her sleep has been intact, as has her appetite.  She has been exercising often on her exercise bike.  She denies suicidal ideations, passive or active at this time.  She denies any overt symptoms of anxiety lately, denies panic attacks, denies worrying.  She continues to attend regular therapy in this clinic with Georgiana Rush.  Historically, when her depression has lifted, she has often stopped taking the Effexor in the past.  She insists on being able to do this again today as she feels as though she no longer needs antidepressant at this time.      Today, she denies symptoms of hypomania, psychosis, homicidal ideations, or ideas of reference that she has experienced in the past.  She notes occasionally she does become irritable with her daughters, but this not often.  Her daughters, however, note that she may be experiencing some slight hypomania, as she has been hyper-focused more on presenting well in terms of her appearance and has been fixated on losing weight.  Historically, these are signs that she is going into a hypomanic state.  The patient adamantly denies this.  She again is insistent that she needs to decrease her Seroquel dosing today due to the fact that she has put on too much weight recently and wishes to lose some.    Explored her past psychotic breaks with her and the dangers of decreasing a medication that she has been stable on for some time.  Explored the benefits versus disadvantages of decreasing this medication.  Advised against decreasing her Seroquel at this time.  However, the patient notes that it is her choice and " "she requests to do this today.  Educated on the need for close follow-up with this provider if this is what she wants to do, verbalized understanding and agreement.     Current Psychotropic Medications:  Divalproex  mg p.o. every morning  Divalproex  mg p.o. Nightly  Seroquel  mg p.o. Nightly  Venlafaxine ER 37.5 mg p.o. Daily  Melatonin 3 mg po QHS at night.     Past Psychotropic Medication Trials:  Thorazine - did not like  Lithium - worked, affected kidneys  Many others in the past     Past Psychiatric History:  Struggling with mental health issues since teen years  Historically, has not done well off of medications  No SAs     Social History (changes since last visit):   The patient lives alone in the house but has 5 children who visit her daily.  Each one of them takes care of her on a different night of the week.  Her son lives across the street.       Substance Use:  Alcohol: denies  Nicotine: denies  Illicit drugs: denies  Caffeine: 2 cups of coffee/day     Depression Screening (PHQ-9 Score):   Depression Screen (PHQ-2/PHQ-9) 5/7/2019 7/8/2019 8/6/2019   PHQ-2 Total Score - - -   PHQ-2 Total Score - - -   PHQ-2 Total Score 2 2 2   PHQ-9 Total Score - - -   PHQ-9 Total Score 7 7 7      Interpretation of PHQ-9 Total Score   Score Severity   1-4 No Depression   5-9 Mild Depression   10-14 Moderate Depression   15-19 Moderately Severe Depression   20-27 Severe Depression     Physical Examination:  1/14/20 9:07 AM       BP  124/84     Pulse  82     Resp  16     Weight   68.5 kg (151 lb)     Height  1.676 m (5' 6\")      Musculoskeletal: Slowed gait.  History of a poor balance and osteoporosis.  Walks with cane     Review of Symptoms:  Constitutional: denies recent chills, fevers  Neuro: denies recent weakness, numbness, or headaches  HEENT: History of macular degeneration, [place], and seasonal allergies  Cardiovascular: History of hypertension  Respiratory:  denies recent shortness of breath, " "dyspnea, or cough  GI: denies recent nausea, vomiting, or diarrhea.   : History of chronic kidney disease and urinary incontinence  Skin: recent removal of precancerous skin lesion  Endocrine: History of hyperparathyroidism  Hematologic: denies recent abnormal bleeding and bruising easily  Psychiatric: See HPI     Mental Status Examination:   Appearance: 79 y.o.  female, dressed in casual attire, nicely groomed.  Behavior: cooperative, good eye contact, no psychomotor agitation or retardation noted  Participation: active verbal participation, engaged  Speech: spontaneous, regular rate, rhythm, and volume noted.  Language appropriate.  Mood: \"pretty good.\"  Affect:  Euthymic and mood congruent  Orientation: alert and oriented to person, place, situation, and time.  Attention/concentration: Fair.  Associations/Abstract reasoning:Adequate.   Thought Process: linear, logical, and goal-directed  Thought Content: denies passive/active suicidal or homicidal ideations, intent, or plan, denies homicidal ideations  Perception: denies auditory or visual hallucinations, no delusions noted  Fund of knowledge and vocabulary: Adequate.  Memory: No gross evidence of memory deficits  Insight: Fair.  Judgment: Fair.     Medical Records/Medications/Labs/Diagnostic Tests:   records reviewed, recent relevant provider encounters reviewed, recent relevant labs in record reviewed, all medications patient is taking reviewed.      Results for SHAMEKA TEJADA (MRN [id number]) as of 9/25/2019 08:42    Ref. Range 9/12/2019 11:15   TSH Latest Ref Range: 0.380 - 5.330 uIU/mL 0.080 (L)   Free T-4 Latest Ref Range: 0.53 - 1.43 ng/dL 0.95      Results for SHAMEKA TEJADA (MRN [id number]) as of 11/18/2019 13:26    Ref. Range 11/14/2019 09:51   Glycohemoglobin Latest Ref Range: 0.0 - 5.6 % 5.3   Estim. Avg Glu Latest Units: mg/dL 105   Fasting Status Unknown Fasting   Cholesterol,Tot Latest Ref Range: 100 - 199 mg/dL 179 "   Triglycerides Latest Ref Range: 0 - 149 mg/dL 138   HDL Latest Ref Range: >=40 mg/dL 49   LDL Latest Ref Range: <100 mg/dL 102 (H)   Valproic Acid Latest Ref Range: 50.0 - 100.0 ug/mL 84.9      Results for SHAMEKA TEJADA (MRN [id number]) as of 11/18/2019 13:26    Ref. Range 9/12/2019 11:15   Sodium Latest Ref Range: 135 - 145 mmol/L 136   Potassium Latest Ref Range: 3.6 - 5.5 mmol/L 5.0   Chloride Latest Ref Range: 96 - 112 mmol/L 106      Strengths/Assets:  Patient strengths identified included insight into problems, evidence of good judgment, self-awareness, family support, stable relationships, optimism, sense of humor, social skills, evidence of effective treatment, hopeful for future     Impression:  Bipolar 1 disorder     Plan:  1. Medication options, alternatives (including no medications) and medication risks/benefits/side effects were discussed in detail.   2. Discontinue venlafaxine per patient request.  3. Continue divalproex  mg p.o. every morning and 500 mg p.o. nightly for stable mood stability.  4. Decrease Seroquel  mg p.o. nightly at 6 PM for stable mood stability, despite provider's recommendation against it.  5. Ordered an additional Seroquel XR 50 mg p.o. daily PRN mood instability, if needed, to bring her back up to her current dosing.  6. Continue melatonin dosing via pharmacist recommendations for sleep disturbance.  7. The patient is to continue therapy with their therapist at this clinic.  8. The patient was advised to call, message provider on [employer], or come in to the clinic if symptoms worsen or if any future questions/issues regarding their medications arise; the patient verbalized understanding and agreement.    9. The patient was educated to call 911, call the suicide hotline, or go to local ER if having thoughts of suicide or homicide; verbalized understanding.    Return to clinic in 3-4 weeks or sooner if symptoms worsen    The proposed treatment plan was  discussed with the patient who was provided the opportunity to ask questions and make suggestions regarding alternative treatment. Patient verbalized understanding and expressed agreement with the plan.     BONY Bazzi.    This note was created using voice recognition software (Dragon). The accuracy of the dictation is limited by the abilities of the software. I have reviewed the note prior to signing, however some errors in grammar and context are still possible. If you have any questions related to this note please do not hesitate to contact our office.

## 2020-01-10 DIAGNOSIS — N39.46 MIXED INCONTINENCE: ICD-10-CM

## 2020-01-13 RX ORDER — TOLTERODINE TARTRATE 4 MG
CAPSULE, EXT RELEASE 24 HR ORAL
Qty: 90 CAP | Refills: 4 | Status: SHIPPED | OUTPATIENT
Start: 2020-01-13 | End: 2021-04-07 | Stop reason: SDUPTHER

## 2020-01-14 ENCOUNTER — OFFICE VISIT (OUTPATIENT)
Dept: BEHAVIORAL HEALTH | Facility: CLINIC | Age: 80
End: 2020-01-14
Payer: MEDICARE

## 2020-01-14 VITALS
HEART RATE: 82 BPM | WEIGHT: 151 LBS | HEIGHT: 66 IN | RESPIRATION RATE: 16 BRPM | DIASTOLIC BLOOD PRESSURE: 84 MMHG | SYSTOLIC BLOOD PRESSURE: 124 MMHG | BODY MASS INDEX: 24.27 KG/M2

## 2020-01-14 DIAGNOSIS — F31.0 BIPOLAR DISORDER, CURRENT EPISODE HYPOMANIC (HCC): ICD-10-CM

## 2020-01-14 DIAGNOSIS — F31.60 BIPOLAR 1 DISORDER, MIXED (HCC): ICD-10-CM

## 2020-01-14 PROCEDURE — 99214 OFFICE O/P EST MOD 30 MIN: CPT | Performed by: NURSE PRACTITIONER

## 2020-01-14 PROCEDURE — 90834 PSYTX W PT 45 MINUTES: CPT | Performed by: SOCIAL WORKER

## 2020-01-14 RX ORDER — QUETIAPINE FUMARATE 50 MG/1
50 TABLET, EXTENDED RELEASE ORAL
Qty: 30 TAB | Refills: 1 | Status: SHIPPED | OUTPATIENT
Start: 2020-01-14 | End: 2020-11-02

## 2020-01-14 RX ORDER — QUETIAPINE 150 MG/1
150 TABLET, FILM COATED, EXTENDED RELEASE ORAL EVERY EVENING
Qty: 30 TAB | Refills: 1 | Status: SHIPPED | OUTPATIENT
Start: 2020-01-14 | End: 2020-02-18 | Stop reason: SDUPTHER

## 2020-01-14 NOTE — BH THERAPY
" [facility]  Therapy Progress Note    Patient Name: Azeb Major  Patient MRN: [id number]  Today's Date: 1/14/2020     Type of session:Individual psychotherapy  Length of session: 45 minutes  Persons in attendance:Patient    Subjective/New Info: Pt states she just met w/Helder ROE for med check, and wants to reduce her Seroquel by 50 mg.  Her daughters came into session, both reportedly disagreeing w/this, fearing that pt will have a manic episode.  Pt states \"I think they want me to fail.\"  Discussed w/pt that having/preventing a manic episode is not within her control.  She disagrees, \"I think I have some control.\"  Pt states Helder willing to try reduced dose x 1 month, and gave daughters permission to contact him if they have concerns.  Suggested that pt be mindful of her sleep, and contact Helder if she doesn't sleep for a night.  Otherwise pt states she is doing well.  Riding her exercise bike, cooking more, watching tennis on TV.   Got dentures repaired so they are more comfortable.     Objective/Observations:   Participation: Active verbal participation   Grooming: Casual and Neat   Cognition: Fully Oriented   Eye contact: Good   Mood: Euthymic   Affect: Congruent with content   Thought process: Logical   Speech: Rate within normal limits and Volume within normal limits   Other:     Diagnoses:   1. Bipolar 1 disorder, mixed (HCC)         Current risk:   SUICIDE: Low   Homicide: Low   Self-harm: Low   Relapse: Not applicable   Other:    Safety Plan reviewed? Not Indicated   If evidence of imminent risk is present, intervention/plan:     Therapeutic Intervention(s): Cognitive modification, Problem-solving, Self-care skills, Stressors assessed and Supportive psychotherapy    Treatment Goal(s)/Objective(s) addressed: 1. Take meds as prescribed by Helder ROE; 2.Contact Helder if sleep becomes disturbed; 3.Continue daily self-care, eg., exercise, cooking.     Progress toward Treatment " Goals: No change    Plan:  - Continue Individual therapy and Medication management    Georgiana Rush L.C.S.W.  1/14/2020

## 2020-02-11 NOTE — PROGRESS NOTES
"PSYCHIATRY FOLLOW-UP NOTE    Chief Complaint:    \"I'm really good.\"    History Of Present Illness:  Azeb Major is a 79 y.o. female with bipolar 1 disorder comes in today for follow up.  She presents today with her daughter at her side.     At her last meeting with this provider, the patient discontinued Effexor, as she has done historically in the past when her depression has lifted.  She is tolerating this change well without any adverse reactions.  She denies feeling depressed today.  She denies anhedonia.  She is sleeping well.  She notes when her mood gets low praying or reading helps lift her mood.  Her appetite is intact, although she continues to try to lose weight.  She denies any overt symptoms of anxiety, denies panic attacks.  She denies suicidal ideations, passive or active at this time.  She continues to see Georgiana Rush in this clinic, as she has historically for therapy.    At her last provider, the patient Seroquel was decreased from 200 to 150 mg at night.  She has not needed the extra dose of 50 mg as she has not felt she has needed it.  She is tolerating this change well without any adverse effects as well.  She notes that she feels \"more normal\" on this amount.  She notes historically, when she woke up at night on 200 mg she felt very confused.  She no longer feels that way and feels this is a better amount for her.  She complains of mild irritability, mostly with her children from time to time, \"which is normal for me.\"  She denies any overt symptoms of hypomania, psychosis, ideas of reference, or homicidal ideations.  She wishes to stay on her current medication regimen add its current dosing today.     Current Psychotropic Medications:  Divalproex  mg p.o. every morning  Divalproex  mg p.o. Nightly  Seroquel  mg p.o. Nightly, 50 mg XR as needed if hypomanic  Melatonin 3 mg po QHS at night     Past Psychotropic Medication Trials:  Thorazine - did not like  Lithium " "- worked, affected kidneys  Effexor - on and off for years  Many others in the past     Past Psychiatric History:  Struggling with mental health issues since teen years  Historically, has not done well off of medications  No SAs     Social History (changes since last visit):   The patient lives alone in the house but has 5 children who visit her daily.  Each one of them takes care of her on a different night of the week.  Her son lives across the street.      Assessed, no changes     Substance Use:  Alcohol: denies  Nicotine: denies  Illicit drugs: denies  Caffeine: 2 cups of coffee/day     Depression Screening (PHQ-9 Score):   Depression Screen (PHQ-2/PHQ-9) 5/7/2019 7/8/2019 8/6/2019   PHQ-2 Total Score - - -   PHQ-2 Total Score - - -   PHQ-2 Total Score 2 2 2   PHQ-9 Total Score - - -   PHQ-9 Total Score 7 7 7      Interpretation of PHQ-9 Total Score   Score Severity   1-4 No Depression   5-9 Mild Depression   10-14 Moderate Depression   15-19 Moderately Severe Depression   20-27 Severe Depression     Physical Examination:   2/18/20 9:06 AM      BP  130/74     Pulse  88     Resp  16     Weight   68.9 kg (152 lb)     Height  1.676 m (5' 6\")       Musculoskeletal: Slowed gait.  History of a poor balance and osteoporosis.  Walks with cane     Review of Symptoms:  Constitutional: denies recent chills, fevers  Neuro: denies recent weakness, numbness, or headaches  HEENT: History of macular degeneration, [place], and seasonal allergies  Cardiovascular: History of hypertension  Respiratory:  denies recent shortness of breath, dyspnea, or cough  GI: denies recent nausea, vomiting, or diarrhea.   : History of chronic kidney disease and urinary incontinence  Skin: recent removal of precancerous skin lesion  Endocrine: History of hyperparathyroidism  Hematologic: denies recent abnormal bleeding and bruising easily  Psychiatric: See HPI     Mental Status Examination:   Appearance: 79 y.o.  female, dressed in casual " "attire, nicely groomed.  Behavior: cooperative, good eye contact, no psychomotor agitation or retardation noted  Participation: active verbal participation, engaged  Speech: spontaneous, regular rate, rhythm, and volume noted.  Language appropriate.  Mood: \"good.\"  Affect:  Euthymic and mood congruent  Orientation: alert and oriented to person, place, situation, and time.  Attention/concentration: Fair.  Associations/Abstract reasoning:Adequate.   Thought Process: linear, logical, and goal-directed  Thought Content: denies passive/active suicidal or homicidal ideations, intent, or plan, denies homicidal ideations  Perception: denies auditory or visual hallucinations, no delusions noted  Fund of knowledge and vocabulary: Adequate.  Memory: No gross evidence of memory deficits  Insight: Fair.  Judgment: Fair.     Medical Records/Medications/Labs/Diagnostic Tests:   records reviewed, recent relevant provider encounters reviewed, recent relevant labs in record reviewed, all medications patient is taking reviewed.       Ref. Range 9/12/2019 11:15   TSH Latest Ref Range: 0.380 - 5.330 uIU/mL 0.080 (L)   Free T-4 Latest Ref Range: 0.53 - 1.43 ng/dL 0.95        Ref. Range 11/14/2019 09:51   Glycohemoglobin Latest Ref Range: 0.0 - 5.6 % 5.3   Estim. Avg Glu Latest Units: mg/dL 105   Fasting Status Unknown Fasting   Cholesterol,Tot Latest Ref Range: 100 - 199 mg/dL 179   Triglycerides Latest Ref Range: 0 - 149 mg/dL 138   HDL Latest Ref Range: >=40 mg/dL 49   LDL Latest Ref Range: <100 mg/dL 102 (H)   Valproic Acid Latest Ref Range: 50.0 - 100.0 ug/mL 84.9        Ref. Range 9/12/2019 11:15   Sodium Latest Ref Range: 135 - 145 mmol/L 136   Potassium Latest Ref Range: 3.6 - 5.5 mmol/L 5.0   Chloride Latest Ref Range: 96 - 112 mmol/L 106      Strengths/Assets:  Patient strengths identified included insight into problems, evidence of good judgment, self-awareness, family support, stable relationships, optimism, sense of humor, " social skills, evidence of effective treatment, hopeful for future     Impression:  Bipolar 1 disorder     Plan:  1. Medication options, alternatives (including no medications) and medication risks/benefits/side effects were discussed in detail.   2. Continue divalproex  mg p.o. every morning and 500 mg p.o. nightly for stable mood stability.  3. ContinueSeroquel  mg p.o. nightly at 6 PM for stable mood stability.  4. May take an additional Seroquel XR 50 mg p.o. daily PRN mood instability if feeling manic.  5. Continue melatonin dosing via pharmacist recommendations for sleep disturbance.  6. The patient is to continue therapy with their therapist at this clinic.  7. The patient was advised to call, message provider on [employer], or come in to the clinic if symptoms worsen or if any future questions/issues regarding their medications arise; the patient verbalized understanding and agreement.    8. The patient was educated to call 911, call the suicide hotline, or go to local ER if having thoughts of suicide or homicide; verbalized understanding.     Return to clinic in 4 weeks or sooner if symptoms worsen    The proposed treatment plan was discussed with the patient who was provided the opportunity to ask questions and make suggestions regarding alternative treatment. Patient verbalized understanding and expressed agreement with the plan.     Helder Portillo, A.P.R.N.    This note was created using voice recognition software (Dragon). The accuracy of the dictation is limited by the abilities of the software. I have reviewed the note prior to signing, however some errors in grammar and context are still possible. If you have any questions related to this note please do not hesitate to contact our office.

## 2020-02-13 ENCOUNTER — OFFICE VISIT (OUTPATIENT)
Dept: INTERNAL MEDICINE | Facility: IMAGING CENTER | Age: 80
End: 2020-02-13
Payer: MEDICARE

## 2020-02-13 VITALS
HEIGHT: 66 IN | SYSTOLIC BLOOD PRESSURE: 120 MMHG | TEMPERATURE: 98.4 F | HEART RATE: 93 BPM | RESPIRATION RATE: 18 BRPM | BODY MASS INDEX: 24.45 KG/M2 | DIASTOLIC BLOOD PRESSURE: 80 MMHG | WEIGHT: 152.12 LBS | OXYGEN SATURATION: 95 %

## 2020-02-13 DIAGNOSIS — L30.9 ECZEMA, UNSPECIFIED TYPE: ICD-10-CM

## 2020-02-13 DIAGNOSIS — L82.1 SK (SEBORRHEIC KERATOSIS): ICD-10-CM

## 2020-02-13 PROCEDURE — 99212 OFFICE O/P EST SF 10 MIN: CPT | Performed by: FAMILY MEDICINE

## 2020-02-13 NOTE — PROGRESS NOTES
Chief Complaint   Patient presents with   • Skin Lesion     left eyebrow       HISTORY OF PRESENT ILLNESS: Patient is a 79 y.o. female established patient who presents today to followup on a lesion above her left eyebrow.  She was seen on December 31 and it was treated with liquid nitrogen.  It is much better.  She just has a small area of dryness still.  Little bit of itching.    She also has a few dry itchy red areas on her arms.      Patient Active Problem List    Diagnosis Date Noted   • CKD (chronic kidney disease) stage 3, GFR 30-59 ml 09/18/2011     Priority: Low   • Hypertension 06/20/2011     Priority: Low   • Hypothyroid 12/01/2010     Priority: Low   • Macular degeneration 12/11/2016   • Bipolar disorder, current episode hypomanic ([facility]) 01/04/2015   • Hard of hearing 12/12/2014   • Poor balance 08/07/2014   • Hyperparathyroidism, secondary ([facility]) 02/08/2012   • Osteoporosis 12/07/2011   • UI (urinary incontinence) 12/01/2010   • Frequent UTI 12/01/2010   • Seasonal allergies 12/01/2010     Current Outpatient Medications on File Prior to Visit   Medication Sig Dispense Refill   • QUEtiapine Fumarate 150 MG TABLET SR 24 HR Take 150 mg by mouth every evening. 30 Tab 1   • QUEtiapine Fumarate 50 MG TABLET SR 24 HR Take 50 mg by mouth 1 time daily as needed (mood instability). 30 Tab 1   • DETROL LA 4 MG CAPSULE SR 24 HR TAKE 1 CAPSULE DAILY 90 Cap 4   • venlafaxine XR (EFFEXOR XR) 37.5 MG CAPSULE SR 24 HR Take 1 Cap by mouth every day. 90 Cap 0   • divalproex ER (DEPAKOTE ER) 250 MG TABLET SR 24 HR TAKE 3 TABLETS AT BEDTIME 270 Tab 0   • nitrofurantoin (MACRODANTIN) 50 MG Cap TAKE 1 CAPSULE DAILY 90 Cap 4   • levothyroxine (SYNTHROID) 125 MCG Tab TAKE 1 TABLET EVERY MORNING ON AN EMPTY STOMACH 90 Tab 3   • omeprazole (PRILOSEC) 20 MG delayed-release capsule TAKE 1 CAPSULE DAILY 90 Cap 3   • alendronate (FOSAMAX) 70 MG Tab Take 1 Tab by mouth every 7 days. 12 Tab 3   • estradiol (ESTRACE) 0.1 MG/GM vaginal  "cream Apply pea sized amount to genital area three time each week. 2 Tube 1   • cyanocobalamin (VITAMIN B-12) 100 MCG Tab Take 100 mcg by mouth every day.       No current facility-administered medications on file prior to visit.        Past medical, surgical, family, and social history is reviewed and updated in Epic chart by me today.   Medications and allergies reviewed and updated in Epic chart by me today.     REVIEW OF SYSTEMS:  GENERAL: No fatigue, no weight loss.  CV:  No chest pain,dyspnea,palpitations or edema.  RESP:  No sob,cough,wheezing or hemoptysis.  GI: No dysphagia, heartburn,abdominal pain, nausea, vomiting, diarrhea or constipation.       No melena, jaundice, bleeding, incontinence or change in bowel habits.  :  No dysuria, polyuria, hematuria, incontinence, or nocturia.  MS:  No joint swelling, myalgias, or arthralgias.  NEURO:  No seizures, syncope, paralysis, tremor, or weakness.  SKIN: As above  PSYCH: Mood fine.    Vitals:    02/13/20 1000   BP: 120/80   Pulse: 93   Resp: 18   Temp: 36.9 °C (98.4 °F)   TempSrc: Temporal   SpO2: 95%   Weight: 69 kg (152 lb 1.9 oz)   Height: 1.676 m (5' 5.98\")       Physical Exam:  Gen: Well developed, well nourished. No acute distress.  Neck:  Supple, no adenopathy or thyromegaly.  Heart:  Regular rate and rhythm.  Normal S1, S2. No murmur, gallop or rub.  Lungs:  Clear, No wheezes,rales or rhonchi.  Extremities:  No edema.  Skin: She has a 4 mm area of erythematous scaly skin lateral left eyebrow.  Significantly improved from previous visit.  She has some erythematous dry patches on her arms consistent with eczema.  Psych: Mood and affect are appropriate.        Assessment/Plan:  1.  Seborrheic keratosis.  The area is treated again with cryotherapy.  She is instructed in wound care and will recheck at next visit.  2.  Eczema.  She is given samples of Eucrisa to apply twice daily.  Follow-up in 3 months and as needed  "

## 2020-02-18 ENCOUNTER — OFFICE VISIT (OUTPATIENT)
Dept: BEHAVIORAL HEALTH | Facility: CLINIC | Age: 80
End: 2020-02-18
Payer: MEDICARE

## 2020-02-18 VITALS
RESPIRATION RATE: 16 BRPM | BODY MASS INDEX: 24.43 KG/M2 | DIASTOLIC BLOOD PRESSURE: 74 MMHG | HEART RATE: 88 BPM | WEIGHT: 152 LBS | HEIGHT: 66 IN | SYSTOLIC BLOOD PRESSURE: 130 MMHG

## 2020-02-18 DIAGNOSIS — F31.0 BIPOLAR DISORDER, CURRENT EPISODE HYPOMANIC (HCC): ICD-10-CM

## 2020-02-18 DIAGNOSIS — F31.9 BIPOLAR 1 DISORDER (HCC): ICD-10-CM

## 2020-02-18 PROCEDURE — 99213 OFFICE O/P EST LOW 20 MIN: CPT | Performed by: NURSE PRACTITIONER

## 2020-02-18 PROCEDURE — 90834 PSYTX W PT 45 MINUTES: CPT | Performed by: SOCIAL WORKER

## 2020-02-18 RX ORDER — QUETIAPINE 150 MG/1
150 TABLET, FILM COATED, EXTENDED RELEASE ORAL EVERY EVENING
Qty: 90 TAB | Refills: 0 | Status: SHIPPED | OUTPATIENT
Start: 2020-02-18 | End: 2020-04-28

## 2020-02-18 NOTE — BH THERAPY
" [facility]  Therapy Progress Note    Patient Name: Azeb Major  Patient MRN: [id number]  Today's Date: 2/18/2020     Type of session:Individual psychotherapy  Length of session: 45 minutes  Persons in attendance:Patient    Subjective/New Info: Pt states she is doing well w/med change reducing Seroquel from 200 to 150 mg w/Helder's supervision.  Had last visit w/Helder today, will transition to new psychiatrist next visit.  Overall doing well.  Reports mood is stable.  \"I have cabin fever\", wants to take a trip; plans to ask her senior aide, who is also a friend, if she is interested.  Pt states she does not need regular therapy visits.  Will schedule one more appt to meet after her med check, then plans to change to prn visits, unless need arises.    Objective/Observations:   Participation: Active verbal participation   Grooming: Casual and Neat   Cognition: Fully Oriented   Eye contact: Good   Mood: Euthymic   Affect: Congruent with content   Thought process: Goal-directed   Speech: Rate within normal limits and Volume within normal limits   Other:     Diagnoses:   1. Bipolar 1 disorder (HCC)         Current risk:   SUICIDE: Low   Homicide: Low   Self-harm: Low   Relapse: Not applicable   Other:    Safety Plan reviewed? Not Indicated   If evidence of imminent risk is present, intervention/plan:     Therapeutic Intervention(s): Cognitive modification, Positive behavior reinforced, Stressors assessed and Supportive psychotherapy    Treatment Goal(s)/Objective(s) addressed: 1.  Maintain current medication regimen; meet w/new psychiatrist next visit; 2. Reduce therapy visits to prn status after next appt.     Progress toward Treatment Goals: Significant improvement    Plan:  - Continue Individual therapy and Medication management    Georgiana Rush L.C.S.W.  2/18/2020                                 "

## 2020-02-19 NOTE — BH THERAPY
[facility]  Therapy Progress Note    Patient Name: Azeb Major  Patient MRN: [id number]  Today's Date: 1/30/2017     Type of session:Individual psychotherapy and Family therapy  Length of session: 50  Persons in attendance:Patient and Children (children for last 1/2)    Subjective/New Info: Patient reports generally doing well: can look back on past incidents where she sees she was psychotic, is keeping journal now.  Would like to cut back on the Seroquel, because of hand tremors and bad dreams, possibly also contributing to her dozing during the day & awakening startled and disoriented this week.  Would like to get active again with [employer], but knows she couldn't take on the responsibility of being a leader.  Would like to have people over, but feels embarrassed that some things in her home are not clean (drapes, floor).  Brought two daughters into session: they do not want patient to decrease her Seroquel, because she is doing so much better; also negotiated with her what things they can afford to do or not do at this point, because they are supporting two households and have rentals.  Patient willing to sell some jewelry; daughter agreed to get it appraised, but reiterated that they still might not be able to afford doing the house renovations patient would like.    Objective/Observations:   Participation: Active verbal participation, Attentive, Engaged and Open to feedback   Grooming: Good   Cognition: Alert and Fully Oriented   Eye contact: Good   Mood: Euthymic and Anxious   Affect: Flexible, Full range and Congruent with content   Thought process: Logical and Goal-directed   Speech: Rate within normal limits and Volume within normal limits   Other:     Diagnoses: No diagnosis found.     Current risk:   SUICIDE: Low   Homicide: Low   Self-harm: Low   Relapse: Not applicable   Other:    Safety Plan reviewed? Not Indicated   If evidence of imminent risk is present, intervention/plan:      Therapeutic Intervention(s): Behavior:  Behavior analysis and Behavioral plan developed/modified, Conflict resolution skills, Interpersonal effectiveness skills, Parenting/familial roles addressed, Self-care skills, Stressors assessed and Supportive psychotherapy    Treatment Goal(s)/Objective(s) addressed: Mood and thought stabilization; problem-solving skills; decrease anxiety, increase communication    Progress toward Treatment Goals: Moderate improvement; much more realistic goals    Plan:  - Continue Individual therapy, Medication management and Family therapy    SELWYN Nicole  1/30/2017                                    no

## 2020-03-03 ENCOUNTER — OFFICE VISIT (OUTPATIENT)
Dept: INTERNAL MEDICINE | Facility: IMAGING CENTER | Age: 80
End: 2020-03-03
Payer: MEDICARE

## 2020-03-03 VITALS
TEMPERATURE: 96.6 F | SYSTOLIC BLOOD PRESSURE: 128 MMHG | BODY MASS INDEX: 24.45 KG/M2 | WEIGHT: 152.12 LBS | RESPIRATION RATE: 14 BRPM | HEIGHT: 66 IN | HEART RATE: 87 BPM | DIASTOLIC BLOOD PRESSURE: 72 MMHG | OXYGEN SATURATION: 97 %

## 2020-03-03 DIAGNOSIS — M79.672 LEFT FOOT PAIN: ICD-10-CM

## 2020-03-03 DIAGNOSIS — L57.0 AK (ACTINIC KERATOSIS): ICD-10-CM

## 2020-03-03 DIAGNOSIS — I73.9 PVD (PERIPHERAL VASCULAR DISEASE) (HCC): ICD-10-CM

## 2020-03-03 DIAGNOSIS — H61.21 IMPACTED CERUMEN OF RIGHT EAR: ICD-10-CM

## 2020-03-03 PROCEDURE — 69210 REMOVE IMPACTED EAR WAX UNI: CPT | Performed by: FAMILY MEDICINE

## 2020-03-03 PROCEDURE — 99213 OFFICE O/P EST LOW 20 MIN: CPT | Mod: 25 | Performed by: FAMILY MEDICINE

## 2020-03-03 ASSESSMENT — FIBROSIS 4 INDEX: FIB4 SCORE: 3.23

## 2020-03-04 NOTE — PROGRESS NOTES
Chief Complaint   Patient presents with   • Skin Lesion     followup lesion left eyebrow   • Foot Pain   • Cerumen Impaction     right         HISTORY OF PRESENT ILLNESS: Patient is a 79 y.o. female established patient who presents today to follow-up on a lesion above her left eyebrow.  This is been treated now twice with liquid nitrogen.  It is significantly smaller.  There is still 1 small rough area.    She is also complaining of wax in her right ear.  She went to [facility] to check out hearing aids and they told her her right ear was impacted with cerumen.  She does admit that she has difficulty hearing on that side and it feels full and plugged.  Her left ear is not bothering her.    She is also complaining of left foot pain.  She complains of pain on the ball of her left foot.  It is worse when walking.  She would like a referral to podiatry.  She has seen Dr. Michelle in the past.  She also has thickened toenails and she has difficult time getting to them to trim them.  She is also noticed some discoloration in her feet and lower legs.  She denies pain in her calves when walking.      Patient Active Problem List    Diagnosis Date Noted   • CKD (chronic kidney disease) stage 3, GFR 30-59 ml 09/18/2011     Priority: Low   • Hypertension 06/20/2011     Priority: Low   • Hypothyroid 12/01/2010     Priority: Low   • Macular degeneration 12/11/2016   • Bipolar disorder, current episode hypomanic ([facility]) 01/04/2015   • Hard of hearing 12/12/2014   • Poor balance 08/07/2014   • Hyperparathyroidism, secondary ([facility]) 02/08/2012   • Osteoporosis 12/07/2011   • UI (urinary incontinence) 12/01/2010   • Frequent UTI 12/01/2010   • Seasonal allergies 12/01/2010     Current Outpatient Medications on File Prior to Visit   Medication Sig Dispense Refill   • QUEtiapine Fumarate 150 MG TABLET SR 24 HR Take 150 mg by mouth every evening. 90 Tab 0   • QUEtiapine Fumarate 50 MG TABLET SR 24 HR Take 50 mg by mouth 1 time daily as needed  (mood instability). 30 Tab 1   • DETROL LA 4 MG CAPSULE SR 24 HR TAKE 1 CAPSULE DAILY 90 Cap 4   • venlafaxine XR (EFFEXOR XR) 37.5 MG CAPSULE SR 24 HR Take 1 Cap by mouth every day. 90 Cap 0   • divalproex ER (DEPAKOTE ER) 250 MG TABLET SR 24 HR TAKE 3 TABLETS AT BEDTIME 270 Tab 0   • nitrofurantoin (MACRODANTIN) 50 MG Cap TAKE 1 CAPSULE DAILY 90 Cap 4   • levothyroxine (SYNTHROID) 125 MCG Tab TAKE 1 TABLET EVERY MORNING ON AN EMPTY STOMACH 90 Tab 3   • omeprazole (PRILOSEC) 20 MG delayed-release capsule TAKE 1 CAPSULE DAILY 90 Cap 3   • alendronate (FOSAMAX) 70 MG Tab Take 1 Tab by mouth every 7 days. 12 Tab 3   • estradiol (ESTRACE) 0.1 MG/GM vaginal cream Apply pea sized amount to genital area three time each week. 2 Tube 1   • cyanocobalamin (VITAMIN B-12) 100 MCG Tab Take 100 mcg by mouth every day.       No current facility-administered medications on file prior to visit.          Past medical, surgical, family, and social history is reviewed and updated in Epic chart by me today.   Medications and allergies reviewed and updated in Epic chart by me today.     REVIEW OF SYSTEMS:  GENERAL: No fatigue, no weight loss.  HEENT:  Ears--she is generally noticed decreased hearing.  Cerumen impaction on the right as above.                 Eyes--no blurred vision, no discharge or pain                 Throat--No sore throat, no dysphagia, no hoarseness  CV:  No chest pain,dyspnea,palpitations or edema.  RESP:  No sob,cough,wheezing or hemoptysis.  GI: No dysphagia, heartburn,abdominal pain, nausea, vomiting, diarrhea or constipation.       No melena, jaundice, bleeding, incontinence or change in bowel habits.  :  No dysuria, polyuria, hematuria, incontinence, or nocturia.  MS: Left foot pain.  NEURO:  No seizures, syncope, paralysis, tremor, or weakness.  SKIN: Skin lesion as above.  PSYCH: Mood fine.    Vitals:    03/03/20 0900   BP: 128/72   Pulse: 87   Resp: 14   Temp: 35.9 °C (96.6 °F)   TempSrc: Temporal   SpO2:  "97%   Weight: 69 kg (152 lb 1.9 oz)   Height: 1.676 m (5' 5.98\")     Physical Exam:  GENERAL;  WD/WN, No acute distress.  HEENT:  PERRLA, EOMI, no conjuctival injection, no icterus.                 Ears: Left external canal is normal, left tympanic membrane is normal.  Right external canal is impacted with cerumen.                 Nasal mucosa normal.                 Pharynx clear. No exudate.  NECK: Supple with no adenopathy or thyromegaly.  LUNGS: Clear with no rales, rhonchi, or wheezes.  COR: RR with no murmur, gallop or rub.  EXT: No edema.  She does have some discoloration around both ankles.  She has decreased pulses in both feet.  Thickened toenails.  Left foot is tender over the ball.  No erythema.  Skin is intact.  Skin: 2 mm erythematous scaling area on her left lateral eyebrow area.  This is significantly improved and much decreased in size.          Assessment/Plan:  1. Left foot pain.  Encouraged her to wear good supportive shoes.  Referral to podiatry. REFERRAL TO PODIATRY   2. PVD (peripheral vascular disease) ([facility]).  I suspect she does have some peripheral vascular disease.  Encouraged exercise. REFERRAL TO PODIATRY   3. Impacted cerumen of right ear.  Her ear was irrigated by the nurse and then I reexamined it with the otoscope.  The remaining wax was removed with a curette.  Tympanic membrane is normal    4. AK (actinic keratosis).  The lesion was again treated with liquid nitrogen.  She is instructed in wound care.       followup 3 months.  "

## 2020-03-13 RX ORDER — OMEPRAZOLE 20 MG/1
CAPSULE, DELAYED RELEASE ORAL
Qty: 90 CAP | Refills: 3 | Status: SHIPPED | OUTPATIENT
Start: 2020-03-13 | End: 2021-02-22 | Stop reason: SDUPTHER

## 2020-03-17 ENCOUNTER — APPOINTMENT (OUTPATIENT)
Dept: BEHAVIORAL HEALTH | Facility: CLINIC | Age: 80
End: 2020-03-17
Payer: MEDICARE

## 2020-04-14 ENCOUNTER — APPOINTMENT (OUTPATIENT)
Dept: BEHAVIORAL HEALTH | Facility: CLINIC | Age: 80
End: 2020-04-14
Payer: MEDICARE

## 2020-04-15 ENCOUNTER — HOSPITAL ENCOUNTER (OUTPATIENT)
Facility: MEDICAL CENTER | Age: 80
End: 2020-04-15
Attending: FAMILY MEDICINE
Payer: MEDICARE

## 2020-04-15 ENCOUNTER — NON-PROVIDER VISIT (OUTPATIENT)
Dept: INTERNAL MEDICINE | Facility: IMAGING CENTER | Age: 80
End: 2020-04-15
Payer: MEDICARE

## 2020-04-15 DIAGNOSIS — N39.0 FREQUENT UTI: ICD-10-CM

## 2020-04-15 DIAGNOSIS — R30.0 DYSURIA: ICD-10-CM

## 2020-04-15 LAB
APPEARANCE UR: CLEAR
BILIRUB UR STRIP-MCNC: NEGATIVE MG/DL
COLOR UR AUTO: YELLOW
GLUCOSE UR STRIP.AUTO-MCNC: NEGATIVE MG/DL
KETONES UR STRIP.AUTO-MCNC: NEGATIVE MG/DL
LEUKOCYTE ESTERASE UR QL STRIP.AUTO: NEGATIVE
NITRITE UR QL STRIP.AUTO: NEGATIVE
PH UR STRIP.AUTO: 6.5 [PH] (ref 5–8)
PROT UR QL STRIP: NEGATIVE MG/DL
RBC UR QL AUTO: NEGATIVE
SP GR UR STRIP.AUTO: 1.01
UROBILINOGEN UR STRIP-MCNC: 0.2 MG/DL

## 2020-04-15 PROCEDURE — 81002 URINALYSIS NONAUTO W/O SCOPE: CPT | Performed by: FAMILY MEDICINE

## 2020-04-15 PROCEDURE — 87086 URINE CULTURE/COLONY COUNT: CPT

## 2020-04-17 LAB
BACTERIA UR CULT: NORMAL
SIGNIFICANT IND 70042: NORMAL
SITE SITE: NORMAL
SOURCE SOURCE: NORMAL

## 2020-04-28 RX ORDER — LEVOTHYROXINE SODIUM 0.12 MG/1
TABLET ORAL
Qty: 90 TAB | Refills: 3 | Status: SHIPPED | OUTPATIENT
Start: 2020-04-28 | End: 2021-05-27 | Stop reason: SDUPTHER

## 2020-04-28 RX ORDER — ALENDRONATE SODIUM 70 MG/1
TABLET ORAL
Qty: 12 TAB | Refills: 3 | Status: SHIPPED | OUTPATIENT
Start: 2020-04-28 | End: 2021-05-27 | Stop reason: SDUPTHER

## 2020-07-08 ENCOUNTER — HOSPITAL ENCOUNTER (OUTPATIENT)
Facility: MEDICAL CENTER | Age: 80
End: 2020-07-08
Attending: FAMILY MEDICINE
Payer: MEDICARE

## 2020-07-08 ENCOUNTER — NON-PROVIDER VISIT (OUTPATIENT)
Dept: INTERNAL MEDICINE | Facility: IMAGING CENTER | Age: 80
End: 2020-07-08
Payer: MEDICARE

## 2020-07-08 DIAGNOSIS — R30.0 DYSURIA: ICD-10-CM

## 2020-07-08 LAB
APPEARANCE UR: NORMAL
BILIRUB UR STRIP-MCNC: NEGATIVE MG/DL
COLOR UR AUTO: YELLOW
GLUCOSE UR STRIP.AUTO-MCNC: NEGATIVE MG/DL
KETONES UR STRIP.AUTO-MCNC: NEGATIVE MG/DL
LEUKOCYTE ESTERASE UR QL STRIP.AUTO: NORMAL
NITRITE UR QL STRIP.AUTO: NEGATIVE
PH UR STRIP.AUTO: 6 [PH] (ref 5–8)
PROT UR QL STRIP: 30 MG/DL
RBC UR QL AUTO: NORMAL
SP GR UR STRIP.AUTO: 1.02
UROBILINOGEN UR STRIP-MCNC: 0.2 MG/DL

## 2020-07-08 PROCEDURE — 87186 SC STD MICRODIL/AGAR DIL: CPT

## 2020-07-08 PROCEDURE — 87086 URINE CULTURE/COLONY COUNT: CPT

## 2020-07-08 PROCEDURE — 81002 URINALYSIS NONAUTO W/O SCOPE: CPT | Performed by: FAMILY MEDICINE

## 2020-07-08 PROCEDURE — 87077 CULTURE AEROBIC IDENTIFY: CPT

## 2020-07-08 RX ORDER — SULFAMETHOXAZOLE AND TRIMETHOPRIM 800; 160 MG/1; MG/1
1 TABLET ORAL EVERY 12 HOURS
Qty: 10 TAB | Refills: 0 | Status: SHIPPED | OUTPATIENT
Start: 2020-07-08 | End: 2020-07-13 | Stop reason: SDUPTHER

## 2020-07-13 RX ORDER — SULFAMETHOXAZOLE AND TRIMETHOPRIM 800; 160 MG/1; MG/1
1 TABLET ORAL EVERY 12 HOURS
Qty: 10 TAB | Refills: 0 | Status: SHIPPED
Start: 2020-07-13 | End: 2020-11-02

## 2020-08-07 DIAGNOSIS — F31.9 BIPOLAR 1 DISORDER (HCC): ICD-10-CM

## 2020-08-10 RX ORDER — QUETIAPINE 150 MG/1
TABLET, FILM COATED, EXTENDED RELEASE ORAL
Qty: 30 TAB | Refills: 2 | Status: SHIPPED | OUTPATIENT
Start: 2020-08-10 | End: 2020-09-23 | Stop reason: SDUPTHER

## 2020-09-23 ENCOUNTER — OFFICE VISIT (OUTPATIENT)
Dept: BEHAVIORAL HEALTH | Facility: CLINIC | Age: 80
End: 2020-09-23
Payer: MEDICARE

## 2020-09-23 VITALS — DIASTOLIC BLOOD PRESSURE: 78 MMHG | SYSTOLIC BLOOD PRESSURE: 138 MMHG

## 2020-09-23 DIAGNOSIS — F31.9 BIPOLAR 1 DISORDER (HCC): ICD-10-CM

## 2020-09-23 DIAGNOSIS — F31.75 BIPOLAR DISORDER, IN PARTIAL REMISSION, MOST RECENT EPISODE DEPRESSED (HCC): ICD-10-CM

## 2020-09-23 PROCEDURE — 99204 OFFICE O/P NEW MOD 45 MIN: CPT | Performed by: PSYCHIATRY & NEUROLOGY

## 2020-09-23 RX ORDER — DIVALPROEX SODIUM 250 MG/1
750 TABLET, EXTENDED RELEASE ORAL
Qty: 270 TAB | Refills: 1 | Status: SHIPPED | OUTPATIENT
Start: 2020-09-23 | End: 2021-03-01

## 2020-09-23 RX ORDER — QUETIAPINE 150 MG/1
1 TABLET, FILM COATED, EXTENDED RELEASE ORAL
Qty: 90 TAB | Refills: 1 | Status: SHIPPED | OUTPATIENT
Start: 2020-09-23 | End: 2021-03-09

## 2020-09-23 NOTE — PROGRESS NOTES
"INITIAL PSYCHIATRIC EVALUATION      This provider informed the patient their medical records are totally confidential except for the use by other providers involved in their care, or if the patient signs a release, or to report instances of child or elder abuse, or if it is determined they are an immediate risk to harm themselves or others.      CHIEF COMPLAINT  \"doing good josh\"    HISTORY OF PRESENT ILLNESS  zAeb Major is a 80 y.o. old female comes in today to establish care and for evaluation of bipolar disorder.  I did reviewed all outpatient psychiatry follow up notes over last 3 years. Patient was following with Helder Portillo in this clinic, with following recommendation done during last session in February 2020:  1. Continue divalproex  mg p.o. every morning and 500 mg p.o. nightly for stable mood stability.  2. ContinueSeroquel  mg p.o. nightly at 6 PM for stable mood stability.  3. May take an additional Seroquel XR 50 mg p.o. daily PRN mood instability if feeling manic.  4. Continue melatonin dosing via pharmacist recommendations for sleep disturbance.  5. The patient is to continue therapy with their therapist at this clinic.    Patient was accompanied by her daughter because patient is hard of hearing and was reading my lips and needing confirmation by her daughter.  Patient with diagnosis of bipolar disorder with evident perry, psychosis and depression episode in the past.  Patient and daughter agreed that she is stable from a mood standpoint and is able to engage in daily functioning.  Patient remained appropriate during entire evaluation and talked in length about her psychiatric and medical diagnosis.  Patient agreed with plan of not titrating medications further.  Patient have shakes noted on examination on both hand but more on the left side.      Abnormal Involuntary Movement Scale (AIMS)    Facial and Oral Movements   1. Muscles of Facial Expression: 0 (None)  2. Lips and " Perioral area: 0 (None)  3. Jaw: 0 (None)  4. Tongue: 0 (None)  Extremity Movements  5. Upper (arms, wrists, hands, fingers): 2 (Mild): muscle twitching noted in arm extension.  6. Lower (legs, knees, ankles, toes): 0 (None)  Trunk Movements  7. Neck, shoulders, hips: 0 (None)  Global Judgments  8. Severity of abnormal movements: 2 (Mild)  9. Incapacitation due to abnormal movements: 1 (Minimal, may be extreme normal)  10. Patient's awareness of abnormal movements: 1 (Minimal, may be extreme normal)  Dental Status  11. Current problems with teeth and/or dentures No     We discussed the risk of abnormal commitment with Seroquel but both patient and her daughter verbalized that she has done best on this combination and the benefit of further mood stability outweighs this risk of abnormal movement at this time.  Patient is currently engaging in physical therapy for weakness and history of vertigo.  Patient denies any history of feeling dizzy while standing up.  Both patient and her daughter were educated on risk of orthostatic hypotension with Seroquel.  Patient reports she felt dizziness at 250 mg of Seroquel in past but 150 mg total dose is helpful for mood stabilization, anxiety and sleep management.    Patient remained appropriate during entire evaluation and agreed with plan of continuing treatment as discussed below.    PSYCHIATRIC REVIEW OF SYSTEMS: denies depressive symptoms, denies symptoms of anxiety that interfere with functioning or are overwhelming, denies manic symptoms, denies psychotic symptoms including AH / VH, denies OCD symptoms, denies restrictive eating or purging and denies trauma related symptoms      MEDICAL REVIEW OF SYSTEMS:   Constitutional negative   Eyes positive - decrease in vision   Ears/Nose/Mouth/Throat negative   Cardiovascular negative   Respiratory negative   Gastrointestinal negative   Genitourinary negative   Muscular positive - weakness   Integumentary negative   Neurological  negative   Endocrine negative   Hematologic/Lymphatic negative     CURRENT MEDICATIONS:  Current Outpatient Medications   Medication Sig Dispense Refill   • QUEtiapine Fumarate 150 MG TABLET SR 24 HR TAKE 1 TABLET EVERY EVENING 30 Tab 2   • sulfamethoxazole-trimethoprim (BACTRIM DS) 800-160 MG tablet Take 1 Tab by mouth every 12 hours. 10 Tab 0   • divalproex ER (DEPAKOTE ER) 250 MG TABLET SR 24 HR TAKE 3 TABLETS AT BEDTIME 90 Tab 1   • levothyroxine (SYNTHROID) 125 MCG Tab TAKE 1 TABLET EVERY MORNING ON AN EMPTY STOMACH 90 Tab 3   • alendronate (FOSAMAX) 70 MG Tab TAKE 1 TABLET EVERY 7 DAYS 12 Tab 3   • omeprazole (PRILOSEC) 20 MG delayed-release capsule TAKE 1 CAPSULE DAILY 90 Cap 3   • QUEtiapine Fumarate 50 MG TABLET SR 24 HR Take 50 mg by mouth 1 time daily as needed (mood instability). 30 Tab 1   • DETROL LA 4 MG CAPSULE SR 24 HR TAKE 1 CAPSULE DAILY 90 Cap 4   • venlafaxine XR (EFFEXOR XR) 37.5 MG CAPSULE SR 24 HR Take 1 Cap by mouth every day. 90 Cap 0   • nitrofurantoin (MACRODANTIN) 50 MG Cap TAKE 1 CAPSULE DAILY 90 Cap 4   • estradiol (ESTRACE) 0.1 MG/GM vaginal cream Apply pea sized amount to genital area three time each week. 2 Tube 1   • cyanocobalamin (VITAMIN B-12) 100 MCG Tab Take 100 mcg by mouth every day.       No current facility-administered medications for this visit.        ALLERGIES:  Banana, Corn oil, Levofloxacin, and Sorbitol    PAST PSYCHIATRIC HISTORY  Patient with psychiatric illness since teenage year  Prior Self harm/suicide attempt: none   Prior Diagnosis: bipolar 1 diorder    PAST PSYCHIATRIC MEDICATIONS  · Historically, has not done well off of medications  · Thorazine - did not like  · Lithium - worked, affected kidneys  · Effexor - on and off for years  · Many others in the past     FAMILY HISTORY  Psychiatric diagnosis:  Not known    SUBSTANCE USE HISTORY:  ALCOHOL: no  TOBACCO: no  CANNABIS: no  OPIOIDS: no  PRESCRIPTION MEDICATIONS: no  OTHERS: no  History of  "inpatient/outpatient rehab treatment: no    SOCIAL HISTORY  The patient lives alone in the house but has 5 children who visit her daily.    Each one of them takes care of her on a different night of the week.    Her son lives across the street.  People from [facility] visit her 3 days/week    MEDICAL HISTORY  Past Medical History:   Diagnosis Date   • Bipolar disorder (HCC)    • Bronchitis 02-26-13    in the past, not recent   • CATARACT     bilat removed    • Dental disorder    • Frequent UTI 12/1/2010   • Heart burn    • History of falling     r/t vertigo   • Hypertension    • Hypothyroid 12/1/2010   • Other specified symptom associated with female genital organs    • Pneumonia     x2   • Psychiatric disorder     bipolar   • Renal disorder     daughter reports some renal insufficiency   • Seasonal allergic reaction    • Stress incontinence    • Urinary bladder disorder      Past Surgical History:   Procedure Laterality Date   • HYSTERECTOMY ROBOTIC  3/18/2013    Performed by Juju Gonzales M.D. at [facility]ERY [facility]   • COLPOSACROPEXY ROBOTIC  3/18/2013    Performed by Juju Gonzales M.D. at SURGERY [facility]   • BLADDER SLING FEMALE  3/18/2013    Performed by Juju Gonzales M.D. at SUR[facility]ERY [facility]   • CYSTOSCOPY  3/18/2013    Performed by Juju Gonzales M.D. at SUR[facility]ERY [facility]   • VENTRAL HERNIA REPAIR  3/18/2010    Performed by TRAVIS PATTERSON at SURGERY [facility]   • TONSILLECTOMY           PHYSICAL EXAMINAION:  Vital signs: /78   Musculoskeletal: slow with cane  Abnormal movements: see AIMS above    MENTAL STATUS EXAMINATION      General:   - Grooming and hygiene: Casual,   - Apparent distress: none,   - Behavior: Calm  - Eye Contact:  Good,   - no psychomotor agitation or retardation    - Participation: Active verbal participation  Orientation: Alert and Fully Oriented to person, place and time  Mood: Euthymic  Affect: Flexible and Full range,  Thought " Process: Logical  Thought Content: Denies suicidal or homicidal ideations, intent or plan Within normal limits  Perception: Denies auditory or visual hallucinations. No delusions noted Within normal limits  Attention span and concentration: fair  Speech:Rate within normal limits and Volume within normal limits  Language: Appropriate   Insight: Adequate  Judgment: Adequate  Recent and remote memory: Poor memory for chronology of events      DEPRESSION SCREENING:  Depression Screen (PHQ-2/PHQ-9) 5/7/2019 7/8/2019 8/6/2019   PHQ-2 Total Score - - -   PHQ-2 Total Score - - -   PHQ-2 Total Score 2 2 2   PHQ-9 Total Score - - -   PHQ-9 Total Score 7 7 7       Interpretation of PHQ-9 Total Score   Score Severity   1-4 No Depression   5-9 Mild Depression   10-14 Moderate Depression   15-19 Moderately Severe Depression   20-27 Severe Depression      SAFETY ASSESSMENT - SELF:    Does patient acknowledge current or past symptoms of dangerousness to self? no  History of suicide by family member: no  History of suicide by friend/significant other: no  Recent change in amount/specificity/intensity of suicidal thoughts or self-harm behavior? no  Current access to firearms, medications, or other identified means of suicide/self-harm? no  Protective factors present: strong family support       SAFETY ASSESSMENT - OTHERS:    Does patient acknowledge current or past symptoms of aggressive behavior or risk to others? no  Recent change in amount/specificity/intensity of thoughts or threats to harm others? no  Current access to firearms/other identified means of harm? no      CURRENT RISK:       Suicidal: Low       Homicidal: Low       Self-Harm: Low       Relapse: Low       Crisis Safety Plan Reviewed Not Indicated    MEDICAL RECORDS/LABS/DIAGNOSTIC TESTS REVIEWED:  Component      Latest Ref Rng & Units 7/26/2018 2/28/2019 11/14/2019           2:27 PM  8:15 AM  9:51 AM   Valproic Acid      50.0 - 100.0 ug/mL 91.5 96.2 84.9      Component      Latest Ref Rng & Units 11/14/2019           9:51 AM   Glycohemoglobin      0.0 - 5.6 % 5.3   Estim. Avg Glu      mg/dL 105     Component      Latest Ref Rng & Units 11/14/2019           9:51 AM   Cholesterol,Tot      100 - 199 mg/dL 179   Triglycerides      0 - 149 mg/dL 138   HDL      >=40 mg/dL 49   LDL      <100 mg/dL 102 (H)     Component      Latest Ref Rng & Units 9/12/2019          11:15 AM   Sodium      135 - 145 mmol/L 136   Potassium      3.6 - 5.5 mmol/L 5.0   Chloride      96 - 112 mmol/L 106   Co2      20 - 33 mmol/L 22   Anion Gap      0.0 - 11.9 8.0   Glucose      65 - 99 mg/dL 111 (H)   Bun      8 - 22 mg/dL 31 (H)   Creatinine      0.50 - 1.40 mg/dL 1.02   Calcium      8.5 - 10.5 mg/dL 8.8   AST(SGOT)      12 - 45 U/L 16   ALT(SGPT)      2 - 50 U/L 11   Alkaline Phosphatase      30 - 99 U/L 53   Total Bilirubin      0.1 - 1.5 mg/dL 0.3   Albumin      3.2 - 4.9 g/dL 3.7   Total Protein      6.0 - 8.2 g/dL 6.8   Globulin      1.9 - 3.5 g/dL 3.1   A-G Ratio      g/dL 1.2         [place]  records -   reviewed      ASSESSMENT  Patient is a 80-year-old female with history of bipolar 1 disorder is currently stable on combination of Depakote and quetiapine.      DIFFERENTIAL DIAGNOSES  · Bipolar 1 disorder      PLAN:  (1) Bipolar 1 disorder  • Stable  • Continue Depakote ER to 50 mg 3 tablets daily after dinner for mood stabilization.  Given 90-day supply with 1 refill.  • Continue quetiapine  mg at bedtime for mood stabilization.  Given 90-day supply with 1 refill.  • Continue psychotherapy for mood and anxiety management.  • Medication options, alternatives (including no medications) and medication risks/benefits/side effects were discussed in detail.  • The patient was advised to call, message provider on MyChart, or come in to the clinic if symptoms worsen or if any future questions/issues regarding their medications arise; the patient verbalized understanding and agreement.     • The patient was educated to call 911, call the suicide hotline, or go to local ER if having thoughts of suicide or homicide; verbalized understanding.        Return to clinic in 6 months or sooner if symptoms worsen.  Next Appointment:  instruction provided on how to make the next appointment.     The proposed treatment plan was discussed with the patient who was provided the opportunity to ask questions and make suggestions regarding alternative treatment. Patient verbalized understanding and expressed agreement with the plan.     Thank you for allowing me to participate in the care of this patient.    James Ray M.D.  09/23/20    CC:   Josy Arteaga M.D.    This note was created using voice recognition software (Dragon). The accuracy of the dictation is limited by the abilities of the software. I have reviewed the note prior to signing, however some errors in grammar and context are still possible. If you have any questions related to this note please do not hesitate to contact our office.

## 2020-10-27 DIAGNOSIS — F31.9 BIPOLAR 1 DISORDER (HCC): Primary | ICD-10-CM

## 2020-10-27 RX ORDER — VENLAFAXINE HYDROCHLORIDE 37.5 MG/1
37.5 CAPSULE, EXTENDED RELEASE ORAL DAILY
Qty: 90 CAP | Refills: 0 | Status: SHIPPED | OUTPATIENT
Start: 2020-10-27 | End: 2020-12-08 | Stop reason: SDUPTHER

## 2020-10-29 ENCOUNTER — OFFICE VISIT (OUTPATIENT)
Dept: INTERNAL MEDICINE | Facility: IMAGING CENTER | Age: 80
End: 2020-10-29
Payer: MEDICARE

## 2020-10-29 VITALS
SYSTOLIC BLOOD PRESSURE: 140 MMHG | RESPIRATION RATE: 14 BRPM | HEIGHT: 66 IN | WEIGHT: 148 LBS | DIASTOLIC BLOOD PRESSURE: 60 MMHG | OXYGEN SATURATION: 93 % | BODY MASS INDEX: 23.78 KG/M2 | HEART RATE: 89 BPM | TEMPERATURE: 97.9 F

## 2020-10-29 DIAGNOSIS — R42 DIZZINESS: ICD-10-CM

## 2020-10-29 DIAGNOSIS — R53.1 WEAKNESS: ICD-10-CM

## 2020-10-29 DIAGNOSIS — H61.23 BILATERAL IMPACTED CERUMEN: ICD-10-CM

## 2020-10-29 DIAGNOSIS — Z23 NEED FOR INFLUENZA VACCINATION: ICD-10-CM

## 2020-10-29 DIAGNOSIS — F31.75 BIPOLAR DISORDER, IN PARTIAL REMISSION, MOST RECENT EPISODE DEPRESSED (HCC): ICD-10-CM

## 2020-10-29 DIAGNOSIS — Z91.81 AT RISK FOR FALLS: ICD-10-CM

## 2020-10-29 PROCEDURE — 90662 IIV NO PRSV INCREASED AG IM: CPT | Performed by: FAMILY MEDICINE

## 2020-10-29 PROCEDURE — G0008 ADMIN INFLUENZA VIRUS VAC: HCPCS | Performed by: FAMILY MEDICINE

## 2020-10-29 PROCEDURE — 99214 OFFICE O/P EST MOD 30 MIN: CPT | Mod: 25 | Performed by: FAMILY MEDICINE

## 2020-10-29 ASSESSMENT — FIBROSIS 4 INDEX: FIB4 SCORE: 3.27

## 2020-11-02 NOTE — PROGRESS NOTES
Chief Complaint   Patient presents with   • Dizziness     vertigo   • Depression       HISTORY OF PRESENT ILLNESS: Patient is a 80 y.o. female established patient who presents today with her daughter Karissa and her caregiver George.    Azeb has been feeling more fatigued, weak over the past month.  She has had decreased appetite.  She has lost about 5 pounds.  She is both more depressed and more anxious.  She lives alone but family checks on her daily.  However over the past month family members have been diagnosed with Covid so she has had much less social contact.  She has a long history of bipolar with depression.  Recently saw a new psychiatrist, Dr. Ray.  She continues on Depakote and Seroquel.  Effexor was just reordered at daughter's request due to her increased depression.  She has been on it before.  She admits to thoughts of hopelessness but denies that she would ever do anything to hurt herself.  She is sleeping.    She is also had some vertigo.  They attempted to irrigate her ears and that seemed to aggravate things.  She does wear hearing aids.  She has had more difficulty with her hearing.  She has had no recent falls she does use a walker.    She denies any urinary symptoms.  No cough or chest congestion.  No fevers.      Patient Active Problem List    Diagnosis Date Noted   • CKD (chronic kidney disease) stage 3, GFR 30-59 ml 09/18/2011     Priority: Low   • Hypertension 06/20/2011     Priority: Low   • Hypothyroid 12/01/2010     Priority: Low   • Macular degeneration 12/11/2016   • Bipolar disorder, in partial remission, most recent episode depressed ([facility]) 01/04/2015   • Hard of hearing 12/12/2014   • Poor balance 08/07/2014   • Hyperparathyroidism, secondary ([facility]) 02/08/2012   • Osteoporosis 12/07/2011   • UI (urinary incontinence) 12/01/2010   • Frequent UTI 12/01/2010   • Seasonal allergies 12/01/2010     Current Outpatient Medications on File Prior to Visit   Medication Sig Dispense Refill    • venlafaxine XR (EFFEXOR XR) 37.5 MG CAPSULE SR 24 HR Take 1 Cap by mouth every day. 90 Cap 0   • divalproex ER (DEPAKOTE ER) 250 MG TABLET SR 24 HR Take 3 Tabs by mouth ONE-HALF HOUR AFTER DINNER for 90 days. 270 Tab 1   • QUEtiapine Fumarate 150 MG TABLET SR 24 HR Take 1 Tab by mouth every bedtime for 90 days. 90 Tab 1   • sulfamethoxazole-trimethoprim (BACTRIM DS) 800-160 MG tablet Take 1 Tab by mouth every 12 hours. 10 Tab 0   • levothyroxine (SYNTHROID) 125 MCG Tab TAKE 1 TABLET EVERY MORNING ON AN EMPTY STOMACH 90 Tab 3   • alendronate (FOSAMAX) 70 MG Tab TAKE 1 TABLET EVERY 7 DAYS 12 Tab 3   • omeprazole (PRILOSEC) 20 MG delayed-release capsule TAKE 1 CAPSULE DAILY 90 Cap 3   • QUEtiapine Fumarate 50 MG TABLET SR 24 HR Take 50 mg by mouth 1 time daily as needed (mood instability). 30 Tab 1   • DETROL LA 4 MG CAPSULE SR 24 HR TAKE 1 CAPSULE DAILY 90 Cap 4   • nitrofurantoin (MACRODANTIN) 50 MG Cap TAKE 1 CAPSULE DAILY 90 Cap 4   • estradiol (ESTRACE) 0.1 MG/GM vaginal cream Apply pea sized amount to genital area three time each week. 2 Tube 1   • cyanocobalamin (VITAMIN B-12) 100 MCG Tab Take 100 mcg by mouth every day.       No current facility-administered medications on file prior to visit.          Past medical, surgical, family, and social history is reviewed and updated in Epic chart by me today.   Medications and allergies reviewed and updated in Epic chart by me today.     REVIEW OF SYSTEMS:  GENERAL: Increased fatigue, 5 pound weight loss.  HEENT:  Ears--as above                 Eyes--no blurred vision, no discharge or pain                 Throat--No sore throat, no dysphagia, no hoarseness  CV:  No chest pain,dyspnea,palpitations or edema.  RESP:  No sob,cough,wheezing or hemoptysis.  GI: No dysphagia, heartburn,abdominal pain, nausea, vomiting, diarrhea or constipation.       No melena, jaundice, bleeding, incontinence or change in bowel habits.  :  No dysuria, polyuria, hematuria,  "incontinence, or nocturia.  MS:  No joint swelling, myalgias, or arthralgias.  NEURO:  No seizures, syncope, paralysis, tremor, or weakness.  SKIN: No new or concerning skin lesions or changes.   PSYCH: Mood as above    Vitals:    10/29/20 0959   BP: 140/60   BP Location: Left arm   Patient Position: Sitting   BP Cuff Size: Adult   Pulse: 89   Resp: 14   Temp: 36.6 °C (97.9 °F)   TempSrc: Temporal   SpO2: 93%   Weight: 67.1 kg (148 lb)   Height: 1.676 m (5' 6\")     Physical Exam:  GENERAL;  WD/WN, No acute distress.  HEENT:  PERRLA, EOMI, no conjuctival injection, no icterus.                 Ears: She has bilateral cerumen impaction.  NECK: Supple with no adenopathy or thyromegaly.  LUNGS: Clear with no rales, rhonchi, or wheezes.  COR: RR with no murmur, gallop or rub.  EXT: No edema.  Psych: Mood as above.      Assessment/Plan:  1. Bipolar disorder, in partial remission, most recent episode depressed (HCC).  Agree with the addition of Effexor and she will follow up with psychiatry.  She has not had consistent counseling since the pandemic.  I suspect a fair amount of her increased depression is due to increased social isolation.  If family is making effort to get back in more frequently. REFERRAL TO HOME HEALTH   2. Weakness.  She is encouraged to always walk with her walker.  I think she would benefit from home health. REFERRAL TO HOME HEALTH   3. At risk for falls  REFERRAL TO HOME HEALTH   4. Dizziness.  Vertiginous.  Will monitor closely.  Hopefully clearing her ears and getting her hearing aids working again will help. REFERRAL TO HOME HEALTH   5. Bilateral impacted cerumen.  Both ears were irrigated by the nurse.    6. Need for influenza vaccination  INFLUENZA VACCINE, HIGH DOSE (65+ ONLY)     Follow-up in 1 month  "

## 2020-12-02 ENCOUNTER — OFFICE VISIT (OUTPATIENT)
Dept: INTERNAL MEDICINE | Facility: IMAGING CENTER | Age: 80
End: 2020-12-02
Payer: MEDICARE

## 2020-12-02 VITALS
DIASTOLIC BLOOD PRESSURE: 65 MMHG | WEIGHT: 146 LBS | RESPIRATION RATE: 14 BRPM | OXYGEN SATURATION: 94 % | BODY MASS INDEX: 23.46 KG/M2 | HEART RATE: 78 BPM | HEIGHT: 66 IN | TEMPERATURE: 98 F | SYSTOLIC BLOOD PRESSURE: 102 MMHG

## 2020-12-02 DIAGNOSIS — S93.402A SPRAIN OF LEFT ANKLE, UNSPECIFIED LIGAMENT, INITIAL ENCOUNTER: ICD-10-CM

## 2020-12-02 DIAGNOSIS — F31.75 BIPOLAR DISORDER, IN PARTIAL REMISSION, MOST RECENT EPISODE DEPRESSED (HCC): ICD-10-CM

## 2020-12-02 PROCEDURE — 99213 OFFICE O/P EST LOW 20 MIN: CPT | Performed by: FAMILY MEDICINE

## 2020-12-02 ASSESSMENT — FIBROSIS 4 INDEX: FIB4 SCORE: 3.27

## 2020-12-08 DIAGNOSIS — N39.0 FREQUENT UTI: ICD-10-CM

## 2020-12-08 DIAGNOSIS — F31.9 BIPOLAR 1 DISORDER (HCC): ICD-10-CM

## 2020-12-08 RX ORDER — VENLAFAXINE HYDROCHLORIDE 37.5 MG/1
37.5 CAPSULE, EXTENDED RELEASE ORAL DAILY
Qty: 90 CAP | Refills: 0 | Status: SHIPPED | OUTPATIENT
Start: 2020-12-08 | End: 2021-03-11

## 2020-12-08 RX ORDER — NITROFURANTOIN MACROCRYSTALS 50 MG/1
CAPSULE ORAL
Qty: 90 CAP | Refills: 3 | Status: SHIPPED | OUTPATIENT
Start: 2020-12-08 | End: 2022-01-03

## 2020-12-08 NOTE — PROGRESS NOTES
Chief Complaint   Patient presents with   • Depression     doing better   • Ankle Pain     left       HISTORY OF PRESENT ILLNESS: Patient is a 80 y.o. female established patient who presents today with both daughters Olive and Norma to follow-up on her previous visit.  She was seen 10/29 with increased symptoms of fatigue, weakness and depression.  She had contacted the psychiatrist and he restarted her on Effexor.  She does have a history of bipolar disorder maintained on Depakote and Seroquel.  However at times when she becomes more depressed she responds well to low-dose Effexor.  There were some situational conditions.  Family had been diagnosed with Covid and they were staying away from [facility].  She is now seeing more of her family.  They check on her daily.  She also has home health and will be starting home physical therapy.  Her appetite is improved.  And she is in better spirits.    Her new complaint is left ankle pain for about the past week.  She remembers no injury.  No swelling or redness.  It just hurts intermittently throughout the day.  Worse with weightbearing.      Patient Active Problem List    Diagnosis Date Noted   • CKD (chronic kidney disease) stage 3, GFR 30-59 ml 09/18/2011     Priority: Low   • Hypertension 06/20/2011     Priority: Low   • Hypothyroid 12/01/2010     Priority: Low   • Macular degeneration 12/11/2016   • Bipolar disorder, in partial remission, most recent episode depressed ([facility]) 01/04/2015   • Hard of hearing 12/12/2014   • Poor balance 08/07/2014   • Hyperparathyroidism, secondary ([facility]) 02/08/2012   • Osteoporosis 12/07/2011   • UI (urinary incontinence) 12/01/2010   • Frequent UTI 12/01/2010   • Seasonal allergies 12/01/2010     Current Outpatient Medications on File Prior to Visit   Medication Sig Dispense Refill   • venlafaxine XR (EFFEXOR XR) 37.5 MG CAPSULE SR 24 HR Take 1 Cap by mouth every day. 90 Cap 0   • divalproex ER (DEPAKOTE ER) 250 MG TABLET SR 24 HR Take 3  "Tabs by mouth ONE-HALF HOUR AFTER DINNER for 90 days. 270 Tab 1   • QUEtiapine Fumarate 150 MG TABLET SR 24 HR Take 1 Tab by mouth every bedtime for 90 days. 90 Tab 1   • levothyroxine (SYNTHROID) 125 MCG Tab TAKE 1 TABLET EVERY MORNING ON AN EMPTY STOMACH 90 Tab 3   • alendronate (FOSAMAX) 70 MG Tab TAKE 1 TABLET EVERY 7 DAYS 12 Tab 3   • omeprazole (PRILOSEC) 20 MG delayed-release capsule TAKE 1 CAPSULE DAILY 90 Cap 3   • DETROL LA 4 MG CAPSULE SR 24 HR TAKE 1 CAPSULE DAILY 90 Cap 4   • nitrofurantoin (MACRODANTIN) 50 MG Cap TAKE 1 CAPSULE DAILY 90 Cap 4   • estradiol (ESTRACE) 0.1 MG/GM vaginal cream Apply pea sized amount to genital area three time each week. 2 Tube 1     No current facility-administered medications on file prior to visit.          Past medical, surgical, family, and social history is reviewed and updated in Epic chart by me today.   Medications and allergies reviewed and updated in Epic chart by me today.     REVIEW OF SYSTEMS:  GENERAL: Improved fatigue, no weight loss.  HEENT:  Ears--no earache, no change in hearing, no dizziness, no tinnitus.  She wears hearing aids.                 Eyes--no blurred vision, no discharge or pain                 Throat--No sore throat, no dysphagia, no hoarseness  CV:  No chest pain,dyspnea,palpitations or edema.  RESP:  No sob,cough,wheezing or hemoptysis.  GI: No dysphagia, heartburn,abdominal pain, nausea, vomiting, diarrhea or constipation.       No melena, jaundice, bleeding, incontinence or change in bowel habits.  :  No dysuria, polyuria, hematuria, incontinence, or nocturia.  MS: Ankle as above.  NEURO:  No seizures, syncope, paralysis, tremor, or weakness.  SKIN: No new or concerning skin lesions or changes.   PSYCH: Mood fine.    Vitals:    12/02/20 1100   BP: 102/65   Pulse: 78   Resp: 14   Temp: 36.7 °C (98 °F)   TempSrc: Temporal   SpO2: 94%   Weight: 66.2 kg (146 lb)   Height: 1.676 m (5' 5.98\")     Physical Exam:  Gen: Well developed, well " nourished. No acute distress.  Alert and oriented x 3.  Neck:  Supple, no adenopathy or thyromegaly.  Heart:  Regular rate and rhythm.  Normal S1, S2. No murmur, gallop or rub.  Lungs:  Clear, No wheezes,rales or rhonchi.  Extremities:  No edema.  Left ankle with no discoloration or deformity.  She has good range of motion.  She is little tender over the proximal dorsum of her foot.    Psych: Mood and affect are appropriate.      Assessment/Plan:  1. Bipolar disorder, in partial remission, most recent episode depressed (HCC)   her mood has improved.  Continue same.  Follow-up with psychiatry.  Call for any concerns.   2. Sprain of left ankle, unspecified ligament, initial encounter   she will be starting with home physical therapy and will have them evaluate her ankle as well.     Follow-up in 3 months and as needed.

## 2021-01-11 DIAGNOSIS — Z23 NEED FOR VACCINATION: ICD-10-CM

## 2021-02-18 DIAGNOSIS — B02.9 HERPES ZOSTER WITHOUT COMPLICATION: ICD-10-CM

## 2021-02-18 RX ORDER — VALACYCLOVIR HYDROCHLORIDE 1 G/1
1000 TABLET, FILM COATED ORAL 3 TIMES DAILY
Qty: 21 TABLET | Refills: 0 | Status: SHIPPED | OUTPATIENT
Start: 2021-02-18 | End: 2021-02-25

## 2021-02-22 RX ORDER — OMEPRAZOLE 20 MG/1
CAPSULE, DELAYED RELEASE ORAL
Qty: 90 CAPSULE | Refills: 3 | Status: SHIPPED | OUTPATIENT
Start: 2021-02-22 | End: 2022-03-30

## 2021-03-09 DIAGNOSIS — F31.9 BIPOLAR 1 DISORDER (HCC): ICD-10-CM

## 2021-03-09 RX ORDER — QUETIAPINE 150 MG/1
TABLET, FILM COATED, EXTENDED RELEASE ORAL
Qty: 90 TABLET | Refills: 3 | Status: SHIPPED | OUTPATIENT
Start: 2021-03-09 | End: 2021-06-30 | Stop reason: SDUPTHER

## 2021-03-24 ENCOUNTER — APPOINTMENT (OUTPATIENT)
Dept: BEHAVIORAL HEALTH | Facility: CLINIC | Age: 81
End: 2021-03-24
Payer: MEDICARE

## 2021-04-07 DIAGNOSIS — N39.46 MIXED INCONTINENCE: ICD-10-CM

## 2021-04-07 RX ORDER — TOLTERODINE 4 MG/1
CAPSULE, EXTENDED RELEASE ORAL
Qty: 90 CAPSULE | Refills: 4 | Status: SHIPPED | OUTPATIENT
Start: 2021-04-07 | End: 2022-07-01

## 2021-04-14 ENCOUNTER — OFFICE VISIT (OUTPATIENT)
Dept: INTERNAL MEDICINE | Facility: IMAGING CENTER | Age: 81
End: 2021-04-14
Payer: MEDICARE

## 2021-04-14 ENCOUNTER — HOSPITAL ENCOUNTER (OUTPATIENT)
Facility: MEDICAL CENTER | Age: 81
End: 2021-04-14
Attending: FAMILY MEDICINE
Payer: MEDICARE

## 2021-04-14 VITALS
DIASTOLIC BLOOD PRESSURE: 72 MMHG | SYSTOLIC BLOOD PRESSURE: 156 MMHG | OXYGEN SATURATION: 95 % | HEIGHT: 66 IN | TEMPERATURE: 98.7 F | HEART RATE: 84 BPM | BODY MASS INDEX: 25.07 KG/M2 | WEIGHT: 156 LBS | RESPIRATION RATE: 14 BRPM

## 2021-04-14 DIAGNOSIS — M81.0 AGE-RELATED OSTEOPOROSIS WITHOUT CURRENT PATHOLOGICAL FRACTURE: ICD-10-CM

## 2021-04-14 DIAGNOSIS — N39.0 FREQUENT UTI: ICD-10-CM

## 2021-04-14 DIAGNOSIS — H91.93 BILATERAL HEARING LOSS, UNSPECIFIED HEARING LOSS TYPE: ICD-10-CM

## 2021-04-14 DIAGNOSIS — E03.9 HYPOTHYROIDISM, UNSPECIFIED TYPE: ICD-10-CM

## 2021-04-14 DIAGNOSIS — Z00.00 LABORATORY EXAMINATION ORDERED AS PART OF A ROUTINE GENERAL MEDICAL EXAMINATION: ICD-10-CM

## 2021-04-14 DIAGNOSIS — F31.75 BIPOLAR DISORDER, IN PARTIAL REMISSION, MOST RECENT EPISODE DEPRESSED (HCC): ICD-10-CM

## 2021-04-14 DIAGNOSIS — R26.89 POOR BALANCE: ICD-10-CM

## 2021-04-14 DIAGNOSIS — N18.30 STAGE 3 CHRONIC KIDNEY DISEASE, UNSPECIFIED WHETHER STAGE 3A OR 3B CKD: ICD-10-CM

## 2021-04-14 DIAGNOSIS — H61.23 BILATERAL IMPACTED CERUMEN: ICD-10-CM

## 2021-04-14 DIAGNOSIS — I10 ESSENTIAL HYPERTENSION: ICD-10-CM

## 2021-04-14 PROCEDURE — 82607 VITAMIN B-12: CPT

## 2021-04-14 PROCEDURE — 84443 ASSAY THYROID STIM HORMONE: CPT

## 2021-04-14 PROCEDURE — 85025 COMPLETE CBC W/AUTO DIFF WBC: CPT

## 2021-04-14 PROCEDURE — 82306 VITAMIN D 25 HYDROXY: CPT

## 2021-04-14 PROCEDURE — 84481 FREE ASSAY (FT-3): CPT

## 2021-04-14 PROCEDURE — 80061 LIPID PANEL: CPT

## 2021-04-14 PROCEDURE — 80053 COMPREHEN METABOLIC PANEL: CPT

## 2021-04-14 PROCEDURE — 99215 OFFICE O/P EST HI 40 MIN: CPT | Performed by: FAMILY MEDICINE

## 2021-04-14 ASSESSMENT — FIBROSIS 4 INDEX: FIB4 SCORE: 3.27

## 2021-04-14 NOTE — ASSESSMENT & PLAN NOTE
Elevated today   currently not on medications  Family will check BP at home and send me a few readings if they are elevated

## 2021-04-14 NOTE — PROGRESS NOTES
"Chief Complaint   Patient presents with   • Establish Care       Subjective:     HPI:   Azeb Major is a 80 y.o. female here to establish care and to discuss the evaluation and management of:       Problem   Hypertension   Hypothyroid    Currently on Synthroid 125 mcg daily     Bipolar Disorder, in Partial Remission, Most Recent Episode Depressed (Hcc)    She sees psychiatry at [facility]  She is supported by all 5 kids who live in town and they keep a close eye on her     Hard of Hearing   Osteoporosis    Declines bone density scan     Frequent Uti    Has been stable on current regimen               Objective:     /72   Pulse 84   Temp 37.1 °C (98.7 °F) (Temporal)   Resp 14   Ht 1.676 m (5' 5.98\")   Wt 70.8 kg (156 lb)   SpO2 95%  Body mass index is 25.19 kg/m².    Physical Exam:  Physical Exam  Constitutional: Well-developed and well-nourished. Not diaphoretic. No distress.   Skin: Skin is warm and dry. No rash noted.  Head: Atraumatic without lesions.  Eyes: Conjunctivae and extraocular motions are normal.   Ears:  External ears unremarkable.  TMs with cerumen impaction bilaterally        Neck: Supple, No thyromegaly present. No JVD  Cardiovascular: Regular rate and rhythm.   Chest: Effort normal. Clear to auscultation throughout. No adventitious sounds.   Abdomen:  without distention.  .  Extremities: No cyanosis, clubbing, erythema, nor edema.   Neurological: Alert and oriented x 3.    Psychiatric:  Behavior, mood, and affect are appropriate     Assessment and Plan:     The following treatment plan was discussed/researched:  Bipolar disorder, in partial remission, most recent episode depressed (HCC)  She has been stable on current regimen  Refill meds      Hard of hearing  Has hearing aids    Hypothyroid  Check TSH today    Osteoporosis  Currently on Fosamax which can cause GI irritation for her  She is interested in starting Prolia    Hypertension  Elevated today   currently not on " medications  Family will check BP at home and send me a few readings if they are elevated        Frequent UTI  Continue with Macrobid 50 mg daily and Estrace vaginal cream    CKD (chronic kidney disease) stage 3, GFR 30-59 ml  Avoid nephrotoxic agents   check metabolic panel    Poor balance  Continue working on strengthening skills    1. Bipolar disorder, in partial remission, most recent episode depressed (HCC)     2. Hypothyroidism, unspecified type  Lipid Profile    TSH    T3 FREE   3. Frequent UTI     4. Age-related osteoporosis without current pathological fracture     5. Laboratory examination ordered as part of a routine general medical examination  CBC WITH DIFFERENTIAL    Comp Metabolic Panel    VITAMIN B12    CANCELED: VITAMIN D,25 HYDROXY   6. Bilateral hearing loss, unspecified hearing loss type     7. Essential hypertension     8. Bilateral impacted cerumen  Ear Cerumen Removal   9. Stage 3 chronic kidney disease, unspecified whether stage 3a or 3b CKD (HCC)     10. Poor balance                                                                                                                                                                                      Any change or worsening of signs or symptoms, patient encouraged to follow-up or report to emergency room for further evaluation. Patient verbalizes understanding and agrees.    Follow-Up: No follow-ups on file.      PLEASE NOTE: This dictation was created using voice recognition software. I have made every reasonable attempt to correct obvious errors, but I expect that there are errors of grammar and possibly content that I did not discover before finalizing the note.      My total time spent caring for the patient on the day of the encounter was  greater than 40 minutes.   This includes obtaining history, reviewing chart, physical exam, patient education, reviewing outside records, placing orders, interpreting tests and coordinating care.

## 2021-04-15 LAB
ALBUMIN SERPL BCP-MCNC: 4.3 G/DL (ref 3.2–4.9)
ALBUMIN/GLOB SERPL: 1.3 G/DL
ALP SERPL-CCNC: 59 U/L (ref 30–99)
ALT SERPL-CCNC: 17 U/L (ref 2–50)
ANION GAP SERPL CALC-SCNC: 9 MMOL/L (ref 7–16)
AST SERPL-CCNC: 31 U/L (ref 12–45)
BASOPHILS # BLD AUTO: 0.9 % (ref 0–1.8)
BASOPHILS # BLD: 0.05 K/UL (ref 0–0.12)
BILIRUB SERPL-MCNC: 0.3 MG/DL (ref 0.1–1.5)
BUN SERPL-MCNC: 39 MG/DL (ref 8–22)
CALCIUM SERPL-MCNC: 9.4 MG/DL (ref 8.5–10.5)
CHLORIDE SERPL-SCNC: 101 MMOL/L (ref 96–112)
CHOLEST SERPL-MCNC: 221 MG/DL (ref 100–199)
CO2 SERPL-SCNC: 24 MMOL/L (ref 20–33)
CREAT SERPL-MCNC: 1.33 MG/DL (ref 0.5–1.4)
EOSINOPHIL # BLD AUTO: 0.06 K/UL (ref 0–0.51)
EOSINOPHIL NFR BLD: 1.1 % (ref 0–6.9)
ERYTHROCYTE [DISTWIDTH] IN BLOOD BY AUTOMATED COUNT: 54.7 FL (ref 35.9–50)
GLOBULIN SER CALC-MCNC: 3.3 G/DL (ref 1.9–3.5)
GLUCOSE SERPL-MCNC: 81 MG/DL (ref 65–99)
HCT VFR BLD AUTO: 45.6 % (ref 37–47)
HDLC SERPL-MCNC: 53 MG/DL
HGB BLD-MCNC: 14.5 G/DL (ref 12–16)
IMM GRANULOCYTES # BLD AUTO: 0.02 K/UL (ref 0–0.11)
IMM GRANULOCYTES NFR BLD AUTO: 0.4 % (ref 0–0.9)
LDLC SERPL CALC-MCNC: 123 MG/DL
LYMPHOCYTES # BLD AUTO: 2.01 K/UL (ref 1–4.8)
LYMPHOCYTES NFR BLD: 36.9 % (ref 22–41)
MCH RBC QN AUTO: 32.2 PG (ref 27–33)
MCHC RBC AUTO-ENTMCNC: 31.8 G/DL (ref 33.6–35)
MCV RBC AUTO: 101.3 FL (ref 81.4–97.8)
MONOCYTES # BLD AUTO: 0.62 K/UL (ref 0–0.85)
MONOCYTES NFR BLD AUTO: 11.4 % (ref 0–13.4)
NEUTROPHILS # BLD AUTO: 2.69 K/UL (ref 2–7.15)
NEUTROPHILS NFR BLD: 49.3 % (ref 44–72)
NRBC # BLD AUTO: 0 K/UL
NRBC BLD-RTO: 0 /100 WBC
PLATELET # BLD AUTO: 161 K/UL (ref 164–446)
PMV BLD AUTO: 12 FL (ref 9–12.9)
POTASSIUM SERPL-SCNC: 5.4 MMOL/L (ref 3.6–5.5)
PROT SERPL-MCNC: 7.6 G/DL (ref 6–8.2)
RBC # BLD AUTO: 4.5 M/UL (ref 4.2–5.4)
SODIUM SERPL-SCNC: 134 MMOL/L (ref 135–145)
T3FREE SERPL-MCNC: 1.87 PG/ML (ref 2–4.4)
TRIGL SERPL-MCNC: 223 MG/DL (ref 0–149)
TSH SERPL DL<=0.005 MIU/L-ACNC: 1.42 UIU/ML (ref 0.38–5.33)
VIT B12 SERPL-MCNC: 1144 PG/ML (ref 211–911)
WBC # BLD AUTO: 5.5 K/UL (ref 4.8–10.8)

## 2021-04-18 LAB — 25(OH)D3 SERPL-MCNC: 76 NG/ML (ref 30–80)

## 2021-04-23 RX ORDER — LISINOPRIL 5 MG/1
5 TABLET ORAL
Qty: 30 TABLET | Refills: 1 | Status: SHIPPED | OUTPATIENT
Start: 2021-04-23 | End: 2021-05-28 | Stop reason: SDUPTHER

## 2021-04-28 ENCOUNTER — IMMUNIZATION (OUTPATIENT)
Dept: FAMILY PLANNING/WOMEN'S HEALTH CLINIC | Facility: IMMUNIZATION CENTER | Age: 81
End: 2021-04-28
Payer: MEDICARE

## 2021-04-28 DIAGNOSIS — Z23 ENCOUNTER FOR VACCINATION: Primary | ICD-10-CM

## 2021-04-28 PROCEDURE — 0001A PFIZER SARS-COV-2 VACCINE: CPT | Performed by: INTERNAL MEDICINE

## 2021-04-28 PROCEDURE — 91300 PFIZER SARS-COV-2 VACCINE: CPT | Performed by: INTERNAL MEDICINE

## 2021-05-20 ENCOUNTER — IMMUNIZATION (OUTPATIENT)
Dept: FAMILY PLANNING/WOMEN'S HEALTH CLINIC | Facility: IMMUNIZATION CENTER | Age: 81
End: 2021-05-20
Payer: MEDICARE

## 2021-05-20 DIAGNOSIS — Z23 ENCOUNTER FOR VACCINATION: Primary | ICD-10-CM

## 2021-05-20 PROCEDURE — 0002A PFIZER SARS-COV-2 VACCINE: CPT | Performed by: INTERNAL MEDICINE

## 2021-05-20 PROCEDURE — 91300 PFIZER SARS-COV-2 VACCINE: CPT | Performed by: INTERNAL MEDICINE

## 2021-05-27 RX ORDER — LEVOTHYROXINE SODIUM 0.12 MG/1
TABLET ORAL
Qty: 90 TABLET | Refills: 3 | Status: SHIPPED | OUTPATIENT
Start: 2021-05-27 | End: 2022-06-06

## 2021-05-27 RX ORDER — ALENDRONATE SODIUM 70 MG/1
TABLET ORAL
Qty: 12 TABLET | Refills: 3 | Status: SHIPPED | OUTPATIENT
Start: 2021-05-27 | End: 2023-10-30

## 2021-05-28 RX ORDER — LISINOPRIL 5 MG/1
5 TABLET ORAL
Qty: 90 TABLET | Refills: 3 | Status: SHIPPED | OUTPATIENT
Start: 2021-05-28 | End: 2022-05-06

## 2021-06-22 ENCOUNTER — OFFICE VISIT (OUTPATIENT)
Dept: BEHAVIORAL HEALTH | Facility: CLINIC | Age: 81
End: 2021-06-22
Payer: MEDICARE

## 2021-06-22 DIAGNOSIS — F31.75 BIPOLAR DISORDER, IN PARTIAL REMISSION, MOST RECENT EPISODE DEPRESSED (HCC): ICD-10-CM

## 2021-06-22 PROCEDURE — 90791 PSYCH DIAGNOSTIC EVALUATION: CPT | Performed by: SOCIAL WORKER

## 2021-06-22 ASSESSMENT — PATIENT HEALTH QUESTIONNAIRE - PHQ9
5. POOR APPETITE OR OVEREATING: 0 - NOT AT ALL
SUM OF ALL RESPONSES TO PHQ QUESTIONS 1-9: 2
CLINICAL INTERPRETATION OF PHQ2 SCORE: 1
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
## 2021-06-30 ENCOUNTER — OFFICE VISIT (OUTPATIENT)
Dept: BEHAVIORAL HEALTH | Facility: CLINIC | Age: 81
End: 2021-06-30
Payer: MEDICARE

## 2021-06-30 DIAGNOSIS — F31.9 BIPOLAR 1 DISORDER (HCC): ICD-10-CM

## 2021-06-30 DIAGNOSIS — F31.75 BIPOLAR DISORDER, IN PARTIAL REMISSION, MOST RECENT EPISODE DEPRESSED (HCC): ICD-10-CM

## 2021-06-30 PROCEDURE — 90833 PSYTX W PT W E/M 30 MIN: CPT | Performed by: PSYCHIATRY & NEUROLOGY

## 2021-06-30 PROCEDURE — 99214 OFFICE O/P EST MOD 30 MIN: CPT | Performed by: PSYCHIATRY & NEUROLOGY

## 2021-06-30 RX ORDER — QUETIAPINE 150 MG/1
TABLET, FILM COATED, EXTENDED RELEASE ORAL
Qty: 90 TABLET | Refills: 1 | Status: SHIPPED | OUTPATIENT
Start: 2021-06-30 | End: 2021-09-29 | Stop reason: SDUPTHER

## 2021-06-30 RX ORDER — DIVALPROEX SODIUM 250 MG/1
750 TABLET, EXTENDED RELEASE ORAL
Qty: 270 TABLET | Refills: 1 | Status: SHIPPED | OUTPATIENT
Start: 2021-06-30 | End: 2021-09-29 | Stop reason: SDUPTHER

## 2021-06-30 NOTE — PROGRESS NOTES
IN-PERSON SESSION IN CLINIC    PSYCHIATRY FOLLOW-UP NOTE      Name: Azeb Major  MRN: [id number]  : 1940  Age: 81 y.o.  Date of assessment: 2021  PCP: Olimpia Newsome M.D.  Persons in attendance: Patient      REASON FOR VISIT/CHIEF COMPLAINT (as stated by Patient):  Azeb Major is a 81 y.o., White female, attending follow-up appointment for mood and anxiety management.      HISTORY OF PRESENT ILLNESS:  Azeb Major is a 81 y.o. old female with bipolar disorder comes in today for follow up. Patient was last seen 9 months ago, and following treatment planning recommendations were done:  · Continue Depakote  mg 3 tablets daily after dinner for mood stabilization.  Given 90-day supply with 1 refill.  · Continue quetiapine  mg at bedtime for mood stabilization.  Given 90-day supply with 1 refill.  · Continue psychotherapy for mood and anxiety management.    Patient's family messaged me on 10/27/2020: Azeb has become very depressed.  We were wondering if she can start a low dose of Venlafaxine.  If she needs to be seen let us know, but this seems to be consistent with past patterns   Discussed: I will send the prescription of effexor XR 37.5 mg daily dose for her. Please keep a close eye on manic activation of symptoms    I received this message in Dec 2020: that Azeb's depression has increased, mainly due to her limited contact with her family. Her visits have been few and far between due to COVID.  Discussed: Ideally the treatment for her depression is increasing the contact time with family via phone or video calls.    We can also increase the dose of her Effexor to 75 mg daily dose but in that case we need to monitor her closely for manic switches.  Please let me know if family can monitor her closely for manic switches and I will prescribe the increased dose of Effexor accordingly.     Patient is compliant with medications with no side effect.  Her daughter  was present for entire session.  Patient describes mood and anxiety is stable.  Her blood pressure is high and she was started on antihypertensive medication.  Discussed that addition of Effexor can contribute to this and agreed with plan of stopping the medication and restarting Effexor only if signs of worsening depression is noted in future.  Her lipid panels are also showing increase cholesterol levels and patient will follow-up with her primary care physician if intervention with medication is indicated.      RESPONSE TO TREATMENT:  improving      MEDICATION SIDE EFFECTS:  Hypertension (likely from effexor addition)  Abnormal lipid panel      PSYCHOTHERAPY ASPECT OF SESSION (16 MIN):  • Most part of the session was dedicated to letting patient express her feelings related to social isolation from COVID-19 and its impact.  Validation was provided for appropriate emotional responses and normalization was done.  • Motivated to remain in contact with close family and maintain physical activity to the best of her abilities.  • Most session was dedicated to active listening and implementing supportive psychotherapy skills.      CURRENT MEDICATIONS:  Current Outpatient Medications   Medication Sig Dispense Refill   • lisinopril (PRINIVIL) 5 MG Tab Take 1 tablet by mouth at bedtime. 90 tablet 3   • alendronate (FOSAMAX) 70 MG Tab TAKE 1 TABLET EVERY 7 DAYS 12 tablet 3   • levothyroxine (SYNTHROID) 125 MCG Tab TAKE 1 TABLET EVERY MORNING ON AN EMPTY STOMACH 90 tablet 3   • tolterodine ER (DETROL LA) 4 MG CAPSULE SR 24 HR TAKE 1 CAPSULE DAILY 90 capsule 4   • venlafaxine XR (EFFEXOR XR) 37.5 MG CAPSULE SR 24 HR TAKE 1 CAPSULE DAILY 90 capsule 2   • QUEtiapine Fumarate 150 MG TABLET SR 24 HR TAKE 1 TABLET DAILY AT BEDTIME 90 tablet 3   • divalproex ER (DEPAKOTE ER) 250 MG TABLET SR 24 HR Take 3 Tablets by mouth 1/2 hour after dinner. 270 tablet 0   • omeprazole (PRILOSEC) 20 MG delayed-release capsule TAKE 1 CAPSULE DAILY  90 capsule 3   • nitrofurantoin (MACRODANTIN) 50 MG Cap TAKE 1 CAPSULE DAILY 90 Cap 3   • estradiol (ESTRACE) 0.1 MG/GM vaginal cream Apply pea sized amount to genital area three time each week. 2 Tube 1     No current facility-administered medications for this visit.       MEDICAL HISTORY  Past Medical History:   Diagnosis Date   • Bipolar disorder (HCC)    • Bronchitis 02-26-13    in the past, not recent   • CATARACT     bilat removed    • Dental disorder    • Frequent UTI 12/1/2010   • Heart burn    • History of falling     r/t vertigo   • Hypertension    • Hypothyroid 12/1/2010   • Other specified symptom associated with female genital organs    • Pneumonia     x2   • Psychiatric disorder     bipolar   • Renal disorder     daughter reports some renal insufficiency   • Seasonal allergic reaction    • Stress incontinence    • Urinary bladder disorder      Past Surgical History:   Procedure Laterality Date   • HYSTERECTOMY ROBOTIC  3/18/2013    Performed by Juju Gonzales M.D. at SUR[facility]ERY [facility]   • COLPOSACROPEXY ROBOTIC  3/18/2013    Performed by Juju Gonzales M.D. at SUR[facility]ERY [facility]   • BLADDER SLING FEMALE  3/18/2013    Performed by Juju Gonzales M.D. at SUR[facility]ERY [facility]   • CYSTOSCOPY  3/18/2013    Performed by Juju Gonzales M.D. at [facility]ERY [facility]   • VENTRAL HERNIA REPAIR  3/18/2010    Performed by TRAVIS PATTERSON at SURGERY [facility]   • TONSILLECTOMY         PAST PSYCHIATRIC HISTORY  Patient with psychiatric illness since teenage year  Prior Self harm/suicide attempt: none   Prior Diagnosis: bipolar 1 diorder     PAST PSYCHIATRIC MEDICATIONS  · Historically, has not done well off of medications  · Thorazine - did not like  · Lithium - worked, affected kidneys  · Effexor - on and off for years  · Many others in the past      FAMILY HISTORY  Psychiatric diagnosis:  Not known     SUBSTANCE USE HISTORY:  ALCOHOL: no  TOBACCO: no  CANNABIS:  "no  OPIOIDS: no  PRESCRIPTION MEDICATIONS: no  OTHERS: no  History of inpatient/outpatient rehab treatment: no     SOCIAL HISTORY  The patient lives alone in the house but has 5 children who visit her daily.    Each one of them takes care of her on a different night of the week.    Her son lives across the street.  People from [employer] visit her 3 days/week      MEDICAL REVIEW OF SYSTEMS:   Constitutional negative   Eyes positive - decrease in vision   Ears/Nose/Mouth/Throat negative   Cardiovascular negative   Respiratory negative   Gastrointestinal negative   Genitourinary  CKD   Muscular positive - weakness   Integumentary negative   Neurological negative   Endocrine  hypothyroid   Hematologic/Lymphatic negative          PHYSICAL EXAMINAION:  Vital signs: There were no vitals taken for this visit.  Musculoskeletal: slow with cane  Abnormal movements: none      MENTAL STATUS EXAMINATION      General:   - Grooming and hygiene: Casual,   - Apparent distress: none,   - Behavior: Calm  - Eye Contact:  Good,   - no psychomotor agitation or retardation    - Participation: Active verbal participation  Orientation: Alert and Fully Oriented to person, place and time  Mood: Euthymic  Affect: Flexible and Full range,  Thought Process: Logical and Goal-directed  Thought Content: Denies suicidal or homicidal ideations, intent or plan Within normal limits  Perception: Denies auditory or visual hallucinations. No delusions noted Within normal limits  Attention span and concentration: Intact   Speech:Rate within normal limits and Volume within normal limits  Language: Appropriate   Insight: Good  Judgment: Good  Recent and remote memory: No gross evidence of memory deficits        DEPRESSION SCREENING:  Depression Screen (PHQ-2/PHQ-9) 7/8/2019 8/6/2019 6/22/2021   PHQ-2 Total Score - - -   PHQ-2 Total Score - - -   PHQ-2 Total Score 2 2 1   PHQ-9 Total Score - - -   PHQ-9 Total Score 7 7 2       Interpretation of PHQ-9 Total " Score   Score Severity   1-4 No Depression   5-9 Mild Depression   10-14 Moderate Depression   15-19 Moderately Severe Depression   20-27 Severe Depression    CURRENT RISK:       Suicidal: Low       Homicidal: Low       Self-Harm: Low       Relapse: Low       Crisis Safety Plan Reviewed Not Indicated       If evidence of imminent risk is present, intervention/plan:      MEDICAL RECORDS/LABS/DIAGNOSTIC TESTS REVIEWED:  Component      Latest Ref Rng & Units 4/14/2021          11:35 AM   TSH      0.380 - 5.330 uIU/mL 1.420     Component      Latest Ref Rng & Units 4/14/2021          11:35 AM   Sodium      135 - 145 mmol/L 134 (L)   Potassium      3.6 - 5.5 mmol/L 5.4   Chloride      96 - 112 mmol/L 101   Co2      20 - 33 mmol/L 24   Anion Gap      7.0 - 16.0 9.0   Glucose      65 - 99 mg/dL 81   Bun      8 - 22 mg/dL 39 (H)   Creatinine      0.50 - 1.40 mg/dL 1.33   Calcium      8.5 - 10.5 mg/dL 9.4   AST(SGOT)      12 - 45 U/L 31   ALT(SGPT)      2 - 50 U/L 17   Alkaline Phosphatase      30 - 99 U/L 59   Total Bilirubin      0.1 - 1.5 mg/dL 0.3   Albumin      3.2 - 4.9 g/dL 4.3   Total Protein      6.0 - 8.2 g/dL 7.6   Globulin      1.9 - 3.5 g/dL 3.3   A-G Ratio      g/dL 1.3     Component      Latest Ref Rng & Units 4/14/2021          11:35 AM   WBC      4.8 - 10.8 K/uL 5.5   RBC      4.20 - 5.40 M/uL 4.50   Hemoglobin      12.0 - 16.0 g/dL 14.5   Hematocrit      37.0 - 47.0 % 45.6   MCV      81.4 - 97.8 fL 101.3 (H)   MCH      27.0 - 33.0 pg 32.2   MCHC      33.6 - 35.0 g/dL 31.8 (L)   RDW      35.9 - 50.0 fL 54.7 (H)   Platelet Count      164 - 446 K/uL 161 (L)   MPV      9.0 - 12.9 fL 12.0   Neutrophils-Polys      44.00 - 72.00 % 49.30   Lymphocytes      22.00 - 41.00 % 36.90   Monocytes      0.00 - 13.40 % 11.40   Eosinophils      0.00 - 6.90 % 1.10   Basophils      0.00 - 1.80 % 0.90   Immature Granulocytes      0.00 - 0.90 % 0.40   Nucleated RBC      /100 WBC 0.00   Neutrophils (Absolute)      2.00 - 7.15 K/uL  2.69   Lymphs (Absolute)      1.00 - 4.80 K/uL 2.01   Monos (Absolute)      0.00 - 0.85 K/uL 0.62   Eos (Absolute)      0.00 - 0.51 K/uL 0.06   Baso (Absolute)      0.00 - 0.12 K/uL 0.05   Immature Granulocytes (abs)      0.00 - 0.11 K/uL 0.02   NRBC (Absolute)      K/uL 0.00   Immature Cells          Comments-Diff            Component      Latest Ref Rng & Units 2021          11:35 AM   Vitamin B12 -True Cobalamin      211 - 911 pg/mL 1144 (H)       [place]  records -   Reviewed       DIAGNOSTIC IMPRESSION(S):  · Bipolar 1 disorder        PLAN:  (1) Bipolar 1 disorder  · Stable  · Continue Depakote  mg 3 tablets daily after dinner for mood stabilization.  Given 90-day supply with 1 refill.  · Continue quetiapine  mg at bedtime for mood stabilization.  Given 90-day supply with 1 refill.  · Continue psychotherapy for mood and anxiety management.  · Medication options, alternatives (including no medications) and medication risks/benefits/side effects were discussed in detail.  · The patient was advised to call, message provider on [employer], or come in to the clinic if symptoms worsen or if any future questions/issues regarding their medications arise; the patient verbalized understanding and agreement.    · The patient was educated to call 911, call the suicide hotline, or go to local ER if having thoughts of suicide or homicide; verbalized understanding.       (3) Medications Induced side effects:  · Stop Effexor for hypertension noted  · Patient will follow up with PCP if increase in lipid panel needs treatment.      DATE  2019   DATE:   DATE:   DATE:     BMI       Blood Pressure       HbA1c    (OR)         Fasting Glucose       Fasting Lipids   T. Chol: 179  T  HDL: 49  LDL: 102 (H)   T. Chol: 221 (H)  T (H)  HDL: 53  LDL: 123 (H)           Billing Coding based on:  64499: based on MDM (1 chronic illness with side effects from treatment)  45642: based on psychotherapy  timing    Return to clinic in 3 months or sooner if symptoms worsen.  Next Appointment: instruction provided on how to make the next appointment.     The proposed treatment plan was discussed with the patient who was provided the opportunity to ask questions and make suggestions regarding alternative treatment. Patient verbalized understanding and expressed agreement with the plan.       James Ray M.D.  06/30/21    This note was created using voice recognition software (Dragon). The accuracy of the dictation is limited by the abilities of the software. I have reviewed the note prior to signing, however some errors in grammar and context are still possible. If you have any questions related to this note please do not hesitate to contact our office.

## 2021-08-17 ENCOUNTER — APPOINTMENT (OUTPATIENT)
Dept: BEHAVIORAL HEALTH | Facility: CLINIC | Age: 81
End: 2021-08-17
Payer: MEDICARE

## 2021-09-29 ENCOUNTER — OFFICE VISIT (OUTPATIENT)
Dept: BEHAVIORAL HEALTH | Facility: CLINIC | Age: 81
End: 2021-09-29
Payer: MEDICARE

## 2021-09-29 DIAGNOSIS — F31.9 BIPOLAR 1 DISORDER (HCC): ICD-10-CM

## 2021-09-29 DIAGNOSIS — F31.75 BIPOLAR DISORDER, IN PARTIAL REMISSION, MOST RECENT EPISODE DEPRESSED (HCC): ICD-10-CM

## 2021-09-29 PROCEDURE — 99214 OFFICE O/P EST MOD 30 MIN: CPT | Performed by: PSYCHIATRY & NEUROLOGY

## 2021-09-29 PROCEDURE — 90833 PSYTX W PT W E/M 30 MIN: CPT | Performed by: PSYCHIATRY & NEUROLOGY

## 2021-09-29 RX ORDER — DIVALPROEX SODIUM 250 MG/1
750 TABLET, EXTENDED RELEASE ORAL
Qty: 270 TABLET | Refills: 1 | Status: SHIPPED | OUTPATIENT
Start: 2021-09-29 | End: 2022-03-30 | Stop reason: SDUPTHER

## 2021-09-29 RX ORDER — QUETIAPINE 150 MG/1
TABLET, FILM COATED, EXTENDED RELEASE ORAL
Qty: 90 TABLET | Refills: 1 | Status: SHIPPED | OUTPATIENT
Start: 2021-09-29 | End: 2022-03-30 | Stop reason: SDUPTHER

## 2021-09-29 RX ORDER — VENLAFAXINE HYDROCHLORIDE 75 MG/1
75 CAPSULE, EXTENDED RELEASE ORAL DAILY
Qty: 30 CAPSULE | Refills: 3 | Status: SHIPPED | OUTPATIENT
Start: 2021-09-29 | End: 2021-10-12 | Stop reason: SDUPTHER
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
## 2021-10-12 DIAGNOSIS — F31.75 BIPOLAR DISORDER, IN PARTIAL REMISSION, MOST RECENT EPISODE DEPRESSED (HCC): ICD-10-CM

## 2021-10-12 RX ORDER — VENLAFAXINE HYDROCHLORIDE 75 MG/1
75 CAPSULE, EXTENDED RELEASE ORAL DAILY
Qty: 90 CAPSULE | Refills: 1 | Status: SHIPPED | OUTPATIENT
Start: 2021-10-12 | End: 2021-11-30 | Stop reason: SDUPTHER

## 2021-11-11 ENCOUNTER — OFFICE VISIT (OUTPATIENT)
Dept: INTERNAL MEDICINE | Facility: IMAGING CENTER | Age: 81
End: 2021-11-11
Payer: MEDICARE

## 2021-11-11 VITALS
RESPIRATION RATE: 14 BRPM | OXYGEN SATURATION: 91 % | WEIGHT: 154 LBS | DIASTOLIC BLOOD PRESSURE: 72 MMHG | HEART RATE: 90 BPM | SYSTOLIC BLOOD PRESSURE: 136 MMHG | BODY MASS INDEX: 24.75 KG/M2 | TEMPERATURE: 97.3 F | HEIGHT: 66 IN

## 2021-11-11 DIAGNOSIS — Z23 NEED FOR VACCINATION: ICD-10-CM

## 2021-11-11 DIAGNOSIS — I10 PRIMARY HYPERTENSION: ICD-10-CM

## 2021-11-11 DIAGNOSIS — H61.21 IMPACTED CERUMEN OF RIGHT EAR: ICD-10-CM

## 2021-11-11 DIAGNOSIS — R54 FRAIL ELDERLY: ICD-10-CM

## 2021-11-11 DIAGNOSIS — L57.0 AK (ACTINIC KERATOSIS): ICD-10-CM

## 2021-11-11 PROCEDURE — 99214 OFFICE O/P EST MOD 30 MIN: CPT | Mod: 25 | Performed by: FAMILY MEDICINE

## 2021-11-11 PROCEDURE — G0008 ADMIN INFLUENZA VIRUS VAC: HCPCS | Performed by: FAMILY MEDICINE

## 2021-11-11 PROCEDURE — 90662 IIV NO PRSV INCREASED AG IM: CPT | Performed by: FAMILY MEDICINE

## 2021-11-11 ASSESSMENT — FIBROSIS 4 INDEX: FIB4 SCORE: 3.88

## 2021-11-12 NOTE — PROGRESS NOTES
"Chief Complaint   Patient presents with   • Flu Vaccine   • Hypertension Follow-up   • Skin Lesion     left forehead       Subjective:     HPI:   Azeb Major is a 81 y.o. female, with history of hypertension here to discuss the evaluation and management of:     1.  Hypertension-patient was started on lisinopril 5 mg earlier this year and has been monitoring her blood pressure  The blood pressure readings she brings with her today look fairly good but they stopped in July.  She forgot to bring the more frequent readings.  She is here with her daughter who reports she will send them through my chart  They reports she had a couple of systolic blood pressure readings in the 90s and did feel dizzy    Overall she reports she does feel weaker  2.  AK-she would like it treated with liquid nitrogen today    3.  Hard of hearing-question of wax in her ear making it worse    4.  Overall weakness she has not been doing her physical therapy at home-and does feel that has an impact on her strength and balance    No problems updated.     Objective:     /72   Pulse 90   Temp 36.3 °C (97.3 °F) (Temporal)   Resp 14   Ht 1.676 m (5' 5.98\")   Wt 69.9 kg (154 lb)   SpO2 91%  Body mass index is 24.87 kg/m².    Physical Exam:  Physical Exam  Constitutional: Well-developed and well-nourished. Not diaphoretic. No distress.   Skin: Skin is warm and dry. No rash noted.  right temple-RAISED, HYPERKERATOTIC, ROUGH LESION    Head: Atraumatic without lesions.  Eyes: Conjunctivae and extraocular motions are normal.   Ears:  External ears unremarkable.  Right EAC impacted with cerumen  Nose: Nares patent. Mucosa without edema or erythema. No discharge. No facial tenderness.     Neck: Supple, No thyromegaly present. No JVD     Chest: Effort normal.     Extremities: No cyanosis, clubbing, erythema, nor edema.   Neurological: Alert and oriented x 3.  Gait is slightly unsteady but she was able to climb up on the exam table with " minimal assistance  Psychiatric:  Behavior, mood, and affect are appropriate     Assessment and Plan:     The following treatment plan was discussed:     No problem-specific Assessment & Plan notes found for this encounter.     1. Primary hypertension-unsure of need for medication adjustment  She should continue checking her blood pressure and hold medication if systolic is under 100  Particularly if she is feeling dizzy     2. Frail elderly  - Referral to Physical Therapy    3. Impacted cerumen of right ear-lavaged by RN    4. AK (actinic keratosis)-treated with liquid nitrogen    5. Need for vaccination  - INFLUENZA VACCINE, HIGH DOSE (65+ ONLY)           Any change or worsening of signs or symptoms, patient encouraged to follow-up or report to emergency room for further evaluation. Patient verbalizes understanding and agrees.    Follow-Up: As needed    PLEASE NOTE: This dictation was created using voice recognition software. I have made every reasonable attempt to correct obvious errors, but I expect that there are errors of grammar and possibly content that I did not discover before finalizing the note.    My total time spent caring for the patient on the day of the encounter was  greater than 30 minutes.   This includes obtaining history, reviewing chart, physical exam, patient education, reviewing outside records, placing orders, interpreting tests and coordinating care.

## 2021-11-30 DIAGNOSIS — F31.75 BIPOLAR DISORDER, IN PARTIAL REMISSION, MOST RECENT EPISODE DEPRESSED (HCC): ICD-10-CM

## 2021-11-30 RX ORDER — VENLAFAXINE HYDROCHLORIDE 75 MG/1
75 CAPSULE, EXTENDED RELEASE ORAL DAILY
Qty: 90 CAPSULE | Refills: 1 | Status: SHIPPED | OUTPATIENT
Start: 2021-11-30 | End: 2022-03-30 | Stop reason: SDUPTHER

## 2021-12-31 DIAGNOSIS — N39.0 FREQUENT UTI: ICD-10-CM

## 2022-01-03 RX ORDER — NITROFURANTOIN MACROCRYSTALS 50 MG/1
CAPSULE ORAL
Qty: 90 CAPSULE | Refills: 3 | Status: SHIPPED | OUTPATIENT
Start: 2022-01-03 | End: 2022-12-05

## 2022-03-30 ENCOUNTER — OFFICE VISIT (OUTPATIENT)
Dept: BEHAVIORAL HEALTH | Facility: CLINIC | Age: 82
End: 2022-03-30
Payer: MEDICARE

## 2022-03-30 DIAGNOSIS — F31.75 BIPOLAR DISORDER, IN PARTIAL REMISSION, MOST RECENT EPISODE DEPRESSED (HCC): ICD-10-CM

## 2022-03-30 PROCEDURE — 99214 OFFICE O/P EST MOD 30 MIN: CPT | Performed by: PSYCHIATRY & NEUROLOGY

## 2022-03-30 RX ORDER — QUETIAPINE 150 MG/1
TABLET, FILM COATED, EXTENDED RELEASE ORAL
Qty: 90 TABLET | Refills: 1 | Status: SHIPPED | OUTPATIENT
Start: 2022-03-30 | End: 2022-09-28 | Stop reason: SDUPTHER

## 2022-03-30 RX ORDER — OMEPRAZOLE 20 MG/1
CAPSULE, DELAYED RELEASE ORAL
Qty: 90 CAPSULE | Refills: 3 | Status: SHIPPED | OUTPATIENT
Start: 2022-03-30 | End: 2023-01-25

## 2022-03-30 RX ORDER — DIVALPROEX SODIUM 250 MG/1
750 TABLET, EXTENDED RELEASE ORAL
Qty: 270 TABLET | Refills: 1 | Status: SHIPPED | OUTPATIENT
Start: 2022-03-30 | End: 2022-09-06

## 2022-03-30 RX ORDER — VENLAFAXINE HYDROCHLORIDE 75 MG/1
75 CAPSULE, EXTENDED RELEASE ORAL DAILY
Qty: 90 CAPSULE | Refills: 1 | Status: SHIPPED | OUTPATIENT
Start: 2022-03-30 | End: 2022-08-03

## 2022-03-30 ASSESSMENT — PATIENT HEALTH QUESTIONNAIRE - PHQ9: CLINICAL INTERPRETATION OF PHQ2 SCORE: 0

## 2022-03-30 NOTE — PROGRESS NOTES
IN-PERSON SESSION IN CLINIC    PSYCHIATRY FOLLOW-UP NOTE      Name: Azeb Major  MRN: [id number]  : 1940  Age: 81 y.o.  Date of assessment: 3/30/2022  PCP: Olimpia Newsome M.D.  Persons in attendance: Patient and daughter Olive      REASON FOR VISIT/CHIEF COMPLAINT (as stated by Patient):  Azeb Major is a 81 y.o., White female, attending follow-up appointment for mood and anxiety management.      HISTORY OF PRESENT ILLNESS:  Azeb Major is a 81 y.o. old female with bipolar disorder comes in today for follow up. Patient was last seen 6 months ago, and following treatment planning recommendations were done:  · Continue Depakote ER 250 mg 3 tablets daily after dinner for mood stabilization.  Given 90-day supply with 1 refill.  · Continue quetiapine  mg at bedtime for mood stabilization.  Given 90-day supply with 1 refill.  · Continue venlafaxine XR 75 mg daily for depression management. Monitor for manic switch. Given 90-day supply with 1 refill.  · Continue psychotherapy for mood and anxiety management.    Patient is compliant with medications with no acute side effects.  Both patient and her daughter agreed that she is stable with no signs of depression, hypomania or perry.  Her blood pressure is stable on Effexor and is on lisinopril with no signs of hypotension.  Patient and daughter prefers to stay on the same combination.  They will see her primary care physician this year and agreed with getting lipid panel and HbA1c also because she is on Seroquel.      CURRENT MEDICATIONS:  Current Outpatient Medications   Medication Sig Dispense Refill   • nitrofurantoin (MACRODANTIN) 50 MG Cap TAKE 1 CAPSULE DAILY 90 Capsule 3   • venlafaxine XR (EFFEXOR XR) 75 MG CAPSULE SR 24 HR Take 1 Capsule by mouth every day. 90 Capsule 1   • divalproex ER (DEPAKOTE ER) 250 MG TABLET SR 24 HR Take 3 Tablets by mouth 1/2 hour after dinner. 270 Tablet 1   • QUEtiapine Fumarate 150 MG TABLET SR  24 HR TAKE 1 TABLET DAILY AT BEDTIME 90 Tablet 1   • lisinopril (PRINIVIL) 5 MG Tab Take 1 tablet by mouth at bedtime. 90 tablet 3   • alendronate (FOSAMAX) 70 MG Tab TAKE 1 TABLET EVERY 7 DAYS 12 tablet 3   • levothyroxine (SYNTHROID) 125 MCG Tab TAKE 1 TABLET EVERY MORNING ON AN EMPTY STOMACH 90 tablet 3   • tolterodine ER (DETROL LA) 4 MG CAPSULE SR 24 HR TAKE 1 CAPSULE DAILY 90 capsule 4   • omeprazole (PRILOSEC) 20 MG delayed-release capsule TAKE 1 CAPSULE DAILY 90 capsule 3   • estradiol (ESTRACE) 0.1 MG/GM vaginal cream Apply pea sized amount to genital area three time each week. 2 Tube 1     No current facility-administered medications for this visit.       MEDICAL HISTORY  Past Medical History:   Diagnosis Date   • Bipolar disorder (HCC)    • Bronchitis 02-26-13    in the past, not recent   • CATARACT     bilat removed    • Dental disorder    • Frequent UTI 12/1/2010   • Heart burn    • History of falling     r/t vertigo   • Hypertension    • Hypothyroid 12/1/2010   • Other specified symptom associated with female genital organs    • Pneumonia     x2   • Psychiatric disorder     bipolar   • Renal disorder     daughter reports some renal insufficiency   • Seasonal allergic reaction    • Stress incontinence    • Urinary bladder disorder      Past Surgical History:   Procedure Laterality Date   • HYSTERECTOMY ROBOTIC  3/18/2013    Performed by Juju Gonzales M.D. at SURGERY [facility]   • COLPOSACROPEXY ROBOTIC  3/18/2013    Performed by Juju Gonzales M.D. at SURGERY [facility]   • BLADDER SLING FEMALE  3/18/2013    Performed by Juju Gonzales M.D. at SURGERY [facility]   • CYSTOSCOPY  3/18/2013    Performed by Juju Gonzales M.D. at SURGERY [facility]   • VENTRAL HERNIA REPAIR  3/18/2010    Performed by TRAVIS PATTERSON at SURGERY [facility]   • TONSILLECTOMY         PAST PSYCHIATRIC HISTORY  Patient with psychiatric illness since teenage year  Prior Self  "harm/suicide attempt: none   Prior Diagnosis: bipolar 1 diorder     PAST PSYCHIATRIC MEDICATIONS  · Historically, has not done well off of medications  · Thorazine - did not like  · Lithium - worked, affected kidneys  · Effexor - on and off for years  · Many others in the past      FAMILY HISTORY  Psychiatric diagnosis:  Not known     SUBSTANCE USE HISTORY:  ALCOHOL: no  TOBACCO: no  CANNABIS: no  OPIOIDS: no  PRESCRIPTION MEDICATIONS: no  OTHERS: no  History of inpatient/outpatient rehab treatment: no     SOCIAL HISTORY  The patient lives alone in the house but has 5 children who visit her daily.    Each one of them takes care of her on a different night of the week.    Her son lives across the street.  People from [facility] visit her 3 days/week    Constitutional negative   Eyes  decrease in vision   Ears/Nose/Mouth/Throat negative   Cardiovascular negative   Respiratory negative   Gastrointestinal negative   Genitourinary  CKD   Muscular negative   Integumentary negative   Neurological negative   Endocrine  hypothyroid   Hematologic/Lymphatic negative       PHYSICAL EXAMINAION:  Vital signs: There were no vitals taken for this visit.  Musculoskeletal: Normal gait.   Abnormal movements: none      MENTAL STATUS EXAMINATION      General:   - Grooming and hygiene: Casual,   - Apparent distress: none,   - Behavior: Calm  - Eye Contact:  Good,   - no psychomotor agitation or retardation    - Participation: Active verbal participation  Orientation: Alert and Fully Oriented to person, place and time  Mood: Euthymic  Affect: Flexible,  Thought Process: Logical and Goal-directed  Thought Content: Denies suicidal or homicidal ideations, intent or plan Within normal limits  Perception: Denies auditory or visual hallucinations. No delusions noted Within normal limits  Attention span and concentration: Intact   Speech:Rate within normal limits and Volume within normal limits  Language: Appropriate   Insight: Good  Judgment: " Good  Recent and remote memory: No gross evidence of memory deficits        DEPRESSION SCREENING:  Depression Screen (PHQ-2/PHQ-9) 7/8/2019 8/6/2019 6/22/2021   PHQ-2 Total Score - - -   PHQ-2 Total Score - - -   PHQ-2 Total Score 2 2 1   PHQ-9 Total Score - - -   PHQ-9 Total Score 7 7 2       Interpretation of PHQ-9 Total Score   Score Severity   1-4 No Depression   5-9 Mild Depression   10-14 Moderate Depression   15-19 Moderately Severe Depression   20-27 Severe Depression    CURRENT RISK:       Suicidal: Low       Homicidal: Low       Self-Harm: Low       Relapse: Low       Crisis Safety Plan Reviewed Not Indicated       If evidence of imminent risk is present, intervention/plan:      MEDICAL RECORDS/LABS/DIAGNOSTIC TESTS REVIEWED:  No new lab since last visit     [place]  records -   Reviewed     DIAGNOSTIC IMPRESSION(S):  · Bipolar 1 disorder        PLAN:  (1) Bipolar 1 disorder  · Stable  · Continue Depakote ER 250 mg 3 tablets daily after dinner for mood stabilization.  Given 90-day supply with 1 refill.  · Continue quetiapine  mg at bedtime for mood stabilization.  Given 90-day supply with 1 refill.  · Continue venlafaxine XR 75 mg daily for depression management. Monitor for manic switch. Given 90-day supply with 1 refill.  · Continue psychotherapy for mood and anxiety management.  · Medication options, alternatives (including no medications) and medication risks/benefits/side effects were discussed in detail.  · The patient was advised to call, message provider on [employer], or come in to the clinic if symptoms worsen or if any future questions/issues regarding their medications arise; the patient verbalized understanding and agreement.    · The patient was educated to call 911, call the suicide hotline, or go to local ER if having thoughts of suicide or homicide; verbalized understanding.        DATE:  11/14/2019 DATE:  4/14/21 DATE:    DATE:      BMI           Blood Pressure            HbA1c     (OR)              Fasting Glucose           Fasting Lipids    T. Chol: 179  T  HDL: 49  LDL: 102 (H)    T. Chol: 221 (H)  T (H)  HDL: 53  LDL: 123 (H)         Billing Coding based on:  12242: based on MDM    Return to clinic in 6 months or sooner if symptoms worsen.  Next Appointment: instruction provided on how to make the next appointment.     The proposed treatment plan was discussed with the patient who was provided the opportunity to ask questions and make suggestions regarding alternative treatment. Patient verbalized understanding and expressed agreement with the plan.       James Ray M.D.  22    This note was created using voice recognition software (Dragon). The accuracy of the dictation is limited by the abilities of the software. I have reviewed the note prior to signing, however some errors in grammar and context are still possible. If you have any questions related to this note please do not hesitate to contact our office.

## 2022-05-06 RX ORDER — LISINOPRIL 5 MG/1
TABLET ORAL
Qty: 90 TABLET | Refills: 3 | Status: SHIPPED | OUTPATIENT
Start: 2022-05-06 | End: 2023-01-04

## 2022-06-06 RX ORDER — LEVOTHYROXINE SODIUM 0.12 MG/1
TABLET ORAL
Qty: 90 TABLET | Refills: 3 | Status: SHIPPED | OUTPATIENT
Start: 2022-06-06 | End: 2023-06-01

## 2022-07-01 DIAGNOSIS — N39.46 MIXED INCONTINENCE: ICD-10-CM

## 2022-07-01 RX ORDER — TOLTERODINE 4 MG/1
CAPSULE, EXTENDED RELEASE ORAL
Qty: 90 CAPSULE | Refills: 3 | Status: SHIPPED | OUTPATIENT
Start: 2022-07-01 | End: 2023-06-26

## 2022-08-03 RX ORDER — VENLAFAXINE HYDROCHLORIDE 37.5 MG/1
37.5 CAPSULE, EXTENDED RELEASE ORAL DAILY
Qty: 30 CAPSULE | Refills: 0 | Status: SHIPPED | OUTPATIENT
Start: 2022-08-03 | End: 2022-09-28

## 2022-08-18 DIAGNOSIS — R53.1 WEAKNESS: ICD-10-CM

## 2022-08-18 DIAGNOSIS — N18.30 STAGE 3 CHRONIC KIDNEY DISEASE, UNSPECIFIED WHETHER STAGE 3A OR 3B CKD: ICD-10-CM

## 2022-08-18 DIAGNOSIS — E55.9 VITAMIN D DEFICIENCY: ICD-10-CM

## 2022-08-18 DIAGNOSIS — E03.9 HYPOTHYROIDISM, UNSPECIFIED TYPE: ICD-10-CM

## 2022-08-24 ENCOUNTER — HOSPITAL ENCOUNTER (OUTPATIENT)
Facility: MEDICAL CENTER | Age: 82
End: 2022-08-24
Attending: FAMILY MEDICINE
Payer: MEDICARE

## 2022-08-24 ENCOUNTER — NON-PROVIDER VISIT (OUTPATIENT)
Dept: INTERNAL MEDICINE | Facility: IMAGING CENTER | Age: 82
End: 2022-08-24
Payer: MEDICARE

## 2022-08-24 DIAGNOSIS — E55.9 VITAMIN D DEFICIENCY: ICD-10-CM

## 2022-08-24 DIAGNOSIS — E03.9 HYPOTHYROIDISM, UNSPECIFIED TYPE: ICD-10-CM

## 2022-08-24 DIAGNOSIS — N18.30 STAGE 3 CHRONIC KIDNEY DISEASE, UNSPECIFIED WHETHER STAGE 3A OR 3B CKD: ICD-10-CM

## 2022-08-24 DIAGNOSIS — R53.1 WEAKNESS: ICD-10-CM

## 2022-08-24 LAB
25(OH)D3 SERPL-MCNC: 103 NG/ML (ref 30–100)
ALBUMIN SERPL BCP-MCNC: 4.2 G/DL (ref 3.2–4.9)
ALBUMIN/GLOB SERPL: 1.3 G/DL
ALP SERPL-CCNC: 61 U/L (ref 30–99)
ALT SERPL-CCNC: 15 U/L (ref 2–50)
ANION GAP SERPL CALC-SCNC: 11 MMOL/L (ref 7–16)
AST SERPL-CCNC: 26 U/L (ref 12–45)
BASOPHILS # BLD AUTO: 0.9 % (ref 0–1.8)
BASOPHILS # BLD: 0.06 K/UL (ref 0–0.12)
BILIRUB SERPL-MCNC: 0.3 MG/DL (ref 0.1–1.5)
BUN SERPL-MCNC: 45 MG/DL (ref 8–22)
CALCIUM SERPL-MCNC: 9.6 MG/DL (ref 8.5–10.5)
CHLORIDE SERPL-SCNC: 108 MMOL/L (ref 96–112)
CHOLEST SERPL-MCNC: 192 MG/DL (ref 100–199)
CO2 SERPL-SCNC: 21 MMOL/L (ref 20–33)
CREAT SERPL-MCNC: 1.29 MG/DL (ref 0.5–1.4)
EOSINOPHIL # BLD AUTO: 0.08 K/UL (ref 0–0.51)
EOSINOPHIL NFR BLD: 1.3 % (ref 0–6.9)
ERYTHROCYTE [DISTWIDTH] IN BLOOD BY AUTOMATED COUNT: 49.2 FL (ref 35.9–50)
GFR SERPLBLD CREATININE-BSD FMLA CKD-EPI: 41 ML/MIN/1.73 M 2
GLOBULIN SER CALC-MCNC: 3.2 G/DL (ref 1.9–3.5)
GLUCOSE SERPL-MCNC: 81 MG/DL (ref 65–99)
HCT VFR BLD AUTO: 41.1 % (ref 37–47)
HDLC SERPL-MCNC: 52 MG/DL
HGB BLD-MCNC: 13.4 G/DL (ref 12–16)
IMM GRANULOCYTES # BLD AUTO: 0.02 K/UL (ref 0–0.11)
IMM GRANULOCYTES NFR BLD AUTO: 0.3 % (ref 0–0.9)
LDLC SERPL CALC-MCNC: 114 MG/DL
LYMPHOCYTES # BLD AUTO: 3.09 K/UL (ref 1–4.8)
LYMPHOCYTES NFR BLD: 48.8 % (ref 22–41)
MCH RBC QN AUTO: 32.1 PG (ref 27–33)
MCHC RBC AUTO-ENTMCNC: 32.6 G/DL (ref 33.6–35)
MCV RBC AUTO: 98.6 FL (ref 81.4–97.8)
MONOCYTES # BLD AUTO: 0.79 K/UL (ref 0–0.85)
MONOCYTES NFR BLD AUTO: 12.5 % (ref 0–13.4)
NEUTROPHILS # BLD AUTO: 2.29 K/UL (ref 2–7.15)
NEUTROPHILS NFR BLD: 36.2 % (ref 44–72)
NRBC # BLD AUTO: 0 K/UL
NRBC BLD-RTO: 0 /100 WBC
PLATELET # BLD AUTO: 151 K/UL (ref 164–446)
PMV BLD AUTO: 11.4 FL (ref 9–12.9)
POTASSIUM SERPL-SCNC: 5.9 MMOL/L (ref 3.6–5.5)
PROT SERPL-MCNC: 7.4 G/DL (ref 6–8.2)
RBC # BLD AUTO: 4.17 M/UL (ref 4.2–5.4)
SODIUM SERPL-SCNC: 140 MMOL/L (ref 135–145)
TRIGL SERPL-MCNC: 131 MG/DL (ref 0–149)
TSH SERPL DL<=0.005 MIU/L-ACNC: 0.98 UIU/ML (ref 0.38–5.33)
VIT B12 SERPL-MCNC: 1206 PG/ML (ref 211–911)
WBC # BLD AUTO: 6.3 K/UL (ref 4.8–10.8)

## 2022-08-24 PROCEDURE — 85025 COMPLETE CBC W/AUTO DIFF WBC: CPT

## 2022-08-24 PROCEDURE — 80061 LIPID PANEL: CPT

## 2022-08-24 PROCEDURE — 82306 VITAMIN D 25 HYDROXY: CPT

## 2022-08-24 PROCEDURE — 84443 ASSAY THYROID STIM HORMONE: CPT

## 2022-08-24 PROCEDURE — 82607 VITAMIN B-12: CPT

## 2022-08-24 PROCEDURE — 80053 COMPREHEN METABOLIC PANEL: CPT

## 2022-08-25 ENCOUNTER — HOSPITAL ENCOUNTER (OUTPATIENT)
Facility: MEDICAL CENTER | Age: 82
End: 2022-08-25
Attending: FAMILY MEDICINE
Payer: MEDICARE

## 2022-08-25 ENCOUNTER — NON-PROVIDER VISIT (OUTPATIENT)
Dept: INTERNAL MEDICINE | Facility: IMAGING CENTER | Age: 82
End: 2022-08-25
Payer: MEDICARE

## 2022-08-25 DIAGNOSIS — E87.5 HYPERKALEMIA: ICD-10-CM

## 2022-08-25 LAB
ANION GAP SERPL CALC-SCNC: 11 MMOL/L (ref 7–16)
BUN SERPL-MCNC: 59 MG/DL (ref 8–22)
CALCIUM SERPL-MCNC: 9.1 MG/DL (ref 8.5–10.5)
CHLORIDE SERPL-SCNC: 108 MMOL/L (ref 96–112)
CO2 SERPL-SCNC: 19 MMOL/L (ref 20–33)
CREAT SERPL-MCNC: 1.48 MG/DL (ref 0.5–1.4)
GFR SERPLBLD CREATININE-BSD FMLA CKD-EPI: 35 ML/MIN/1.73 M 2
GLUCOSE SERPL-MCNC: 108 MG/DL (ref 65–99)
POTASSIUM SERPL-SCNC: 5.9 MMOL/L (ref 3.6–5.5)
SODIUM SERPL-SCNC: 138 MMOL/L (ref 135–145)

## 2022-08-25 PROCEDURE — 80048 BASIC METABOLIC PNL TOTAL CA: CPT

## 2022-09-01 ENCOUNTER — OFFICE VISIT (OUTPATIENT)
Dept: INTERNAL MEDICINE | Facility: IMAGING CENTER | Age: 82
End: 2022-09-01
Payer: MEDICARE

## 2022-09-01 ENCOUNTER — HOSPITAL ENCOUNTER (OUTPATIENT)
Facility: MEDICAL CENTER | Age: 82
End: 2022-09-01
Attending: FAMILY MEDICINE
Payer: MEDICARE

## 2022-09-01 VITALS
TEMPERATURE: 97.4 F | OXYGEN SATURATION: 94 % | HEIGHT: 66 IN | RESPIRATION RATE: 14 BRPM | WEIGHT: 151 LBS | SYSTOLIC BLOOD PRESSURE: 140 MMHG | DIASTOLIC BLOOD PRESSURE: 74 MMHG | BODY MASS INDEX: 24.27 KG/M2 | HEART RATE: 87 BPM

## 2022-09-01 DIAGNOSIS — H57.9 EYE DISEASE: ICD-10-CM

## 2022-09-01 DIAGNOSIS — E87.5 HYPERKALEMIA: ICD-10-CM

## 2022-09-01 DIAGNOSIS — E67.3 HIGH VITAMIN D LEVEL: ICD-10-CM

## 2022-09-01 DIAGNOSIS — F31.70 BIPOLAR DISORDER IN REMISSION (HCC): ICD-10-CM

## 2022-09-01 DIAGNOSIS — N18.32 STAGE 3B CHRONIC KIDNEY DISEASE: ICD-10-CM

## 2022-09-01 DIAGNOSIS — Z79.899 OTHER LONG TERM (CURRENT) DRUG THERAPY: ICD-10-CM

## 2022-09-01 LAB
ALBUMIN SERPL BCP-MCNC: 3.9 G/DL (ref 3.2–4.9)
ALBUMIN/GLOB SERPL: 1.3 G/DL
ALP SERPL-CCNC: 58 U/L (ref 30–99)
ALT SERPL-CCNC: 13 U/L (ref 2–50)
ANION GAP SERPL CALC-SCNC: 11 MMOL/L (ref 7–16)
AST SERPL-CCNC: 20 U/L (ref 12–45)
BILIRUB SERPL-MCNC: 0.2 MG/DL (ref 0.1–1.5)
BUN SERPL-MCNC: 50 MG/DL (ref 8–22)
CALCIUM SERPL-MCNC: 9 MG/DL (ref 8.5–10.5)
CHLORIDE SERPL-SCNC: 111 MMOL/L (ref 96–112)
CO2 SERPL-SCNC: 19 MMOL/L (ref 20–33)
CREAT SERPL-MCNC: 1.26 MG/DL (ref 0.5–1.4)
EST. AVERAGE GLUCOSE BLD GHB EST-MCNC: 111 MG/DL
GFR SERPLBLD CREATININE-BSD FMLA CKD-EPI: 42 ML/MIN/1.73 M 2
GLOBULIN SER CALC-MCNC: 3.1 G/DL (ref 1.9–3.5)
GLUCOSE SERPL-MCNC: 91 MG/DL (ref 65–99)
HBA1C MFR BLD: 5.5 % (ref 4–5.6)
POTASSIUM SERPL-SCNC: 5.4 MMOL/L (ref 3.6–5.5)
PROT SERPL-MCNC: 7 G/DL (ref 6–8.2)
SODIUM SERPL-SCNC: 141 MMOL/L (ref 135–145)

## 2022-09-01 PROCEDURE — 80053 COMPREHEN METABOLIC PANEL: CPT

## 2022-09-01 PROCEDURE — 99215 OFFICE O/P EST HI 40 MIN: CPT | Performed by: FAMILY MEDICINE

## 2022-09-01 PROCEDURE — 83036 HEMOGLOBIN GLYCOSYLATED A1C: CPT

## 2022-09-01 ASSESSMENT — FIBROSIS 4 INDEX: FIB4 SCORE: 3.73

## 2022-09-06 DIAGNOSIS — F31.75 BIPOLAR DISORDER, IN PARTIAL REMISSION, MOST RECENT EPISODE DEPRESSED (HCC): ICD-10-CM

## 2022-09-06 RX ORDER — DIVALPROEX SODIUM 250 MG/1
TABLET, EXTENDED RELEASE ORAL
Qty: 270 TABLET | Refills: 3 | Status: SHIPPED | OUTPATIENT
Start: 2022-09-06 | End: 2022-09-28 | Stop reason: SDUPTHER

## 2022-09-13 NOTE — PROGRESS NOTES
"Chief Complaint   Patient presents with    Follow-Up    Eye Problem     Hemorrhages in her eye-needs labs    Lab Results       Subjective:     HPI:   Azeb Major is a 82 y.o. female with history of bipolar here  to discuss the evaluation and management of: Lab results  It was noted on August 24 that she had a high potassium  We had her come back for repeat in it remained at 5.9    She was advised to hold lisinopril     also she was noted to have a very high vitamin D level and she did stop it 1 month ago    We will be retesting the potassium today     No problems updated.          Objective:     BP (!) 140/74   Pulse 87   Temp 36.3 °C (97.4 °F) (Temporal)   Resp 14   Ht 1.676 m (5' 5.98\")   Wt 68.5 kg (151 lb)   SpO2 94%  Body mass index is 24.38 kg/m².    Physical Exam:  Physical Exam  Constitutional: Well-developed and well-nourished. Not diaphoretic. No distress.   Skin: Skin is warm and dry. No rash noted.  Head: Atraumatic without lesions.  Eyes: Conjunctivae and extraocular motions are normal.   Ears:  External ears unremarkable.    Nose: Nares patent. Mucosa without edema or erythema. No discharge. No facial tenderness.     Neck: Supple, No thyromegaly present. No JVD  Cardiovascular: Regular rate and rhythm.   Chest: Effort normal. Clear to auscultation throughout. No adventitious sounds.   Abdomen:  without distention.  .  Extremities: No cyanosis, clubbing, erythema, nor edema.   Neurological: Alert and oriented x 3.    Psychiatric:  Behavior, mood, and affect are appropriate       Hospital Outpatient Visit on 09/01/2022   Component Date Value Ref Range Status    Glycohemoglobin 09/01/2022 5.5  4.0 - 5.6 % Final    Comment: Increased risk for diabetes:  5.7 -6.4%  Diabetes:  >6.4%  Glycemic control for adults with diabetes:  <7.0%    The above interpretations are per ADA guidelines.  Diagnosis  of diabetes mellitus on the basis of elevated Hemoglobin A1c  should be confirmed by repeating " the Hb A1c test.      Est Avg Glucose 09/01/2022 111  mg/dL Final    Comment: The eAG calculation is based on the A1c-Derived Daily Glucose  (ADAG) study.  See the ADA's website for additional information.      Sodium 09/01/2022 141  135 - 145 mmol/L Final    Potassium 09/01/2022 5.4  3.6 - 5.5 mmol/L Final    Chloride 09/01/2022 111  96 - 112 mmol/L Final    Co2 09/01/2022 19 (A) 20 - 33 mmol/L Final    Anion Gap 09/01/2022 11.0  7.0 - 16.0 Final    Glucose 09/01/2022 91  65 - 99 mg/dL Final    Bun 09/01/2022 50 (A) 8 - 22 mg/dL Final    Creatinine 09/01/2022 1.26  0.50 - 1.40 mg/dL Final    Calcium 09/01/2022 9.0  8.5 - 10.5 mg/dL Final    AST(SGOT) 09/01/2022 20  12 - 45 U/L Final    ALT(SGPT) 09/01/2022 13  2 - 50 U/L Final    Alkaline Phosphatase 09/01/2022 58  30 - 99 U/L Final    Total Bilirubin 09/01/2022 0.2  0.1 - 1.5 mg/dL Final    Albumin 09/01/2022 3.9  3.2 - 4.9 g/dL Final    Total Protein 09/01/2022 7.0  6.0 - 8.2 g/dL Final    Globulin 09/01/2022 3.1  1.9 - 3.5 g/dL Final    A-G Ratio 09/01/2022 1.3  g/dL Final    GFR (CKD-EPI) 09/01/2022 42 (A) >60 mL/min/1.73 m 2 Final    Comment: Estimated Glomerular Filtration Rate is calculated using  race neutral CKD-EPI 2021 equation per NKF-ASN recommendations.     Hospital Outpatient Visit on 08/25/2022   Component Date Value Ref Range Status    Sodium 08/25/2022 138  135 - 145 mmol/L Final    Potassium 08/25/2022 5.9 (A) 3.6 - 5.5 mmol/L Final    Chloride 08/25/2022 108  96 - 112 mmol/L Final    Co2 08/25/2022 19 (A) 20 - 33 mmol/L Final    Glucose 08/25/2022 108 (A) 65 - 99 mg/dL Final    Bun 08/25/2022 59 (A) 8 - 22 mg/dL Final    Creatinine 08/25/2022 1.48 (A) 0.50 - 1.40 mg/dL Final    Calcium 08/25/2022 9.1  8.5 - 10.5 mg/dL Final    Anion Gap 08/25/2022 11.0  7.0 - 16.0 Final    GFR (CKD-EPI) 08/25/2022 35 (A) >60 mL/min/1.73 m 2 Final    Comment: Effective 3/15/2022, estimated Glomerular Filtration Rate  is calculated using race neutral CKD-EPI 2021  equation  per NKF-ASN recommendations.     Hospital Outpatient Visit on 08/24/2022   Component Date Value Ref Range Status    Vitamin B12 -True Cobalamin 08/24/2022 1206 (A) 211 - 911 pg/mL Final    WBC 08/24/2022 6.3  4.8 - 10.8 K/uL Final    RBC 08/24/2022 4.17 (A) 4.20 - 5.40 M/uL Final    Hemoglobin 08/24/2022 13.4  12.0 - 16.0 g/dL Final    Hematocrit 08/24/2022 41.1  37.0 - 47.0 % Final    MCV 08/24/2022 98.6 (A) 81.4 - 97.8 fL Final    MCH 08/24/2022 32.1  27.0 - 33.0 pg Final    MCHC 08/24/2022 32.6 (A) 33.6 - 35.0 g/dL Final    RDW 08/24/2022 49.2  35.9 - 50.0 fL Final    Platelet Count 08/24/2022 151 (A) 164 - 446 K/uL Final    MPV 08/24/2022 11.4  9.0 - 12.9 fL Final    Neutrophils-Polys 08/24/2022 36.20 (A) 44.00 - 72.00 % Final    Lymphocytes 08/24/2022 48.80 (A) 22.00 - 41.00 % Final    Monocytes 08/24/2022 12.50  0.00 - 13.40 % Final    Eosinophils 08/24/2022 1.30  0.00 - 6.90 % Final    Basophils 08/24/2022 0.90  0.00 - 1.80 % Final    Immature Granulocytes 08/24/2022 0.30  0.00 - 0.90 % Final    Nucleated RBC 08/24/2022 0.00  /100 WBC Final    Neutrophils (Absolute) 08/24/2022 2.29  2.00 - 7.15 K/uL Final    Includes immature neutrophils, if present.    Lymphs (Absolute) 08/24/2022 3.09  1.00 - 4.80 K/uL Final    Monos (Absolute) 08/24/2022 0.79  0.00 - 0.85 K/uL Final    Eos (Absolute) 08/24/2022 0.08  0.00 - 0.51 K/uL Final    Baso (Absolute) 08/24/2022 0.06  0.00 - 0.12 K/uL Final    Immature Granulocytes (abs) 08/24/2022 0.02  0.00 - 0.11 K/uL Final    NRBC (Absolute) 08/24/2022 0.00  K/uL Final    Sodium 08/24/2022 140  135 - 145 mmol/L Final    Potassium 08/24/2022 5.9 (A) 3.6 - 5.5 mmol/L Final    Chloride 08/24/2022 108  96 - 112 mmol/L Final    Co2 08/24/2022 21  20 - 33 mmol/L Final    Anion Gap 08/24/2022 11.0  7.0 - 16.0 Final    Glucose 08/24/2022 81  65 - 99 mg/dL Final    Bun 08/24/2022 45 (A) 8 - 22 mg/dL Final    Creatinine 08/24/2022 1.29  0.50 - 1.40 mg/dL Final    Calcium  08/24/2022 9.6  8.5 - 10.5 mg/dL Final    AST(SGOT) 08/24/2022 26  12 - 45 U/L Final    ALT(SGPT) 08/24/2022 15  2 - 50 U/L Final    Alkaline Phosphatase 08/24/2022 61  30 - 99 U/L Final    Total Bilirubin 08/24/2022 0.3  0.1 - 1.5 mg/dL Final    Albumin 08/24/2022 4.2  3.2 - 4.9 g/dL Final    Total Protein 08/24/2022 7.4  6.0 - 8.2 g/dL Final    Globulin 08/24/2022 3.2  1.9 - 3.5 g/dL Final    A-G Ratio 08/24/2022 1.3  g/dL Final    Cholesterol,Tot 08/24/2022 192  100 - 199 mg/dL Final    Triglycerides 08/24/2022 131  0 - 149 mg/dL Final    HDL 08/24/2022 52  >=40 mg/dL Final    LDL 08/24/2022 114 (A) <100 mg/dL Final    TSH 08/24/2022 0.980  0.380 - 5.330 uIU/mL Final    Comment: Reference Range:    Pregnant Females, 1st Trimester  0.050-3.700  Pregnant Females, 2nd Trimester  0.310-4.350  Pregnant Females, 3rd Trimester  0.410-5.180      25-Hydroxy   Vitamin D 25 08/24/2022 103 (A) 30 - 100 ng/mL Final    Comment: Adult Ranges:   <20 ng/mL - Deficiency  20-29 ng/mL - Insufficiency   ng/mL - Sufficiency  Effective 3/18/2020, this electrochemiluminescence binding assay is  performed using Roche linda e immunoassay analyzer.  The [employer] Vitamin D  total II assay is intended for the quantitative determination of total 25  hydroxyvitamin D in human serum and plasma. This assay is to be used as an  aid in the assessment of vitamin D sufficiency in adults.      GFR (CKD-EPI) 08/24/2022 41 (A) >60 mL/min/1.73 m 2 Final    Comment: Effective 3/15/2022, estimated Glomerular Filtration Rate  is calculated using race neutral CKD-EPI 2021 equation  per NKF-ASN recommendations.           Assessment and Plan:     The following treatment plan was discussed/researched:  No problem-specific Assessment & Plan notes found for this encounter.  1. Hyperkalemia -improved off lisinopril  Blood pressure is stable today, continue to monitor blood pressure  - Comp Metabolic Panel; Future- Repeat test today shows normal potassium and  stable GFR    2. High vitamin D level- remain off vit d recheck in 2 months    3. Bipolar disorder in remission (HCC)-Stable    4. Eye disease  - HEMOGLOBIN A1C; Future    5. Stage 3b chronic kidney disease (HCC)-Stable    6. Other long term (current) drug therapy   - HEMOGLOBIN A1C; Future                                                                                                                                                                                            Any change or worsening of signs or symptoms, patient encouraged to follow-up or report to emergency room for further evaluation. Patient verbalizes understanding and agrees.    Follow-Up: 2 months      PLEASE NOTE: This dictation was created using voice recognition software. I have made every reasonable attempt to correct obvious errors, but I expect that there are errors of grammar and possibly content that I did not discover before finalizing the note.      My total time spent caring for the patient on the day of the encounter was  greater than 40 minutes.   This includes obtaining history, reviewing chart, physical exam, patient education, reviewing outside records, placing orders, interpreting tests and coordinating care.

## 2022-09-28 ENCOUNTER — OFFICE VISIT (OUTPATIENT)
Dept: BEHAVIORAL HEALTH | Facility: CLINIC | Age: 82
End: 2022-09-28
Payer: MEDICARE

## 2022-09-28 DIAGNOSIS — F31.75 BIPOLAR DISORDER, IN PARTIAL REMISSION, MOST RECENT EPISODE DEPRESSED (HCC): ICD-10-CM

## 2022-09-28 PROCEDURE — 90833 PSYTX W PT W E/M 30 MIN: CPT | Performed by: PSYCHIATRY & NEUROLOGY

## 2022-09-28 PROCEDURE — 99214 OFFICE O/P EST MOD 30 MIN: CPT | Performed by: PSYCHIATRY & NEUROLOGY

## 2022-09-28 RX ORDER — QUETIAPINE 150 MG/1
TABLET, FILM COATED, EXTENDED RELEASE ORAL
Qty: 90 TABLET | Refills: 3 | Status: SHIPPED | OUTPATIENT
Start: 2022-09-28 | End: 2023-01-20 | Stop reason: SDUPTHER

## 2022-09-28 RX ORDER — DIVALPROEX SODIUM 250 MG/1
TABLET, EXTENDED RELEASE ORAL
Qty: 270 TABLET | Refills: 3 | Status: SHIPPED | OUTPATIENT
Start: 2022-09-28 | End: 2023-01-20 | Stop reason: SDUPTHER

## 2022-09-28 NOTE — PROGRESS NOTES
IN-PERSON SESSION IN CLINIC    PSYCHIATRY FOLLOW-UP NOTE      Name: Azeb Major  MRN: [id number]  : 1940  Age: 82 y.o.  Date of assessment: 2022  PCP: Olimpia Newsome M.D.  Persons in attendance: Patient      REASON FOR VISIT/CHIEF COMPLAINT (as stated by Patient):  Azeb Major is a 82 y.o., White female, attending follow-up appointment for mood and anxiety management.      HISTORY OF PRESENT ILLNESS:  Azeb Major is a 82 y.o. old female with bipolar 1 disorder comes in today for follow up. Patient was last seen 6 months ago, and following treatment planning recommendations were done:  Continue Depakote  mg 3 tablets daily after dinner for mood stabilization.  Given 90-day supply with 1 refill.  Continue Quetiapine  mg at bedtime for mood stabilization.  Given 90-day supply with 1 refill.  Continue Venlafaxine XR 75 mg daily for depression management. Monitor for manic switch. Given 90-day supply with 1 refill.  Continue Psychotherapy for mood and anxiety management.    Family contacted me on 8/3/22:   Azeb is wondering if it would be OK to taper off the Effexor as she is not feeling depressed at this time.  Discussed: I agree with tapering effexor to 37.5 mg for 30 days and then stop. New prescription for effexor 37.5 mg dose sent to [facility] today.    Patient is compliant with medications and was able to successfully stop Effexor.  Patient describes stable mood, anxiety and sleep.  Agreed with not titrating medications further and contacting me on MyChart if worsening depression is seen during winter and in that case we will consider restarting Effexor if indicated.    Patient is also no longer on lisinopril and vitamin D.    PSYCHOTHERAPY ASPECT OF SESSION (16 MIN):  Most part of the session dedicated to letting patient and family members expressed their feelings related to recent changes.  Validation provided for appropriate emotional  responses.  Later part of the session dedicated to active listening and implementing supportive psychotherapy skills.      CURRENT MEDICATIONS:  Current Outpatient Medications   Medication Sig Dispense Refill    divalproex ER (DEPAKOTE ER) 250 MG TABLET SR 24 HR TAKE 3 TABLETS 1/2 HOUR AFTER DINNER 270 Tablet 3    venlafaxine XR (EFFEXOR XR) 37.5 MG CAPSULE SR 24 HR Take 1 Capsule by mouth every day. 30 Capsule 0    tolterodine ER (DETROL LA) 4 MG CAPSULE SR 24 HR TAKE 1 CAPSULE DAILY 90 Capsule 3    levothyroxine (SYNTHROID) 125 MCG Tab TAKE 1 TABLET EVERY MORNING ON AN EMPTY STOMACH 90 Tablet 3    lisinopril (PRINIVIL) 5 MG Tab TAKE 1 TABLET AT BEDTIME 90 Tablet 3    QUEtiapine Fumarate 150 MG TABLET SR 24 HR TAKE 1 TABLET DAILY AT BEDTIME 90 Tablet 1    omeprazole (PRILOSEC) 20 MG delayed-release capsule TAKE 1 CAPSULE DAILY 90 Capsule 3    nitrofurantoin (MACRODANTIN) 50 MG Cap TAKE 1 CAPSULE DAILY 90 Capsule 3    alendronate (FOSAMAX) 70 MG Tab TAKE 1 TABLET EVERY 7 DAYS 12 tablet 3    estradiol (ESTRACE) 0.1 MG/GM vaginal cream Apply pea sized amount to genital area three time each week. 2 Tube 1     No current facility-administered medications for this visit.       MEDICAL HISTORY  Past Medical History:   Diagnosis Date    Bipolar disorder (HCC)     Bronchitis 02-26-13    in the past, not recent    CATARACT     bilat removed     Dental disorder     Frequent UTI 12/1/2010    Heart burn     History of falling     r/t vertigo    Hypertension     Hypothyroid 12/1/2010    Other specified symptom associated with female genital organs     Pneumonia     x2    Psychiatric disorder     bipolar    Renal disorder     daughter reports some renal insufficiency    Seasonal allergic reaction     Stress incontinence     Urinary bladder disorder      Past Surgical History:   Procedure Laterality Date    HYSTERECTOMY ROBOTIC  3/18/2013    Performed by Juju Gonzales M.D. at SURGERY [facility]    COLPOSACROPEXY ROBOTIC  " 3/18/2013    Performed by Juju Gonzales M.D. at SURGERY [facility]    BLADDER SLING FEMALE  3/18/2013    Performed by Juju Gonzales M.D. at SURGERY [facility]    CYSTOSCOPY  3/18/2013    Performed by Juju Gonzales M.D. at SURGERY [facility]    VENTRAL HERNIA REPAIR  3/18/2010    Performed by TRAVIS PATTERSON at SURGERY [facility]    TONSILLECTOMY         PAST PSYCHIATRIC HISTORY  Patient with psychiatric illness since teenage year  Prior Self harm/suicide attempt: none   Prior Diagnosis: bipolar 1 diorder     PAST PSYCHIATRIC MEDICATIONS  Historically, has not done well off of medications  Thorazine - did not like  Lithium - worked, affected kidneys  Effexor - on and off for years  Many others in the past      FAMILY HISTORY  Psychiatric diagnosis:  Not known     SUBSTANCE USE HISTORY:  ALCOHOL: no  TOBACCO: no  CANNABIS: no  OPIOIDS: no  PRESCRIPTION MEDICATIONS: no  OTHERS: no  History of inpatient/outpatient rehab treatment: no     SOCIAL HISTORY  The patient lives alone in the house but has 5 children who visit her daily.    Each one of them takes care of her on a different night of the week.    Her son lives across the street.  People from \"[employer] visit her 3 days/week    REVIEW OF SYSTEMS:        Constitutional negative   Eyes  Decrease in vision   Ears/Nose/Mouth/Throat negative   Cardiovascular negative   Respiratory negative   Gastrointestinal negative   Genitourinary  CKD   Muscular negative   Integumentary negative   Neurological negative   Endocrine  Hypothyroid   Hematologic/Lymphatic negative     PHYSICAL EXAMINAION:  Vital signs: There were no vitals taken for this visit.  Musculoskeletal:slow gate with cane  Abnormal movements: none      MENTAL STATUS EXAMINATION      General:   - Grooming and hygiene: Casual,   - Apparent distress: none,   - Behavior: Calm  - Eye Contact:  Good,   - no psychomotor agitation or retardation    - Participation: Active verbal " participation  Orientation: Alert and Fully Oriented to person, place and time  Mood: Euthymic  Affect: Flexible and Full range,  Thought Process: Logical  Thought Content: Denies suicidal or homicidal ideations, intent or plan   Perception: Denies auditory or visual hallucinations. No delusions noted   Attention span and concentration: Intact   Speech:Rate within normal limits and Volume within normal limits  Language: Appropriate   Insight: Good  Judgment: Good  Recent and remote memory: No gross evidence of memory deficits        DEPRESSION SCREENING:  Depression Screen (PHQ-2/PHQ-9) 8/6/2019 6/22/2021 3/30/2022   PHQ-2 Total Score - - -   PHQ-2 Total Score - - -   PHQ-2 Total Score 2 1 0   PHQ-9 Total Score - - -   PHQ-9 Total Score 7 2 -       Interpretation of PHQ-9 Total Score   Score Severity   1-4 No Depression   5-9 Mild Depression   10-14 Moderate Depression   15-19 Moderately Severe Depression   20-27 Severe Depression    CURRENT RISK:       Suicidal: Low       Homicidal: Low       Self-Harm: Low       Relapse: Low       Crisis Safety Plan Reviewed Not Indicated       If evidence of imminent risk is present, intervention/plan:      MEDICAL RECORDS/LABS/DIAGNOSTIC TESTS REVIEWED:  No new lab since last visit     [place]  records -   Reviewed     PLAN:  (1) Bipolar 1 disorder  Stable  Continue Depakote  mg 3 tablets daily after dinner for mood stabilization.   Continue Quetiapine  mg at bedtime for mood stabilization.    Continue psychotherapy for mood and anxiety management.  Medication options, alternatives (including no medications) and medication risks/benefits/side effects were discussed in detail.  The patient was advised to call, message provider on [facility], or come in to the clinic if symptoms worsen or if any future questions/issues regarding their medications arise; the patient verbalized understanding and agreement.    The patient was educated to call 911, call the suicide hotline, or  go to local ER if having thoughts of suicide or homicide; verbalized understanding.    Billing Coding based on:  [id number] based on [place]  [id number]: based on psychotherapy timing    Return to clinic in 1 year or sooner if symptoms worsen.  Next Appointment: instruction provided on how to make the next appointment.     The proposed treatment plan was discussed with the patient who was provided the opportunity to ask questions and make suggestions regarding alternative treatment. Patient verbalized understanding and expressed agreement with the plan.       James Ray M.D.  09/28/22    This note was created using voice recognition software (Dragon). The accuracy of the dictation is limited by the abilities of the software. I have reviewed the note prior to signing, however some errors in grammar and context are still possible. If you have any questions related to this note please do not hesitate to contact our office.

## 2022-11-01 ENCOUNTER — OFFICE VISIT (OUTPATIENT)
Dept: INTERNAL MEDICINE | Facility: IMAGING CENTER | Age: 82
End: 2022-11-01
Payer: MEDICARE

## 2022-11-01 VITALS
HEART RATE: 93 BPM | BODY MASS INDEX: 25.23 KG/M2 | WEIGHT: 157 LBS | TEMPERATURE: 96.9 F | SYSTOLIC BLOOD PRESSURE: 126 MMHG | RESPIRATION RATE: 14 BRPM | OXYGEN SATURATION: 98 % | HEIGHT: 66 IN | DIASTOLIC BLOOD PRESSURE: 80 MMHG

## 2022-11-01 DIAGNOSIS — L98.9 SKIN LESION: ICD-10-CM

## 2022-11-01 DIAGNOSIS — Z23 NEED FOR VACCINATION: ICD-10-CM

## 2022-11-01 DIAGNOSIS — I10 PRIMARY HYPERTENSION: ICD-10-CM

## 2022-11-01 PROCEDURE — 90662 IIV NO PRSV INCREASED AG IM: CPT | Performed by: FAMILY MEDICINE

## 2022-11-01 PROCEDURE — 99214 OFFICE O/P EST MOD 30 MIN: CPT | Mod: 25 | Performed by: FAMILY MEDICINE

## 2022-11-01 PROCEDURE — G0008 ADMIN INFLUENZA VIRUS VAC: HCPCS | Performed by: FAMILY MEDICINE

## 2022-11-01 ASSESSMENT — FIBROSIS 4 INDEX: FIB4 SCORE: 3.09

## 2022-11-04 NOTE — PROGRESS NOTES
"Chief Complaint   Patient presents with    Skin Lesion       Subjective:     HPI:   Azeb Major is a 82 y.o. female with history of hypertension here with her caregiver to discuss the evaluation and management of:    She has a skin lesion on her face over a year now and it is growing and tender when pressed  She is not having any trouble with her medications    No problems updated.     Objective:     /80   Pulse 93   Temp 36.1 °C (96.9 °F)   Resp 14   Ht 1.676 m (5' 5.98\")   Wt 71.2 kg (157 lb)   SpO2 98%  Body mass index is 25.35 kg/m².    Physical Exam:  Physical Exam  Constitutional: Well-developed and well-nourished. Not diaphoretic. No distress.   Skin: Skin is warm and dry. No rash noted.  Head: Atraumatic without lesions.  Left forehead proximately 1 cm raised erythematous pearly lesion  Eyes: Conjunctivae and extraocular motions are normal.   Ears:  External ears unremarkable.    Nose: Nares patent. Mucosa without edema or erythema. No discharge. No facial tenderness.     Neck: Supple, No thyromegaly present. No JVD  Cardiovascular: Regular rate and rhythm.   Chest: Effort normal. Clear to auscultation throughout. No adventitious sounds.   Abdomen:  without distention.  .  Extremities: No cyanosis, clubbing, erythema, nor edema.   Neurological: Alert and oriented x 3.    Psychiatric:  Behavior, mood, and affect are appropriate     Assessment and Plan:     The following treatment plan was discussed:     No problem-specific Assessment & Plan notes found for this encounter.  1. Primary hypertension--stable continue current regimen  [place]    2. Skin lesion  - Referral to Dermatology    3. Need for vaccination  - INFLUENZA VACCINE, HIGH DOSE (65+ ONLY)          Any change or worsening of signs or symptoms, patient encouraged to follow-up or report to emergency room for further evaluation. Patient verbalizes understanding and agrees.    Follow-Up: No follow-ups on file.      PLEASE NOTE: " This dictation was created using voice recognition software. I have made every reasonable attempt to correct obvious errors, but I expect that there are errors of grammar and possibly content that I did not discover before finalizing the note.    My total time spent caring for the patient on the day of the encounter was  greater than 30 minutes.   This includes obtaining history, reviewing chart, physical exam, patient education, reviewing outside records, placing orders, interpreting tests and coordinating care.

## 2022-11-09 ENCOUNTER — PATIENT MESSAGE (OUTPATIENT)
Dept: HEALTH INFORMATION MANAGEMENT | Facility: OTHER | Age: 82
End: 2022-11-09

## 2022-11-29 ENCOUNTER — OFFICE VISIT (OUTPATIENT)
Dept: DERMATOLOGY | Facility: IMAGING CENTER | Age: 82
End: 2022-11-29
Payer: MEDICARE

## 2022-11-29 DIAGNOSIS — D48.9 NEOPLASM OF UNCERTAIN BEHAVIOR: ICD-10-CM

## 2022-11-29 PROCEDURE — 11102 TANGNTL BX SKIN SINGLE LES: CPT | Performed by: NURSE PRACTITIONER

## 2022-11-29 NOTE — PROGRESS NOTES
DERMATOLOGY NOTE  NEW VISIT       Chief complaint: Establish Care and Skin Lesion       HPI/location: lt eyebrow   Time present: 2-3 mths   Painful lesion: Yes  Itching lesion: Yes  Enlarging lesion: No  Anything make it better or worse?    History of skin cancer: No  History of precancers/actinic keratoses: Yes, Details: LN2 tx  History of biopsies:No  History of blistering/severe sunburns:Yes, Details: in youth  Family history of skin cancer:No  Family history of atypical moles:No      Allergies   Allergen Reactions    Banana      Stomach upset.    Corn Oil      Anything with corn; stomach upset.    Levofloxacin      Unidentified reaction.    Sorbitol      Stomach upset.        MEDICATIONS:  Medications relevant to specialty reviewed.     REVIEW OF SYSTEMS:   Positive for skin (see HPI)  Negative for fevers and chills       EXAM:  There were no vitals taken for this visit.  Constitutional: Well-developed, well-nourished, and in no distress.     A focused skin exam was performed including the affected areas of the face. Notable findings on exam today listed below and/or in assessment/plan.     1 cm raised nodule to L lateral eyebrow     IMPRESSION / PLAN:    1. Neoplasm of uncertain behavior  Procedure Note   Procedure: Biopsy by shave technique  Location: L lateral eyebrow  Size: as noted in exam  Preoperative diagnosis:NMSC vs other  Risks, benefits and alternatives of procedure discussed, verbal consent obtained for photo (see chart) and written informed consent obtained for procedure. Time out completed. Area of biopsy prepped with alcohol. Anesthesia with 1% lidocaine with epinephrine administered with 30 gauge needle. Shave biopsy of the site performed. Hemostasis achieved with pressure and aluminum chloride. Vaseline applied to wound with bandage. Patient tolerated procedure well and there were no complications. The specimen was sent to the pathology lab by the staff. Wound care was  discussed.        Discussed risks, benefits, alternative treatments as well as common side effects associated with prescribed treatment, Patient verbalized understanding and agrees with plan regarding the above                 Please note that this dictation was created using voice recognition software. I have made every reasonable attempt to correct obvious errors, but I expect that there are errors of grammar and possibly content that I did not discover before finalizing the note.      Return to clinic in: Return for pending Bx results. and as needed for any new or changing skin lesions.

## 2022-12-03 DIAGNOSIS — N39.0 FREQUENT UTI: ICD-10-CM

## 2022-12-05 ENCOUNTER — TELEPHONE (OUTPATIENT)
Dept: DERMATOLOGY | Facility: IMAGING CENTER | Age: 82
End: 2022-12-05
Payer: MEDICARE

## 2022-12-05 DIAGNOSIS — D04.39 SQUAMOUS CELL CARCINOMA IN SITU OF SKIN OF FOREHEAD: ICD-10-CM

## 2022-12-05 RX ORDER — NITROFURANTOIN MACROCRYSTALS 50 MG/1
CAPSULE ORAL
Qty: 90 CAPSULE | Refills: 3 | Status: SHIPPED | OUTPATIENT
Start: 2022-12-05 | End: 2023-11-29

## 2022-12-05 NOTE — TELEPHONE ENCOUNTER
Spoke with pt's daughter and discussed results, agreeable to MOHS, referral placed, please process  Thank you

## 2022-12-06 NOTE — TELEPHONE ENCOUNTER
[facility]'s daughter notified.

## 2022-12-23 DIAGNOSIS — F31.75 BIPOLAR DISORDER, IN PARTIAL REMISSION, MOST RECENT EPISODE DEPRESSED (HCC): ICD-10-CM

## 2022-12-23 RX ORDER — VENLAFAXINE HYDROCHLORIDE 75 MG/1
75 CAPSULE, EXTENDED RELEASE ORAL DAILY
Qty: 90 CAPSULE | Refills: 1 | Status: SHIPPED | OUTPATIENT
Start: 2022-12-23 | End: 2023-01-20

## 2023-01-04 DIAGNOSIS — H61.23 BILATERAL IMPACTED CERUMEN: ICD-10-CM

## 2023-01-20 ENCOUNTER — TELEMEDICINE (OUTPATIENT)
Dept: BEHAVIORAL HEALTH | Facility: CLINIC | Age: 83
End: 2023-01-20
Payer: MEDICARE

## 2023-01-20 DIAGNOSIS — F31.75 BIPOLAR DISORDER, IN PARTIAL REMISSION, MOST RECENT EPISODE DEPRESSED (HCC): ICD-10-CM

## 2023-01-20 PROCEDURE — 99214 OFFICE O/P EST MOD 30 MIN: CPT | Mod: 95 | Performed by: PSYCHIATRY & NEUROLOGY

## 2023-01-20 RX ORDER — VENLAFAXINE HYDROCHLORIDE 150 MG/1
150 CAPSULE, EXTENDED RELEASE ORAL DAILY
Qty: 90 CAPSULE | Refills: 0 | Status: SHIPPED | OUTPATIENT
Start: 2023-01-20 | End: 2023-04-04

## 2023-01-20 RX ORDER — DIVALPROEX SODIUM 250 MG/1
TABLET, EXTENDED RELEASE ORAL
Qty: 270 TABLET | Refills: 3 | Status: SHIPPED | OUTPATIENT
Start: 2023-01-20 | End: 2023-07-06 | Stop reason: SDUPTHER

## 2023-01-20 RX ORDER — QUETIAPINE 150 MG/1
TABLET, FILM COATED, EXTENDED RELEASE ORAL
Qty: 90 TABLET | Refills: 3 | Status: SHIPPED | OUTPATIENT
Start: 2023-01-20 | End: 2023-07-06 | Stop reason: SDUPTHER

## 2023-01-20 NOTE — PROGRESS NOTES
This evaluation was conducted via Zoom using secure and encrypted videoconferencing technology. The patient was in their home in the [facility].    The patient's identity was confirmed and verbal consent was obtained for this virtual visit.      PSYCHIATRY FOLLOW-UP NOTE      Name: Azeb Major  MRN: [id number]  : 1940  Age: 82 y.o.  Date of assessment: 2023  PCP: Olimpia Newsome M.D.  Persons in attendance: Patient and daughter      REASON FOR VISIT/CHIEF COMPLAINT (as stated by Patient):  Azeb Major is a 82 y.o., White female, attending follow-up appointment for mood and anxiety management.      HISTORY OF PRESENT ILLNESS:  Azeb Major is a 82 y.o. old female with bipolar 1 disorder comes in today for follow up. Patient was last seen 3 months ago, and following treatment planning recommendations were done:  Continue Depakote  mg #3 tablets daily after dinner for mood stabilization.   Continue Quetiapine  mg HS for mood stabilization.    Continue psychotherapy for mood and anxiety management.    On 2022 patient's family members contacted me that patient is feeling depressed and she has done well on taking Effexor during wintertime and Effexor was initiated at 75 mg dose.  Family contacted me again today indicating that her depression has not shown improvement and was instructed to make this appointment today.    Patient is seen with her daughter today.    Patient reports feeling depressed and Effexor 75 mg is not helping.  She describes depression with low energy, low motivation and declining interest and no manic or hypomanic symptoms noted.  She is sleeping well and denies any side effects from Depakote or Seroquel.  Her presentation is not suggestive of sedation from medication looking like depression.  Daughter acknowledged that this is her typical depression.  Agreed with increasing Effexor but family will monitor her blood pressure closely  to assess for hypertension.  Agreed with switching Effexor to Zoloft or Lexapro if Effexor is not tolerated due to hypotension.  In upcoming session consider reducing Depakote if indicated.  Educated on the risk of high ammonia level with Depakote toxicity.  Patient and family will closely monitor for those as well.      CURRENT MEDICATIONS:  Current Outpatient Medications   Medication Sig Dispense Refill    nitrofurantoin (MACRODANTIN) 50 MG Cap TAKE 1 CAPSULE DAILY 90 Capsule 3    venlafaxine XR (EFFEXOR XR) 75 MG CAPSULE SR 24 HR Take 1 Capsule by mouth every day. 90 Capsule 1    divalproex ER (DEPAKOTE ER) 250 MG TABLET SR 24 HR TAKE 3 TABLETS 1/2 HOUR AFTER DINNER 270 Tablet 3    QUEtiapine Fumarate 150 MG TABLET SR 24 HR TAKE 1 TABLET DAILY AT BEDTIME 90 Tablet 3    tolterodine ER (DETROL LA) 4 MG CAPSULE SR 24 HR TAKE 1 CAPSULE DAILY 90 Capsule 3    levothyroxine (SYNTHROID) 125 MCG Tab TAKE 1 TABLET EVERY MORNING ON AN EMPTY STOMACH 90 Tablet 3    omeprazole (PRILOSEC) 20 MG delayed-release capsule TAKE 1 CAPSULE DAILY 90 Capsule 3    alendronate (FOSAMAX) 70 MG Tab TAKE 1 TABLET EVERY 7 DAYS 12 tablet 3    estradiol (ESTRACE) 0.1 MG/GM vaginal cream Apply pea sized amount to genital area three time each week. 2 Tube 1     No current facility-administered medications for this visit.       MEDICAL HISTORY  Past Medical History:   Diagnosis Date    Bipolar disorder (HCC)     Bronchitis 02-26-13    in the past, not recent    CATARACT     bilat removed     Dental disorder     Frequent UTI 12/1/2010    Heart burn     History of falling     r/t vertigo    Hypertension     Hypothyroid 12/1/2010    Other specified symptom associated with female genital organs     Pneumonia     x2    Psychiatric disorder     bipolar    Renal disorder     daughter reports some renal insufficiency    Seasonal allergic reaction     Stress incontinence     Urinary bladder disorder      Past Surgical History:   Procedure Laterality Date  "   HYSTERECTOMY ROBOTIC  3/18/2013    Performed by Juju Gonzales M.D. at SURGERY [facility] ORS    COLPOSACROPEXY ROBOTIC  3/18/2013    Performed by Juju Gonzales M.D. at SURGERY [facility] ORS    BLADDER SLING FEMALE  3/18/2013    Performed by Juju Gonzales M.D. at SURGERY [facility] ORS    CYSTOSCOPY  3/18/2013    Performed by Juju Gonzales M.D. at SURGERY [facility] ORS    VENTRAL HERNIA REPAIR  3/18/2010    Performed by TRAVIS PATTERSON at SURGERY [facility] ORS    TONSILLECTOMY         PAST PSYCHIATRIC HISTORY  Patient with psychiatric illness since teenage year  Prior Self harm/suicide attempt: none   Prior Diagnosis: bipolar 1 diorder     PAST PSYCHIATRIC MEDICATIONS  Historically, has not done well off of medications  Thorazine - did not like  Lithium - worked, affected kidneys  Effexor - on and off for years  Many others in the past      FAMILY HISTORY  Psychiatric diagnosis:  Not known     SUBSTANCE USE HISTORY:  ALCOHOL: no  TOBACCO: no  CANNABIS: no  OPIOIDS: no  PRESCRIPTION MEDICATIONS: no  OTHERS: no  History of inpatient/outpatient rehab treatment: no     SOCIAL HISTORY  The patient lives alone in the house but has 5 children who visit her daily.    Each one of them takes care of her on a different night of the week.    Her son lives across the street.  People from \"[employer] visit her 3 days/week      REVIEW OF SYSTEMS:        Constitutional negative   Eyes  Decrease in vision   Ears/Nose/Mouth/Throat negative   Cardiovascular negative   Respiratory negative   Gastrointestinal negative   Genitourinary  CKD   Muscular negative   Integumentary negative   Neurological negative   Endocrine  Hypothyroid   Hematologic/Lymphatic negative     PHYSICAL EXAMINAION:  Vital signs: There were no vitals taken for this visit.  Musculoskeletal: Normal gait.   Abnormal movements: none      MENTAL STATUS EXAMINATION      General:   - Grooming and hygiene: Casual,   - Apparent distress: " none,   - Behavior: Calm  - Eye Contact:  Good,   - no psychomotor agitation or retardation    - Participation: Active verbal participation  Orientation: Alert and Fully Oriented to person, place and time  Mood: Depressed  Affect: Constricted,  Thought Process: Logical and Goal-directed  Thought Content: Denies suicidal or homicidal ideations, intent or plan   Perception: Denies auditory or visual hallucinations. No delusions noted   Attention span and concentration: Intact   Speech:Rate within normal limits and Volume within normal limits  Language: Appropriate   Insight: Good  Judgment: Good  Recent and remote memory: No gross evidence of memory deficits        DEPRESSION SCREENIN/6/2019 2021 3/30/2022   Depression Screen (PHQ-2/PHQ-9)   PHQ-2 Total Score 2 1 0   PHQ-9 Total Score 7 2        Multiple values from one day are sorted in reverse-chronological order       Interpretation of PHQ-9 Total Score   Score Severity   1-4 No Depression   5-9 Mild Depression   10-14 Moderate Depression   15-19 Moderately Severe Depression   20-27 Severe Depression    CURRENT RISK:       Suicidal: Low       Homicidal: Low       Self-Harm: Low       Relapse: Low       Crisis Safety Plan Reviewed Not Indicated       If evidence of imminent risk is present, intervention/plan:      MEDICAL RECORDS/LABS/DIAGNOSTIC TESTS REVIEWED:  No new lab since last visit     [place]  records -   Reviewed     PLAN:  (1) Bipolar 1 disorder  Persistence of depression  Continue Depakote  mg #3 tablets daily after dinner for mood stabilization.   Continue Quetiapine  mg HS for mood stabilization.    Increase Effexor  mg daily for depression.   Continue psychotherapy for mood and anxiety management.  Medication options, alternatives (including no medications) and medication risks/benefits/side effects were discussed in detail.  The patient was advised to call, message provider on [employer], or come in to the clinic if  symptoms worsen or if any future questions/issues regarding their medications arise; the patient verbalized understanding and agreement.    The patient was educated to call 911, call the suicide hotline, or go to local ER if having thoughts of suicide or homicide; verbalized understanding.    Billing Coding based on:  61655 based on MDM    Return to clinic in 2 months or sooner if symptoms worsen.  Next Appointment: instruction provided on how to make the next appointment.     The proposed treatment plan was discussed with the patient who was provided the opportunity to ask questions and make suggestions regarding alternative treatment. Patient verbalized understanding and expressed agreement with the plan.       James Ray M.D.  01/20/23    This note was created using voice recognition software (Dragon). The accuracy of the dictation is limited by the abilities of the software. I have reviewed the note prior to signing, however some errors in grammar and context are still possible. If you have any questions related to this note please do not hesitate to contact our office.

## 2023-01-25 RX ORDER — OMEPRAZOLE 20 MG/1
CAPSULE, DELAYED RELEASE ORAL
Qty: 90 CAPSULE | Refills: 3 | Status: SHIPPED | OUTPATIENT
Start: 2023-01-25 | End: 2023-11-29

## 2023-04-03 DIAGNOSIS — F31.75 BIPOLAR DISORDER, IN PARTIAL REMISSION, MOST RECENT EPISODE DEPRESSED (HCC): ICD-10-CM

## 2023-04-03 NOTE — TELEPHONE ENCOUNTER
Received request via: Patient    Was the patient seen in the last year in this department? Yes    Does the patient have an active prescription (recently filled or refills available) for medication(s) requested? No    Does the patient have [facility] and need 100 day supply (blood pressure, diabetes and cholesterol meds only)? Medication is not for cholesterol, blood pressure or diabetes

## 2023-04-04 RX ORDER — VENLAFAXINE HYDROCHLORIDE 150 MG/1
CAPSULE, EXTENDED RELEASE ORAL
Qty: 90 CAPSULE | Refills: 3 | Status: SHIPPED | OUTPATIENT
Start: 2023-04-04 | End: 2023-07-06

## 2023-05-02 ENCOUNTER — NON-PROVIDER VISIT (OUTPATIENT)
Dept: INTERNAL MEDICINE | Facility: IMAGING CENTER | Age: 83
End: 2023-05-02
Payer: MEDICARE

## 2023-05-02 ENCOUNTER — HOSPITAL ENCOUNTER (OUTPATIENT)
Facility: MEDICAL CENTER | Age: 83
End: 2023-05-02
Attending: FAMILY MEDICINE
Payer: MEDICARE

## 2023-05-02 DIAGNOSIS — R30.0 DYSURIA: ICD-10-CM

## 2023-05-02 LAB
APPEARANCE UR: NORMAL
BILIRUB UR STRIP-MCNC: NEGATIVE MG/DL
COLOR UR AUTO: NORMAL
GLUCOSE UR STRIP.AUTO-MCNC: NEGATIVE MG/DL
KETONES UR STRIP.AUTO-MCNC: NEGATIVE MG/DL
LEUKOCYTE ESTERASE UR QL STRIP.AUTO: NORMAL
NITRITE UR QL STRIP.AUTO: NEGATIVE
PH UR STRIP.AUTO: 5.5 [PH] (ref 5–8)
PROT UR QL STRIP: NEGATIVE MG/DL
RBC UR QL AUTO: NORMAL
SP GR UR STRIP.AUTO: 1.02
UROBILINOGEN UR STRIP-MCNC: 0.2 MG/DL

## 2023-05-02 PROCEDURE — 87186 SC STD MICRODIL/AGAR DIL: CPT

## 2023-05-02 PROCEDURE — 81002 URINALYSIS NONAUTO W/O SCOPE: CPT | Performed by: FAMILY MEDICINE

## 2023-05-02 PROCEDURE — 87077 CULTURE AEROBIC IDENTIFY: CPT

## 2023-05-02 PROCEDURE — 87086 URINE CULTURE/COLONY COUNT: CPT

## 2023-05-02 RX ORDER — CEPHALEXIN 500 MG/1
500 CAPSULE ORAL 3 TIMES DAILY
Qty: 21 CAPSULE | Refills: 0 | Status: SHIPPED | OUTPATIENT
Start: 2023-05-02 | End: 2023-05-18 | Stop reason: SDUPTHER

## 2023-05-18 ENCOUNTER — NON-PROVIDER VISIT (OUTPATIENT)
Dept: INTERNAL MEDICINE | Facility: IMAGING CENTER | Age: 83
End: 2023-05-18
Payer: MEDICARE

## 2023-05-18 ENCOUNTER — HOSPITAL ENCOUNTER (OUTPATIENT)
Facility: MEDICAL CENTER | Age: 83
End: 2023-05-18
Attending: FAMILY MEDICINE
Payer: MEDICARE

## 2023-05-18 DIAGNOSIS — N39.0 FREQUENT UTI: ICD-10-CM

## 2023-05-18 LAB
APPEARANCE UR: NORMAL
BILIRUB UR STRIP-MCNC: NEGATIVE MG/DL
COLOR UR AUTO: YELLOW
GLUCOSE UR STRIP.AUTO-MCNC: NEGATIVE MG/DL
KETONES UR STRIP.AUTO-MCNC: NORMAL MG/DL
LEUKOCYTE ESTERASE UR QL STRIP.AUTO: NORMAL
NITRITE UR QL STRIP.AUTO: NEGATIVE
PH UR STRIP.AUTO: 5.5 [PH] (ref 5–8)
PROT UR QL STRIP: >=300 MG/DL
RBC UR QL AUTO: NORMAL
SP GR UR STRIP.AUTO: 1.02
UROBILINOGEN UR STRIP-MCNC: 0.2 MG/DL

## 2023-05-18 PROCEDURE — 81002 URINALYSIS NONAUTO W/O SCOPE: CPT | Performed by: FAMILY MEDICINE

## 2023-05-18 PROCEDURE — 87086 URINE CULTURE/COLONY COUNT: CPT

## 2023-05-18 RX ORDER — CEPHALEXIN 500 MG/1
500 CAPSULE ORAL 3 TIMES DAILY
Qty: 30 CAPSULE | Refills: 0 | Status: SHIPPED | OUTPATIENT
Start: 2023-05-18 | End: 2023-05-28

## 2023-05-20 LAB
BACTERIA UR CULT: NORMAL
SIGNIFICANT IND 70042: NORMAL
SITE SITE: NORMAL
SOURCE SOURCE: NORMAL

## 2023-06-01 RX ORDER — LEVOTHYROXINE SODIUM 0.12 MG/1
TABLET ORAL
Qty: 90 TABLET | Refills: 3 | Status: SHIPPED | OUTPATIENT
Start: 2023-06-01

## 2023-06-08 ENCOUNTER — HOSPITAL ENCOUNTER (OUTPATIENT)
Facility: MEDICAL CENTER | Age: 83
End: 2023-06-08
Attending: FAMILY MEDICINE
Payer: MEDICARE

## 2023-06-08 ENCOUNTER — OFFICE VISIT (OUTPATIENT)
Dept: INTERNAL MEDICINE | Facility: IMAGING CENTER | Age: 83
End: 2023-06-08
Payer: MEDICARE

## 2023-06-08 VITALS
RESPIRATION RATE: 14 BRPM | HEART RATE: 66 BPM | DIASTOLIC BLOOD PRESSURE: 60 MMHG | TEMPERATURE: 97.3 F | WEIGHT: 156 LBS | SYSTOLIC BLOOD PRESSURE: 140 MMHG | OXYGEN SATURATION: 96 % | BODY MASS INDEX: 25.19 KG/M2

## 2023-06-08 DIAGNOSIS — E55.9 VITAMIN D DEFICIENCY: ICD-10-CM

## 2023-06-08 DIAGNOSIS — R13.10 DYSPHAGIA, UNSPECIFIED TYPE: ICD-10-CM

## 2023-06-08 DIAGNOSIS — E03.9 HYPOTHYROIDISM, UNSPECIFIED TYPE: ICD-10-CM

## 2023-06-08 DIAGNOSIS — N25.81 HYPERPARATHYROIDISM, SECONDARY (HCC): ICD-10-CM

## 2023-06-08 DIAGNOSIS — N18.32 STAGE 3B CHRONIC KIDNEY DISEASE: ICD-10-CM

## 2023-06-08 DIAGNOSIS — I10 PRIMARY HYPERTENSION: ICD-10-CM

## 2023-06-08 DIAGNOSIS — M81.0 AGE-RELATED OSTEOPOROSIS WITHOUT CURRENT PATHOLOGICAL FRACTURE: ICD-10-CM

## 2023-06-08 LAB
25(OH)D3 SERPL-MCNC: 56 NG/ML (ref 30–100)
ALBUMIN SERPL BCP-MCNC: 4.1 G/DL (ref 3.2–4.9)
ALBUMIN/GLOB SERPL: 1.2 G/DL
ALP SERPL-CCNC: 80 U/L (ref 30–99)
ALT SERPL-CCNC: 13 U/L (ref 2–50)
ANION GAP SERPL CALC-SCNC: 12 MMOL/L (ref 7–16)
AST SERPL-CCNC: 26 U/L (ref 12–45)
BASOPHILS # BLD AUTO: 0.8 % (ref 0–1.8)
BASOPHILS # BLD: 0.05 K/UL (ref 0–0.12)
BILIRUB SERPL-MCNC: 0.3 MG/DL (ref 0.1–1.5)
BUN SERPL-MCNC: 50 MG/DL (ref 8–22)
CALCIUM ALBUM COR SERPL-MCNC: 8.9 MG/DL (ref 8.5–10.5)
CALCIUM SERPL-MCNC: 9 MG/DL (ref 8.5–10.5)
CHLORIDE SERPL-SCNC: 105 MMOL/L (ref 96–112)
CHOLEST SERPL-MCNC: 201 MG/DL (ref 100–199)
CO2 SERPL-SCNC: 21 MMOL/L (ref 20–33)
CREAT SERPL-MCNC: 1.21 MG/DL (ref 0.5–1.4)
EOSINOPHIL # BLD AUTO: 0.09 K/UL (ref 0–0.51)
EOSINOPHIL NFR BLD: 1.5 % (ref 0–6.9)
ERYTHROCYTE [DISTWIDTH] IN BLOOD BY AUTOMATED COUNT: 50.4 FL (ref 35.9–50)
GFR SERPLBLD CREATININE-BSD FMLA CKD-EPI: 44 ML/MIN/1.73 M 2
GLOBULIN SER CALC-MCNC: 3.5 G/DL (ref 1.9–3.5)
GLUCOSE SERPL-MCNC: 66 MG/DL (ref 65–99)
HCT VFR BLD AUTO: 43.4 % (ref 37–47)
HDLC SERPL-MCNC: 58 MG/DL
HGB BLD-MCNC: 13.6 G/DL (ref 12–16)
IMM GRANULOCYTES # BLD AUTO: 0.01 K/UL (ref 0–0.11)
IMM GRANULOCYTES NFR BLD AUTO: 0.2 % (ref 0–0.9)
LDLC SERPL CALC-MCNC: 120 MG/DL
LYMPHOCYTES # BLD AUTO: 2.03 K/UL (ref 1–4.8)
LYMPHOCYTES NFR BLD: 32.8 % (ref 22–41)
MCH RBC QN AUTO: 31 PG (ref 27–33)
MCHC RBC AUTO-ENTMCNC: 31.3 G/DL (ref 32.2–35.5)
MCV RBC AUTO: 98.9 FL (ref 81.4–97.8)
MONOCYTES # BLD AUTO: 0.7 K/UL (ref 0–0.85)
MONOCYTES NFR BLD AUTO: 11.3 % (ref 0–13.4)
NEUTROPHILS # BLD AUTO: 3.3 K/UL (ref 1.82–7.42)
NEUTROPHILS NFR BLD: 53.4 % (ref 44–72)
NRBC # BLD AUTO: 0 K/UL
NRBC BLD-RTO: 0 /100 WBC (ref 0–0.2)
PLATELET # BLD AUTO: 200 K/UL (ref 164–446)
PMV BLD AUTO: 12.3 FL (ref 9–12.9)
POTASSIUM SERPL-SCNC: 5.6 MMOL/L (ref 3.6–5.5)
PROT SERPL-MCNC: 7.6 G/DL (ref 6–8.2)
RBC # BLD AUTO: 4.39 M/UL (ref 4.2–5.4)
SODIUM SERPL-SCNC: 138 MMOL/L (ref 135–145)
TRIGL SERPL-MCNC: 117 MG/DL (ref 0–149)
TSH SERPL DL<=0.005 MIU/L-ACNC: 1.78 UIU/ML (ref 0.38–5.33)
WBC # BLD AUTO: 6.2 K/UL (ref 4.8–10.8)

## 2023-06-08 PROCEDURE — 80053 COMPREHEN METABOLIC PANEL: CPT

## 2023-06-08 PROCEDURE — 3078F DIAST BP <80 MM HG: CPT | Performed by: FAMILY MEDICINE

## 2023-06-08 PROCEDURE — 3077F SYST BP >= 140 MM HG: CPT | Performed by: FAMILY MEDICINE

## 2023-06-08 PROCEDURE — 99215 OFFICE O/P EST HI 40 MIN: CPT | Performed by: FAMILY MEDICINE

## 2023-06-08 PROCEDURE — 80061 LIPID PANEL: CPT

## 2023-06-08 PROCEDURE — 82306 VITAMIN D 25 HYDROXY: CPT

## 2023-06-08 PROCEDURE — 84443 ASSAY THYROID STIM HORMONE: CPT

## 2023-06-08 PROCEDURE — 85025 COMPLETE CBC W/AUTO DIFF WBC: CPT

## 2023-06-08 ASSESSMENT — FIBROSIS 4 INDEX: FIB4 SCORE: 3.12

## 2023-06-11 NOTE — PROGRESS NOTES
Chief Complaint   Patient presents with    Hypertension Follow-up       Subjective:     HPI:   Azeb Major is a 83 y.o. female here w/ daughter  to discuss the evaluation and management of:      Left sided frontal HA x sev weeks  Wondering if allergies vs BP  Chronic issues w/ trouble swallowing- cough/choking now w/ foods  Osteoporosis - concerns about meds- ? About Prolia  Reporting home BP around 170's/90     No problems updated.          Objective:     BP (!) 140/60 (BP Location: Right arm, Patient Position: Sitting, BP Cuff Size: Adult)   Pulse 66   Temp 36.3 °C (97.3 °F) (Temporal)   Resp 14   Wt 70.8 kg (156 lb)   SpO2 96%  Body mass index is 25.19 kg/m².    Physical Exam:  Physical Exam  Constitutional: Well-developed and well-nourished. Not diaphoretic. No distress.   Skin: Skin is warm and dry. No rash noted.  Head: Atraumatic without lesions. Frontal sinus- nontender  Eyes: Conjunctivae and extraocular motions are normal.   Ears:  External ears unremarkable.    Nose: Nares patent.       Neck: Supple     Chest: Effort normal.       Extremities: No cyanosis, clubbing, erythema, nor edema.   Neurological: Alert and oriented x 3.    Psychiatric:  Behavior, mood, and affect are appropriate         Hospital Outpatient Visit on 06/08/2023   Component Date Value Ref Range Status    WBC 06/08/2023 6.2  4.8 - 10.8 K/uL Final    RBC 06/08/2023 4.39  4.20 - 5.40 M/uL Final    Hemoglobin 06/08/2023 13.6  12.0 - 16.0 g/dL Final    Hematocrit 06/08/2023 43.4  37.0 - 47.0 % Final    MCV 06/08/2023 98.9 (H)  81.4 - 97.8 fL Final    MCH 06/08/2023 31.0  27.0 - 33.0 pg Final    MCHC 06/08/2023 31.3 (L)  32.2 - 35.5 g/dL Final    Please note new reference range effective 05/22/2023.    RDW 06/08/2023 50.4 (H)  35.9 - 50.0 fL Final    Platelet Count 06/08/2023 200  164 - 446 K/uL Final    MPV 06/08/2023 12.3  9.0 - 12.9 fL Final    Neutrophils-Polys 06/08/2023 53.40  44.00 - 72.00 % Final    Lymphocytes  06/08/2023 32.80  22.00 - 41.00 % Final    Monocytes 06/08/2023 11.30  0.00 - 13.40 % Final    Eosinophils 06/08/2023 1.50  0.00 - 6.90 % Final    Basophils 06/08/2023 0.80  0.00 - 1.80 % Final    Immature Granulocytes 06/08/2023 0.20  0.00 - 0.90 % Final    Nucleated RBC 06/08/2023 0.00  0.00 - 0.20 /100 WBC Final    Please note new reference range effective 05/22/2023.    Neutrophils (Absolute) 06/08/2023 3.30  1.82 - 7.42 K/uL Final    Comment: Includes immature neutrophils, if present.  Please note new reference range effective 05/22/2023.      Lymphs (Absolute) 06/08/2023 2.03  1.00 - 4.80 K/uL Final    Monos (Absolute) 06/08/2023 0.70  0.00 - 0.85 K/uL Final    Eos (Absolute) 06/08/2023 0.09  0.00 - 0.51 K/uL Final    Baso (Absolute) 06/08/2023 0.05  0.00 - 0.12 K/uL Final    Immature Granulocytes (abs) 06/08/2023 0.01  0.00 - 0.11 K/uL Final    NRBC (Absolute) 06/08/2023 0.00  K/uL Final    Sodium 06/08/2023 138  135 - 145 mmol/L Final    Potassium 06/08/2023 5.6 (H)  3.6 - 5.5 mmol/L Final    Chloride 06/08/2023 105  96 - 112 mmol/L Final    Co2 06/08/2023 21  20 - 33 mmol/L Final    Anion Gap 06/08/2023 12.0  7.0 - 16.0 Final    Glucose 06/08/2023 66  65 - 99 mg/dL Final    Bun 06/08/2023 50 (H)  8 - 22 mg/dL Final    Creatinine 06/08/2023 1.21  0.50 - 1.40 mg/dL Final    Calcium 06/08/2023 9.0  8.5 - 10.5 mg/dL Final    AST(SGOT) 06/08/2023 26  12 - 45 U/L Final    Comment: The hemolysis index of the specimen exceeds the allowed tolerance for the  test.  Result may be affected.  Specimen recollection is recommended to  confirm the result.      ALT(SGPT) 06/08/2023 13  2 - 50 U/L Final    Alkaline Phosphatase 06/08/2023 80  30 - 99 U/L Final    Total Bilirubin 06/08/2023 0.3  0.1 - 1.5 mg/dL Final    Albumin 06/08/2023 4.1  3.2 - 4.9 g/dL Final    Total Protein 06/08/2023 7.6  6.0 - 8.2 g/dL Final    Globulin 06/08/2023 3.5  1.9 - 3.5 g/dL Final    A-G Ratio 06/08/2023 1.2  g/dL Final    TSH 06/08/2023  1.780  0.380 - 5.330 uIU/mL Final    Comment: The 2011 American Thyroid Association (ZAHRAA) guidelines  recommended that the interpretation of thyroid function in  pregnancy be based on trimester specific reference ranges.    1st Trimester  0.100-2.500 mIU/L  2nd Trimester  0.200-3.000 mIU/L  3rd Trimester  0.300-3.500 mIU/L    These established reference ranges have not been validated  at [facility].      Cholesterol,Tot 06/08/2023 201 (H)  100 - 199 mg/dL Final    Triglycerides 06/08/2023 117  0 - 149 mg/dL Final    HDL 06/08/2023 58  >=40 mg/dL Final    LDL 06/08/2023 120 (H)  <100 mg/dL Final    25-Hydroxy   Vitamin D 25 06/08/2023 56  30 - 100 ng/mL Final    Comment: Adult Ranges:   <20 ng/mL - Deficiency  20-29 ng/mL - Insufficiency   ng/mL - Sufficiency  Electrochemiluminescence binding assay performed using Roche linda e  immunoassay analyzer.  The Elecsys Vitamin D total II assay is intended for  the quantitative determination of total 25 hydroxyvitamin D in human serum  and plasma. This assay is to be used as an aid in the assessment of vitamin  D sufficiency in adults.      Correct Calcium 06/08/2023 8.9  8.5 - 10.5 mg/dL Final    GFR (CKD-EPI) 06/08/2023 44 (A)  >60 mL/min/1.73 m 2 Final    Comment: Estimated Glomerular Filtration Rate is calculated using  race neutral CKD-EPI 2021 equation per NKF-ASN recommendations.     Hospital Outpatient Visit on 05/18/2023   Component Date Value Ref Range Status    Significant Indicator 05/18/2023 NEG   Final    Source 05/18/2023 UR   Final    Site 05/18/2023 -   Final    Culture Result 05/18/2023    Final                    Value:Usual urogenital gwendolyn ,000 cfu/mL  Greater than 3 organisms isolated, culture of doubtful  significance, please recollect.     Non-Provider Visit on 05/18/2023   Component Date Value Ref Range Status    POC Color 05/18/2023 yellow  Negative Final    POC Appearance 05/18/2023 turbid  Negative Final    POC Glucose  05/18/2023 negative  Negative mg/dL Final    POC Bilirubin 05/18/2023 negative  Negative mg/dL Final    POC Ketones 05/18/2023 trace  Negative mg/dL Final    POC Specific Gravity 05/18/2023 1.025  <1.005 - >1.030 Final    POC Blood 05/18/2023 large  Negative Final    POC Urine PH 05/18/2023 5.5  5.0 - 8.0 Final    POC Protein 05/18/2023 >=300  Negative mg/dL Final    POC Urobiligen 05/18/2023 0.2  Negative (0.2) mg/dL Final    POC Nitrites 05/18/2023 negative  Negative Final    POC Leukocyte Esterase 05/18/2023 large  Negative Final         Assessment and Plan:     The following treatment plan was discussed/researched:  No problem-specific Assessment & Plan notes found for this encounter.    1. Hyperparathyroidism, secondary (HCC)  Stable       2. Age-related osteoporosis without current pathological fracture  DS-BONE DENSITY STUDY (DEXA)      3. Vitamin D deficiency  VITAMIN D,25 HYDROXY (DEFICIENCY)      4. Stage 3b chronic kidney disease (HCC)  CBC WITH DIFFERENTIAL    Comp Metabolic Panel      5. Primary hypertension  Lipid Profile      6. Hypothyroidism, unspecified type  TSH      7. Dysphagia, unspecified type  DX-ESOPHAGUS - [id number]               Reviewed labs  Trouble swallwing/choking sl.   discussed  Brain MRI and Swallow test.  Pt prefers to avoid at this time           HCC Gap Form    Last edited 06/11/23 14:38 PDT by Olimpia Newsome M.D.                                                                                                                                                                             Any change or worsening of signs or symptoms, patient encouraged to follow-up or report to emergency room for further evaluation. Patient verbalizes understanding and agrees.    Follow-Up: No follow-ups on file.      PLEASE NOTE: This dictation was created using voice recognition software. I have made every reasonable attempt to correct obvious errors, but I expect that there are errors of  grammar and possibly content that I did not discover before finalizing the note.      My total time spent caring for the patient on the day of the encounter was  greater than 40 minutes.   This includes obtaining history, reviewing chart, physical exam, patient education, reviewing outside records, placing orders, interpreting tests and coordinating care.

## 2023-06-26 DIAGNOSIS — N39.46 MIXED INCONTINENCE: ICD-10-CM

## 2023-06-26 RX ORDER — TOLTERODINE 4 MG/1
CAPSULE, EXTENDED RELEASE ORAL
Qty: 90 CAPSULE | Refills: 3 | Status: SHIPPED | OUTPATIENT
Start: 2023-06-26

## 2023-07-06 ENCOUNTER — OFFICE VISIT (OUTPATIENT)
Dept: BEHAVIORAL HEALTH | Facility: CLINIC | Age: 83
End: 2023-07-06
Payer: MEDICARE

## 2023-07-06 DIAGNOSIS — F31.75 BIPOLAR DISORDER, IN PARTIAL REMISSION, MOST RECENT EPISODE DEPRESSED (HCC): ICD-10-CM

## 2023-07-06 PROCEDURE — 99214 OFFICE O/P EST MOD 30 MIN: CPT | Performed by: PSYCHIATRY & NEUROLOGY

## 2023-07-06 RX ORDER — VENLAFAXINE HYDROCHLORIDE 75 MG/1
75 CAPSULE, EXTENDED RELEASE ORAL DAILY
Qty: 90 CAPSULE | Refills: 0 | Status: SHIPPED | OUTPATIENT
Start: 2023-07-06 | End: 2023-10-31

## 2023-07-06 RX ORDER — QUETIAPINE 150 MG/1
TABLET, FILM COATED, EXTENDED RELEASE ORAL
Qty: 90 TABLET | Refills: 3 | Status: SHIPPED | OUTPATIENT
Start: 2023-07-06 | End: 2023-12-06 | Stop reason: SDUPTHER

## 2023-07-06 RX ORDER — DIVALPROEX SODIUM 250 MG/1
TABLET, EXTENDED RELEASE ORAL
Qty: 270 TABLET | Refills: 3 | Status: SHIPPED | OUTPATIENT
Start: 2023-07-06 | End: 2023-12-06 | Stop reason: SDUPTHER

## 2023-07-06 NOTE — PROGRESS NOTES
PSYCHIATRY FOLLOW-UP NOTE      Name: Azeb Major  MRN: [id number]  : 1940  Age: 83 y.o.  Date of assessment: 2023  PCP: Olimpia Newsome M.D.  Persons in attendance: Patient & Daughter      REASON FOR VISIT/CHIEF COMPLAINT (as stated by Patient):  Azeb Major is a 83 y.o., White female, attending follow-up appointment for mood and anxiety management.      HISTORY OF PRESENT ILLNESS:  Azeb Major is a 83 y.o. old female with bipolar disorder comes in today for follow up. Patient was last seen 6 months ago, and following treatment planning recommendations were done:  Continue Depakote  mg #3 tablets daily after dinner for mood stabilization.   Continue Quetiapine  mg HS for mood stabilization.    Increase Effexor  mg daily for depression.   Continue psychotherapy for mood and anxiety management.        Patient is compliant with medications with no side effect and describes stable mood, anxiety and sleep no signs of perry, hypomania or psychosis.  Agreed with tapering Effexor over 6 weeks as discussed below and considering Effexor again during wintertime if depression is seen.  Educated patient and family on the risk of hypertension with Effexor but during her recent visit with primary care physician her hypertension diagnosis was removed and she is no longer on antihypertensives.  Discussed the risk of high ammonia level with Depakote causing confusion as well.  Her most recent liver function tests are in normal range and agreed with checking ammonia level in future if any worsening of confusion is seen.      CURRENT MEDICATIONS:  Current Outpatient Medications   Medication Sig Dispense Refill    tolterodine ER (DETROL LA) 4 MG CAPSULE SR 24 HR TAKE 1 CAPSULE DAILY 90 Capsule 3    levothyroxine (SYNTHROID) 125 MCG Tab TAKE 1 TABLET EVERY MORNING ON AN EMPTY STOMACH 90 Tablet 3    venlafaxine (EFFEXOR-XR) 150 MG extended-release capsule TAKE 1 CAPSULE DAILY  90 Capsule 3    omeprazole (PRILOSEC) 20 MG delayed-release capsule TAKE 1 CAPSULE DAILY 90 Capsule 3    divalproex ER (DEPAKOTE ER) 250 MG TABLET SR 24 HR TAKE 3 TABLETS 1/2 HOUR AFTER DINNER 270 Tablet 3    QUEtiapine Fumarate 150 MG TABLET SR 24 HR TAKE 1 TABLET DAILY AT BEDTIME 90 Tablet 3    nitrofurantoin (MACRODANTIN) 50 MG Cap TAKE 1 CAPSULE DAILY 90 Capsule 3    alendronate (FOSAMAX) 70 MG Tab TAKE 1 TABLET EVERY 7 DAYS (Patient not taking: Reported on 6/8/2023) 12 tablet 3    estradiol (ESTRACE) 0.1 MG/GM vaginal cream Apply pea sized amount to genital area three time each week. (Patient not taking: Reported on 6/8/2023) 2 Tube 1     No current facility-administered medications for this visit.       MEDICAL HISTORY  Past Medical History:   Diagnosis Date    Bipolar disorder (HCC)     Bronchitis 02-26-13    in the past, not recent    CATARACT     bilat removed     Dental disorder     Frequent UTI 12/1/2010    Heart burn     History of falling     r/t vertigo    Hypertension     Hypothyroid 12/1/2010    Other specified symptom associated with female genital organs     Pneumonia     x2    Psychiatric disorder     bipolar    Renal disorder     daughter reports some renal insufficiency    Seasonal allergic reaction     Stress incontinence     Urinary bladder disorder      Past Surgical History:   Procedure Laterality Date    HYSTERECTOMY ROBOTIC  3/18/2013    Performed by Juju Gonzales M.D. at SURGERY [facility]S    COLPOSACROPEXY ROBOTIC  3/18/2013    Performed by Juju Gonzales M.D. at SURGERY [facility]    BLADDER SLING FEMALE  3/18/2013    Performed by Juju Gonzales M.D. at SURGERY [facility]    CYSTOSCOPY  3/18/2013    Performed by Juju Gonzales M.D. at SURGERY [facility]    VENTRAL HERNIA REPAIR  3/18/2010    Performed by TRAVIS PATTERSON at SURGERY [facility]S    TONSILLECTOMY         PAST PSYCHIATRIC HISTORY  Patient with psychiatric illness since teenage  "year  Prior Self harm/suicide attempt: none   Prior Diagnosis: bipolar 1 diorder     PAST PSYCHIATRIC MEDICATIONS  Historically, has not done well off of medications  Thorazine - did not like  Lithium - worked, affected kidneys  Effexor - on and off for years  Many others in the past      FAMILY HISTORY  Psychiatric diagnosis:  Not known     SUBSTANCE USE HISTORY:  ALCOHOL: no  TOBACCO: no  CANNABIS: no  OPIOIDS: no  PRESCRIPTION MEDICATIONS: no  OTHERS: no  History of inpatient/outpatient rehab treatment: no     SOCIAL HISTORY  The patient lives alone in the house but has 5 children who visit her daily.    Each one of them takes care of her on a different night of the week.    Her son lives across the street.  People from [facility] visit her 3 days/week      REVIEW OF SYSTEMS:        Constitutional negative   Eyes  Decrease in vision   Ears/Nose/Mouth/Throat negative   Cardiovascular negative   Respiratory negative   Gastrointestinal negative   Genitourinary  CKD   Muscular negative   Integumentary negative   Neurological negative   Endocrine  Hypothyroid   Hematologic/Lymphatic negative     PHYSICAL EXAMINAION:  Vital signs: There were no vitals taken for this visit.  Musculoskeletal: slow with cane  Abnormal movements: none      MENTAL STATUS EXAMINATION      General:   - Grooming and hygiene: Casual,   - Apparent distress: none,   - Behavior: Calm  - Eye Contact:  Good,   - no psychomotor agitation or retardation    - Participation: Active verbal participation  Orientation: Alert and Fully Oriented to person, place and time  Mood: Euthymic  Affect: Flexible and Full range,  Thought Process: Logical and Goal-directed  Thought Content: Denies suicidal or homicidal ideations, intent or plan   Perception: Denies auditory or visual hallucinations. No delusions noted   Attention span and concentration: fair  Speech:Rate within normal limits and Volume within normal limits  Language: Appropriate   Insight: " Adequate  Judgment: Adequate  Recent and remote memory: No gross evidence of memory deficits        DEPRESSION SCREENIN/6/2019     9:30 AM 2021    12:20 PM 3/30/2022    11:00 AM   Depression Screen (PHQ-2/PHQ-9)   PHQ-2 Total Score 2 1 0   PHQ-9 Total Score 7 2        Interpretation of PHQ-9 Total Score   Score Severity   1-4 No Depression   5-9 Mild Depression   10-14 Moderate Depression   15-19 Moderately Severe Depression   20-27 Severe Depression    CURRENT RISK:       Suicidal: Low       Homicidal: Low       Self-Harm: Low       Relapse: Low       Crisis Safety Plan Reviewed Not Indicated       If evidence of imminent risk is present, intervention/plan:      MEDICAL RECORDS/LABS/DIAGNOSTIC TESTS REVIEWED:  No new lab since last visit     [place]  records -   Reviewed     PLAN:  (1) Bipolar 1 disorder  Stable  Continue Depakote  mg #3 tablets daily after dinner for mood stabilization.   Continue Quetiapine  mg HS for mood stabilization.    Taper Effexor XR to 75 mg daily X 6 weeks and stop.   Restart Effexor XR 75 mg during winter time if depression is noted.  Continue psychotherapy for mood and anxiety management.  Medication options, alternatives (including no medications) and medication risks/benefits/side effects were discussed in detail.  The patient was advised to call, message provider on [employer], or come in to the clinic if symptoms worsen or if any future questions/issues regarding their medications arise; the patient verbalized understanding and agreement.    The patient was educated to call 911, call the suicide hotline, or go to local ER if having thoughts of suicide or homicide; verbalized understanding.    Billing Coding based on:  67813 based on [facility]    Return to clinic in 6 months or sooner if symptoms worsen.  Next Appointment: instruction provided on how to make the next appointment.     The proposed treatment plan was discussed with the patient who was provided the  opportunity to ask questions and make suggestions regarding alternative treatment. Patient verbalized understanding and expressed agreement with the plan.       James Ray M.D.  07/06/23    This note was created using voice recognition software (Dragon). The accuracy of the dictation is limited by the abilities of the software. I have reviewed the note prior to signing, however some errors in grammar and context are still possible. If you have any questions related to this note please do not hesitate to contact our office.

## 2023-07-12 ENCOUNTER — APPOINTMENT (OUTPATIENT)
Dept: BEHAVIORAL HEALTH | Facility: CLINIC | Age: 83
End: 2023-07-12
Payer: MEDICARE

## 2023-09-14 ENCOUNTER — HOSPITAL ENCOUNTER (OUTPATIENT)
Dept: RADIOLOGY | Facility: MEDICAL CENTER | Age: 83
End: 2023-09-14
Attending: FAMILY MEDICINE
Payer: MEDICARE

## 2023-09-14 DIAGNOSIS — M81.0 AGE-RELATED OSTEOPOROSIS WITHOUT CURRENT PATHOLOGICAL FRACTURE: ICD-10-CM

## 2023-09-14 PROCEDURE — 77080 DXA BONE DENSITY AXIAL: CPT

## 2023-10-30 RX ORDER — QUETIAPINE FUMARATE 25 MG/1
25 TABLET, FILM COATED ORAL 2 TIMES DAILY
Qty: 60 TABLET | Refills: 0 | Status: SHIPPED | OUTPATIENT
Start: 2023-10-30 | End: 2023-12-06 | Stop reason: SDUPTHER

## 2023-11-01 ENCOUNTER — OFFICE VISIT (OUTPATIENT)
Dept: INTERNAL MEDICINE | Facility: IMAGING CENTER | Age: 83
End: 2023-11-01
Payer: MEDICARE

## 2023-11-01 VITALS
RESPIRATION RATE: 14 BRPM | BODY MASS INDEX: 25.03 KG/M2 | SYSTOLIC BLOOD PRESSURE: 140 MMHG | WEIGHT: 155 LBS | DIASTOLIC BLOOD PRESSURE: 60 MMHG | OXYGEN SATURATION: 95 % | TEMPERATURE: 97 F | HEART RATE: 86 BPM

## 2023-11-01 DIAGNOSIS — M81.0 AGE-RELATED OSTEOPOROSIS WITHOUT CURRENT PATHOLOGICAL FRACTURE: ICD-10-CM

## 2023-11-01 DIAGNOSIS — F31.75 BIPOLAR DISORDER, IN PARTIAL REMISSION, MOST RECENT EPISODE DEPRESSED (HCC): Primary | ICD-10-CM

## 2023-11-01 DIAGNOSIS — Z23 NEED FOR INFLUENZA VACCINATION: ICD-10-CM

## 2023-11-01 PROCEDURE — 3077F SYST BP >= 140 MM HG: CPT | Performed by: FAMILY MEDICINE

## 2023-11-01 PROCEDURE — 3078F DIAST BP <80 MM HG: CPT | Performed by: FAMILY MEDICINE

## 2023-11-01 PROCEDURE — 99214 OFFICE O/P EST MOD 30 MIN: CPT | Mod: 25 | Performed by: FAMILY MEDICINE

## 2023-11-01 PROCEDURE — G0008 ADMIN INFLUENZA VIRUS VAC: HCPCS | Performed by: FAMILY MEDICINE

## 2023-11-01 PROCEDURE — 90662 IIV NO PRSV INCREASED AG IM: CPT | Performed by: FAMILY MEDICINE

## 2023-11-01 ASSESSMENT — FIBROSIS 4 INDEX: FIB4 SCORE: 3.06

## 2023-11-21 RX ORDER — QUETIAPINE FUMARATE 200 MG/1
200 TABLET, FILM COATED ORAL 2 TIMES DAILY
Qty: 30 TABLET | Refills: 0 | Status: SHIPPED
Start: 2023-11-21 | End: 2023-11-21

## 2023-11-21 RX ORDER — QUETIAPINE FUMARATE 200 MG/1
200 TABLET, FILM COATED ORAL DAILY
Qty: 30 TABLET | Refills: 0 | Status: SHIPPED
Start: 2023-11-21 | End: 2023-12-06

## 2023-11-29 DIAGNOSIS — N39.0 FREQUENT UTI: ICD-10-CM

## 2023-11-29 RX ORDER — OMEPRAZOLE 20 MG/1
CAPSULE, DELAYED RELEASE ORAL
Qty: 90 CAPSULE | Refills: 3 | Status: SHIPPED | OUTPATIENT
Start: 2023-11-29

## 2023-11-29 RX ORDER — NITROFURANTOIN MACROCRYSTALS 50 MG/1
CAPSULE ORAL
Qty: 90 CAPSULE | Refills: 3 | Status: SHIPPED | OUTPATIENT
Start: 2023-11-29

## 2023-12-06 ENCOUNTER — OFFICE VISIT (OUTPATIENT)
Dept: BEHAVIORAL HEALTH | Facility: CLINIC | Age: 83
End: 2023-12-06
Payer: MEDICARE

## 2023-12-06 DIAGNOSIS — F31.75 BIPOLAR DISORDER, IN PARTIAL REMISSION, MOST RECENT EPISODE DEPRESSED (HCC): ICD-10-CM

## 2023-12-06 PROCEDURE — 99215 OFFICE O/P EST HI 40 MIN: CPT | Performed by: PSYCHIATRY & NEUROLOGY

## 2023-12-06 RX ORDER — QUETIAPINE 150 MG/1
TABLET, FILM COATED, EXTENDED RELEASE ORAL
Qty: 30 TABLET | Refills: 1 | Status: SHIPPED | OUTPATIENT
Start: 2023-12-06

## 2023-12-06 RX ORDER — DIVALPROEX SODIUM 250 MG/1
TABLET, EXTENDED RELEASE ORAL
Qty: 270 TABLET | Refills: 3 | Status: SHIPPED | OUTPATIENT
Start: 2023-12-06

## 2023-12-06 RX ORDER — QUETIAPINE FUMARATE 25 MG/1
25 TABLET, FILM COATED ORAL
Qty: 30 TABLET | Refills: 1 | Status: ON HOLD
Start: 2023-12-06 | End: 2024-01-19

## 2023-12-06 NOTE — PROGRESS NOTES
PSYCHIATRY FOLLOW-UP NOTE      Name: Azeb Major  MRN: [id number]  : 1940  Age: 83 y.o.  Date of assessment: 2023  PCP: Olimpia Newsome M.D.  Persons in attendance: Patient & two family memberw      REASON FOR VISIT/CHIEF COMPLAINT (as stated by Patient):  Azeb Major is a 83 y.o., White female, attending follow-up appointment for mood and anxiety management.      HISTORY OF PRESENT ILLNESS:  Azeb Major is a 83 y.o. old female with bipolar disorder comes in today for follow up. Patient was last seen 5 months ago, and following treatment planning recommendations were done:  Continue Depakote  mg #3 tablets daily after dinner for mood stabilization.   Continue Quetiapine  mg HS for mood stabilization.    Taper Effexor XR to 75 mg daily X 6 weeks and stop.   Restart Effexor XR 75 mg during winter time if depression is noted.  Continue psychotherapy for mood and anxiety management.    Session was done in three section: first with family and patient together followed by alone with patient and then summarizing the treatment plan with everyone present.    According to the family 1 month ago patient had worsening of negative thoughts with symptoms of hypomania.  She was seen by her primary care physician and dose of Seroquel was increased to 200 mg dose with good response.  Initially we discussed the plan to not change medications but patient reporting that higher dosing of Seroquel is causing grogginess and agreed with reducing the dose down to 175 mg dosage with close monitoring for return of negative thoughts or mood changes.  Agreed with not considering Effexor due to the hypomania and manic switch risk.  But agreed with considering Effexor in upcoming session if depression is noted.  Family will monitor for hypomania or perry.  Importance of sleep hygiene was emphasized to the patient.  Agreed with not titrating Depakote at this time.  Most session dedicated to  psychoeducation and active listening.    Discussed the risk of high ammonia level with Depakote causing confusion as well.  Her most recent liver function tests are in normal range and agreed with checking ammonia level in future if any worsening of confusion is seen.      CURRENT MEDICATIONS:  Current Outpatient Medications   Medication Sig Dispense Refill    omeprazole (PRILOSEC) 20 MG delayed-release capsule TAKE 1 CAPSULE DAILY 90 Capsule 3    nitrofurantoin (MACRODANTIN) 50 MG Cap TAKE 1 CAPSULE DAILY 90 Capsule 3    QUEtiapine (SEROQUEL) 200 MG Tab Take 1 Tablet by mouth every day. 30 Tablet 0    QUEtiapine (SEROQUEL) 25 MG Tab Take 1 Tablet by mouth 2 times a day. (Patient not taking: Reported on 11/1/2023) 60 Tablet 0    divalproex ER (DEPAKOTE ER) 250 MG TABLET SR 24 HR TAKE 3 TABLETS 1/2 HOUR AFTER DINNER 270 Tablet 3    QUEtiapine Fumarate 150 MG TABLET SR 24 HR TAKE 1 TABLET DAILY AT BEDTIME 90 Tablet 3    tolterodine ER (DETROL LA) 4 MG CAPSULE SR 24 HR TAKE 1 CAPSULE DAILY 90 Capsule 3    levothyroxine (SYNTHROID) 125 MCG Tab TAKE 1 TABLET EVERY MORNING ON AN EMPTY STOMACH 90 Tablet 3     No current facility-administered medications for this visit.       MEDICAL HISTORY  Past Medical History:   Diagnosis Date    Bipolar disorder (HCC)     Bronchitis 02-26-13    in the past, not recent    CATARACT     bilat removed     Dental disorder     Frequent UTI 12/1/2010    Heart burn     History of falling     r/t vertigo    Hypertension     Hypothyroid 12/1/2010    Other specified symptom associated with female genital organs     Pneumonia     x2    Psychiatric disorder     bipolar    Renal disorder     daughter reports some renal insufficiency    Seasonal allergic reaction     Stress incontinence     Urinary bladder disorder      Past Surgical History:   Procedure Laterality Date    HYSTERECTOMY ROBOTIC  3/18/2013    Performed by Juju Gonzales M.D. at SURGERY [facility]    COLPOSACROPEXY ROBOTIC   3/18/2013    Performed by Juju Gonzales M.D. at SURGERY [facility]S    BLADDER SLING FEMALE  3/18/2013    Performed by Juju Gonzales M.D. at SURGERY [facility]    CYSTOSCOPY  3/18/2013    Performed by Juju Gonzales M.D. at SURGERY [facility]    VENTRAL HERNIA REPAIR  3/18/2010    Performed by TRAVIS PATTERSON at SURGERY [facility]    TONSILLECTOMY         PAST PSYCHIATRIC MEDICATIONS  Historically, has not done well off of medications  Thorazine - did not like  Lithium - worked, affected kidneys  Depakote  Effexor - on and off for years  Many others in the past        REVIEW OF SYSTEMS:        Constitutional negative   Eyes  Decrease in vision   Ears/Nose/Mouth/Throat negative   Cardiovascular negative   Respiratory negative   Gastrointestinal negative   Genitourinary  CKD   Muscular negative   Integumentary negative   Neurological negative   Endocrine  Hypothyroid   Hematologic/Lymphatic negative     PHYSICAL EXAMINAION:  Vital signs: There were no vitals taken for this visit.  Musculoskeletal: with cane  Abnormal movements: none      MENTAL STATUS EXAMINATION      General:   - Grooming and hygiene: Casual,   - Apparent distress: none,   - Behavior: Calm  - Eye Contact:  Good,   - no psychomotor agitation or retardation    - Participation: Active verbal participation  Orientation: Alert and Fully Oriented to person, place and time  Mood: Euthymic  Affect: Flexible and Full range,  Thought Process: Logical  Thought Content: Denies suicidal or homicidal ideations, intent or plan   Perception: Denies auditory or visual hallucinations. No delusions noted   Attention span and concentration: fair  Speech:Rate within normal limits  Language: Appropriate   Insight: Adequate  Judgment: Adequate  Recent and remote memory:  not teste d        DEPRESSION SCREENIN/6/2019     9:30 AM 2021    12:20 PM 3/30/2022    11:00 AM   Depression Screen (PHQ-2/PHQ-9)   PHQ-2 Total Score 2 1  0   PHQ-9 Total Score 7 2        Interpretation of PHQ-9 Total Score   Score Severity   1-4 No Depression   5-9 Mild Depression   10-14 Moderate Depression   15-19 Moderately Severe Depression   20-27 Severe Depression    CURRENT RISK:       Suicidal: Low       Homicidal: Low       Self-Harm: Low       Relapse: Low       Crisis Safety Plan Reviewed Not Indicated       If evidence of imminent risk is present, intervention/plan:      MEDICAL RECORDS/LABS/DIAGNOSTIC TESTS REVIEWED:  No new lab since last visit     [place]  records -   Reviewed     PLAN:  (1) Bipolar 1 disorder  Improving  Continue Depakote  mg #3 tablets daily after dinner for mood stabilization.   Reduce Quetiapine to 175 mg HS for mood stabilization.    Restart Effexor XR 75 mg during winter time if depression is noted.  Continue psychotherapy for mood and anxiety management.  Medication options, alternatives (including no medications) and medication risks/benefits/side effects were discussed in detail.  The patient was advised to call, message provider on [employer], or come in to the clinic if symptoms worsen or if any future questions/issues regarding their medications arise; the patient verbalized understanding and agreement.    The patient was educated to call 911, call the suicide hotline, or go to local ER if having thoughts of suicide or homicide; verbalized understanding.    Billing Coding based on:  10629: based on total time spent of 40 min in evaluation, charting and file review.     Return to clinic in 6 weeks or sooner if symptoms worsen.  Next Appointment: instruction provided on how to make the next appointment.     The proposed treatment plan was discussed with the patient who was provided the opportunity to ask questions and make suggestions regarding alternative treatment. Patient verbalized understanding and expressed agreement with the plan.       James Ray M.D.  12/06/23    This note was created using voice recognition  software (Dragon). The accuracy of the dictation is limited by the abilities of the software. I have reviewed the note prior to signing, however some errors in grammar and context are still possible. If you have any questions related to this note please do not hesitate to contact our office.

## 2023-12-10 NOTE — PROGRESS NOTES
Subjective:     HPI:   Azeb Major is a 83 y.o. female w/ osteoporosis   here to discuss the evaluation and management of:  Chief Complaint   Patient presents with   • Lab Results   • Flu Vaccine     She has bipolar disorder and mood has been down lately. Seroquel has been helping    On Prolia of osteoporosis          Objective:     BP (!) 140/60 (BP Location: Left arm, Patient Position: Sitting, BP Cuff Size: Adult)   Pulse 86   Temp 36.1 °C (97 °F) (Temporal)   Resp 14   Wt 70.3 kg (155 lb)   SpO2 95%   BMI 25.03 kg/m²      Physical Exam:  Physical Exam  Constitutional:       General: She is not in acute distress.     Appearance: Normal appearance. She is normal weight.   HENT:      Head: Normocephalic and atraumatic.   Eyes:      Conjunctiva/sclera: Conjunctivae normal.   Cardiovascular:      Rate and Rhythm: Normal rate.   Pulmonary:      Effort: Pulmonary effort is normal. No respiratory distress.      Breath sounds: No stridor. No wheezing, rhonchi or rales.   Musculoskeletal:      Right lower leg: No edema.      Left lower leg: No edema.   Skin:     General: Skin is warm and dry.   Neurological:      General: No focal deficit present.      Mental Status: She is alert.   Psychiatric:         Mood and Affect: Mood normal.         Behavior: Behavior normal.        Hospital Outpatient Visit on 06/08/2023   Component Date Value Ref Range Status   • WBC 06/08/2023 6.2  4.8 - 10.8 K/uL Final   • RBC 06/08/2023 4.39  4.20 - 5.40 M/uL Final   • Hemoglobin 06/08/2023 13.6  12.0 - 16.0 g/dL Final   • Hematocrit 06/08/2023 43.4  37.0 - 47.0 % Final   • MCV 06/08/2023 98.9 (H)  81.4 - 97.8 fL Final   • MCH 06/08/2023 31.0  27.0 - 33.0 pg Final   • MCHC 06/08/2023 31.3 (L)  32.2 - 35.5 g/dL Final    Please note new reference range effective 05/22/2023.   • RDW 06/08/2023 50.4 (H)  35.9 - 50.0 fL Final   • Platelet Count 06/08/2023 200  164 - 446 K/uL Final   • MPV 06/08/2023 12.3  9.0 - 12.9 fL Final   •  Neutrophils-Polys 06/08/2023 53.40  44.00 - 72.00 % Final   • Lymphocytes 06/08/2023 32.80  22.00 - 41.00 % Final   • Monocytes 06/08/2023 11.30  0.00 - 13.40 % Final   • Eosinophils 06/08/2023 1.50  0.00 - 6.90 % Final   • Basophils 06/08/2023 0.80  0.00 - 1.80 % Final   • Immature Granulocytes 06/08/2023 0.20  0.00 - 0.90 % Final   • Nucleated RBC 06/08/2023 0.00  0.00 - 0.20 /100 WBC Final    Please note new reference range effective 05/22/2023.   • Neutrophils (Absolute) 06/08/2023 3.30  1.82 - 7.42 K/uL Final    Comment: Includes immature neutrophils, if present.  Please note new reference range effective 05/22/2023.     • Lymphs (Absolute) 06/08/2023 2.03  1.00 - 4.80 K/uL Final   • Monos (Absolute) 06/08/2023 0.70  0.00 - 0.85 K/uL Final   • Eos (Absolute) 06/08/2023 0.09  0.00 - 0.51 K/uL Final   • Baso (Absolute) 06/08/2023 0.05  0.00 - 0.12 K/uL Final   • Immature Granulocytes (abs) 06/08/2023 0.01  0.00 - 0.11 K/uL Final   • NRBC (Absolute) 06/08/2023 0.00  K/uL Final   • Sodium 06/08/2023 138  135 - 145 mmol/L Final   • Potassium 06/08/2023 5.6 (H)  3.6 - 5.5 mmol/L Final   • Chloride 06/08/2023 105  96 - 112 mmol/L Final   • Co2 06/08/2023 21  20 - 33 mmol/L Final   • Anion Gap 06/08/2023 12.0  7.0 - 16.0 Final   • Glucose 06/08/2023 66  65 - 99 mg/dL Final   • Bun 06/08/2023 50 (H)  8 - 22 mg/dL Final   • Creatinine 06/08/2023 1.21  0.50 - 1.40 mg/dL Final   • Calcium 06/08/2023 9.0  8.5 - 10.5 mg/dL Final   • AST(SGOT) 06/08/2023 26  12 - 45 U/L Final    Comment: The hemolysis index of the specimen exceeds the allowed tolerance for the  test.  Result may be affected.  Specimen recollection is recommended to  confirm the result.     • ALT(SGPT) 06/08/2023 13  2 - 50 U/L Final   • Alkaline Phosphatase 06/08/2023 80  30 - 99 U/L Final   • Total Bilirubin 06/08/2023 0.3  0.1 - 1.5 mg/dL Final   • Albumin 06/08/2023 4.1  3.2 - 4.9 g/dL Final   • Total Protein 06/08/2023 7.6  6.0 - 8.2 g/dL Final   •  Globulin 06/08/2023 3.5  1.9 - 3.5 g/dL Final   • A-G Ratio 06/08/2023 1.2  g/dL Final   • TSH 06/08/2023 1.780  0.380 - 5.330 uIU/mL Final    Comment: The 2011 American Thyroid Association (ZAHRAA) guidelines  recommended that the interpretation of thyroid function in  pregnancy be based on trimester specific reference ranges.    1st Trimester  0.100-2.500 mIU/L  2nd Trimester  0.200-3.000 mIU/L  3rd Trimester  0.300-3.500 mIU/L    These established reference ranges have not been validated  at [facility].     • Cholesterol,Tot 06/08/2023 201 (H)  100 - 199 mg/dL Final   • Triglycerides 06/08/2023 117  0 - 149 mg/dL Final   • HDL 06/08/2023 58  >=40 mg/dL Final   • LDL 06/08/2023 120 (H)  <100 mg/dL Final   • 25-Hydroxy   Vitamin D 25 06/08/2023 56  30 - 100 ng/mL Final    Comment: Adult Ranges:   <20 ng/mL - Deficiency  20-29 ng/mL - Insufficiency   ng/mL - Sufficiency  Electrochemiluminescence binding assay performed using Roche linda e  immunoassay analyzer.  The Elecsys Vitamin D total II assay is intended for  the quantitative determination of total 25 hydroxyvitamin D in human serum  and plasma. This assay is to be used as an aid in the assessment of vitamin  D sufficiency in adults.     • Correct Calcium 06/08/2023 8.9  8.5 - 10.5 mg/dL Final   • GFR (CKD-EPI) 06/08/2023 44 (A)  >60 mL/min/1.73 m 2 Final    Comment: Estimated Glomerular Filtration Rate is calculated using  race neutral CKD-EPI 2021 equation per NKF-ASN recommendations.     Hospital Outpatient Visit on 05/18/2023   Component Date Value Ref Range Status   • Significant Indicator 05/18/2023 NEG   Final   • Source 05/18/2023 UR   Final   • Site 05/18/2023 -   Final   • Culture Result 05/18/2023    Final                    Value:Usual urogenital gwendolyn ,000 cfu/mL  Greater than 3 organisms isolated, culture of doubtful  significance, please recollect.     Non-Provider Visit on 05/18/2023   Component Date Value Ref Range  Status   • POC Color 05/18/2023 yellow  Negative Final   • POC Appearance 05/18/2023 turbid  Negative Final   • POC Glucose 05/18/2023 negative  Negative mg/dL Final   • POC Bilirubin 05/18/2023 negative  Negative mg/dL Final   • POC Ketones 05/18/2023 trace  Negative mg/dL Final   • POC Specific Gravity 05/18/2023 1.025  <1.005 - >1.030 Final   • POC Blood 05/18/2023 large  Negative Final   • POC Urine PH 05/18/2023 5.5  5.0 - 8.0 Final   • POC Protein 05/18/2023 >=300  Negative mg/dL Final   • POC Urobiligen 05/18/2023 0.2  Negative (0.2) mg/dL Final   • POC Nitrites 05/18/2023 negative  Negative Final   • POC Leukocyte Esterase 05/18/2023 large  Negative Final   ]   Assessment and Plan:     The following treatment plan was discussed:     1. Age-related osteoporosis without current pathological fracture  Cont w/ current regimen of Prolia, Ca and vit D    2. Need for influenza vaccination     - INFLUENZA VACCINE, HIGH DOSE (65+ ONLY)    3. Reviewed all labs    Medical Decision Making: Today's Assessment/Status/Plan:                 HCC Gap Form    Last edited 12/09/23 17:29 PST by Olimpia Newsome M.D.        Any change or worsening of signs or symptoms, patient encouraged to follow-up or report to emergency room for further evaluation. Patient verbalizes understanding and agrees.    Follow-Up: No follow-ups on file.      PLEASE NOTE: This dictation was created using voice recognition software. I have made every reasonable attempt to correct obvious errors, but I expect that there are errors of grammar and possibly content that I did not discover before finalizing the note.    My total time spent caring for the patient on the day of the encounter was  greater than 30 minutes.   This includes obtaining history, reviewing chart, physical exam, patient education, reviewing outside records, placing orders, interpreting tests and coordinating care.

## 2023-12-19 ENCOUNTER — HOSPITAL ENCOUNTER (OUTPATIENT)
Facility: MEDICAL CENTER | Age: 83
End: 2023-12-19
Attending: FAMILY MEDICINE
Payer: MEDICARE

## 2023-12-19 ENCOUNTER — NON-PROVIDER VISIT (OUTPATIENT)
Dept: INTERNAL MEDICINE | Facility: IMAGING CENTER | Age: 83
End: 2023-12-19
Payer: MEDICARE

## 2023-12-19 DIAGNOSIS — R30.0 DYSURIA: ICD-10-CM

## 2023-12-19 DIAGNOSIS — F31.75 BIPOLAR DISORDER, IN PARTIAL REMISSION, MOST RECENT EPISODE DEPRESSED (HCC): ICD-10-CM

## 2023-12-19 LAB
APPEARANCE UR: ABNORMAL
BACTERIA #/AREA URNS HPF: ABNORMAL /HPF
BILIRUB UR QL STRIP.AUTO: NEGATIVE
COLOR UR: YELLOW
EPI CELLS #/AREA URNS HPF: NEGATIVE /HPF
GLUCOSE UR STRIP.AUTO-MCNC: NEGATIVE MG/DL
HYALINE CASTS #/AREA URNS LPF: ABNORMAL /LPF
KETONES UR STRIP.AUTO-MCNC: NEGATIVE MG/DL
LEUKOCYTE ESTERASE UR QL STRIP.AUTO: ABNORMAL
MICRO URNS: ABNORMAL
NITRITE UR QL STRIP.AUTO: POSITIVE
PH UR STRIP.AUTO: 6 [PH] (ref 5–8)
PROT UR QL STRIP: NEGATIVE MG/DL
RBC # URNS HPF: ABNORMAL /HPF
RBC UR QL AUTO: ABNORMAL
SP GR UR STRIP.AUTO: 1.02
UROBILINOGEN UR STRIP.AUTO-MCNC: 0.2 MG/DL
WBC #/AREA URNS HPF: ABNORMAL /HPF

## 2023-12-19 PROCEDURE — 87086 URINE CULTURE/COLONY COUNT: CPT

## 2023-12-19 PROCEDURE — 87077 CULTURE AEROBIC IDENTIFY: CPT | Mod: 91

## 2023-12-19 PROCEDURE — 87186 SC STD MICRODIL/AGAR DIL: CPT

## 2023-12-19 PROCEDURE — 81001 URINALYSIS AUTO W/SCOPE: CPT

## 2023-12-19 RX ORDER — VENLAFAXINE HYDROCHLORIDE 75 MG/1
75 CAPSULE, EXTENDED RELEASE ORAL DAILY
Qty: 90 CAPSULE | Refills: 1 | Status: SHIPPED | OUTPATIENT
Start: 2023-12-19

## 2023-12-19 NOTE — PROGRESS NOTES
Azeb PhilipGriffin Osvaldocorey is a 83 y.o. female here for a non-provider visit for urine sample drop off    If abnormal was an in office provider notified today (if so, indicate provider)? No    Routed to PCP? No

## 2023-12-20 ENCOUNTER — TELEPHONE (OUTPATIENT)
Dept: INTERNAL MEDICINE | Facility: IMAGING CENTER | Age: 83
End: 2023-12-20
Payer: MEDICARE

## 2023-12-20 DIAGNOSIS — N30.00 ACUTE CYSTITIS WITHOUT HEMATURIA: ICD-10-CM

## 2023-12-20 RX ORDER — CEFDINIR 300 MG/1
300 CAPSULE ORAL 2 TIMES DAILY
Qty: 10 CAPSULE | Refills: 0 | Status: SHIPPED | OUTPATIENT
Start: 2023-12-20 | End: 2023-12-25

## 2023-12-20 NOTE — TELEPHONE ENCOUNTER
Spoke with her daughter Karissa about urinalysis results. Will send in antibiotic prescription for patient now with understanding that I may need to change RX depending on final urine culture results.

## 2024-01-09 ENCOUNTER — HOSPITAL ENCOUNTER (OUTPATIENT)
Facility: MEDICAL CENTER | Age: 84
DRG: 323 | End: 2024-01-09
Attending: FAMILY MEDICINE
Payer: MEDICARE

## 2024-01-09 ENCOUNTER — APPOINTMENT (OUTPATIENT)
Dept: RADIOLOGY | Facility: MEDICAL CENTER | Age: 84
DRG: 323 | End: 2024-01-09
Attending: EMERGENCY MEDICINE
Payer: MEDICARE

## 2024-01-09 ENCOUNTER — OFFICE VISIT (OUTPATIENT)
Dept: INTERNAL MEDICINE | Facility: IMAGING CENTER | Age: 84
End: 2024-01-09
Payer: MEDICARE

## 2024-01-09 ENCOUNTER — HOSPITAL ENCOUNTER (INPATIENT)
Facility: MEDICAL CENTER | Age: 84
LOS: 10 days | DRG: 323 | End: 2024-01-19
Attending: EMERGENCY MEDICINE | Admitting: INTERNAL MEDICINE
Payer: MEDICARE

## 2024-01-09 VITALS
HEART RATE: 99 BPM | OXYGEN SATURATION: 93 % | SYSTOLIC BLOOD PRESSURE: 128 MMHG | TEMPERATURE: 97.4 F | DIASTOLIC BLOOD PRESSURE: 68 MMHG

## 2024-01-09 DIAGNOSIS — R07.9 CHEST PAIN WITH MINIMAL RISK FOR CARDIAC ETIOLOGY: ICD-10-CM

## 2024-01-09 DIAGNOSIS — Z79.899 MEDICATION MANAGEMENT: ICD-10-CM

## 2024-01-09 DIAGNOSIS — I48.91 RAPID ATRIAL FIBRILLATION (HCC): ICD-10-CM

## 2024-01-09 DIAGNOSIS — N18.32 STAGE 3B CHRONIC KIDNEY DISEASE: ICD-10-CM

## 2024-01-09 DIAGNOSIS — I21.4 NSTEMI (NON-ST ELEVATED MYOCARDIAL INFARCTION) (HCC): ICD-10-CM

## 2024-01-09 DIAGNOSIS — F31.75 BIPOLAR DISORDER, IN PARTIAL REMISSION, MOST RECENT EPISODE DEPRESSED (HCC): ICD-10-CM

## 2024-01-09 DIAGNOSIS — I48.91 ATRIAL FIBRILLATION, UNSPECIFIED TYPE (HCC): ICD-10-CM

## 2024-01-09 DIAGNOSIS — I10 PRIMARY HYPERTENSION: ICD-10-CM

## 2024-01-09 DIAGNOSIS — I50.9 ACUTE ON CHRONIC CONGESTIVE HEART FAILURE, UNSPECIFIED HEART FAILURE TYPE (HCC): ICD-10-CM

## 2024-01-09 DIAGNOSIS — I48.19 PERSISTENT ATRIAL FIBRILLATION (HCC): ICD-10-CM

## 2024-01-09 DIAGNOSIS — R94.31 ST ELEVATION: ICD-10-CM

## 2024-01-09 DIAGNOSIS — E87.70 HYPERVOLEMIA, UNSPECIFIED HYPERVOLEMIA TYPE: ICD-10-CM

## 2024-01-09 DIAGNOSIS — J45.20 MILD INTERMITTENT ASTHMA WITHOUT COMPLICATION: ICD-10-CM

## 2024-01-09 DIAGNOSIS — R26.89 POOR BALANCE: ICD-10-CM

## 2024-01-09 DIAGNOSIS — R09.02 HYPOXIA: ICD-10-CM

## 2024-01-09 PROBLEM — Z71.89 ACP (ADVANCE CARE PLANNING): Status: ACTIVE | Noted: 2024-01-09

## 2024-01-09 LAB
25(OH)D3 SERPL-MCNC: 56 NG/ML (ref 30–100)
ALBUMIN SERPL BCP-MCNC: 3.5 G/DL (ref 3.2–4.9)
ALBUMIN SERPL BCP-MCNC: 3.5 G/DL (ref 3.2–4.9)
ALBUMIN/GLOB SERPL: 1 G/DL
ALBUMIN/GLOB SERPL: 1 G/DL
ALP SERPL-CCNC: 66 U/L (ref 30–99)
ALP SERPL-CCNC: 70 U/L (ref 30–99)
ALT SERPL-CCNC: 11 U/L (ref 2–50)
ALT SERPL-CCNC: 8 U/L (ref 2–50)
ANION GAP SERPL CALC-SCNC: 14 MMOL/L (ref 7–16)
ANION GAP SERPL CALC-SCNC: 15 MMOL/L (ref 7–16)
APTT PPP: 24.5 SEC (ref 24.7–36)
AST SERPL-CCNC: 17 U/L (ref 12–45)
AST SERPL-CCNC: 19 U/L (ref 12–45)
BASOPHILS # BLD AUTO: 0.3 % (ref 0–1.8)
BASOPHILS # BLD AUTO: 0.5 % (ref 0–1.8)
BASOPHILS # BLD: 0.03 K/UL (ref 0–0.12)
BASOPHILS # BLD: 0.04 K/UL (ref 0–0.12)
BILIRUB SERPL-MCNC: 0.2 MG/DL (ref 0.1–1.5)
BILIRUB SERPL-MCNC: 0.3 MG/DL (ref 0.1–1.5)
BUN SERPL-MCNC: 101 MG/DL (ref 8–22)
BUN SERPL-MCNC: 96 MG/DL (ref 8–22)
CALCIUM ALBUM COR SERPL-MCNC: 9.4 MG/DL (ref 8.5–10.5)
CALCIUM ALBUM COR SERPL-MCNC: 9.6 MG/DL (ref 8.5–10.5)
CALCIUM SERPL-MCNC: 9 MG/DL (ref 8.5–10.5)
CALCIUM SERPL-MCNC: 9.2 MG/DL (ref 8.5–10.5)
CHLORIDE SERPL-SCNC: 106 MMOL/L (ref 96–112)
CHLORIDE SERPL-SCNC: 106 MMOL/L (ref 96–112)
CHOLEST SERPL-MCNC: 130 MG/DL (ref 100–199)
CO2 SERPL-SCNC: 18 MMOL/L (ref 20–33)
CO2 SERPL-SCNC: 19 MMOL/L (ref 20–33)
CREAT SERPL-MCNC: 1.35 MG/DL (ref 0.5–1.4)
CREAT SERPL-MCNC: 1.6 MG/DL (ref 0.5–1.4)
EKG IMPRESSION: NORMAL
EKG IMPRESSION: NORMAL
EOSINOPHIL # BLD AUTO: 0.05 K/UL (ref 0–0.51)
EOSINOPHIL # BLD AUTO: 0.12 K/UL (ref 0–0.51)
EOSINOPHIL NFR BLD: 0.6 % (ref 0–6.9)
EOSINOPHIL NFR BLD: 1.4 % (ref 0–6.9)
ERYTHROCYTE [DISTWIDTH] IN BLOOD BY AUTOMATED COUNT: 49.5 FL (ref 35.9–50)
ERYTHROCYTE [DISTWIDTH] IN BLOOD BY AUTOMATED COUNT: 50.6 FL (ref 35.9–50)
EST. AVERAGE GLUCOSE BLD GHB EST-MCNC: 123 MG/DL
EST. AVERAGE GLUCOSE BLD GHB EST-MCNC: 123 MG/DL
GFR SERPLBLD CREATININE-BSD FMLA CKD-EPI: 31 ML/MIN/1.73 M 2
GFR SERPLBLD CREATININE-BSD FMLA CKD-EPI: 38 ML/MIN/1.73 M 2
GLOBULIN SER CALC-MCNC: 3.6 G/DL (ref 1.9–3.5)
GLOBULIN SER CALC-MCNC: 3.6 G/DL (ref 1.9–3.5)
GLUCOSE SERPL-MCNC: 104 MG/DL (ref 65–99)
GLUCOSE SERPL-MCNC: 117 MG/DL (ref 65–99)
HBA1C MFR BLD: 5.9 % (ref 4–5.6)
HBA1C MFR BLD: 5.9 % (ref 4–5.6)
HCT VFR BLD AUTO: 35.1 % (ref 37–47)
HCT VFR BLD AUTO: 35.5 % (ref 37–47)
HDLC SERPL-MCNC: 48 MG/DL
HGB BLD-MCNC: 11.3 G/DL (ref 12–16)
HGB BLD-MCNC: 11.6 G/DL (ref 12–16)
IMM GRANULOCYTES # BLD AUTO: 0.03 K/UL (ref 0–0.11)
IMM GRANULOCYTES # BLD AUTO: 0.04 K/UL (ref 0–0.11)
IMM GRANULOCYTES NFR BLD AUTO: 0.3 % (ref 0–0.9)
IMM GRANULOCYTES NFR BLD AUTO: 0.5 % (ref 0–0.9)
INR PPP: 1.16 (ref 0.87–1.13)
LDLC SERPL CALC-MCNC: 65 MG/DL
LYMPHOCYTES # BLD AUTO: 1.27 K/UL (ref 1–4.8)
LYMPHOCYTES # BLD AUTO: 2.65 K/UL (ref 1–4.8)
LYMPHOCYTES NFR BLD: 14.2 % (ref 22–41)
LYMPHOCYTES NFR BLD: 31.4 % (ref 22–41)
MCH RBC QN AUTO: 30.1 PG (ref 27–33)
MCH RBC QN AUTO: 31.1 PG (ref 27–33)
MCHC RBC AUTO-ENTMCNC: 31.8 G/DL (ref 32.2–35.5)
MCHC RBC AUTO-ENTMCNC: 33 G/DL (ref 32.2–35.5)
MCV RBC AUTO: 94.1 FL (ref 81.4–97.8)
MCV RBC AUTO: 94.7 FL (ref 81.4–97.8)
MONOCYTES # BLD AUTO: 0.95 K/UL (ref 0–0.85)
MONOCYTES # BLD AUTO: 1.08 K/UL (ref 0–0.85)
MONOCYTES NFR BLD AUTO: 11.3 % (ref 0–13.4)
MONOCYTES NFR BLD AUTO: 12.1 % (ref 0–13.4)
NEUTROPHILS # BLD AUTO: 4.64 K/UL (ref 1.82–7.42)
NEUTROPHILS # BLD AUTO: 6.46 K/UL (ref 1.82–7.42)
NEUTROPHILS NFR BLD: 54.9 % (ref 44–72)
NEUTROPHILS NFR BLD: 72.5 % (ref 44–72)
NRBC # BLD AUTO: 0 K/UL
NRBC # BLD AUTO: 0.02 K/UL
NRBC BLD-RTO: 0 /100 WBC (ref 0–0.2)
NRBC BLD-RTO: 0.2 /100 WBC (ref 0–0.2)
NT-PROBNP SERPL IA-MCNC: ABNORMAL PG/ML (ref 0–125)
PLATELET # BLD AUTO: 160 K/UL (ref 164–446)
PLATELET # BLD AUTO: 171 K/UL (ref 164–446)
PMV BLD AUTO: 11.4 FL (ref 9–12.9)
PMV BLD AUTO: 11.8 FL (ref 9–12.9)
POTASSIUM SERPL-SCNC: 4.3 MMOL/L (ref 3.6–5.5)
POTASSIUM SERPL-SCNC: 4.7 MMOL/L (ref 3.6–5.5)
PROT SERPL-MCNC: 7.1 G/DL (ref 6–8.2)
PROT SERPL-MCNC: 7.1 G/DL (ref 6–8.2)
PROTHROMBIN TIME: 14.9 SEC (ref 12–14.6)
RBC # BLD AUTO: 3.73 M/UL (ref 4.2–5.4)
RBC # BLD AUTO: 3.75 M/UL (ref 4.2–5.4)
SODIUM SERPL-SCNC: 139 MMOL/L (ref 135–145)
SODIUM SERPL-SCNC: 139 MMOL/L (ref 135–145)
T4 FREE SERPL-MCNC: 1.1 NG/DL (ref 0.93–1.7)
TRIGL SERPL-MCNC: 86 MG/DL (ref 0–149)
TROPONIN T SERPL-MCNC: 287 NG/L (ref 6–19)
TROPONIN T SERPL-MCNC: 315 NG/L (ref 6–19)
TSH SERPL DL<=0.005 MIU/L-ACNC: 6.43 UIU/ML (ref 0.38–5.33)
TSH SERPL DL<=0.005 MIU/L-ACNC: 7.76 UIU/ML (ref 0.38–5.33)
UFH PPP CHRO-ACNC: <0.1 IU/ML
UFH PPP CHRO-ACNC: >1.1 IU/ML
VIT B12 SERPL-MCNC: 930 PG/ML (ref 211–911)
WBC # BLD AUTO: 8.4 K/UL (ref 4.8–10.8)
WBC # BLD AUTO: 8.9 K/UL (ref 4.8–10.8)

## 2024-01-09 PROCEDURE — 93010 ELECTROCARDIOGRAM REPORT: CPT | Mod: MISDOCU | Performed by: INTERNAL MEDICINE

## 2024-01-09 PROCEDURE — 83036 HEMOGLOBIN GLYCOSYLATED A1C: CPT | Mod: GA,91

## 2024-01-09 PROCEDURE — 80053 COMPREHEN METABOLIC PANEL: CPT

## 2024-01-09 PROCEDURE — 84443 ASSAY THYROID STIM HORMONE: CPT

## 2024-01-09 PROCEDURE — 80053 COMPREHEN METABOLIC PANEL: CPT | Mod: 91

## 2024-01-09 PROCEDURE — 99285 EMERGENCY DEPT VISIT HI MDM: CPT

## 2024-01-09 PROCEDURE — A9270 NON-COVERED ITEM OR SERVICE: HCPCS | Performed by: INTERNAL MEDICINE

## 2024-01-09 PROCEDURE — 3078F DIAST BP <80 MM HG: CPT | Performed by: FAMILY MEDICINE

## 2024-01-09 PROCEDURE — 96375 TX/PRO/DX INJ NEW DRUG ADDON: CPT

## 2024-01-09 PROCEDURE — 99223 1ST HOSP IP/OBS HIGH 75: CPT | Mod: GC | Performed by: INTERNAL MEDICINE

## 2024-01-09 PROCEDURE — 96376 TX/PRO/DX INJ SAME DRUG ADON: CPT

## 2024-01-09 PROCEDURE — 93005 ELECTROCARDIOGRAM TRACING: CPT | Performed by: INTERNAL MEDICINE

## 2024-01-09 PROCEDURE — 83036 HEMOGLOBIN GLYCOSYLATED A1C: CPT

## 2024-01-09 PROCEDURE — 700102 HCHG RX REV CODE 250 W/ 637 OVERRIDE(OP): Performed by: INTERNAL MEDICINE

## 2024-01-09 PROCEDURE — 700111 HCHG RX REV CODE 636 W/ 250 OVERRIDE (IP): Mod: JZ | Performed by: INTERNAL MEDICINE

## 2024-01-09 PROCEDURE — 36415 COLL VENOUS BLD VENIPUNCTURE: CPT

## 2024-01-09 PROCEDURE — 85730 THROMBOPLASTIN TIME PARTIAL: CPT

## 2024-01-09 PROCEDURE — 3074F SYST BP LT 130 MM HG: CPT | Performed by: FAMILY MEDICINE

## 2024-01-09 PROCEDURE — 82607 VITAMIN B-12: CPT

## 2024-01-09 PROCEDURE — 700101 HCHG RX REV CODE 250: Performed by: EMERGENCY MEDICINE

## 2024-01-09 PROCEDURE — 71045 X-RAY EXAM CHEST 1 VIEW: CPT

## 2024-01-09 PROCEDURE — 85520 HEPARIN ASSAY: CPT

## 2024-01-09 PROCEDURE — 84439 ASSAY OF FREE THYROXINE: CPT

## 2024-01-09 PROCEDURE — 93005 ELECTROCARDIOGRAM TRACING: CPT | Performed by: EMERGENCY MEDICINE

## 2024-01-09 PROCEDURE — 700111 HCHG RX REV CODE 636 W/ 250 OVERRIDE (IP): Mod: JZ | Performed by: EMERGENCY MEDICINE

## 2024-01-09 PROCEDURE — 84484 ASSAY OF TROPONIN QUANT: CPT

## 2024-01-09 PROCEDURE — 85025 COMPLETE CBC W/AUTO DIFF WBC: CPT

## 2024-01-09 PROCEDURE — 84443 ASSAY THYROID STIM HORMONE: CPT | Mod: 91

## 2024-01-09 PROCEDURE — 85025 COMPLETE CBC W/AUTO DIFF WBC: CPT | Mod: 91

## 2024-01-09 PROCEDURE — 770020 HCHG ROOM/CARE - TELE (206)

## 2024-01-09 PROCEDURE — 93000 ELECTROCARDIOGRAM COMPLETE: CPT | Performed by: FAMILY MEDICINE

## 2024-01-09 PROCEDURE — 96374 THER/PROPH/DIAG INJ IV PUSH: CPT

## 2024-01-09 PROCEDURE — 83880 ASSAY OF NATRIURETIC PEPTIDE: CPT

## 2024-01-09 PROCEDURE — 80061 LIPID PANEL: CPT

## 2024-01-09 PROCEDURE — 99215 OFFICE O/P EST HI 40 MIN: CPT | Performed by: FAMILY MEDICINE

## 2024-01-09 PROCEDURE — 82306 VITAMIN D 25 HYDROXY: CPT

## 2024-01-09 PROCEDURE — 85610 PROTHROMBIN TIME: CPT

## 2024-01-09 RX ORDER — CEFDINIR 300 MG/1
300 CAPSULE ORAL 2 TIMES DAILY
Status: ON HOLD | COMMUNITY
Start: 2023-12-20 | End: 2024-01-19

## 2024-01-09 RX ORDER — ONDANSETRON 4 MG/1
4 TABLET, ORALLY DISINTEGRATING ORAL EVERY 4 HOURS PRN
Status: DISCONTINUED | OUTPATIENT
Start: 2024-01-09 | End: 2024-01-19 | Stop reason: HOSPADM

## 2024-01-09 RX ORDER — METOPROLOL TARTRATE 1 MG/ML
5 INJECTION, SOLUTION INTRAVENOUS
Status: DISCONTINUED | OUTPATIENT
Start: 2024-01-09 | End: 2024-01-19 | Stop reason: HOSPADM

## 2024-01-09 RX ORDER — VENLAFAXINE HYDROCHLORIDE 75 MG/1
75 CAPSULE, EXTENDED RELEASE ORAL EVERY EVENING
Status: DISCONTINUED | OUTPATIENT
Start: 2024-01-09 | End: 2024-01-17

## 2024-01-09 RX ORDER — HEPARIN SODIUM 1000 [USP'U]/ML
40 INJECTION, SOLUTION INTRAVENOUS; SUBCUTANEOUS PRN
Status: DISCONTINUED | OUTPATIENT
Start: 2024-01-09 | End: 2024-01-18

## 2024-01-09 RX ORDER — FUROSEMIDE 10 MG/ML
40 INJECTION INTRAMUSCULAR; INTRAVENOUS ONCE
Status: COMPLETED | OUTPATIENT
Start: 2024-01-09 | End: 2024-01-09

## 2024-01-09 RX ORDER — FEXOFENADINE HCL 60 MG/1
60 TABLET, FILM COATED ORAL
COMMUNITY

## 2024-01-09 RX ORDER — METOPROLOL TARTRATE 1 MG/ML
5 INJECTION, SOLUTION INTRAVENOUS ONCE
Status: COMPLETED | OUTPATIENT
Start: 2024-01-09 | End: 2024-01-09

## 2024-01-09 RX ORDER — FUROSEMIDE 10 MG/ML
40 INJECTION INTRAMUSCULAR; INTRAVENOUS
Status: DISCONTINUED | OUTPATIENT
Start: 2024-01-09 | End: 2024-01-14

## 2024-01-09 RX ORDER — AMOXICILLIN 250 MG
2 CAPSULE ORAL 2 TIMES DAILY
Status: DISCONTINUED | OUTPATIENT
Start: 2024-01-09 | End: 2024-01-19 | Stop reason: HOSPADM

## 2024-01-09 RX ORDER — POLYETHYLENE GLYCOL 3350 17 G/17G
1 POWDER, FOR SOLUTION ORAL
Status: DISCONTINUED | OUTPATIENT
Start: 2024-01-09 | End: 2024-01-19 | Stop reason: HOSPADM

## 2024-01-09 RX ORDER — OXYBUTYNIN CHLORIDE 10 MG/1
10 TABLET, EXTENDED RELEASE ORAL
Status: DISCONTINUED | OUTPATIENT
Start: 2024-01-09 | End: 2024-01-19 | Stop reason: HOSPADM

## 2024-01-09 RX ORDER — BISACODYL 10 MG
10 SUPPOSITORY, RECTAL RECTAL
Status: DISCONTINUED | OUTPATIENT
Start: 2024-01-09 | End: 2024-01-19 | Stop reason: HOSPADM

## 2024-01-09 RX ORDER — HEPARIN SODIUM 1000 [USP'U]/ML
80 INJECTION, SOLUTION INTRAVENOUS; SUBCUTANEOUS ONCE
Status: COMPLETED | OUTPATIENT
Start: 2024-01-09 | End: 2024-01-09

## 2024-01-09 RX ORDER — ENOXAPARIN SODIUM 100 MG/ML
1 INJECTION SUBCUTANEOUS EVERY 12 HOURS
Status: DISCONTINUED | OUTPATIENT
Start: 2024-01-09 | End: 2024-01-09

## 2024-01-09 RX ORDER — ASPIRIN 81 MG/1
81 TABLET ORAL DAILY
Status: DISCONTINUED | OUTPATIENT
Start: 2024-01-09 | End: 2024-01-13

## 2024-01-09 RX ORDER — ONDANSETRON 2 MG/ML
4 INJECTION INTRAMUSCULAR; INTRAVENOUS EVERY 4 HOURS PRN
Status: DISCONTINUED | OUTPATIENT
Start: 2024-01-09 | End: 2024-01-19 | Stop reason: HOSPADM

## 2024-01-09 RX ORDER — ATORVASTATIN CALCIUM 40 MG/1
40 TABLET, FILM COATED ORAL EVERY EVENING
Status: DISCONTINUED | OUTPATIENT
Start: 2024-01-09 | End: 2024-01-19 | Stop reason: HOSPADM

## 2024-01-09 RX ORDER — ACETAMINOPHEN 325 MG/1
650 TABLET ORAL EVERY 6 HOURS PRN
Status: DISCONTINUED | OUTPATIENT
Start: 2024-01-09 | End: 2024-01-19 | Stop reason: HOSPADM

## 2024-01-09 RX ORDER — HEPARIN SODIUM 5000 [USP'U]/100ML
0-30 INJECTION, SOLUTION INTRAVENOUS CONTINUOUS
Status: DISCONTINUED | OUTPATIENT
Start: 2024-01-09 | End: 2024-01-09

## 2024-01-09 RX ORDER — OMEPRAZOLE 20 MG/1
20 CAPSULE, DELAYED RELEASE ORAL
Status: DISCONTINUED | OUTPATIENT
Start: 2024-01-09 | End: 2024-01-19 | Stop reason: HOSPADM

## 2024-01-09 RX ORDER — MORPHINE SULFATE 4 MG/ML
1 INJECTION INTRAVENOUS EVERY 4 HOURS PRN
Status: DISCONTINUED | OUTPATIENT
Start: 2024-01-09 | End: 2024-01-19 | Stop reason: HOSPADM

## 2024-01-09 RX ORDER — QUETIAPINE FUMARATE 50 MG/1
175 TABLET, FILM COATED ORAL NIGHTLY
Status: DISCONTINUED | OUTPATIENT
Start: 2024-01-09 | End: 2024-01-19 | Stop reason: HOSPADM

## 2024-01-09 RX ORDER — VITS A,C,E/LUTEIN/MINERALS 300MCG-200
1 TABLET ORAL
COMMUNITY

## 2024-01-09 RX ORDER — LEVOTHYROXINE SODIUM 0.12 MG/1
125 TABLET ORAL
Status: DISCONTINUED | OUTPATIENT
Start: 2024-01-09 | End: 2024-01-19 | Stop reason: HOSPADM

## 2024-01-09 RX ORDER — HEPARIN SODIUM 1000 [USP'U]/ML
2000 INJECTION, SOLUTION INTRAVENOUS; SUBCUTANEOUS PRN
Status: DISCONTINUED | OUTPATIENT
Start: 2024-01-09 | End: 2024-01-09

## 2024-01-09 RX ORDER — HEPARIN SODIUM 5000 [USP'U]/100ML
0-30 INJECTION, SOLUTION INTRAVENOUS CONTINUOUS
Status: DISCONTINUED | OUTPATIENT
Start: 2024-01-09 | End: 2024-01-18

## 2024-01-09 RX ORDER — HEPARIN SODIUM 1000 [USP'U]/ML
4000 INJECTION, SOLUTION INTRAVENOUS; SUBCUTANEOUS ONCE
Status: DISCONTINUED | OUTPATIENT
Start: 2024-01-09 | End: 2024-01-09

## 2024-01-09 RX ADMIN — METOPROLOL TARTRATE 25 MG: 25 TABLET, FILM COATED ORAL at 12:39

## 2024-01-09 RX ADMIN — MORPHINE SULFATE 1 MG: 4 INJECTION, SOLUTION INTRAMUSCULAR; INTRAVENOUS at 16:11

## 2024-01-09 RX ADMIN — HEPARIN SODIUM 18 UNITS/KG/HR: 5000 INJECTION, SOLUTION INTRAVENOUS at 13:07

## 2024-01-09 RX ADMIN — ONDANSETRON 4 MG: 2 INJECTION INTRAMUSCULAR; INTRAVENOUS at 16:11

## 2024-01-09 RX ADMIN — ASPIRIN 81 MG: 81 TABLET, COATED ORAL at 12:39

## 2024-01-09 RX ADMIN — MORPHINE SULFATE 1 MG: 4 INJECTION, SOLUTION INTRAMUSCULAR; INTRAVENOUS at 20:25

## 2024-01-09 RX ADMIN — OXYBUTYNIN CHLORIDE 10 MG: 10 TABLET, EXTENDED RELEASE ORAL at 20:52

## 2024-01-09 RX ADMIN — DIVALPROEX SODIUM 750 MG: 500 TABLET, EXTENDED RELEASE ORAL at 20:52

## 2024-01-09 RX ADMIN — LEVOTHYROXINE SODIUM 125 MCG: 0.12 TABLET ORAL at 12:39

## 2024-01-09 RX ADMIN — OMEPRAZOLE 20 MG: 20 CAPSULE, DELAYED RELEASE ORAL at 20:56

## 2024-01-09 RX ADMIN — FUROSEMIDE 40 MG: 10 INJECTION, SOLUTION INTRAMUSCULAR; INTRAVENOUS at 16:11

## 2024-01-09 RX ADMIN — METOPROLOL TARTRATE 25 MG: 25 TABLET, FILM COATED ORAL at 20:53

## 2024-01-09 RX ADMIN — HEPARIN SODIUM 5600 UNITS: 1000 INJECTION, SOLUTION INTRAVENOUS; SUBCUTANEOUS at 13:03

## 2024-01-09 RX ADMIN — METOPROLOL TARTRATE 5 MG: 5 INJECTION INTRAVENOUS at 10:05

## 2024-01-09 RX ADMIN — ATORVASTATIN CALCIUM 40 MG: 40 TABLET, FILM COATED ORAL at 20:52

## 2024-01-09 RX ADMIN — FUROSEMIDE 40 MG: 10 INJECTION, SOLUTION INTRAMUSCULAR; INTRAVENOUS at 11:38

## 2024-01-09 RX ADMIN — QUETIAPINE FUMARATE 175 MG: 50 TABLET ORAL at 20:51

## 2024-01-09 RX ADMIN — VENLAFAXINE HYDROCHLORIDE 75 MG: 75 CAPSULE, EXTENDED RELEASE ORAL at 20:52

## 2024-01-09 RX ADMIN — METOPROLOL TARTRATE 5 MG: 5 INJECTION INTRAVENOUS at 11:39

## 2024-01-09 ASSESSMENT — ENCOUNTER SYMPTOMS
MYALGIAS: 0
SPEECH CHANGE: 0
CONSTIPATION: 0
COUGH: 0
WEAKNESS: 0
BLURRED VISION: 0
FEVER: 0
DIARRHEA: 0
PHOTOPHOBIA: 0
CLAUDICATION: 0
NECK PAIN: 0
DOUBLE VISION: 0
CHILLS: 0
ORTHOPNEA: 0
NAUSEA: 0
WEIGHT LOSS: 0
PALPITATIONS: 0
HEMOPTYSIS: 0
ABDOMINAL PAIN: 0
SHORTNESS OF BREATH: 1
VOMITING: 0
DIZZINESS: 0

## 2024-01-09 ASSESSMENT — LIFESTYLE VARIABLES
AVERAGE NUMBER OF DAYS PER WEEK YOU HAVE A DRINK CONTAINING ALCOHOL: 0
ALCOHOL_USE: NO
CONSUMPTION TOTAL: NEGATIVE
EVER HAD A DRINK FIRST THING IN THE MORNING TO STEADY YOUR NERVES TO GET RID OF A HANGOVER: NO
HAVE PEOPLE ANNOYED YOU BY CRITICIZING YOUR DRINKING: NO
DOES PATIENT WANT TO STOP DRINKING: NO
TOTAL SCORE: 0
HAVE YOU EVER FELT YOU SHOULD CUT DOWN ON YOUR DRINKING: NO
TOTAL SCORE: 0
ON A TYPICAL DAY WHEN YOU DRINK ALCOHOL HOW MANY DRINKS DO YOU HAVE: 0
HOW MANY TIMES IN THE PAST YEAR HAVE YOU HAD 5 OR MORE DRINKS IN A DAY: 0
EVER FELT BAD OR GUILTY ABOUT YOUR DRINKING: NO
TOTAL SCORE: 0

## 2024-01-09 ASSESSMENT — COGNITIVE AND FUNCTIONAL STATUS - GENERAL
HELP NEEDED FOR BATHING: A LITTLE
EATING MEALS: A LITTLE
WALKING IN HOSPITAL ROOM: A LOT
STANDING UP FROM CHAIR USING ARMS: A LOT
TOILETING: A LOT
DRESSING REGULAR LOWER BODY CLOTHING: A LITTLE
PERSONAL GROOMING: A LOT
SUGGESTED CMS G CODE MODIFIER DAILY ACTIVITY: CK
CLIMB 3 TO 5 STEPS WITH RAILING: A LOT
DAILY ACTIVITIY SCORE: 16
MOBILITY SCORE: 16
MOVING FROM LYING ON BACK TO SITTING ON SIDE OF FLAT BED: A LITTLE
MOVING TO AND FROM BED TO CHAIR: A LITTLE
DRESSING REGULAR UPPER BODY CLOTHING: A LITTLE
SUGGESTED CMS G CODE MODIFIER MOBILITY: CK

## 2024-01-09 ASSESSMENT — PATIENT HEALTH QUESTIONNAIRE - PHQ9
9. THOUGHTS THAT YOU WOULD BE BETTER OFF DEAD, OR OF HURTING YOURSELF: SEVERAL DAYS
8. MOVING OR SPEAKING SO SLOWLY THAT OTHER PEOPLE COULD HAVE NOTICED. OR THE OPPOSITE, BEING SO FIGETY OR RESTLESS THAT YOU HAVE BEEN MOVING AROUND A LOT MORE THAN USUAL: SEVERAL DAYS
5. POOR APPETITE OR OVEREATING: NEARLY EVERY DAY
1. LITTLE INTEREST OR PLEASURE IN DOING THINGS: SEVERAL DAYS
SUM OF ALL RESPONSES TO PHQ9 QUESTIONS 1 AND 2: 2
SUM OF ALL RESPONSES TO PHQ QUESTIONS 1-9: 10
2. FEELING DOWN, DEPRESSED, IRRITABLE, OR HOPELESS: NOT AT ALL
3. TROUBLE FALLING OR STAYING ASLEEP OR SLEEPING TOO MUCH: NOT AT ALL
7. TROUBLE CONCENTRATING ON THINGS, SUCH AS READING THE NEWSPAPER OR WATCHING TELEVISION: SEVERAL DAYS
1. LITTLE INTEREST OR PLEASURE IN DOING THINGS: NOT AT ALL
4. FEELING TIRED OR HAVING LITTLE ENERGY: SEVERAL DAYS
2. FEELING DOWN, DEPRESSED, IRRITABLE, OR HOPELESS: SEVERAL DAYS
SUM OF ALL RESPONSES TO PHQ9 QUESTIONS 1 AND 2: 0
6. FEELING BAD ABOUT YOURSELF - OR THAT YOU ARE A FAILURE OR HAVE LET YOURSELF OR YOUR FAMILY DOWN: SEVERAL DAYS

## 2024-01-09 ASSESSMENT — PAIN DESCRIPTION - PAIN TYPE
TYPE: ACUTE PAIN
TYPE: ACUTE PAIN

## 2024-01-09 ASSESSMENT — FIBROSIS 4 INDEX: FIB4 SCORE: 3.06

## 2024-01-09 NOTE — PROGRESS NOTES
Subjective:     HPI:   Azeb Major is a 83 y.o. female here  to discuss the evaluation and management of:   Chief Complaint   Patient presents with    Extremity Weakness     Not moving much at home    Pain     Acid reflux? At night when she is supine.    Depression     She is brought in by her daughter, who reports that she has been overall weaker.  She has baseline shortness of breath that seems but lately she has been complaining of chest pain when she lies down.    She has no trouble swallowing food or medications as per daughter at dinner yesterday         Objective:     /68   Pulse 99   Temp 36.3 °C (97.4 °F)   SpO2 93%     Physical Exam:  Physical Exam  Constitutional:       Appearance: She is normal weight. She is not ill-appearing, toxic-appearing or diaphoretic.   HENT:      Head: Normocephalic and atraumatic.      Right Ear: Tympanic membrane normal.      Left Ear: Tympanic membrane normal.   Cardiovascular:      Rate and Rhythm: Tachycardia present. Rhythm irregular.   Pulmonary:      Effort: Pulmonary effort is normal.      Breath sounds: Wheezing present. No rhonchi.   Skin:     General: Skin is warm.      Coloration: Skin is not jaundiced.   Neurological:      Mental Status: She is alert.   Psychiatric:         Mood and Affect: Mood normal.         Behavior: Behavior normal.         Thought Content: Thought content normal.         Judgment: Judgment normal.      EKG shows Afib, rate 130's  Assessment and Plan:     The following treatment plan was discussed/researched:  1. Rapid atrial fibrillation (HCC)  Transferred to emergency room via paramedics for cardiology consult    2.  Possible ST elevation  Rule out STEMI    3. Medication management    - CBC WITH DIFFERENTIAL; Future  - TSH; Future  - Lipid Profile; Future  - Comp Metabolic Panel; Future  - VITAMIN D 25-HYDROXY; Future  - VITAMIN B12; Future  - HEMOGLOBIN A1C; Future    4. Chest pain with minimal risk for cardiac  etiology     - DX-CHEST-2 VIEWS; Future    5. Primary hypertension  Well-controlled    6. Mild intermittent asthma without complication  Stable      7. Bipolar disorder, in partial remission, most recent episode depressed (HCC)  managed by psychiatry    8. Stage 3b chronic kidney disease (HCC)  Ck labs today          Medical Decision Making: Today's Assessment/Status/Plan:            HCC Gap Form    Diagnosis to address: N18.32 - Stage 3b chronic kidney disease (HCC)  Assessment and plan: Chronic, stable. Continue with current defined treatment plan: check GFR today. Follow-up at least annually.  Diagnosis: F31.75 - Bipolar disorder, in partial remission, most recent episode depressed (HCC)  Assessment and plan: Chronic, stable, as based on today's assessment and impact on other conditions evaluated today. Continue with current treatment plan: started on antidepressant 12/30. Follow-up with specialist as directed, but at least annually.  Last edited 01/09/24 09:09 PST by Olimpia Newsome M.D.                                                                                                                                                                             Any change or worsening of signs or symptoms, patient encouraged to follow-up or report to emergency room for further evaluation. Patient verbalizes understanding and agrees.    Follow-Up: No follow-ups on file.      PLEASE NOTE: This dictation was created using voice recognition software. I have made every reasonable attempt to correct obvious errors, but I expect that there are errors of grammar and possibly content that I did not discover before finalizing the note.      My total time spent caring for the patient on the day of the encounter was  greater than 40 minutes.   This includes obtaining history, reviewing chart, physical exam, patient education, reviewing outside records, placing orders, interpreting tests and coordinating care.

## 2024-01-09 NOTE — ED NOTES
Pt complaining of new onset shooting pain in bilateral arms. Hospitalist notified. Bilateral BP checked. EKG complete.

## 2024-01-09 NOTE — ED NOTES
Med Rec complete per family member at bedside   Allergies reviewed  Antibiotics in the past 30 days:yes  Anticoagulant in past 14 days:no  Pharmacy patient utilizes:[facility], Pt's family at bedside states pt takes all her medications and vitamins in the evening except for her Thyroid medication

## 2024-01-09 NOTE — ED PROVIDER NOTES
ED Provider Note    Scribed for Yessica Currie M.D. by Yessica Currie M.D.. 1/9/2024, 9:46 AM.    Primary care provider: Olimpia Nwesome M.D.  Means of arrival: EMS  History obtained from: Patient and EMS  History limited by: None    CHIEF COMPLAINT  Chief Complaint   Patient presents with    Sent by MD     Was seen at Dr office today and found to be in A. Fib with RVR - no history    Shortness of Breath     X3 days       HPI/ROS  Azeb Major is a 83 y.o. female who presents to the Emergency Department via EMS for evaluation of acute shortness of breath onset three days ago. Patient states that she has not been feeling well for the past few days. She reports that when she lays flat, she has trouble breathing.  She also reports feeling generally weak.  She was seen by her PCP today in clinic to follow up for her symptoms. There, she was found to be in atrial fibrillation with RVR and sent here for higher level of care. Patient denies prior arrhyhtmia history. Currently in the ED, patient has a heart rate of 122 bpm. She was also hypoxic on room air at 88%, and requires 2 liters of oxygen to maintain an SpO2 above 90%. Patient is not on at-home oxygen. Denies any chest pain, fevers, chills, cough.    EXTERNAL RECORDS REVIEWED  Patient seen by primary care physician today for weakness and shortness of breath for a couple of days.  Found to be in new atrial fibrillation.  Per primary care records patient has a history of hypertension and chronic kidney disease.  She also has bipolar disorder.    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    SURGICAL HISTORY   has a past surgical history that includes tonsillectomy; ventral hernia repair (3/18/2010); hysterectomy robotic (3/18/2013); colposacropexy robotic (3/18/2013); bladder sling female (3/18/2013); and cystoscopy (3/18/2013).    SOCIAL HISTORY  Social History     Tobacco Use    Smoking status: Former     Current packs/day: 0.25     Average packs/day: 0.3 packs/day for 1 year (0.3 ttl pk-yrs)     Types: Cigarettes    Smokeless tobacco: Never   Substance Use Topics    Alcohol use: No     Alcohol/week: 0.0 oz    Drug use: No      Social History     Substance and Sexual Activity   Drug Use No       FAMILY HISTORY  Family History   Problem Relation Age of Onset    Stroke Father     Heart Disease Sister     Heart Disease Sister        CURRENT MEDICATIONS  Home Medications       Reviewed by Nilam Herrera R.N. (Registered Nurse) on 01/09/24 at 0938  Med List Status: Not Addressed     Medication Last Dose Status   divalproex ER (DEPAKOTE ER) 250 MG TABLET SR 24 HR  Active   levothyroxine (SYNTHROID) 125 MCG Tab  Active   nitrofurantoin (MACRODANTIN) 50 MG Cap  Active   omeprazole (PRILOSEC) 20 MG delayed-release capsule  Active   QUEtiapine (SEROQUEL) 25 MG Tab  Active   QUEtiapine Fumarate 150 MG TABLET SR 24 HR  Active   tolterodine ER (DETROL LA) 4 MG CAPSULE SR 24 HR  Active   venlafaxine XR (EFFEXOR XR) 75 MG CAPSULE SR 24 HR  Active                    ALLERGIES  Allergies   Allergen Reactions    Levofloxacin      Unidentified reaction.       PHYSICAL EXAM  VITAL SIGNS: /84   Pulse (!) 120   Temp 36.7 °C (98.1 °F)   Resp 16   Ht 1.676 m (5' 6\")   Wt 70.3 kg (155 lb)   SpO2 88%   BMI 25.02 kg/m²   Vitals reviewed by myself.  Nursing note and vitals reviewed.  Constitutional: Well-developed and well-nourished.  Moderate distress.  HENT: Head is " normocephalic and atraumatic.  Eyes: extra-ocular movements intact  Cardiovascular: Tachycardic rate and irregular rhythm. No murmur heard.  Pulmonary/Chest: Breath sounds notable for rales throughout all lung fields  Abdominal: Soft and non-tender. No distention.    Musculoskeletal: Extremities exhibit normal range of motion without edema or tenderness.   Neurological: Awake and alert  Skin: Skin is warm and dry. No rash.        DIAGNOSTIC STUDIES:  LABS  Labs Reviewed   CBC WITH DIFFERENTIAL - Abnormal; Notable for the following components:       Result Value    RBC 3.73 (*)     Hemoglobin 11.6 (*)     Hematocrit 35.1 (*)     Platelet Count 160 (*)     Neutrophils-Polys 72.50 (*)     Lymphocytes 14.20 (*)     Monos (Absolute) 1.08 (*)     All other components within normal limits   COMP METABOLIC PANEL - Abnormal; Notable for the following components:    Co2 18 (*)     Glucose 104 (*)     Bun 101 (*)     Globulin 3.6 (*)     All other components within normal limits   PROBRAIN NATRIURETIC PEPTIDE, NT - Abnormal; Notable for the following components:    NT-proBNP 09280 (*)     All other components within normal limits   ESTIMATED GFR - Abnormal; Notable for the following components:    GFR (CKD-EPI) 38 (*)     All other components within normal limits   TSH WITH REFLEX TO FT4       All labs reviewed and independently interpreted by myself    EKG Interpretation:    12 Lead EKG independently interpreted by myself to show:  EKG at 9:41 AM demonstrates atrial fibrillation with rapid ventricular response at a rate of 131, normal axis, intervals notable for prolonged QRS of 132 with associated left bundle branch block, , QTc 482, no acute ST-T segment changes, no evidence of acute ischemia, atrial fibrillation is new for this patient    RADIOLOGY  Images independently interpreted by myself prior to radiologist review:   -Diffuse infiltrates consistent with pulmonary edema. There is also a wedge shape abnormality in  the right mid-lung, likely atelectasis.     Final interpretation by radiology demonstrates:    DX-CHEST-PORTABLE (1 VIEW)   Final Result      1.  Increased diffuse interstitial edema with layering small right effusion.   2.  Bilateral basilar atelectasis and/or consolidation. Underlying infection is possible.   3.  Mild enlargement of the cardiomediastinal silhouette.        The radiologist's interpretation of all radiological studies have been reviewed by me.      COURSE & MEDICAL DECISION MAKING    ED Observation Status? No; Patient does not meet criteria for ED Observation.     INITIAL ASSESSMENT, ED COURSE AND PLAN    Patient is an 83-year-old female who presents for evaluation of shortness of breath and weakness.  Differential diagnosis includes arrhythmia, fluid overload, heart failure exacerbation, ACS.  Diagnostic workup includes labs, EKG and chest x-ray.    Patient's initial vitals notable for hypoxemia, she has diffuse crackles throughout all lung fields I am concerned about fluid overload likely secondary to rapid heart rate.  EKG returns and is consistent with atrial fibrillation without evidence of ischemia.  Patient is treated with IV metoprolol for her rapid atrial fibrillation and responds well to this.  Initially heart rate trends down from 120 to 105-110 range.  As she is still tachycardic repeat dose of metoprolol will be given.    Patient's chest x-ray returns and demonstrates diffuse infiltrates consistent with likely pulmonary edema.  Labs returned and are independently interpreted by myself to demonstrate:  -Electrolytes and renal function are within normal limits  -[place] [address] consistent with her clinical fluid overload, patient will be started on Lasix  -Patient is mildly anemic, no evidence of bleeding at this time, continue to monitor  -TSH is significantly elevated, free T4 is pending, no evidence of hyperthyroidism that would be contributing to her atrial  fibrillation  -Labs are otherwise unremarkable    At this time patient will require hospitalization for her new onset rapid atrial fibrillation with associated fluid overload consistent with likely acute heart failure.  I discussed the case with Dr. Escobedo who will hospitalize patient for ongoing management.  Patient is in guarded condition.       REASSESSMENTS   9:46 AM - Patient was seen and evaluated at bedside. Patient presents to the ED for evaluation of shortness of breath. She is hypoxic on arrival, requiring 2 liters of oxygen to maintain an SpO2 above 90%. After my exam, I discussed with the patient the plan of care, which includes treating the patient with medication for their symptoms, as well as obtaining lab work and imaging for further evaluation. Patient understands and verbalizes agreement to plan of care.      10:28 AM - Patient was reevaluated at bedside. Daughter is now at bedside, and was updated on patient's plan of care. Her heart rate is now around 110 bpm. We will treat with another dose of metoprolol. Also discussed the plan for hospitalization. Patient understands and verbalizes agreement to plan of care.     10:48 AM -  I discussed the patient's case and the above findings with Dr. Escobedo (Hospitalist) who agrees to evaluate the patient for hospitalization.      11:18 AM-heart rate down to 78    CRITICAL CARE  The very real possibility of a deterioration of this patient's condition required the highest level of my preparedness for sudden, emergent intervention.  I provided critical care services, which included medication orders, frequent reevaluations of the patient's condition and response to treatment, ordering and reviewing test results, and discussing the case with various consultants.  The critical care time associated with the care of the patient was 35 minutes. Review chart for interventions. This time is exclusive of any other billable procedures.        DISPOSITION AND  DISCUSSIONS  I have discussed management of the patient with the following physicians and KIN's:  Dr. Escobedo (Hospitalist)     Discussion of management with other Hospitals in [place] or appropriate source(s): None     Escalation of care considered, and ultimately not performed:see above    Barriers to care at this time, including but not limited to:  None .     Decision tools and prescription drugs considered including, but not limited to: see above.    Please see review of records as noted above    Patient will be hospitalized by Dr. Escobedo (Hospitalist)  in critical condition.     FINAL IMPRESSION  1. Rapid atrial fibrillation (HCC)    2. Hypoxia    3. Hypervolemia, unspecified hypervolemia type    4.     The critical care time associated with the care of the patient was 35 minutes.     Yessica MC M.D. (Scribe), am scribing for, and in the presence of, Yessica Currie M.D..    Electronically signed by: Yessica Currie M.D. (Scribe), 1/9/2024    Yessica MC M.D. personally performed the services described in this documentation, as scribed by Yessica Currie M.D. in my presence, and it is both accurate and complete.    The note accurately reflects work and decisions made by me.  Yessica Currie M.D.  1/9/2024  12:07 PM

## 2024-01-09 NOTE — ASSESSMENT & PLAN NOTE
New diagnosis with A-fib and RVR  Came with signs of heart failure  TSH is normal  Check echo  Start with metoprolol 25 mg twice daily  No history of smoking or drugs  Telemetry    1/15 heart rate improved on increased metoprolol, future LUCY/LUCY DCCV planned after second stage of staged PCI, cardioversion to happen outpatient.  Still on heparin drip

## 2024-01-09 NOTE — ASSESSMENT & PLAN NOTE
The ASCVD Risk score (Breann MONTAGUE, et al., 2019) failed to calculate for the following reasons:    The 2019 ASCVD risk score is only valid for ages 40 to 79  Pressure chest pain goes to her shoulders  Blood pressure is at the same and both arms and no signs of dissection on chest x-ray  With a new A-fib and a new heart failure  Patient is a high risk for coronary artery disease however her chest pain possibly related to demand  Trending troponin and telemetry  Elevated troponin  Echo  Cardiology was consulted since patient has a new heart failure  Aspirin and statin  1/16: cardiology following case discussed plan for angiogram today   1/18: Patient had PCI on 1/16 and plan is for staged PCI possible today cardiology following appreciate rec.

## 2024-01-09 NOTE — ED TRIAGE NOTES
"Chief Complaint   Patient presents with    Sent by MD     Was seen at Dr office today and found to be in A. Fib with RVR - no history    Shortness of Breath     X3 days     Pt BIB [facility] from her doctors office. Pt had been feeling SOB for the last 3 days. EKG at office showed A. Fib.       /84   Pulse (!) 120   Temp 36.7 °C (98.1 °F)   Resp 16   Ht 1.676 m (5' 6\")   Wt 70.3 kg (155 lb)   SpO2 88%       "

## 2024-01-09 NOTE — H&P
Hospital Medicine History & Physical Note    Date of Service  1/9/2024    Primary Care Physician  Olimpia Newsome M.D.    Consultants  Cardiology    Code Status  Full Code    Chief Complaint  Chief Complaint   Patient presents with    Sent by MD     Was seen at Dr office today and found to be in A. Fib with RVR - no history    Shortness of Breath     X3 days       History of Presenting Illness    83-year-old female with history of hearing loss, hypertension, bipolar and chronic kidney disease who presented 1/9 with chest pain and shortness of breath.  Patient has significant hearing loss and history was taken from the patient and family, patient has had worsening shortness of breath and chest pain for last couple days, also noticed swelling on her legs, on the day of admission patient was evaluated by PCP and they found the new atrial fibrillation and patient was transferred to emergency.  Patient was alert oriented x 4, patient has pressure chest pain, goes to her shoulders, with worsening shortness of breath, no nausea or vomiting or other symptoms and no fever.  EKG showed new atrial fibrillation with no significant ischemic changes, patient had a crackles with lower extremity edema, chest x-ray showed pulmonary edema, BNP was more than 27,000, troponin was elevated more than 200, cardiology was consulted, heparin with aspirin and metoprolol were initiated.  Also Lasix was initiated for heart failure.    Patient lives by herself and she usually does all daily activities by herself, no significant memory deficit however patient has bipolar which is a stable.    I discussed the plan of care with patient, family, bedside RN, and cardiologist .    Review of Systems  Review of Systems   Constitutional:  Positive for malaise/fatigue. Negative for chills, fever and weight loss.   HENT:  Negative for ear pain, hearing loss and tinnitus.    Eyes:  Negative for blurred vision, double vision and photophobia.    Respiratory:  Positive for shortness of breath. Negative for cough and hemoptysis.    Cardiovascular:  Positive for chest pain. Negative for palpitations, orthopnea and claudication.   Gastrointestinal:  Negative for abdominal pain, constipation, diarrhea, nausea and vomiting.   Genitourinary:  Negative for dysuria, frequency and urgency.   Musculoskeletal:  Negative for myalgias and neck pain.   Skin:  Negative for rash.   Neurological:  Negative for dizziness, speech change and weakness.       Past Medical History   has a past medical history of Bipolar disorder ([facility]), Bronchitis (02-26-13), CATARACT, Dental disorder, Frequent UTI (12/1/2010), Heart burn, History of falling, Hypertension, Hypothyroid (12/1/2010), Other specified symptom associated with female genital organs, Pneumonia, Psychiatric disorder, Renal disorder, Seasonal allergic reaction, Stress incontinence, and Urinary bladder disorder.    Surgical History   has a past surgical history that includes tonsillectomy; ventral hernia repair (3/18/2010); hysterectomy robotic (3/18/2013); colposacropexy robotic (3/18/2013); bladder sling female (3/18/2013); and cystoscopy (3/18/2013).     Family History  family history includes Heart Disease in her sister and sister; Stroke in her father.   Family history reviewed with patient. There is no family history that is pertinent to the chief complaint.     Social History   reports that she has quit smoking. She has a 0.3 pack-year smoking history. She has never used smokeless tobacco. She reports that she does not drink alcohol and does not use drugs.    Allergies  Allergies   Allergen Reactions    Levofloxacin Unspecified     Unidentified reaction. Per family it's been a long time       Medications  Prior to Admission Medications   Prescriptions Last Dose Informant Patient Reported? Taking?   QUEtiapine (SEROQUEL) 25 MG Tab   No No   Sig: Take 1 Tablet by mouth at bedtime. (Seroquel 150 mg + 25 mg = 175 mg at  bedtime)   QUEtiapine Fumarate 150 MG TABLET SR 24 HR   No No   Sig: TAKE 1 TABLET DAILY AT BEDTIME   divalproex ER (DEPAKOTE ER) 250 MG TABLET SR 24 HR   No No   Sig: TAKE 3 TABLETS 1/2 HOUR AFTER DINNER   levothyroxine (SYNTHROID) 125 MCG Tab   No No   Sig: TAKE 1 TABLET EVERY MORNING ON AN EMPTY STOMACH   nitrofurantoin (MACRODANTIN) 50 MG Cap   No No   Sig: TAKE 1 CAPSULE DAILY   omeprazole (PRILOSEC) 20 MG delayed-release capsule   No No   Sig: TAKE 1 CAPSULE DAILY   tolterodine ER (DETROL LA) 4 MG CAPSULE SR 24 HR   No No   Sig: TAKE 1 CAPSULE DAILY   venlafaxine XR (EFFEXOR XR) 75 MG CAPSULE SR 24 HR   No No   Sig: Take 1 Capsule by mouth every day.      Facility-Administered Medications: None       Physical Exam  Temp:  [36.3 °C (97.4 °F)-36.7 °C (98.1 °F)] 36.7 °C (98.1 °F)  Pulse:  [] 105  Resp:  [16-29] 22  BP: (110-141)/(68-88) 110/70  SpO2:  [88 %-94 %] 94 %  Blood Pressure : 133/78   Temperature: 36.7 °C (98.1 °F)   Pulse: (!) 120   Respiration: 16   Pulse Oximetry: 92 %       Physical Exam  Constitutional:       General: She is not in acute distress.     Appearance: She is ill-appearing.   HENT:      Head: Normocephalic and atraumatic.   Eyes:      General: No scleral icterus.  Cardiovascular:      Rate and Rhythm: Tachycardia present. Rhythm irregular.      Heart sounds: No murmur heard.  Pulmonary:      Effort: Respiratory distress present.      Breath sounds: Rales present.   Abdominal:      General: There is no distension.      Palpations: Abdomen is soft.      Tenderness: There is no abdominal tenderness. There is no guarding.   Musculoskeletal:      Right lower leg: Edema present.      Left lower leg: Edema present.   Skin:     Findings: Bruising and lesion present.   Neurological:      General: No focal deficit present.      Mental Status: She is oriented to person, place, and time. Mental status is at baseline.         Laboratory:  Recent Labs     01/09/24  0830 01/09/24  1003   WBC 8.4  8.9   RBC 3.75* 3.73*   HEMOGLOBIN 11.3* 11.6*   HEMATOCRIT 35.5* 35.1*   MCV 94.7 94.1   MCH 30.1 31.1   MCHC 31.8* 33.0   RDW 50.6* 49.5   PLATELETCT 171 160*   MPV 11.8 11.4     Recent Labs     01/09/24  0830 01/09/24  1003   SODIUM 139 139   POTASSIUM 4.3 4.7   CHLORIDE 106 106   CO2 19* 18*   GLUCOSE 117* 104*   BUN 96* 101*   CREATININE 1.60* 1.35   CALCIUM 9.2 9.0     Recent Labs     01/09/24  0830 01/09/24  1003   ALTSGPT 8 11   ASTSGOT 17 19   ALKPHOSPHAT 70 66   TBILIRUBIN 0.2 0.3   GLUCOSE 117* 104*     Recent Labs     01/09/24  1229   APTT 24.5*   INR 1.16*     Recent Labs     01/09/24  1003   NTPROBNP 53822*     Recent Labs     01/09/24  0830   TRIGLYCERIDE 86   HDL 48   LDL 65     Recent Labs     01/09/24  1003 01/09/24  1603   TROPONINT 287* 315*       Imaging:  DX-CHEST-PORTABLE (1 VIEW)   Final Result      1.  Increased diffuse interstitial edema with layering small right effusion.   2.  Bilateral basilar atelectasis and/or consolidation. Underlying infection is possible.   3.  Mild enlargement of the cardiomediastinal silhouette.      EC-ECHOCARDIOGRAM COMPLETE W/O CONT    (Results Pending)       X-Ray:  I have personally reviewed the images and compared with prior images.  EKG:  I have personally reviewed the images and compared with prior images.    Assessment/Plan:  Justification for Admission Status  I anticipate this patient will require at least two midnights for appropriate medical management, necessitating inpatient admission because atrial fibrillation with chest pain    Patient will need a Telemetry bed on CARDIOLOGY service .  The need is secondary to atrial fibrillation and chest pain.    * A-fib (HCC)- (present on admission)  Assessment & Plan  New diagnosis with A-fib and RVR  Came with signs of heart failure  TSH is normal  Check echo  Start with metoprolol 25 mg twice daily  No history of smoking or drugs  Telemetry      CHF exacerbation (HCC)  Assessment & Plan  Patient has  shortness of breath with crackles and lower extremity edema  BNP more than 27,000  Heart failure likely related to uncontrolled A-fib  Started with IV Lasix 40 mg twice daily with heparin drip and metoprolol  Close monitoring and start with ACE inhibitors and spironolactone if needed  Echo is pending  Telemetry    Chest pain- (present on admission)  Assessment & Plan  The ASCVD Risk score (Breann MONTAGUE, et al., 2019) failed to calculate for the following reasons:    The 2019 ASCVD risk score is only valid for ages 40 to 79  Pressure chest pain goes to her shoulders  Blood pressure is at the same and both arms and no signs of dissection on chest x-ray  With a new A-fib and a new heart failure  Patient is a high risk for coronary artery disease however her chest pain possibly related to demand  Trending troponin and telemetry  Elevated troponin  Echo  Cardiology was consulted since patient has a new heart failure  Aspirin and statin  Check A1c and lipid panel    Bipolar disorder, in partial remission, most recent episode depressed (HCC)- (present on admission)  Assessment & Plan  Stable  Continue home dose of Seroquel and venlafaxine    Hypothyroid- (present on admission)  Assessment & Plan  TSH 6.4  Continue levothyroxine 125 mcg daily    ACP (advance care planning)  Assessment & Plan  1/9 plan of care was discussed in detail with the patient and family, discussed all treatment options, discussed the CODE STATUS, discussed all images and labs, answered all their questions, they want to be full code, all treatment, time 30 minutes        VTE prophylaxis: SCDs/TEDs and therapeutic anticoagulation with heparin drip

## 2024-01-09 NOTE — ED NOTES
Second PIV established. Heparin initiated.     Lab called with critical result of Troponin 284 at 1311. Critical lab result read back to lab.   Dr. Escobedo notified of critical lab result at 1312.  Critical lab result read back by Dr. Escobedo.

## 2024-01-09 NOTE — ASSESSMENT & PLAN NOTE
Patient has shortness of breath with crackles and lower extremity edema  BNP more than 27,000  Heart failure likely related to uncontrolled A-fib  Started with IV Lasix 40 mg twice daily with heparin drip and metoprolol  Close monitoring and start with ACE inhibitors and spironolactone if needed  Echo is pending  Telemetry    1/15 still with respiratory failure, however no acute exacerbation symptoms, exacerbation resolved.  Now with ongoing HFpEF.  Diuresis currently held by cardiology.  Continue other GDMT measures.    1/18: Cardiology following on GDMT

## 2024-01-09 NOTE — CONSULTS
CARDIOLOGY CONSULTATION NOTE      Date of Consultation: 1/9/2024  Consulting Provider: Ashley Moncada    Patient Name: Azeb Major  YOB: 1940  MRN: [id number]    Reason for Consultation:   Atrial Fibrillation with RVR, New onset decompensated heart failure, Elevated troponin     History of Present Illness:   Azeb Major is a 83  year-old female with a past medical history of bipoloar disorder Hypertension, CKD 3b, hypothyroidism.  Patient presents from primary care clinic due to concerns of atrial fibrillation with rapid ventricular response.  Patient states for the past 3 days she has been having increasing shortness of breath with episodic chest tightness.  Patient also states that she was having orthopnea which is new.  Patient states normally she is able to walk around without any chest pain or shortness of breath and is able to walk up stairs.  Patient states she has previously been on antihypertensive medications however nothing recently.  Denies any cardiac or pulmonary history.  Patient states she lives alone, daughter in [place].  Presented in the ED with atrial fibrillation with rapid ventricular response heart rate in the 120s, normotensive, 93% on 2 L.  Labs remarkable for a NT proBNP 18677, troponin of 287, CXR with interstitial edema. EKG showing atrial fibrillation rate of 108, LBBB.  Given metoprolol 5mg x2 rounds, lasix 40mg, started on heparin drip. At bedside patient denied any active chest pain, states she feels short of breath, and at times is feeling palpitations.         Medical History:     Past Medical History:   Diagnosis Date    Bipolar disorder (HCC)     Bronchitis 02-26-13    in the past, not recent    CATARACT     bilat removed     Dental disorder     Frequent UTI 12/1/2010    Heart burn     History of falling     r/t vertigo    Hypertension     Hypothyroid 12/1/2010    Other specified symptom associated with female genital organs     Pneumonia      x2    Psychiatric disorder     bipolar    Renal disorder     daughter reports some renal insufficiency    Seasonal allergic reaction     Stress incontinence     Urinary bladder disorder        Surgical History:     Past Surgical History:   Procedure Laterality Date    HYSTERECTOMY ROBOTIC  3/18/2013    Performed by Juju Gonzales M.D. at SUR[facility]ERY [facility]    COLPOSACROPEXY ROBOTIC  3/18/2013    Performed by Juju Gonzales M.D. at SURGERY [facility]    BLADDER SLING FEMALE  3/18/2013    Performed by Juju Gonzales M.D. at SUR[facility]ERY [facility]    CYSTOSCOPY  3/18/2013    Performed by Juju Gonzales M.D. at SURGERY [facility]    VENTRAL HERNIA REPAIR  3/18/2010    Performed by TRAVIS PATTERSON at SURGERY [facility]    TONSILLECTOMY         Family History:     Family History   Problem Relation Age of Onset    Stroke Father     Heart Disease Sister     Heart Disease Sister        Social History:   The patient is a ex smoker    Medications and Allergies:     Current Facility-Administered Medications   Medication Dose Route Frequency Provider Last Rate Last Admin    divalproex ER (Depakote ER) tablet 750 mg  750 mg Oral PM MEAL Ashley Escobedo M.D.        levothyroxine (Synthroid) tablet 125 mcg  125 mcg Oral AM ES Ashley Escobedo M.D.   125 mcg at 01/09/24 1239    omeprazole (PriLOSEC) capsule 20 mg  20 mg Oral PM MEAL Ashley Escobedo M.D.        QUEtiapine (SEROquel) tablet 175 mg  175 mg Oral Nightly Ashley Escobedo M.D.        oxybutynin SR (Ditropan-XL) tablet 10 mg  10 mg Oral QHS Ashley Escobedo M.D.        venlafaxine XR (Effexor XR) capsule 75 mg  75 mg Oral Q EVENING Ashley Escobedo M.D.        senna-docusate (Pericolace Or Senokot S) 8.6-50 MG per tablet 2 Tablet  2 Tablet Oral BID Ashley Escobedo M.D.        And    polyethylene glycol/lytes (Miralax) Packet 1 Packet  1 Packet Oral QDAY PRN Ashley Escobedo M.D.        And    magnesium hydroxide  (Milk Of Magnesia) suspension 30 mL  30 mL Oral QDAY PRN Ashley Escobedo M.D.        And    bisacodyl (Dulcolax) suppository 10 mg  10 mg Rectal QDAY PRN Ashley Escobedo M.D.        acetaminophen (Tylenol) tablet 650 mg  650 mg Oral Q6HRS PRN Ashley Escobedo M.D.        Metoprolol Tartrate (Lopressor) injection 5 mg  5 mg Intravenous Q5 MIN PRN Ashley Escobedo M.D.        metoprolol tartrate (Lopressor) tablet 25 mg  25 mg Oral TWICE DAILY Ashley Escobedo M.D.   25 mg at 01/09/24 1239    ondansetron (Zofran) syringe/vial injection 4 mg  4 mg Intravenous Q4HRS PRN Ashley Escobedo M.D.        ondansetron (Zofran ODT) dispertab 4 mg  4 mg Oral Q4HRS PRN Ashley Escobedo M.D.        furosemide (Lasix) injection 40 mg  40 mg Intravenous BID DIURETIC Ashley Escobedo M.D.        aspirin EC tablet 81 mg  81 mg Oral DAILY Ashley Escobedo M.D.   81 mg at 01/09/24 1239    atorvastatin (Lipitor) tablet 40 mg  40 mg Oral Q EVENING Ashley Escobedo M.D.        heparin infusion 25,000 units in 500 mL 0.45% NACL  0-30 Units/kg/hr Intravenous Continuous Ashley Escobedo M.D. 25.3 mL/hr at 01/09/24 1307 18 Units/kg/hr at 01/09/24 1307    heparin injection 2,800 Units  40 Units/kg Intravenous PRN Ashley Escobedo M.D.         Current Outpatient Medications   Medication Sig Dispense Refill    cefdinir (OMNICEF) 300 MG Cap Take 300 mg by mouth 2 times a day. X 5 days      MAGNESIUM PO Take 1 Tablet by mouth at bedtime.      MELATONIN PO Take 1 Capsule by mouth at bedtime.      Probiotic Product (PROBIOTIC PO) Take 1 Tablet by mouth at bedtime.      fexofenadine (ALLEGRA) 60 MG Tab Take 60 mg by mouth at bedtime.      Simethicone (GAS-X PO) Take 1 Tablet by mouth at bedtime as needed (flatulence).      Cranberry (CRANBEREX PO) Take 1 Tablet by mouth at bedtime.      Multiple Vitamins-Minerals (OCUVITE-LUTEIN) Tab Take 1 Tablet by mouth at bedtime.      venlafaxine XR (EFFEXOR XR) 75 MG CAPSULE SR 24 HR  "Take 1 Capsule by mouth every day. 90 Capsule 1    QUEtiapine (SEROQUEL) 25 MG Tab Take 1 Tablet by mouth at bedtime. (Seroquel 150 mg + 25 mg = 175 mg at bedtime) 30 Tablet 1    QUEtiapine Fumarate 150 MG TABLET SR 24 HR TAKE 1 TABLET DAILY AT BEDTIME 30 Tablet 1    divalproex ER (DEPAKOTE ER) 250 MG TABLET SR 24 HR TAKE 3 TABLETS 1/2 HOUR AFTER DINNER 270 Tablet 3    omeprazole (PRILOSEC) 20 MG delayed-release capsule TAKE 1 CAPSULE DAILY 90 Capsule 3    nitrofurantoin (MACRODANTIN) 50 MG Cap TAKE 1 CAPSULE DAILY (Patient taking differently: Take 50 mg by mouth every day.) 90 Capsule 3    tolterodine ER (DETROL LA) 4 MG CAPSULE SR 24 HR TAKE 1 CAPSULE DAILY 90 Capsule 3    levothyroxine (SYNTHROID) 125 MCG Tab TAKE 1 TABLET EVERY MORNING ON AN EMPTY STOMACH (Patient taking differently: Take 125 mcg by mouth every morning on an empty stomach.) 90 Tablet 3       Allergies   Allergen Reactions    Levofloxacin Unspecified     Unidentified reaction. Per family it's been a long time       Review of Systems:   A pertinent review of systems was performed and was unremarkable except as per HPI above.    Vital Signs:   /82   Pulse (!) 114   Temp 36.7 °C (98.1 °F)   Resp 16   Ht 1.676 m (5' 6\")   Wt 70.3 kg (155 lb)   SpO2 92%   BMI 25.02 kg/m²   Vitals:    01/09/24 0937 01/09/24 1002 01/09/24 1118 01/09/24 1119   BP: 128/84 133/78 122/82    Pulse: (!) 120 (!) 120 78 (!) 114   Resp: 16      Temp: 36.7 °C (98.1 °F)      SpO2: 88% 92% 93% 92%   Weight:       Height:         Body mass index is 25.02 kg/m².  Oxygen Therapy:  Pulse Oximetry: 92 %, O2 Delivery Device: None - Room Air    Physical Examination:   General: ill-appearing,  Eyes: Extraocular movements intact, anicteric  HENT:  ear lobe crease, neck full range of motion, bilateral jugular venous distension present  Pulmonary: Respiratory distress present, Rales to auscultation bilaterally  Cardiovascular: Regular rate and rhythm, no murmurs or gallops " appreciated  Gastrointestinal: Soft, non-tender, non-distended  Extremities: Warm and well perfused, Lower extremity edema present up to knees  Neurological: Alert and oriented, no gross focal motor deficits  Psychiatric: Normal affect    Laboratories:   Estimated Creatinine Clearance: 28.5 mL/min (by C-G formula based on SCr of 1.35 mg/dL).  Recent Labs     01/09/24  1003   CREATININE 1.35   *   POTASSIUM 4.7   SODIUM 139   CALCIUM 9.0   CO2 18*   ALBUMIN 3.5     Recent Labs     01/09/24  1003   GLUCOSE 104*     Recent Labs     01/09/24  1003   ASTSGOT 19   ALTSGPT 11   ALKPHOSPHAT 66     Recent Labs     01/09/24  1003   WBC 8.9   HEMOGLOBIN 11.6*   PLATELETCT 160*     Recent Labs     01/09/24  1003   TROPONINT 287*   NTPROBNP 96288*     Lab Results   Component Value Date/Time     (H) 06/08/2023 1130     (H) 08/24/2022 0910     (H) 04/14/2021 1135     Lab Results   Component Value Date/Time    HDL 58 06/08/2023 1130    HDL 52 08/24/2022 0910    HDL 53 04/14/2021 1135       Lab Results   Component Value Date/Time    TRIGLYCERIDE 117 06/08/2023 1130    TRIGLYCERIDE 131 08/24/2022 0910    TRIGLYCERIDE 223 (H) 04/14/2021 1135       Lab Results   Component Value Date/Time    CHOLSTRLTOT 201 (H) 06/08/2023 1130    CHOLSTRLTOT 192 08/24/2022 0910    CHOLSTRLTOT 221 (H) 04/14/2021 1135       Studies:     Electrophysiology  EKG performed on 1/9  Atrial fibrillation with a rate of 108, left bundle branch block    Assessment and Recommendations:   #Atrial Fibrillation with rapid ventricular response  #New onset decompensated Heart Failure   #Left bundle branch block  #Acute hypoxic respiratory failure  #Pulmonary Edema  Presented with symptoms and examination consistent with decompensated heart failure, found to be in atrial fibrillation with rapid ventricular response and EKG demonstrating new LBBB[place] 79[address], troponin of 287. Possible ischemic event contributing to heart failure based  off of patients symptoms.  [id number] of 5  -Pending Echocardiogram  -Continue diuresis with goal of net negative -2L over 24 hours till euvolemia is achieved  -Continue on Heparin drip  -Likely will perform Cardiac Catheterization when euvolemic.  -Fluid restrict 2L Daily  -On oral metoprolol 25mg BID, titrate as needed to achieve rate control  -On atorvastatin 40mg , aspirin 81mg daily  -Possible Cardioversion when appropriate            Chana Moe MD  PGY-2 IM Resident  [facility]

## 2024-01-09 NOTE — ASSESSMENT & PLAN NOTE
1/9 plan of care was discussed in detail with the patient and family, discussed all treatment options, discussed the CODE STATUS, discussed all images and labs, answered all their questions, they want to be full code, all treatment, time 30 minutes

## 2024-01-10 ENCOUNTER — APPOINTMENT (OUTPATIENT)
Dept: CARDIOLOGY | Facility: MEDICAL CENTER | Age: 84
DRG: 323 | End: 2024-01-10
Attending: PHYSICIAN ASSISTANT
Payer: MEDICARE

## 2024-01-10 LAB
ALBUMIN SERPL BCP-MCNC: 3.3 G/DL (ref 3.2–4.9)
ALBUMIN/GLOB SERPL: 1 G/DL
ALP SERPL-CCNC: 63 U/L (ref 30–99)
ALT SERPL-CCNC: 13 U/L (ref 2–50)
ANION GAP SERPL CALC-SCNC: 14 MMOL/L (ref 7–16)
ANION GAP SERPL CALC-SCNC: 14 MMOL/L (ref 7–16)
APTT PPP: >240 SEC (ref 24.7–36)
AST SERPL-CCNC: 16 U/L (ref 12–45)
BASOPHILS # BLD AUTO: 0.3 % (ref 0–1.8)
BASOPHILS # BLD: 0.02 K/UL (ref 0–0.12)
BILIRUB SERPL-MCNC: 0.2 MG/DL (ref 0.1–1.5)
BUN SERPL-MCNC: 104 MG/DL (ref 8–22)
BUN SERPL-MCNC: 110 MG/DL (ref 8–22)
CALCIUM ALBUM COR SERPL-MCNC: 9.2 MG/DL (ref 8.5–10.5)
CALCIUM SERPL-MCNC: 8.3 MG/DL (ref 8.5–10.5)
CALCIUM SERPL-MCNC: 8.6 MG/DL (ref 8.5–10.5)
CHLORIDE SERPL-SCNC: 104 MMOL/L (ref 96–112)
CHLORIDE SERPL-SCNC: 105 MMOL/L (ref 96–112)
CHOLEST SERPL-MCNC: 125 MG/DL (ref 100–199)
CO2 SERPL-SCNC: 17 MMOL/L (ref 20–33)
CO2 SERPL-SCNC: 20 MMOL/L (ref 20–33)
CREAT SERPL-MCNC: 1.99 MG/DL (ref 0.5–1.4)
CREAT SERPL-MCNC: 2.06 MG/DL (ref 0.5–1.4)
EKG IMPRESSION: NORMAL
EKG IMPRESSION: NORMAL
EOSINOPHIL # BLD AUTO: 0.04 K/UL (ref 0–0.51)
EOSINOPHIL NFR BLD: 0.5 % (ref 0–6.9)
ERYTHROCYTE [DISTWIDTH] IN BLOOD BY AUTOMATED COUNT: 53 FL (ref 35.9–50)
GFR SERPLBLD CREATININE-BSD FMLA CKD-EPI: 23 ML/MIN/1.73 M 2
GFR SERPLBLD CREATININE-BSD FMLA CKD-EPI: 24 ML/MIN/1.73 M 2
GLOBULIN SER CALC-MCNC: 3.3 G/DL (ref 1.9–3.5)
GLUCOSE BLD STRIP.AUTO-MCNC: 103 MG/DL (ref 65–99)
GLUCOSE BLD STRIP.AUTO-MCNC: 83 MG/DL (ref 65–99)
GLUCOSE BLD STRIP.AUTO-MCNC: 95 MG/DL (ref 65–99)
GLUCOSE SERPL-MCNC: 103 MG/DL (ref 65–99)
GLUCOSE SERPL-MCNC: 88 MG/DL (ref 65–99)
HCT VFR BLD AUTO: 35.6 % (ref 37–47)
HDLC SERPL-MCNC: 46 MG/DL
HGB BLD-MCNC: 11.1 G/DL (ref 12–16)
IMM GRANULOCYTES # BLD AUTO: 0.03 K/UL (ref 0–0.11)
IMM GRANULOCYTES NFR BLD AUTO: 0.4 % (ref 0–0.9)
INR PPP: 1.29 (ref 0.87–1.13)
LDLC SERPL CALC-MCNC: 65 MG/DL
LV EJECT FRACT  99904: 26
LV EJECT FRACT MOD 4C 99902: 33.51
LYMPHOCYTES # BLD AUTO: 1.83 K/UL (ref 1–4.8)
LYMPHOCYTES NFR BLD: 23.8 % (ref 22–41)
MAGNESIUM SERPL-MCNC: 2.8 MG/DL (ref 1.5–2.5)
MCH RBC QN AUTO: 30.3 PG (ref 27–33)
MCHC RBC AUTO-ENTMCNC: 31.2 G/DL (ref 32.2–35.5)
MCV RBC AUTO: 97.3 FL (ref 81.4–97.8)
MONOCYTES # BLD AUTO: 0.89 K/UL (ref 0–0.85)
MONOCYTES NFR BLD AUTO: 11.6 % (ref 0–13.4)
NEUTROPHILS # BLD AUTO: 4.88 K/UL (ref 1.82–7.42)
NEUTROPHILS NFR BLD: 63.4 % (ref 44–72)
NRBC # BLD AUTO: 0.05 K/UL
NRBC BLD-RTO: 0.7 /100 WBC (ref 0–0.2)
PHOSPHATE SERPL-MCNC: 6.6 MG/DL (ref 2.5–4.5)
PLATELET # BLD AUTO: 147 K/UL (ref 164–446)
PMV BLD AUTO: 11.9 FL (ref 9–12.9)
POTASSIUM SERPL-SCNC: 5.4 MMOL/L (ref 3.6–5.5)
POTASSIUM SERPL-SCNC: 5.6 MMOL/L (ref 3.6–5.5)
PROT SERPL-MCNC: 6.6 G/DL (ref 6–8.2)
PROTHROMBIN TIME: 16.3 SEC (ref 12–14.6)
RBC # BLD AUTO: 3.66 M/UL (ref 4.2–5.4)
SODIUM SERPL-SCNC: 135 MMOL/L (ref 135–145)
SODIUM SERPL-SCNC: 139 MMOL/L (ref 135–145)
TRIGL SERPL-MCNC: 70 MG/DL (ref 0–149)
TROPONIN T SERPL-MCNC: 437 NG/L (ref 6–19)
UFH PPP CHRO-ACNC: 0.48 IU/ML
UFH PPP CHRO-ACNC: 0.55 IU/ML
UFH PPP CHRO-ACNC: >1.1 IU/ML
WBC # BLD AUTO: 7.7 K/UL (ref 4.8–10.8)

## 2024-01-10 PROCEDURE — 93306 TTE W/DOPPLER COMPLETE: CPT | Mod: 26 | Performed by: INTERNAL MEDICINE

## 2024-01-10 PROCEDURE — A9270 NON-COVERED ITEM OR SERVICE: HCPCS | Performed by: INTERNAL MEDICINE

## 2024-01-10 PROCEDURE — 85610 PROTHROMBIN TIME: CPT

## 2024-01-10 PROCEDURE — 85520 HEPARIN ASSAY: CPT | Mod: 91

## 2024-01-10 PROCEDURE — 80048 BASIC METABOLIC PNL TOTAL CA: CPT

## 2024-01-10 PROCEDURE — 700117 HCHG RX CONTRAST REV CODE 255: Performed by: PHYSICIAN ASSISTANT

## 2024-01-10 PROCEDURE — 84484 ASSAY OF TROPONIN QUANT: CPT

## 2024-01-10 PROCEDURE — 700102 HCHG RX REV CODE 250 W/ 637 OVERRIDE(OP): Performed by: PHYSICIAN ASSISTANT

## 2024-01-10 PROCEDURE — 99233 SBSQ HOSP IP/OBS HIGH 50: CPT | Mod: GC | Performed by: INTERNAL MEDICINE

## 2024-01-10 PROCEDURE — 93306 TTE W/DOPPLER COMPLETE: CPT

## 2024-01-10 PROCEDURE — A9270 NON-COVERED ITEM OR SERVICE: HCPCS | Performed by: PHYSICIAN ASSISTANT

## 2024-01-10 PROCEDURE — 700111 HCHG RX REV CODE 636 W/ 250 OVERRIDE (IP): Performed by: INTERNAL MEDICINE

## 2024-01-10 PROCEDURE — 83735 ASSAY OF MAGNESIUM: CPT

## 2024-01-10 PROCEDURE — 80061 LIPID PANEL: CPT

## 2024-01-10 PROCEDURE — 99291 CRITICAL CARE FIRST HOUR: CPT | Performed by: STUDENT IN AN ORGANIZED HEALTH CARE EDUCATION/TRAINING PROGRAM

## 2024-01-10 PROCEDURE — 85025 COMPLETE CBC W/AUTO DIFF WBC: CPT

## 2024-01-10 PROCEDURE — 93010 ELECTROCARDIOGRAM REPORT: CPT | Performed by: INTERNAL MEDICINE

## 2024-01-10 PROCEDURE — 700102 HCHG RX REV CODE 250 W/ 637 OVERRIDE(OP): Performed by: INTERNAL MEDICINE

## 2024-01-10 PROCEDURE — 85730 THROMBOPLASTIN TIME PARTIAL: CPT

## 2024-01-10 PROCEDURE — 97163 PT EVAL HIGH COMPLEX 45 MIN: CPT

## 2024-01-10 PROCEDURE — 93005 ELECTROCARDIOGRAM TRACING: CPT | Performed by: STUDENT IN AN ORGANIZED HEALTH CARE EDUCATION/TRAINING PROGRAM

## 2024-01-10 PROCEDURE — 770020 HCHG ROOM/CARE - TELE (206)

## 2024-01-10 PROCEDURE — 84100 ASSAY OF PHOSPHORUS: CPT

## 2024-01-10 PROCEDURE — 36415 COLL VENOUS BLD VENIPUNCTURE: CPT

## 2024-01-10 PROCEDURE — 82962 GLUCOSE BLOOD TEST: CPT

## 2024-01-10 PROCEDURE — 80053 COMPREHEN METABOLIC PANEL: CPT

## 2024-01-10 PROCEDURE — 93005 ELECTROCARDIOGRAM TRACING: CPT | Performed by: INTERNAL MEDICINE

## 2024-01-10 RX ADMIN — FUROSEMIDE 40 MG: 10 INJECTION, SOLUTION INTRAMUSCULAR; INTRAVENOUS at 17:52

## 2024-01-10 RX ADMIN — QUETIAPINE FUMARATE 175 MG: 50 TABLET ORAL at 20:38

## 2024-01-10 RX ADMIN — METOPROLOL TARTRATE 25 MG: 25 TABLET, FILM COATED ORAL at 17:52

## 2024-01-10 RX ADMIN — DIVALPROEX SODIUM 750 MG: 500 TABLET, EXTENDED RELEASE ORAL at 17:55

## 2024-01-10 RX ADMIN — ATORVASTATIN CALCIUM 40 MG: 40 TABLET, FILM COATED ORAL at 17:54

## 2024-01-10 RX ADMIN — ASPIRIN 81 MG: 81 TABLET, COATED ORAL at 05:34

## 2024-01-10 RX ADMIN — OXYBUTYNIN CHLORIDE 10 MG: 10 TABLET, EXTENDED RELEASE ORAL at 20:38

## 2024-01-10 RX ADMIN — LEVOTHYROXINE SODIUM 125 MCG: 0.12 TABLET ORAL at 05:34

## 2024-01-10 RX ADMIN — HUMAN ALBUMIN MICROSPHERES AND PERFLUTREN 3 ML: 10; .22 INJECTION, SOLUTION INTRAVENOUS at 12:24

## 2024-01-10 RX ADMIN — VENLAFAXINE HYDROCHLORIDE 75 MG: 75 CAPSULE, EXTENDED RELEASE ORAL at 17:54

## 2024-01-10 RX ADMIN — OMEPRAZOLE 20 MG: 20 CAPSULE, DELAYED RELEASE ORAL at 17:55

## 2024-01-10 RX ADMIN — DOCUSATE SODIUM 50 MG AND SENNOSIDES 8.6 MG 2 TABLET: 8.6; 5 TABLET, FILM COATED ORAL at 17:54

## 2024-01-10 RX ADMIN — HEPARIN SODIUM 12 UNITS/KG/HR: 5000 INJECTION, SOLUTION INTRAVENOUS at 14:11

## 2024-01-10 ASSESSMENT — COGNITIVE AND FUNCTIONAL STATUS - GENERAL
MOVING FROM LYING ON BACK TO SITTING ON SIDE OF FLAT BED: A LITTLE
SUGGESTED CMS G CODE MODIFIER MOBILITY: CK
MOVING TO AND FROM BED TO CHAIR: A LITTLE
WALKING IN HOSPITAL ROOM: A LITTLE
TURNING FROM BACK TO SIDE WHILE IN FLAT BAD: A LITTLE
CLIMB 3 TO 5 STEPS WITH RAILING: A LITTLE
STANDING UP FROM CHAIR USING ARMS: A LITTLE
MOBILITY SCORE: 18

## 2024-01-10 ASSESSMENT — GAIT ASSESSMENTS
ASSISTIVE DEVICE: FRONT WHEEL WALKER
GAIT LEVEL OF ASSIST: MINIMAL ASSIST
DISTANCE (FEET): 30

## 2024-01-10 ASSESSMENT — PAIN DESCRIPTION - PAIN TYPE: TYPE: ACUTE PAIN

## 2024-01-10 NOTE — PROGRESS NOTES
4 Eyes Skin Assessment Completed by Deandre Vang, RN and KAREN Mcgovern.    Head WDL  Ears WDL  Nose WDL  Mouth WDL  Neck WDL  Breast/Chest WDL  Shoulder Blades WDL  Spine WDL  (R) Arm/Elbow/Hand WDL  (L) Arm/Elbow/Hand WDL  Abdomen WDL  Groin Redness and Blanching  Scrotum/Coccyx/Buttocks Redness and Blanching  (R) Leg Rash dusky flaky dry skin  (L) Leg Rash dusky flaky dry skin  (R) Heel/Foot/Toe WDL  (L) Heel/Foot/Toe WDL          Devices In Places Tele Box, Pulse Ox, and Nasal Cannula      Interventions In Place Gray Ear Foams and Pillows    Possible Skin Injury No    Pictures Uploaded Into Epic N/A  Wound Consult Placed N/A  RN Wound Prevention Protocol Ordered No

## 2024-01-10 NOTE — THERAPY
01/10/24 0840   Interdisciplinary Plan of Care Collaboration   Collaboration Comments OT consult received. Pt with uptrending troponin. Will hold at this time and monitor for appropriate timing for OT eval.

## 2024-01-10 NOTE — THERAPY
Physical Therapy   Initial Evaluation     Patient Name: Azeb Major  Age:  83 y.o., Sex:  female  Medical Record #: [id number]  Today's Date: 1/10/2024     Precautions  Precautions: Fall Risk;Cardiac Precautions (See Comments) (hearing issues impact communication)  Comments: cardiac plan still pending. EF 26%    Assessment  Patient is 83 y.o. female with a diagnosis of afib.  Additional factors influencing patient status / progress : pt is ok to see for PT as afib is rate controlled per RN, pt is asking for up to toilet. Family present, answers questions of PLF due to pt with severe hearing loss, though can read lips if spoken to slowly, facing straight on. PT needs min a to come to EOB, cg with sit to stand, and min A to take a few steps to commode and walked 20 feet in room with min A.  Pt follows cues when she can hear instructions. PT to follow once cardiac plan is more clear. See below for details.     Plan    Physical Therapy Initial Treatment Plan   Treatment Plan : Bed Mobility, Gait Training, Neuro Re-Education / Balance, Therapeutic Activities  Treatment Frequency:  (2-3x per week, depending on cardiology plan)  Duration: Until Therapy Goals Met    DC Equipment Recommendations: Unable to determine at this time  Discharge Recommendations: Recommend post-acute placement for additional physical therapy services prior to discharge home (vs home with HH and family assist depending on progress)          Objective       01/10/24 1423   Charge Group   PT Evaluation PT Evaluation High   Total Time Spent   PT Total Time Yes   PT Evaluation Time Spent (Mins) 30   PT Self Care/Home Evaluation Time Spent (Mins) 10   PT Total Time Spent (Calculated) 40   Initial Contact Note    Initial Contact Note Order Received and Verified, Physical Therapy Evaluation in Progress with Full Report to Follow.   Precautions   Precautions Fall Risk;Cardiac Precautions (See Comments)  (hearing issues impact communication)    Comments cardiac plan still pending. EF 26%   Vitals   Pulse (!) 129  (afib HR from 118-129)   Pulse Oximetry 95 %  (trouble getting reading, new probe better)   O2 (LPM) 3   O2 Delivery Device Nasal Cannula   Pain 0 - 10 Group   Therapist Pain Assessment 0;During Activity;Nurse Notified   Prior Living Situation   Prior Services None   Housing / Facility 2 Story House  (lives main floor, does not need to access basement per family.)   Steps Into Home 1   Steps In Home   (flight to basement, family reports trying to get pt to avoid basement.)   Equipment Owned Front-Wheel Walker;Single Point Cane   Lives with - Patient's Self Care Capacity Alone and Able to Care For Self  (daughters Olive (works nutrition at [employer]) and Norma at bedside)   Comments several adult children live very close by and have set up a schedule so that one of them is with patient every evening for dinner and to assist as needed. Daughter reports that with pt's bipolar, she will be indep with most adls on good days, but when in low period, pt will need additional assist with things like grooming etc.   Prior Level of Functional Mobility   Bed Mobility Independent   Transfer Status Independent   Ambulation Independent   Ambulation Distance community distances   Assistive Devices Used None   Stairs Independent   Cognition    Cognition / Consciousness X   Speech/ Communication Hard of Hearing  (very [place], mostly reads lips per family, hearing aids at home not always helpful.)   Level of Consciousness Alert   Comments best to speak slowly and face patient directly per family.   Active ROM Lower Body    Active ROM Lower Body  WDL   Strength Lower Body   Lower Body Strength  WDL   Comments MMT challenging due to hearing loss, but eventually able, shows 5/5 B ankle df, knee ext.   Balance Assessment   Sitting Balance (Static) Fair   Sitting Balance (Dynamic) Fair -  (falling asleep on commode)   Standing Balance (Static) Fair -   Standing Balance  (Dynamic) Fair -   Weight Shift Sitting Fair   Weight Shift Standing Fair   Comments with FWW   Bed Mobility    Supine to Sit Minimal Assist   Sit to Supine Supervised   Scooting Supervised   Rolling Supervised   Gait Analysis   Gait Level Of Assist Minimal Assist   Assistive Device Front Wheel Walker   Distance (Feet) 30   # of Times Distance was Traveled 1   Deviation   (confusion due to hearing issues, difficult to give direction)   Functional Mobility   Sit to Stand Contact Guard Assist   Bed, Chair, Wheelchair Transfer Minimal Assist   Transfer Method Stand Step   How much difficulty does the patient currently have...   Turning over in bed (including adjusting bedclothes, sheets and blankets)? 3   Sitting down on and standing up from a chair with arms (e.g., wheelchair, bedside commode, etc.) 3   Moving from lying on back to sitting on the side of the bed? 3   How much help from another person does the patient currently need...   Moving to and from a bed to a chair (including a wheelchair)? 3   Need to walk in a hospital room? 3   Climbing 3-5 steps with a railing? 3   6 clicks Mobility Score 18   Activity Tolerance   Standing 5+   Short Term Goals    Short Term Goal # 1 Pt will perform bed mobility with supervision in 6 visits   Short Term Goal # 2 Pt will transfer with supervision in 6 visits to improve functional indep.   Short Term Goal # 3 Pt will ambulate x 125 feet using fWW with supervision in 6 visits to improve functional indep.   Education Group   Education Provided Role of Physical Therapist   Role of Physical Therapist Patient Response Patient;Family;Acceptance;Explanation;Verbal Demonstration;Action Demonstration   Physical Therapy Initial Treatment Plan    Treatment Plan  Bed Mobility;Gait Training;Neuro Re-Education / Balance;Therapeutic Activities   Treatment Frequency   (2-3x per week, depending on cardiology plan)   Duration Until Therapy Goals Met   Problem List    Problems Decreased  Activity Tolerance;Impaired Balance;Impaired Ambulation   Anticipated Discharge Equipment and Recommendations   DC Equipment Recommendations Unable to determine at this time   Discharge Recommendations Recommend post-acute placement for additional physical therapy services prior to discharge home  (vs home with HH and family assist depending on progress)   Interdisciplinary Plan of Care Collaboration   IDT Collaboration with  Nursing   Patient Position at End of Therapy In Bed;Call Light within Reach;Tray Table within Reach;Phone within Reach;Family / Friend in Room;Bed Alarm On   Collaboration Comments nsg updated   Session Information   Date / Session Number  1/10-1 (1/2, 1/16)

## 2024-01-10 NOTE — PROGRESS NOTES
Blood specimen was collected for Anti-Xa between 0500 and 0600. However, no result as posted to [facility].     This RN reached out to inpatient lab to check the status of anti-xa test. Lab stated that they did not have the blood sample. Reached out to phlebotomist Wendy Richardson whom confirmed she had collected and sent sample to lab. Lab confirmed they do not have the blood specimen.

## 2024-01-10 NOTE — PROGRESS NOTES
Patient has been hypotensive, but asymptomatic. Notified BHUPINDER Barnes. No new orders received at this time. Patient resting comfortably without signs of acute distress.

## 2024-01-10 NOTE — DISCHARGE PLANNING
Care Transition Team Assessment    In the case of an emergency, pt's legal NOK is Olive RileySeverino     RNCM met with pt at bedside and obtained the information used in this assessment. Also called daughter, Galileo to verify information. Pt verified accuracy of facesheet. Pt lives in a 2 story home alone. 5 adult children live close by and see pt regularly.  Each child provides daily meals. Pt uses [facility]. Prior to current hospitalization, pt was completely independent in ADLS/IADLS. Uses [facility] for amb. Pt has a good support system. Pt denies any hx of substance use and does have bipolar disease. Owns FWW. Has been to [facility] in the past and has had [facility]  in the past.    Information Source:pt  Orientation Level: Oriented X4  Information Given By: Patient  Informant's Name: Annita  Who is responsible for making decisions for patient? : Patient         Elopement Risk  Legal Hold: No  Ambulatory or Self Mobile in Wheelchair: No-Not an Elopement Risk  Elopement Risk: Not at Risk for Elopement    Interdisciplinary Discharge Planning  Does Admitting Nurse Feel This Could be a Complex Discharge?: No  Primary Care Physician: Mercedez  Lives with - Patient's Self Care Capacity: Alone and Able to Care For Self  Patient or legal guardian wants to designate a caregiver: Yes  Caregiver name: Lauren Chapa  Caregiver contact info: [phone]  ([facility] [place]) Authorization for Release of Health Information has been completed: Yes  Support Systems: Family Member(s)  Housing / Facility: 2 Story House  Do You Take your Prescribed Medications Regularly: Yes  Mobility Issues: Yes  Prior Services: None  Durable Medical Equipment: Walker    Discharge Preparedness  What is your plan after discharge?: Uncertain - pending medical team collaboration  What are your discharge supports?: Child  Prior Functional Level: Ambulatory, Independent with Activities of Daily Living, Independent with Medication Management,  Uses Walker  Difficulity with ADLs: None  Difficulity with IADLs: Driving, Keeping track of finances    Functional Assesment  Prior Functional Level: Ambulatory, Independent with Activities of Daily Living, Independent with Medication Management, Uses Walker    Finances  Prescription Coverage: Yes    Vision / Hearing Impairment  Vision Impairment : Yes  Right Eye Vision: Impaired, Wears Glasses  Left Eye Vision: Impaired, Wears Glasses  Hearing Impairment : Yes  Hearing Impairment: Both Ears, Other (Comments) (Voice amplifier ordered x2.)  Does Pt Need Special Equipment for the Hearing Impaired?: Yes-But Does not Need for Facility to Arrange Equipment              Domestic Abuse  Have you ever been the victim of abuse or violence?: No  Physical Abuse or Sexual Abuse: No  Verbal Abuse or Emotional Abuse: No  Possible Abuse/Neglect Reported to:: Not Applicable    Psychological Assessment  History of Substance Abuse: None  History of Psychiatric Problems: Yes         Anticipated Discharge Information  Discharge Disposition: D/T to home under HHA care in anticipation of covered skilled care (06)

## 2024-01-10 NOTE — CARE PLAN
"The patient is Stable - Low risk of patient condition declining or worsening    Shift Goals  Clinical Goals: Patient and Family will verbalize POC by end of shift. Patient will be able to easily communicate with members of the healthcare team.  Patient Goals: \"Get Gas-X added to my MAR.\"  Family Goals: \"Keep patient safe,\" \"Call and provide update to family for anything unexpected or emergent.\"    Progress made toward(s) clinical / shift goals:  Patient and family provided a verbal discussion related to POC to include a discussion on indication for continuous heparin infusion, A-fib w/ RVR and indication for metoprolol for heart rate control. Discussed heightened risk of acute stroke due to A-fib and BEFAST signs and symptoms of stroke. Patient's risk for injury and falls assessed. Appropriate safety precautions in place. Patient educated to utilize call light for needs. Patient verbalizes understanding. Proper hand hygiene before and after patient care to ensure patient will remain free from infection. Pt is able to verbalize pain on a scale of 1-10 and is able to verbalize comfort goal. Pain management plan followed and pt educated on nonpharmacologic and pharmacological comfort measures. Ordered patient voice amplifier x2 to enhance communication and ensure patient is able to participate in conversations.     Patient is not progressing towards the following goals: N/A      "

## 2024-01-10 NOTE — PROGRESS NOTES
After moving patient from [facility] to [facility], patient observed breathing rapidly and complaining of chest pain. Ordered stat EKG which was unremarkable and medicated patient for pain with PRN morphing 1 mg IV.

## 2024-01-10 NOTE — PROGRESS NOTES
Pt arrived to unit at 1900 via gurney. Assessment complete. VSS. No signs of distress noted at this time. Tele monitor in place. Monitor room notified. Pt c/o pain 0/10. Fall precautions and appropriate signs in place. Pt oriented to unit routine, call light/phone system within reach. Personal belongings within reach. Pt educated regarding fall precautions. Bed alarm is on. No isolation precautions in place. Pt denies any further needs at this time.

## 2024-01-10 NOTE — PROGRESS NOTES
CARDIOLOGY PROGRESS NOTE      Date: 1/10/2024      Patient Name: Azeb Major  YOB: 1940  MRN: [id number]    Assessment/Recommendations:   #Atrial Fibrillation with rapid ventricular response  #New onset decompensated Heart Failure   #Left bundle branch block  #Acute hypoxic respiratory failure  #Pulmonary Edema  Presented with symptoms and examination consistent with decompensated heart failure, found to be in atrial fibrillation with rapid ventricular response and EKG demonstrating new LBBB[place]NP [place] 93874, troponin of 287. Possible ischemic event contributing to heart failure based off of patients symptoms.  [id number] of at least 4  -Pending Echocardiogram  -Will hold of lasix for this afternoon due to rising creatinine   -Continue on Heparin drip  -Likely will perform Cardiac Catheterization when euvolemic.  -Fluid restrict 2L Daily, strict I and Os  -On oral metoprolol 25mg BID  -On atorvastatin 40mg , aspirin 81mg daily  -Possible Cardioversion when appropriate          Disposition:   Cardiology will continue to follow.    Please contact us if there are any questions or concerns.    Events/Subjective:   Azeb Major is a 83  year-old female with a past medical history of bipoloar disorder Hypertension, CKD 3b, hypothyroidism.  Patient presented from primary care clinic due to concerns of atrial fibrillation with rapid ventricular response, 3 days of orthopnea.Patient states she had a an episode of severe left sided chest/shoulder pain 3 days before presenting to the hospital. Admitted with concerns of atrial fibrillation with rapid ventricular response, decompensated heart failure started on diuresis and oral metoprolol.      1/10- creatinine increased from 1.35 to 1.99 overnight. Will hold afternoon dose of lasix and revaluate renal function tomorrow, still appears volume overloaded     Medications:     Scheduled Medications   Medication Dose Frequency    divalproex  "ER  750 mg PM MEAL    levothyroxine  125 mcg AM ES    omeprazole  20 mg PM MEAL    QUEtiapine  175 mg Nightly    oxybutynin SR  10 mg QHS    venlafaxine XR  75 mg Q EVENING    senna-docusate  2 Tablet BID    metoprolol tartrate  25 mg TWICE DAILY    furosemide  40 mg BID DIURETIC    aspirin  81 mg DAILY    atorvastatin  40 mg Q EVENING     Current Outpatient Medications   Medication Instructions    cefdinir (OMNICEF) 300 mg, Oral, 2 TIMES DAILY, X 5 days    Cranberry (CRANBEREX PO) 1 Tablet, Oral, EVERY BEDTIME    divalproex ER (DEPAKOTE ER) 250 MG TABLET SR 24 HR TAKE 3 TABLETS 1/2 HOUR AFTER DINNER    fexofenadine (ALLEGRA) 60 mg, Oral, EVERY BEDTIME    levothyroxine (SYNTHROID) 125 MCG Tab TAKE 1 TABLET EVERY MORNING ON AN EMPTY STOMACH    MAGNESIUM PO 1 Tablet, Oral, EVERY BEDTIME    MELATONIN PO 1 Capsule, Oral, EVERY BEDTIME    Multiple Vitamins-Minerals (OCUVITE-LUTEIN) Tab 1 Tablet, Oral, EVERY BEDTIME    nitrofurantoin (MACRODANTIN) 50 MG Cap TAKE 1 CAPSULE DAILY    omeprazole (PRILOSEC) 20 MG delayed-release capsule TAKE 1 CAPSULE DAILY    Probiotic Product (PROBIOTIC PO) 1 Tablet, Oral, EVERY BEDTIME    QUEtiapine (SEROQUEL) 25 mg, Oral, EVERY BEDTIME, (Seroquel 150 mg + 25 mg = 175 mg at bedtime)    QUEtiapine Fumarate 150 MG TABLET SR 24 HR TAKE 1 TABLET DAILY AT BEDTIME    Simethicone (GAS-X PO) 1 Tablet, Oral, NIGHTLY PRN    tolterodine ER (DETROL LA) 4 MG CAPSULE SR 24 HR TAKE 1 CAPSULE DAILY    venlafaxine XR (EFFEXOR XR) 75 mg, Oral, DAILY       Vitals:   BP 94/63   Pulse (!) 109   Temp 36.3 °C (97.3 °F) (Temporal)   Resp 17   Ht 1.676 m (5' 6\")   Wt 70.3 kg (155 lb)   SpO2 93%    BP Readings from Last 4 Encounters:   01/10/24 94/63   01/09/24 128/68   11/01/23 (!) 140/60   06/08/23 (!) 140/60     Wt Readings from Last 4 Encounters:   01/09/24 70.3 kg (155 lb)   11/01/23 70.3 kg (155 lb)   06/08/23 70.8 kg (156 lb)   11/01/22 71.2 kg (157 lb)     Body mass index is 25.02 kg/m².    Physical " Examination:   General: ill-appearing,  Eyes: Extraocular movements intact, anicteric  HENT: , neck full range of motion, bilateral jugular venous distension present  Pulmonary: Respiratory distress present, Rales to auscultation bilaterally  Cardiovascular: Regular rate and rhythm, no murmurs or gallops appreciated  Gastrointestinal: Soft, non-tender, non-distended  Extremities: Warm and well perfused, Lower extremity edema present up to knees, and at hips  Neurological: Alert and oriented, no gross focal motor deficits  Psychiatric: Normal affect    Laboratories:   Estimated Creatinine Clearance: 19.3 mL/min (A) (by C-G formula based on SCr of 1.99 mg/dL (H)).  Recent Labs     01/09/24  0830 01/09/24  1003 01/10/24  0554   CREATININE 1.60* 1.35 1.99*   BUN 96* 101* 104*   POTASSIUM 4.3 4.7 5.6*   SODIUM 139 139 139   CALCIUM 9.2 9.0 8.6   MAGNESIUM  --   --  2.8*   CO2 19* 18* 20   ALBUMIN 3.5 3.5 3.3     Recent Labs     01/09/24  0830 01/09/24  1003 01/10/24  0554   GLUCOSE 117* 104* 88     Recent Labs     01/09/24  0830 01/09/24  1003 01/09/24  1229 01/10/24  0554   ASTSGOT 17 19  --  16   ALTSGPT 8 11  --  13   ALKPHOSPHAT 70 66  --  63   INR  --   --  1.16*  --      Recent Labs     01/09/24  0830 01/09/24  1003 01/10/24  0554   WBC 8.4 8.9 7.7   HEMOGLOBIN 11.3* 11.6* 11.1*   PLATELETCT 171 160* 147*     Recent Labs     01/09/24  0830 01/09/24  1003 01/09/24  1603 01/10/24  0554   TROPONINT  --  287* 315* 437*   NTPROBNP  --  86644*  --   --    HBA1C 5.9* 5.9*  --   --      Lab Results   Component Value Date/Time    LDL 65 01/10/2024 0554    LDL 65 01/09/2024 0830     (H) 06/08/2023 1130     Lab Results   Component Value Date/Time    HDL 46 01/10/2024 0554    HDL 48 01/09/2024 0830    HDL 58 06/08/2023 1130       Lab Results   Component Value Date/Time    TRIGLYCERIDE 70 01/10/2024 0554    TRIGLYCERIDE 86 01/09/2024 0830    TRIGLYCERIDE 117 06/08/2023 1130       Lab Results   Component Value Date/Time     CHOLSTRLTOT 125 01/10/2024 0554    CHOLSTRLTOT 130 01/09/2024 0830    CHOLSTRLTOT 201 (H) 06/08/2023 1130       Telemetry:   Atrial Fibrillation 70-90    Cardiac Studies and Procedures:     Electrophysiology  EKG performed on 1/9  Atrial fibrillation with a rate of 108, left bundle branch block      Chana Moe MD  PGY-2 IM Resident  [facility]

## 2024-01-10 NOTE — DISCHARGE PLANNING
Case Management Discharge Planning    Admission Date: 1/9/2024  GMLOS: 3.5  ALOS: 1    6-Clicks ADL Score: 16  6-Clicks Mobility Score: 16  PT and/or OT Eval ordered: Yes  Post-acute Referrals Ordered: No  Post-acute Choice Obtained: No  Has referral(s) been sent to post-acute provider:  No      Anticipated Discharge Dispo: Discharge Disposition: D/T to home under HHA care in anticipation of covered skilled care (06)    DME Needed: No    Action(s) Taken: Updated Provider/Nurse on Discharge Plan and DC Assessment Complete (See below)    Escalations Completed: None    Medically Clear: No    Next Steps: Discharge plan undetermined at this time. Daughter states that if pt needs post acute care, prefers Rehab over SNF.    Barriers to Discharge: Medical clearance    Is the patient up for discharge tomorrow: No

## 2024-01-10 NOTE — PROGRESS NOTES
Received bedside report from RN and assumed patient care. Patient is A&Ox4. Physical assessment complete. Patients belongings and call light are within reach. Plan of care is ongoing.

## 2024-01-10 NOTE — ED NOTES
Pt complaining of chest pain/pressure. Hospitalist notified. Morphine ordered and administered. Pt resting comfortably on gurney.

## 2024-01-10 NOTE — PROGRESS NOTES
Patient working with PT/OT. Bleeding and hematoma noted at left hand. IV taken out and pressure held with dressing. Dr. Rodriguez notified.

## 2024-01-10 NOTE — PROGRESS NOTES
MS:      Atrial Fibrillation ( 77 - 90) with a BBB   *Pacer spikes not visible*  - - / 0.13 / - -

## 2024-01-11 LAB
ALBUMIN SERPL BCP-MCNC: 3.1 G/DL (ref 3.2–4.9)
ALBUMIN/GLOB SERPL: 1 G/DL
ALP SERPL-CCNC: 64 U/L (ref 30–99)
ALT SERPL-CCNC: 11 U/L (ref 2–50)
ANION GAP SERPL CALC-SCNC: 14 MMOL/L (ref 7–16)
AST SERPL-CCNC: 16 U/L (ref 12–45)
BASOPHILS # BLD AUTO: 0.3 % (ref 0–1.8)
BASOPHILS # BLD: 0.02 K/UL (ref 0–0.12)
BILIRUB SERPL-MCNC: 0.2 MG/DL (ref 0.1–1.5)
BUN SERPL-MCNC: 99 MG/DL (ref 8–22)
CALCIUM ALBUM COR SERPL-MCNC: 8.8 MG/DL (ref 8.5–10.5)
CALCIUM SERPL-MCNC: 8.1 MG/DL (ref 8.5–10.5)
CHLORIDE SERPL-SCNC: 103 MMOL/L (ref 96–112)
CO2 SERPL-SCNC: 19 MMOL/L (ref 20–33)
CREAT SERPL-MCNC: 1.89 MG/DL (ref 0.5–1.4)
EKG IMPRESSION: NORMAL
EKG IMPRESSION: NORMAL
EOSINOPHIL # BLD AUTO: 0.08 K/UL (ref 0–0.51)
EOSINOPHIL NFR BLD: 1 % (ref 0–6.9)
ERYTHROCYTE [DISTWIDTH] IN BLOOD BY AUTOMATED COUNT: 51.6 FL (ref 35.9–50)
GFR SERPLBLD CREATININE-BSD FMLA CKD-EPI: 26 ML/MIN/1.73 M 2
GLOBULIN SER CALC-MCNC: 3.1 G/DL (ref 1.9–3.5)
GLUCOSE BLD STRIP.AUTO-MCNC: 119 MG/DL (ref 65–99)
GLUCOSE BLD STRIP.AUTO-MCNC: 74 MG/DL (ref 65–99)
GLUCOSE SERPL-MCNC: 82 MG/DL (ref 65–99)
HCT VFR BLD AUTO: 34.3 % (ref 37–47)
HGB BLD-MCNC: 11 G/DL (ref 12–16)
IMM GRANULOCYTES # BLD AUTO: 0.02 K/UL (ref 0–0.11)
IMM GRANULOCYTES NFR BLD AUTO: 0.3 % (ref 0–0.9)
LYMPHOCYTES # BLD AUTO: 1.64 K/UL (ref 1–4.8)
LYMPHOCYTES NFR BLD: 21 % (ref 22–41)
MAGNESIUM SERPL-MCNC: 2.6 MG/DL (ref 1.5–2.5)
MCH RBC QN AUTO: 31.3 PG (ref 27–33)
MCHC RBC AUTO-ENTMCNC: 32.1 G/DL (ref 32.2–35.5)
MCV RBC AUTO: 97.4 FL (ref 81.4–97.8)
MONOCYTES # BLD AUTO: 1.15 K/UL (ref 0–0.85)
MONOCYTES NFR BLD AUTO: 14.7 % (ref 0–13.4)
NEUTROPHILS # BLD AUTO: 4.91 K/UL (ref 1.82–7.42)
NEUTROPHILS NFR BLD: 62.7 % (ref 44–72)
NRBC # BLD AUTO: 0.04 K/UL
NRBC BLD-RTO: 0.5 /100 WBC (ref 0–0.2)
PHOSPHATE SERPL-MCNC: 5.9 MG/DL (ref 2.5–4.5)
PLATELET # BLD AUTO: 140 K/UL (ref 164–446)
PMV BLD AUTO: 11.7 FL (ref 9–12.9)
POTASSIUM SERPL-SCNC: 4.8 MMOL/L (ref 3.6–5.5)
PROT SERPL-MCNC: 6.2 G/DL (ref 6–8.2)
RBC # BLD AUTO: 3.52 M/UL (ref 4.2–5.4)
SODIUM SERPL-SCNC: 136 MMOL/L (ref 135–145)
WBC # BLD AUTO: 7.8 K/UL (ref 4.8–10.8)

## 2024-01-11 PROCEDURE — 84100 ASSAY OF PHOSPHORUS: CPT

## 2024-01-11 PROCEDURE — 93005 ELECTROCARDIOGRAM TRACING: CPT | Performed by: STUDENT IN AN ORGANIZED HEALTH CARE EDUCATION/TRAINING PROGRAM

## 2024-01-11 PROCEDURE — 83735 ASSAY OF MAGNESIUM: CPT

## 2024-01-11 PROCEDURE — 82962 GLUCOSE BLOOD TEST: CPT | Mod: 91

## 2024-01-11 PROCEDURE — 93010 ELECTROCARDIOGRAM REPORT: CPT | Performed by: INTERNAL MEDICINE

## 2024-01-11 PROCEDURE — 700102 HCHG RX REV CODE 250 W/ 637 OVERRIDE(OP): Performed by: INTERNAL MEDICINE

## 2024-01-11 PROCEDURE — 700111 HCHG RX REV CODE 636 W/ 250 OVERRIDE (IP): Performed by: INTERNAL MEDICINE

## 2024-01-11 PROCEDURE — A9270 NON-COVERED ITEM OR SERVICE: HCPCS | Performed by: PHYSICIAN ASSISTANT

## 2024-01-11 PROCEDURE — 99223 1ST HOSP IP/OBS HIGH 75: CPT | Performed by: PHYSICAL MEDICINE & REHABILITATION

## 2024-01-11 PROCEDURE — 85520 HEPARIN ASSAY: CPT

## 2024-01-11 PROCEDURE — 80053 COMPREHEN METABOLIC PANEL: CPT

## 2024-01-11 PROCEDURE — 99233 SBSQ HOSP IP/OBS HIGH 50: CPT | Mod: GC | Performed by: INTERNAL MEDICINE

## 2024-01-11 PROCEDURE — A9270 NON-COVERED ITEM OR SERVICE: HCPCS | Performed by: INTERNAL MEDICINE

## 2024-01-11 PROCEDURE — 700102 HCHG RX REV CODE 250 W/ 637 OVERRIDE(OP): Performed by: PHYSICIAN ASSISTANT

## 2024-01-11 PROCEDURE — 85025 COMPLETE CBC W/AUTO DIFF WBC: CPT

## 2024-01-11 PROCEDURE — 99233 SBSQ HOSP IP/OBS HIGH 50: CPT | Performed by: STUDENT IN AN ORGANIZED HEALTH CARE EDUCATION/TRAINING PROGRAM

## 2024-01-11 PROCEDURE — 36415 COLL VENOUS BLD VENIPUNCTURE: CPT

## 2024-01-11 PROCEDURE — 770020 HCHG ROOM/CARE - TELE (206)

## 2024-01-11 RX ORDER — METOPROLOL SUCCINATE 25 MG/1
25 TABLET, EXTENDED RELEASE ORAL
Status: DISCONTINUED | OUTPATIENT
Start: 2024-01-11 | End: 2024-01-12

## 2024-01-11 RX ADMIN — BISACODYL 10 MG: 10 SUPPOSITORY RECTAL at 23:23

## 2024-01-11 RX ADMIN — VENLAFAXINE HYDROCHLORIDE 75 MG: 75 CAPSULE, EXTENDED RELEASE ORAL at 16:02

## 2024-01-11 RX ADMIN — METOPROLOL TARTRATE 25 MG: 25 TABLET, FILM COATED ORAL at 05:26

## 2024-01-11 RX ADMIN — FUROSEMIDE 40 MG: 10 INJECTION, SOLUTION INTRAMUSCULAR; INTRAVENOUS at 16:02

## 2024-01-11 RX ADMIN — ATORVASTATIN CALCIUM 40 MG: 40 TABLET, FILM COATED ORAL at 16:02

## 2024-01-11 RX ADMIN — DOCUSATE SODIUM 50 MG AND SENNOSIDES 8.6 MG 2 TABLET: 8.6; 5 TABLET, FILM COATED ORAL at 16:02

## 2024-01-11 RX ADMIN — DIVALPROEX SODIUM 750 MG: 500 TABLET, EXTENDED RELEASE ORAL at 17:47

## 2024-01-11 RX ADMIN — FUROSEMIDE 40 MG: 10 INJECTION, SOLUTION INTRAMUSCULAR; INTRAVENOUS at 05:26

## 2024-01-11 RX ADMIN — LEVOTHYROXINE SODIUM 125 MCG: 0.12 TABLET ORAL at 05:26

## 2024-01-11 RX ADMIN — ASPIRIN 81 MG: 81 TABLET, COATED ORAL at 05:26

## 2024-01-11 RX ADMIN — HEPARIN SODIUM 12 UNITS/KG/HR: 5000 INJECTION, SOLUTION INTRAVENOUS at 20:47

## 2024-01-11 RX ADMIN — OMEPRAZOLE 20 MG: 20 CAPSULE, DELAYED RELEASE ORAL at 17:47

## 2024-01-11 RX ADMIN — POLYETHYLENE GLYCOL 3350 1 PACKET: 17 POWDER, FOR SOLUTION ORAL at 22:52

## 2024-01-11 RX ADMIN — METOPROLOL SUCCINATE 25 MG: 25 TABLET, EXTENDED RELEASE ORAL at 16:02

## 2024-01-11 RX ADMIN — DOCUSATE SODIUM 50 MG AND SENNOSIDES 8.6 MG 2 TABLET: 8.6; 5 TABLET, FILM COATED ORAL at 05:26

## 2024-01-11 ASSESSMENT — FIBROSIS 4 INDEX
FIB4 SCORE: 2.93
FIB4 SCORE: 2.93

## 2024-01-11 NOTE — PROGRESS NOTES
Bedside report received, assumed care of patient. Resting in bed, denied pain. A&O x4. Tele monitoring in place. Educated on fall risk, all fall precautions in place. Call light within reach, bed locked and in lowest position, denied other needs at this time.

## 2024-01-11 NOTE — PROGRESS NOTES
Hospital Medicine Daily Progress Note    Date of Service  1/10/2024    Chief Complaint  Azeb Major is a 83 y.o. female admitted 1/9/2024 with dyspnea    Hospital Course  83-year-old female with history of hearing loss, hypertension, bipolar and chronic kidney disease who presented 1/9 with chest pain and shortness of breath.  Patient has significant hearing loss and history was taken from the patient and family, patient has had worsening shortness of breath and chest pain for last couple days, also noticed swelling on her legs, on the day of admission patient was evaluated by PCP and they found the new atrial fibrillation and patient was transferred to emergency.  Patient was alert oriented x 4, patient has pressure chest pain, goes to her shoulders, with worsening shortness of breath, no nausea or vomiting or other symptoms and no fever.  EKG showed new atrial fibrillation with no significant ischemic changes, patient had a crackles with lower extremity edema, chest x-ray showed pulmonary edema, BNP was more than 27,000, troponin was elevated more than 200, cardiology was consulted, heparin with aspirin and metoprolol were initiated.  Also Lasix was initiated for heart failure.     Patient lives by herself and she usually does all daily activities by herself, no significant memory deficit however patient has bipolar which is a stable    Interval Problem Update  1/10  Patient was examined in her inpatient room multiple times throughout the day.   She is afebrile today heart rate from 90s to 120s systolic blood pressure overnight was in the 80s while sleeping throughout the day was in the 90s to low 100s.  Remains on 3 L nasal cannula, she is still volume overloaded. Cautious parameters for diuresis and heart rate control, no need for aggressive heart rate control, suspected she has severely reduced LV function which was confirmed later with echo, shows EF 25% with moderate to severe mitral valve regurg  which is new.    She has no respiratory distress and respiratory status has been stable, no chest pain, feeling weak and generally unwell however overall stable, and actually improved throughout the day in regards to cap refill, extremity temperature, color.    Initial Lasix dose was held due to parameters and cardiology wanted additional Lasix dose in the evening to be held, too aggressive diuresis will be detrimental and she may require inotropic support if renal function does not improve.    Hyperkalemia improved on repeat BMP in the afternoon, renal function was only slightly worse.  On heparin drip.  Developed small hand hematoma from waking IV site after PT, dressing applied and bleeding stopped.  Hand remained neurovascularly intact.  Cardiology continues to follow, family updated at bedside multiple times throughout the day.      I have discussed this patient's plan of care and discharge plan at IDT rounds today with Case Management, Nursing, Nursing leadership, and other members of the IDT team.    Consultants/Specialty  cardiology    Code Status  Full Code    Disposition  Medically Cleared  I have placed the appropriate orders for post-discharge needs.    Review of Systems  ROS   Review of Systems   Constitutional:  Positive for malaise/fatigue. Negative for chills, fever and weight loss.   HENT:  Negative for ear pain, hearing loss and tinnitus.    Eyes:  Negative for blurred vision, double vision and photophobia.   Respiratory:  Positive for shortness of breath. Negative for cough and hemoptysis.    Cardiovascular:  Positive for chest pain. Negative for palpitations, orthopnea and claudication.   Gastrointestinal:  Negative for abdominal pain, constipation, diarrhea, nausea and vomiting.   Genitourinary:  Negative for dysuria, frequency and urgency.   Musculoskeletal:  Negative for myalgias and neck pain.   Skin:  Negative for rash.   Neurological:  Negative for dizziness, speech change and  weakness    Physical Exam  Temp:  [35.9 °C (96.6 °F)-36.8 °C (98.2 °F)] 35.9 °C (96.6 °F)  Pulse:  [] 117  Resp:  [14-18] 18  BP: ()/(59-76) 87/59  SpO2:  [90 %-95 %] 90 %    Physical Exam  Vitals and nursing note reviewed. Exam conducted with a chaperone present.   Constitutional:       General: She is not in acute distress.     Appearance: She is ill-appearing. She is not toxic-appearing.      Comments: Appears weak   HENT:      Head: Normocephalic and atraumatic.      Nose: Nose normal. No rhinorrhea.      Mouth/Throat:      Mouth: Mucous membranes are moist.      Pharynx: Oropharynx is clear.   Eyes:      General: No scleral icterus.     Extraocular Movements: Extraocular movements intact.      Conjunctiva/sclera: Conjunctivae normal.   Neck:      Comments: JVD  Cardiovascular:      Rate and Rhythm: Tachycardia present. Rhythm irregular.      Heart sounds: Murmur heard.   Pulmonary:      Effort: No respiratory distress.      Breath sounds: Rales present. No wheezing or rhonchi.      Comments: Low work of breathing.  On 3 L NC, rales present  Abdominal:      General: There is distension.      Palpations: Abdomen is soft.      Tenderness: There is no abdominal tenderness. There is no guarding or rebound.   Musculoskeletal:         General: Normal range of motion.      Cervical back: Normal range of motion and neck supple. No rigidity or tenderness.      Right lower leg: Edema present.      Left lower leg: Edema present.   Skin:     General: Skin is dry.      Capillary Refill: Capillary refill takes 2 to 3 seconds.      Comments: Cooler extremities in the morning but warmed throughout the day, minimal mottling on the knees, overall cap refill 2-3 secs  Swelling to left hand   Neurological:      General: No focal deficit present.      Mental Status: She is alert. Mental status is at baseline.      Cranial Nerves: No cranial nerve deficit.      Sensory: No sensory deficit.      Motor: No weakness.    Psychiatric:         Mood and Affect: Mood normal.         Behavior: Behavior normal.         Thought Content: Thought content normal.         Judgment: Judgment normal.         Fluids    Intake/Output Summary (Last 24 hours) at 1/11/2024 0159  Last data filed at 1/10/2024 2030  Gross per 24 hour   Intake 540 ml   Output 500 ml   Net 40 ml       Laboratory  Recent Labs     01/09/24  0830 01/09/24  1003 01/10/24  0554   WBC 8.4 8.9 7.7   RBC 3.75* 3.73* 3.66*   HEMOGLOBIN 11.3* 11.6* 11.1*   HEMATOCRIT 35.5* 35.1* 35.6*   MCV 94.7 94.1 97.3   MCH 30.1 31.1 30.3   MCHC 31.8* 33.0 31.2*   RDW 50.6* 49.5 53.0*   PLATELETCT 171 160* 147*   MPV 11.8 11.4 11.9     Recent Labs     01/09/24  1003 01/10/24  0554 01/10/24  1600   SODIUM 139 139 135   POTASSIUM 4.7 5.6* 5.4   CHLORIDE 106 105 104   CO2 18* 20 17*   GLUCOSE 104* 88 103*   * 104* 110*   CREATININE 1.35 1.99* 2.06*   CALCIUM 9.0 8.6 8.3*     Recent Labs     01/09/24  1229 01/10/24  0731   APTT 24.5* >240.0*   INR 1.16* 1.29*         Recent Labs     01/09/24  0830 01/10/24  0554   TRIGLYCERIDE 86 70   HDL 48 46   LDL 65 65       Imaging  EC-ECHOCARDIOGRAM COMPLETE W/ CONT   Final Result      DX-CHEST-PORTABLE (1 VIEW)   Final Result      1.  Increased diffuse interstitial edema with layering small right effusion.   2.  Bilateral basilar atelectasis and/or consolidation. Underlying infection is possible.   3.  Mild enlargement of the cardiomediastinal silhouette.           Assessment/Plan  * A-fib (HCC)- (present on admission)  Assessment & Plan  New diagnosis with A-fib and RVR  Came with signs of heart failure  TSH is normal  Check echo  Start with metoprolol 25 mg twice daily  No history of smoking or drugs  Telemetry      CHF exacerbation (HCC)  Assessment & Plan  Patient has shortness of breath with crackles and lower extremity edema  BNP more than 27,000  Heart failure likely related to uncontrolled A-fib  Started with IV Lasix 40 mg twice daily with  heparin drip and metoprolol  Close monitoring and start with ACE inhibitors and spironolactone if needed  Echo is pending  Telemetry    ACP (advance care planning)  Assessment & Plan  1/9 plan of care was discussed in detail with the patient and family, discussed all treatment options, discussed the CODE STATUS, discussed all images and labs, answered all their questions, they want to be full code, all treatment, time 30 minutes    Chest pain- (present on admission)  Assessment & Plan  The ASCVD Risk score (Breann MONTAGUE, et al., 2019) failed to calculate for the following reasons:    The 2019 ASCVD risk score is only valid for ages 40 to 79  Pressure chest pain goes to her shoulders  Blood pressure is at the same and both arms and no signs of dissection on chest x-ray  With a new A-fib and a new heart failure  Patient is a high risk for coronary artery disease however her chest pain possibly related to demand  Trending troponin and telemetry  Elevated troponin  Echo  Cardiology was consulted since patient has a new heart failure  Aspirin and statin  Check A1c and lipid panel    Bipolar disorder, in partial remission, most recent episode depressed (HCC)- (present on admission)  Assessment & Plan  Stable  Continue home dose of Seroquel and venlafaxine    Hypothyroid- (present on admission)  Assessment & Plan  TSH 6.4  Continue levothyroxine 125 mcg daily         VTE prophylaxis:    therapeutic anticoagulation with weight-based heparin gtt, pharmacy to adjust PRN      I have performed a physical exam and reviewed and updated ROS and Plan today (1/10/2024). In review of yesterday's note (1/09/2024), there are no changes except as documented above.  Total time spent 53 minutes. I spent greater than 50% of the time for patient care, counseling, and coordination on this date, including unit/floor time, and face-to-face time with the patient as per interval events, my own review of patient's imaging and lab analysis and  developing my assessment and plan above.  The very real possibility of a deterioration of this patient’s condition required the highest level of my preparedness for sudden, emergent intervention. I provided critical care services, which included evaluating causes of hypotension and heart failure, possible cardiogenic shock, monitoring blood pressure, medication orders, frequent reevaluations of the patient’s condition and response to treatment, ordering and reviewing test results, and discussing the case with various consultants. The critical care time associated with the care of the patient was 45 minutes. Review chart for interventions. This time is exclusive of any other billable procedures

## 2024-01-11 NOTE — CONSULTS
Physical Medicine and Rehabilitation Consultation          Date of initial consultation: 1/11/2024  Consulting provider: Sunny Rodriguez M.D.   Reason for consultation: assess for acute inpatient rehab appropriateness  LOS: 2 Day(s)    Chief complaint: Decompensated heart failure    HPI: The patient is a 83 y.o. right hand dominant female with a past medical history of hearing loss, hypertension, bipolar, CKD, presbycusis;  who presented on 1/9/2024  9:28 AM with chest pain and shortness of breath, associated with lower extremity swelling.  Patient went to her PCP who found new A-fib and patient was transferred to the ED.  EKG confirmed A-fib, pulmonary auscultation revealed crackles, CXR showed pulmonary edema, BNP greater than 27,000, troponin over 200.  Cardiology consulted, heparin, Lasix and metoprolol was started.  Echocardiogram found reduced EF of 25% with moderate to severe mitral valve regurg, which is new.  Cardiology is following and will likely perform cardiac catheterization when euvolemic.  Possible cardioversion when appropriate.    The patient is significantly hard of hearing but is able to understand and respond to questions. She is frail appearing. She is on 5L NC O2 and reports not being on O2 at home. She has  RRR with radial pulse palpation. She reports she has been to [facility] 3 times before but I am unable to corroborate that in the chart.     ROS  Pertinent positives are mentioned in the HPI, all others reviewed and are negative.    Social Hx:  2 SH  1 MIYA  With: Alone.  Patient has several adult children who live close by and have a schedule so that one of them is with the patient every evening for dinner and to assist as needed.    THERAPY:  Restrictions: Fall risk  PT: Functional mobility   1/10: Walking 30 feet with front wheel walker at min assist    OT: ADLs  Pending    SLP:   None    IMAGING:  CXR 1/9/2024  1.  Increased diffuse interstitial edema with layering small right  effusion.  2.  Bilateral basilar atelectasis and/or consolidation. Underlying infection is possible.  3.  Mild enlargement of the cardiomediastinal silhouette.    Echocardiogram 1/10/2024  Normal left ventricular chamber size. Normal left ventricular wall   thickness. Severely reduced left ventricular systolic function. The   ejection fraction is measured to be 26% by Dominguez's biplane.  Normal right ventricular size with reduced systolic function.  Moderate-severe mitral regurgitation eccentric with coanda effect.  Mild tricuspid regurgitation.  No pericardial effusion.     Compared to the prior study on 01/05/15, now severely reduced left   ventricular systolic function with mod-sev mitral valve regurgitation.     PROCEDURES:  None    PMH:  Past Medical History:   Diagnosis Date    Bipolar disorder (HCC)     Bronchitis 02-26-13    in the past, not recent    CATARACT     bilat removed     Dental disorder     Frequent UTI 12/1/2010    Heart burn     History of falling     r/t vertigo    Hypertension     Hypothyroid 12/1/2010    Other specified symptom associated with female genital organs     Pneumonia     x2    Psychiatric disorder     bipolar    Renal disorder     daughter reports some renal insufficiency    Seasonal allergic reaction     Stress incontinence     Urinary bladder disorder        PSH:  Past Surgical History:   Procedure Laterality Date    HYSTERECTOMY ROBOTIC  3/18/2013    Performed by Juju Gonzales M.D. at SUR[facility]ERY [facility]    COLPOSACROPEXY ROBOTIC  3/18/2013    Performed by Juju Gonzales M.D. at SUR[facility]ERY [facility]    BLADDER SLING FEMALE  3/18/2013    Performed by Juju Gonzales M.D. at SUR[facility]ERY [facility]    CYSTOSCOPY  3/18/2013    Performed by Juju Gonzales M.D. at SUR[facility]ERY [facility]    VENTRAL HERNIA REPAIR  3/18/2010    Performed by TRAVIS PATTERSON at SUR[facility]ERY [facility]    TONSILLECTOMY         FHX:  Family History   Problem Relation Age of  "Onset    Stroke Father     Heart Disease Sister     Heart Disease Sister        Medications:  Current Facility-Administered Medications   Medication Dose    divalproex ER (Depakote ER) tablet 750 mg  750 mg    levothyroxine (Synthroid) tablet 125 mcg  125 mcg    omeprazole (PriLOSEC) capsule 20 mg  20 mg    QUEtiapine (SEROquel) tablet 175 mg  175 mg    oxybutynin SR (Ditropan-XL) tablet 10 mg  10 mg    venlafaxine XR (Effexor XR) capsule 75 mg  75 mg    senna-docusate (Pericolace Or Senokot S) 8.6-50 MG per tablet 2 Tablet  2 Tablet    And    polyethylene glycol/lytes (Miralax) Packet 1 Packet  1 Packet    And    magnesium hydroxide (Milk Of Magnesia) suspension 30 mL  30 mL    And    bisacodyl (Dulcolax) suppository 10 mg  10 mg    acetaminophen (Tylenol) tablet 650 mg  650 mg    Metoprolol Tartrate (Lopressor) injection 5 mg  5 mg    metoprolol tartrate (Lopressor) tablet 25 mg  25 mg    ondansetron (Zofran) syringe/vial injection 4 mg  4 mg    ondansetron (Zofran ODT) dispertab 4 mg  4 mg    furosemide (Lasix) injection 40 mg  40 mg    aspirin EC tablet 81 mg  81 mg    atorvastatin (Lipitor) tablet 40 mg  40 mg    heparin infusion 25,000 units in 500 mL 0.45% NACL  0-30 Units/kg/hr    heparin injection 2,800 Units  40 Units/kg    morphine 4 MG/ML injection 1 mg  1 mg       Allergies:  Allergies   Allergen Reactions    Levofloxacin Unspecified     Unidentified reaction. Per family it's been a long time         Physical Exam:  Vitals: BP 97/64   Pulse (!) 108   Temp 36.4 °C (97.5 °F) (Temporal)   Resp 16   Ht 1.676 m (5' 6\")   Wt 73.9 kg (162 lb 14.7 oz)   SpO2 95%   Gen: NAD  Head: NC/AT  Eyes/ Nose/ Mouth: PERRLA, moist mucous membranes  Cardio: RRR, good distal perfusion, warm extremities  Pulm: normal respiratory effort, no cyanosis   Abd: Soft NTND, negative borborygmi   Ext: No peripheral edema. No calf tenderness. No clubbing.    Mental status: answers questions appropriately follows commands  Speech: " fluent, no aphasia or dysarthria    Motor:      Upper Extremity  Myotome R L   Shoulder flexion C5 5 5   Elbow flexion C5 5 5   Wrist extension C6 5 5   Elbow extension C7 5 5   Finger flexion C8 5 5   Finger abduction T1 5 5     Lower Extremity Myotome R L   Hip flexion L2 4/5 4/5   Knee extension L3 5 5   Ankle dorsiflexion L4 5 5   Toe extension L5 5 5   Ankle plantarflexion S1 5 5     Sensory:   intact to light touch through out    Labs: Reviewed and significant for   Recent Labs     01/09/24  1003 01/09/24  1229 01/10/24  0554 01/10/24  0731 01/11/24  0337   RBC 3.73*  --  3.66*  --  3.52*   HEMOGLOBIN 11.6*  --  11.1*  --  11.0*   HEMATOCRIT 35.1*  --  35.6*  --  34.3*   PLATELETCT 160*  --  147*  --  140*   PROTHROMBTM  --  14.9*  --  16.3*  --    APTT  --  24.5*  --  >240.0*  --    INR  --  1.16*  --  1.29*  --      Recent Labs     01/10/24  0554 01/10/24  1600 01/11/24  0337   SODIUM 139 135 136   POTASSIUM 5.6* 5.4 4.8   CHLORIDE 105 104 103   CO2 20 17* 19*   GLUCOSE 88 103* 82   * 110* 99*   CREATININE 1.99* 2.06* 1.89*   CALCIUM 8.6 8.3* 8.1*     Recent Results (from the past 24 hour(s))   EC-ECHOCARDIOGRAM COMPLETE W/ CONT    Collection Time: 01/10/24 12:17 PM   Result Value Ref Range    Eject.Frac. MOD 4C 33.51     Left Ventrical Ejection Fraction 26    Basic Metabolic Panel    Collection Time: 01/10/24  4:00 PM   Result Value Ref Range    Sodium 135 135 - 145 mmol/L    Potassium 5.4 3.6 - 5.5 mmol/L    Chloride 104 96 - 112 mmol/L    Co2 17 (L) 20 - 33 mmol/L    Glucose 103 (H) 65 - 99 mg/dL    Bun 110 (H) 8 - 22 mg/dL    Creatinine 2.06 (H) 0.50 - 1.40 mg/dL    Calcium 8.3 (L) 8.5 - 10.5 mg/dL    Anion Gap 14.0 7.0 - 16.0   Heparin Anti-Xa    Collection Time: 01/10/24  4:00 PM   Result Value Ref Range    Heparin Xa (UFH) 0.55 IU/mL   ESTIMATED GFR    Collection Time: 01/10/24  4:00 PM   Result Value Ref Range    GFR (CKD-EPI) 23 (A) >60 mL/min/1.73 m 2   Heparin Anti-Xa    Collection Time:  01/10/24  9:55 PM   Result Value Ref Range    Heparin Xa (UFH) 0.48 IU/mL   POCT glucose device results    Collection Time: 01/10/24 10:27 PM   Result Value Ref Range    POC Glucose, Blood 83 65 - 99 mg/dL   EKG    Collection Time: 01/10/24 10:47 PM   Result Value Ref Range    Report       [facility]    Test Date:  2024-01-10  Pt Name:    SHAMEKA TEJADA                 Department: 171  MRN:        [id number]                      Room:       T704  Gender:     Female                       Technician: JENI  :        [id number]                   Requested By:AAMIR MAYFIELD  Order #:    [id number]                    Reading MD: Neeraj Mcgraw MD    Measurements  Intervals                                Axis  Rate:       102                          P:          0  KS:         0                            QRS:        -33  QRSD:       126                          T:          119  QT:         351  QTc:        458    Interpretive Statements  Poor quality data, interpretation may be affected  Atrial fibrillation  Left bundle branch block  Artifact in lead(s) I,II,III,aVR,aVF,V1,V2,V3,V4,V5,V6  Compared to ECG 01/10/2024 10:06:04  No significant changes  Electronically Signed On 2024 03:30:46 PST by Neeraj Mcgraw MD     POCT glucose device results    Collection Time: 01/10/24 11:02 PM   Result Value Ref Range    POC Glucose, Blood 95 65 - 99 mg/dL   POCT glucose device results    Collection Time: 01/10/24 11:21 PM   Result Value Ref Range    POC Glucose, Blood 103 (H) 65 - 99 mg/dL   Comp Metabolic Panel    Collection Time: 24  3:37 AM   Result Value Ref Range    Sodium 136 135 - 145 mmol/L    Potassium 4.8 3.6 - 5.5 mmol/L    Chloride 103 96 - 112 mmol/L    Co2 19 (L) 20 - 33 mmol/L    Anion Gap 14.0 7.0 - 16.0    Glucose 82 65 - 99 mg/dL    Bun 99 (H) 8 - 22 mg/dL    Creatinine 1.89 (H) 0.50 - 1.40 mg/dL    Calcium 8.1 (L) 8.5 - 10.5 mg/dL    Correct Calcium 8.8 8.5 - 10.5 mg/dL    AST(SGOT) 16 12 - 45 U/L     ALT(SGPT) 11 2 - 50 U/L    Alkaline Phosphatase 64 30 - 99 U/L    Total Bilirubin 0.2 0.1 - 1.5 mg/dL    Albumin 3.1 (L) 3.2 - 4.9 g/dL    Total Protein 6.2 6.0 - 8.2 g/dL    Globulin 3.1 1.9 - 3.5 g/dL    A-G Ratio 1.0 g/dL   CBC WITH DIFFERENTIAL    Collection Time: 01/11/24  3:37 AM   Result Value Ref Range    WBC 7.8 4.8 - 10.8 K/uL    RBC 3.52 (L) 4.20 - 5.40 M/uL    Hemoglobin 11.0 (L) 12.0 - 16.0 g/dL    Hematocrit 34.3 (L) 37.0 - 47.0 %    MCV 97.4 81.4 - 97.8 fL    MCH 31.3 27.0 - 33.0 pg    MCHC 32.1 (L) 32.2 - 35.5 g/dL    RDW 51.6 (H) 35.9 - 50.0 fL    Platelet Count 140 (L) 164 - 446 K/uL    MPV 11.7 9.0 - 12.9 fL    Neutrophils-Polys 62.70 44.00 - 72.00 %    Lymphocytes 21.00 (L) 22.00 - 41.00 %    Monocytes 14.70 (H) 0.00 - 13.40 %    Eosinophils 1.00 0.00 - 6.90 %    Basophils 0.30 0.00 - 1.80 %    Immature Granulocytes 0.30 0.00 - 0.90 %    Nucleated RBC 0.50 (H) 0.00 - 0.20 /100 WBC    Neutrophils (Absolute) 4.91 1.82 - 7.42 K/uL    Lymphs (Absolute) 1.64 1.00 - 4.80 K/uL    Monos (Absolute) 1.15 (H) 0.00 - 0.85 K/uL    Eos (Absolute) 0.08 0.00 - 0.51 K/uL    Baso (Absolute) 0.02 0.00 - 0.12 K/uL    Immature Granulocytes (abs) 0.02 0.00 - 0.11 K/uL    NRBC (Absolute) 0.04 K/uL   MAGNESIUM    Collection Time: 01/11/24  3:37 AM   Result Value Ref Range    Magnesium 2.6 (H) 1.5 - 2.5 mg/dL   PHOSPHORUS    Collection Time: 01/11/24  3:37 AM   Result Value Ref Range    Phosphorus 5.9 (H) 2.5 - 4.5 mg/dL   ESTIMATED GFR    Collection Time: 01/11/24  3:37 AM   Result Value Ref Range    GFR (CKD-EPI) 26 (A) >60 mL/min/1.73 m 2   POCT glucose device results    Collection Time: 01/11/24  5:24 AM   Result Value Ref Range    POC Glucose, Blood 74 65 - 99 mg/dL   POCT glucose device results    Collection Time: 01/11/24  6:23 AM   Result Value Ref Range    POC Glucose, Blood 119 (H) 65 - 99 mg/dL         ASSESSMENT:  Patient is a 83 y.o. female admitted with CHF exacerbation and A fib     [facility] Code / Diagnosis  to Support: 0009 - Cardiac    Rehabilitation: Impaired ADLs and mobility  Patient is a good candidate for inpatient rehab based on needs for PT, OT.  Patient will also benefit from family training.  Patient has a good discharge situation which will be home with family.     Barriers to transfer include: Insurance authorization, TCCs to verify disposition, medical clearance and bed availability     All cases are subject to administrative review and recommendations may change    Disposition recommendations:  - Good candidate for IPR. Patient has deficits with mobility and ADLs. She has a good DC situation which is home with her family.   -PMR to follow in the periphery for rehab appropriateness, please reach out with questions or request for medical management    Medical Complexity:    Debility 2/2 decompensated CHF  -EF 26%  -BNP 62243  -Lasix 40 mg injection twice daily  -Pending [facility] and possible cardioversion   - Continue PT/OT while in house   - Good candidate for IPR when medically cleared    Atrial fibrillation  -Pending cardioversion potentially  -Cardiology following  -Aspirin, statin    SHANTELLE on CKD  -Resolving.  Creatinine 1.89, down from 2.06 yesterday  -GFR 26  -Avoid nephrotoxic agents  -Primary team managing with Lasix    Elevated troponin  -Troponin T continues to rise, currently 437  -Cardiology following    Hypertension  -Metoprolol 25 mg twice daily    Bipolar  -Seroquel 175 mg nightly  -Venlafaxine 75 mg q. evening    DVT PPX: Heparin       Thank you for allowing us to participate in the care of this patient.     Patient was seen for >80 minutes on unit/floor of which > 50% of time was spent on counseling and coordination of care regarding the above, including prognosis, risk reduction, benefits of treatment, and options for next stage of care.    Scott Jaramillo, DO   Physical Medicine and Rehabilitation     Please note that this dictation was created using voice recognition software. I have made every  reasonable attempt to correct obvious errors, but there may be errors of grammar and possibly content that I did not discover before finalizing the note.

## 2024-01-11 NOTE — CARE PLAN
The patient is Watcher - Medium risk of patient condition declining or worsening    Shift Goals  Clinical Goals: Comfort, rest, saftey, update patient and family on POC  Patient Goals:  (Rest)  Family Goals:  (get updated on plan of care.)    Progress made toward(s) clinical / shift goals:    Problem: Fall Risk  Goal: Patient will remain free from falls  Outcome: Progressing     Problem: Hemodynamics  Goal: Patient's hemodynamics, fluid balance and neurologic status will be stable or improve  1/10/2024 1736 by Brisa Collado R.N.  Outcome: Progressing  1/10/2024 1735 by DARLYN Lawler.N.  Outcome: Progressing     Problem: Knowledge Deficit - Standard  Goal: Patient and family/care givers will demonstrate understanding of plan of care, disease process/condition, diagnostic tests and medications  1/10/2024 1736 by Brisa Collado, R.N.  Outcome: Progressing  1/10/2024 1735 by Brisa Collado R.N.  Outcome: Progressing

## 2024-01-11 NOTE — HOSPITAL COURSE
83-year-old female with history of hearing loss, hypertension, bipolar and chronic kidney disease who presented 1/9 with chest pain and shortness of breath.  Patient has significant hearing loss and history was taken from the patient and family, patient has had worsening shortness of breath and chest pain for last couple days, also noticed swelling on her legs, on the day of admission patient was evaluated by PCP and they found the new atrial fibrillation and patient was transferred to emergency.  Patient was alert oriented x 4, patient has pressure chest pain, goes to her shoulders, with worsening shortness of breath, no nausea or vomiting or other symptoms and no fever.  EKG showed new atrial fibrillation with no significant ischemic changes, patient had a crackles with lower extremity edema, chest x-ray showed pulmonary edema, BNP was more than 27,000, troponin was elevated more than 200, cardiology was consulted, heparin with aspirin and metoprolol were initiated.  Also Lasix was initiated for heart failure.     Patient lives by herself and she usually does all daily activities by herself, no significant memory deficit however patient has bipolar which is a stable.    Required 3 L nasal cannula. Diuresed with IV diuretics. echo, shows EF 25% with moderate to severe mitral valve regurg which is new.    She has no respiratory distress and respiratory status has been stable on 3 L.  Had SHANTELLE which improved.    Heparin gtt continued.  Developed small hand hematoma from waking IV site after PT, dressing applied and bleeding stopped.  Hand remained neurovascularly intact.  Volume status improving, supplemental oxygen requirements decreasing.  She remains well-perfused.  Cardiology is increased metoprolol 25 mg twice daily and started spironolactone 25 mg daily.  Renal function improved essentially to baseline, lytes normal.   Went for coronary angiogram found to have severe three vessel disease with successful PCI of  the mid-right coronary artery with one ANDRES.   Brought back to the floors and restarted on heparin gtt.   Plan for staged PCI to LAD and circumflex in 2-3 days followed by LUCY/DCCV.  Patient feels improved, more energetic, family notes she is acting more like herself.  Watch renal function and UOP, respiratory status and BP. Monitor for bleeding at access site.

## 2024-01-11 NOTE — DISCHARGE PLANNING
[facility] Transitional Care Coordination    Referral from: Dr. Rodriguez    Insurance Provider on Facesheet: MCR/TRI    Potential Rehab Diagnosis: Cardiac    Chart review indicates patient may have on going medical management and may have therapy needs to possibly meet inpatient rehab facility criteria with the goal of returning to community.    D/C support will need to be verified: Daughter    Physiatry consultation forwarded per protocol.  Hep gtt is a barrier.      Thank you for the referral.

## 2024-01-11 NOTE — PROGRESS NOTES
Upon lab coming to bedside for lab draw, this RN and  could not arouse patient without several seconds of forceful sternal rubs. Once aroused, patient was hypotensive in the 80's/50's, denied chest pain or dizziness. Patient A&O4. EKG ordered. BG checked with reading of 83, hypoglycemic protocol initiated and came up to 103. Patient BP soft throughout this RN's shift. Patient more easily aroused as night progressed. BHUPINDER Barnes notified.

## 2024-01-11 NOTE — PROGRESS NOTES
Received bedside report from NOC RN. Pt AAOx4, with no report of pain at this time. No signs of acute distress noted at this time. Plan of care discussed with pt and verbalizes no questions. Pt denies any additional needs at this time. Bed locked/in lowest position, bed alarm on, pt educated on fall risk and verbalized understanding, call light within reach, hourly rounding initiated.

## 2024-01-11 NOTE — CARE PLAN
The patient is Stable - Low risk of patient condition declining or worsening    Shift Goals  Clinical Goals: Heparin gtt, hemodynamic stability  Patient Goals: rest  Family Goals: updates, nutrition    Progress made toward(s) clinical / shift goals:      Problem: Knowledge Deficit - Standard  Goal: Patient and family/care givers will demonstrate understanding of plan of care, disease process/condition, diagnostic tests and medications  Outcome: Progressing     Problem: Skin Integrity  Goal: Skin integrity is maintained or improved  Outcome: Progressing     Problem: Communication  Goal: The ability to communicate needs accurately and effectively will improve  Outcome: Progressing     Problem: Discharge Barriers/Planning  Goal: Patient's continuum of care needs are met  Outcome: Progressing     Problem: Hemodynamics  Goal: Patient's hemodynamics, fluid balance and neurologic status will be stable or improve  Outcome: Progressing       Patient is not progressing towards the following goals:

## 2024-01-11 NOTE — CARE PLAN
The patient is Stable - Low risk of patient condition declining or worsening    Shift Goals  Clinical Goals: Heparin gtt, strict I/O  Patient Goals: Rest, comfort  Family Goals: ROSMERY    Progress made toward(s) clinical / shift goals:    Problem: Knowledge Deficit - Standard  Goal: Patient and family/care givers will demonstrate understanding of plan of care, disease process/condition, diagnostic tests and medications  Outcome: Progressing     Problem: Skin Integrity  Goal: Skin integrity is maintained or improved  Outcome: Progressing     Problem: Hemodynamics  Goal: Patient's hemodynamics, fluid balance and neurologic status will be stable or improve  Outcome: Progressing     Problem: Respiratory  Goal: Patient will achieve/maintain optimum respiratory ventilation and gas exchange  Outcome: Progressing       Patient is not progressing towards the following goals:

## 2024-01-11 NOTE — PROGRESS NOTES
CARDIOLOGY PROGRESS NOTE      Date: 1/11/2024      Patient Name: Azeb Major  YOB: 1940  MRN: [id number]    Assessment/Recommendations:   #Atrial Fibrillation with rapid ventricular response  #New onset Decompensated Heart Failure   #Left bundle branch block  #Acute hypoxic respiratory failure  #Pulmonary Edema  Presented with symptoms and examination consistent with decompensated heart failure, found to be in atrial fibrillation with rapid ventricular response and EKG demonstrating new LBBB[place] 128[address], troponin of 287. Possible ischemic event contributing to heart failure based off of patients symptoms.  [id number] of at least 4. Echocardiogram performed on 1/10 demonstrating severely reduced left ventricular systolic function and a EF of 26%, Moderate to severe mitral regurgitation.  -Continue diuresis with goal of net negative -2L over 24 hours till euvolemia is achieved   -Continue on Heparin drip  -Likely will perform Cardiac Catheterization when euvolemic.  -Fluid restrict 2L Daily, strict I and Os, Daily weights  -On oral metoprolol 25mg BID  -Possible Cardioversion when appropriate          Disposition:   Cardiology will continue to follow.    Please contact us if there are any questions or concerns.    Events/Subjective:   Azeb Major is a 83  year-old female with a past medical history of bipoloar disorder Hypertension, CKD 3b, hypothyroidism.  Patient presented from primary care clinic due to concerns of atrial fibrillation with rapid ventricular response, 3 days of orthopnea.Patient states she had a an episode of severe left sided chest/shoulder pain 3 days before presenting to the hospital. Admitted with concerns of atrial fibrillation with rapid ventricular response, decompensated heart failure started on diuresis and oral metoprolol.      1/10- creatinine increased from 1.35 to 1.99 overnight.Still appears volume overloaded    1/11- Improving lower  "extremity edema and improving JVD. Continuing on IV Diuresis.   Echocardiogram performed on 1/10 demonstrating severely reduced left ventricular systolic function and a EF of 26%, Moderate to severe mitral regurgitation. Remains in atrial fibrillation .  Medications:     Scheduled Medications   Medication Dose Frequency    divalproex ER  750 mg PM MEAL    levothyroxine  125 mcg AM ES    omeprazole  20 mg PM MEAL    QUEtiapine  175 mg Nightly    oxybutynin SR  10 mg QHS    venlafaxine XR  75 mg Q EVENING    senna-docusate  2 Tablet BID    metoprolol tartrate  25 mg TWICE DAILY    furosemide  40 mg BID DIURETIC    aspirin  81 mg DAILY    atorvastatin  40 mg Q EVENING     Current Outpatient Medications   Medication Instructions    cefdinir (OMNICEF) 300 mg, Oral, 2 TIMES DAILY, X 5 days    Cranberry (CRANBEREX PO) 1 Tablet, Oral, EVERY BEDTIME    divalproex ER (DEPAKOTE ER) 250 MG TABLET SR 24 HR TAKE 3 TABLETS 1/2 HOUR AFTER DINNER    fexofenadine (ALLEGRA) 60 mg, Oral, EVERY BEDTIME    levothyroxine (SYNTHROID) 125 MCG Tab TAKE 1 TABLET EVERY MORNING ON AN EMPTY STOMACH    MAGNESIUM PO 1 Tablet, Oral, EVERY BEDTIME    MELATONIN PO 1 Capsule, Oral, EVERY BEDTIME    Multiple Vitamins-Minerals (OCUVITE-LUTEIN) Tab 1 Tablet, Oral, EVERY BEDTIME    nitrofurantoin (MACRODANTIN) 50 MG Cap TAKE 1 CAPSULE DAILY    omeprazole (PRILOSEC) 20 MG delayed-release capsule TAKE 1 CAPSULE DAILY    Probiotic Product (PROBIOTIC PO) 1 Tablet, Oral, EVERY BEDTIME    QUEtiapine (SEROQUEL) 25 mg, Oral, EVERY BEDTIME, (Seroquel 150 mg + 25 mg = 175 mg at bedtime)    QUEtiapine Fumarate 150 MG TABLET SR 24 HR TAKE 1 TABLET DAILY AT BEDTIME    Simethicone (GAS-X PO) 1 Tablet, Oral, NIGHTLY PRN    tolterodine ER (DETROL LA) 4 MG CAPSULE SR 24 HR TAKE 1 CAPSULE DAILY    venlafaxine XR (EFFEXOR XR) 75 mg, Oral, DAILY       Vitals:   BP 97/64   Pulse (!) 108   Temp 36.4 °C (97.5 °F) (Temporal)   Resp 16   Ht 1.676 m (5' 6\")   Wt 73.9 kg " (162 lb 14.7 oz)   SpO2 95%    BP Readings from Last 4 Encounters:   01/11/24 97/64   01/09/24 128/68   11/01/23 (!) 140/60   06/08/23 (!) 140/60     Wt Readings from Last 4 Encounters:   01/11/24 73.9 kg (162 lb 14.7 oz)   11/01/23 70.3 kg (155 lb)   06/08/23 70.8 kg (156 lb)   11/01/22 71.2 kg (157 lb)     Body mass index is 26.3 kg/m².    Physical Examination:   General: ill-appearing,  Eyes: Extraocular movements intact, anicteric  HENT: , neck full range of motion, improving bilateral jugular venous distension present  Pulmonary: Respiratory distress present, Rales to auscultation bilaterally  Cardiovascular: Regular rate and rhythm, no murmurs or gallops appreciated  Gastrointestinal: Soft, non-tender, non-distended  Extremities: Warm and well perfused, Lower extremity edema present up to knees, and at hips  Neurological: Alert and oriented, no gross focal motor deficits  Psychiatric: Normal affect    Laboratories:   Estimated Creatinine Clearance: 22.4 mL/min (A) (by C-G formula based on SCr of 1.89 mg/dL (H)).  Recent Labs     01/09/24  1003 01/10/24  0554 01/10/24  1600 01/11/24  0337   CREATININE 1.35 1.99* 2.06* 1.89*   * 104* 110* 99*   POTASSIUM 4.7 5.6* 5.4 4.8   SODIUM 139 139 135 136   CALCIUM 9.0 8.6 8.3* 8.1*   MAGNESIUM  --  2.8*  --  2.6*   CO2 18* 20 17* 19*   ALBUMIN 3.5 3.3  --  3.1*     Recent Labs     01/10/24  0554 01/10/24  1600 01/11/24  0337   GLUCOSE 88 103* 82     Recent Labs     01/09/24  1003 01/09/24  1229 01/10/24  0554 01/10/24  0731 01/11/24  0337   ASTSGOT 19  --  16  --  16   ALTSGPT 11  --  13  --  11   ALKPHOSPHAT 66  --  63  --  64   INR  --  1.16*  --  1.29*  --      Recent Labs     01/09/24  1003 01/10/24  0554 01/11/24  0337   WBC 8.9 7.7 7.8   HEMOGLOBIN 11.6* 11.1* 11.0*   PLATELETCT 160* 147* 140*     Recent Labs     01/09/24  0830 01/09/24  1003 01/09/24  1603 01/10/24  0554   TROPONINT  --  287* 315* 437*   NTPROBNP  --  72854*  --   --    HBA1C 5.9* 5.9*  --    --      Lab Results   Component Value Date/Time    LDL 65 01/10/2024 0554    LDL 65 01/09/2024 0830     (H) 06/08/2023 1130     Lab Results   Component Value Date/Time    HDL 46 01/10/2024 0554    HDL 48 01/09/2024 0830    HDL 58 06/08/2023 1130       Lab Results   Component Value Date/Time    TRIGLYCERIDE 70 01/10/2024 0554    TRIGLYCERIDE 86 01/09/2024 0830    TRIGLYCERIDE 117 06/08/2023 1130       Lab Results   Component Value Date/Time    CHOLSTRLTOT 125 01/10/2024 0554    CHOLSTRLTOT 130 01/09/2024 0830    CHOLSTRLTOT 201 (H) 06/08/2023 1130       Telemetry:   Atrial Fibrillation 70-90    Cardiac Studies and Procedures:   Echocardiogram  TTE on 1/10  CONCLUSIONS  Normal left ventricular chamber size. Normal left ventricular wall   thickness. Severely reduced left ventricular systolic function. The   ejection fraction is measured to be 26% by Dominguez's biplane.  Normal right ventricular size with reduced systolic function.  Moderate-severe mitral regurgitation eccentric with coanda effect.  Mild tricuspid regurgitation.  No pericardial effusion.    Electrophysiology  EKG performed on 1/9  Atrial fibrillation with a rate of 108, left bundle branch block      Chana Moe MD  PGY-2 IM Resident  [employer]

## 2024-01-12 ENCOUNTER — HOSPITAL ENCOUNTER (INPATIENT)
Facility: REHABILITATION | Age: 84
End: 2024-01-12
Attending: PHYSICAL MEDICINE & REHABILITATION | Admitting: PHYSICAL MEDICINE & REHABILITATION
Payer: MEDICARE

## 2024-01-12 LAB
ANION GAP SERPL CALC-SCNC: 13 MMOL/L (ref 7–16)
BUN SERPL-MCNC: 105 MG/DL (ref 8–22)
CALCIUM SERPL-MCNC: 8.3 MG/DL (ref 8.5–10.5)
CHLORIDE SERPL-SCNC: 101 MMOL/L (ref 96–112)
CO2 SERPL-SCNC: 25 MMOL/L (ref 20–33)
CREAT SERPL-MCNC: 1.66 MG/DL (ref 0.5–1.4)
GFR SERPLBLD CREATININE-BSD FMLA CKD-EPI: 30 ML/MIN/1.73 M 2
GLUCOSE BLD STRIP.AUTO-MCNC: 107 MG/DL (ref 65–99)
GLUCOSE SERPL-MCNC: 99 MG/DL (ref 65–99)
POTASSIUM SERPL-SCNC: 3.7 MMOL/L (ref 3.6–5.5)
SODIUM SERPL-SCNC: 139 MMOL/L (ref 135–145)
UFH PPP CHRO-ACNC: 0.29 IU/ML
UFH PPP CHRO-ACNC: 0.56 IU/ML
UFH PPP CHRO-ACNC: 0.61 IU/ML
UFH PPP CHRO-ACNC: 0.69 IU/ML

## 2024-01-12 PROCEDURE — 99233 SBSQ HOSP IP/OBS HIGH 50: CPT | Performed by: STUDENT IN AN ORGANIZED HEALTH CARE EDUCATION/TRAINING PROGRAM

## 2024-01-12 PROCEDURE — 700102 HCHG RX REV CODE 250 W/ 637 OVERRIDE(OP): Performed by: INTERNAL MEDICINE

## 2024-01-12 PROCEDURE — A9270 NON-COVERED ITEM OR SERVICE: HCPCS | Performed by: INTERNAL MEDICINE

## 2024-01-12 PROCEDURE — 99232 SBSQ HOSP IP/OBS MODERATE 35: CPT | Mod: GC | Performed by: INTERNAL MEDICINE

## 2024-01-12 PROCEDURE — 82962 GLUCOSE BLOOD TEST: CPT

## 2024-01-12 PROCEDURE — 36415 COLL VENOUS BLD VENIPUNCTURE: CPT

## 2024-01-12 PROCEDURE — 85520 HEPARIN ASSAY: CPT | Mod: 91

## 2024-01-12 PROCEDURE — 700111 HCHG RX REV CODE 636 W/ 250 OVERRIDE (IP): Performed by: INTERNAL MEDICINE

## 2024-01-12 PROCEDURE — 700101 HCHG RX REV CODE 250

## 2024-01-12 PROCEDURE — 770020 HCHG ROOM/CARE - TELE (206)

## 2024-01-12 PROCEDURE — 80048 BASIC METABOLIC PNL TOTAL CA: CPT

## 2024-01-12 RX ORDER — ALPRAZOLAM 0.25 MG/1
0.25 TABLET ORAL ONCE
Status: COMPLETED | OUTPATIENT
Start: 2024-01-12 | End: 2024-01-12

## 2024-01-12 RX ORDER — METOPROLOL SUCCINATE 25 MG/1
25 TABLET, EXTENDED RELEASE ORAL
Status: DISCONTINUED | OUTPATIENT
Start: 2024-01-12 | End: 2024-01-19

## 2024-01-12 RX ORDER — ENEMA 19; 7 G/133ML; G/133ML
1 ENEMA RECTAL ONCE
Status: COMPLETED | OUTPATIENT
Start: 2024-01-12 | End: 2024-01-12

## 2024-01-12 RX ADMIN — OXYBUTYNIN CHLORIDE 10 MG: 10 TABLET, EXTENDED RELEASE ORAL at 22:20

## 2024-01-12 RX ADMIN — ATORVASTATIN CALCIUM 40 MG: 40 TABLET, FILM COATED ORAL at 17:50

## 2024-01-12 RX ADMIN — ASPIRIN 81 MG: 81 TABLET, COATED ORAL at 05:16

## 2024-01-12 RX ADMIN — NITROGLYCERIN 0.5 INCH: 20 OINTMENT TOPICAL at 17:51

## 2024-01-12 RX ADMIN — OMEPRAZOLE 20 MG: 20 CAPSULE, DELAYED RELEASE ORAL at 17:50

## 2024-01-12 RX ADMIN — DOCUSATE SODIUM 50 MG AND SENNOSIDES 8.6 MG 2 TABLET: 8.6; 5 TABLET, FILM COATED ORAL at 17:50

## 2024-01-12 RX ADMIN — FUROSEMIDE 40 MG: 10 INJECTION, SOLUTION INTRAMUSCULAR; INTRAVENOUS at 05:15

## 2024-01-12 RX ADMIN — NITROGLYCERIN 0.5 INCH: 20 OINTMENT TOPICAL at 02:11

## 2024-01-12 RX ADMIN — SODIUM PHOSPHATE 133 ML: 7; 19 ENEMA RECTAL at 01:39

## 2024-01-12 RX ADMIN — METOPROLOL SUCCINATE 25 MG: 25 TABLET, EXTENDED RELEASE ORAL at 22:21

## 2024-01-12 RX ADMIN — METOPROLOL SUCCINATE 25 MG: 25 TABLET, EXTENDED RELEASE ORAL at 05:15

## 2024-01-12 RX ADMIN — FUROSEMIDE 40 MG: 10 INJECTION, SOLUTION INTRAMUSCULAR; INTRAVENOUS at 16:39

## 2024-01-12 RX ADMIN — LEVOTHYROXINE SODIUM 125 MCG: 0.12 TABLET ORAL at 05:15

## 2024-01-12 RX ADMIN — DIVALPROEX SODIUM 750 MG: 500 TABLET, EXTENDED RELEASE ORAL at 17:50

## 2024-01-12 RX ADMIN — QUETIAPINE FUMARATE 175 MG: 50 TABLET ORAL at 22:21

## 2024-01-12 RX ADMIN — HEPARIN SODIUM 2800 UNITS: 1000 INJECTION, SOLUTION INTRAVENOUS; SUBCUTANEOUS at 01:33

## 2024-01-12 RX ADMIN — VENLAFAXINE HYDROCHLORIDE 75 MG: 75 CAPSULE, EXTENDED RELEASE ORAL at 17:50

## 2024-01-12 RX ADMIN — HEPARIN SODIUM 14 UNITS/KG/HR: 5000 INJECTION, SOLUTION INTRAVENOUS at 22:25

## 2024-01-12 RX ADMIN — ACETAMINOPHEN 650 MG: 325 TABLET, FILM COATED ORAL at 02:12

## 2024-01-12 ASSESSMENT — PAIN DESCRIPTION - PAIN TYPE
TYPE: ACUTE PAIN;CHRONIC PAIN
TYPE: ACUTE PAIN

## 2024-01-12 ASSESSMENT — FIBROSIS 4 INDEX: FIB4 SCORE: 2.93

## 2024-01-12 NOTE — CARE PLAN
The patient is Stable - Low risk of patient condition declining or worsening    Shift Goals  Clinical Goals: heparin gtt, hemodynamic stability, Monitor HR/rhythm/chest pain, bowel movements  Patient Goals: updates  Family Goals: updates    Progress made toward(s) clinical / shift goals:      Problem: Knowledge Deficit - Standard  Goal: Patient and family/care givers will demonstrate understanding of plan of care, disease process/condition, diagnostic tests and medications  Outcome: Progressing     Problem: Skin Integrity  Goal: Skin integrity is maintained or improved  Outcome: Progressing     Problem: Discharge Barriers/Planning  Goal: Patient's continuum of care needs are met  Outcome: Progressing       Patient is not progressing towards the following goals:

## 2024-01-12 NOTE — PROGRESS NOTES
CARDIOLOGY PROGRESS NOTE      Date: 1/12/2024      Patient Name: Azeb Major  YOB: 1940  MRN: [id number]    Assessment/Recommendations:   #Atrial Fibrillation with rapid ventricular response  #New onset Decompensated Heart Failure   #Left bundle branch block  #Acute hypoxic respiratory failure  #Pulmonary Edema  Presented with symptoms and examination consistent with decompensated heart failure, found to be in atrial fibrillation with rapid ventricular response and EKG demonstrating new LBBB. [place] of 73580, troponin of 287. Possible ischemic event contributing to heart failure based off of patients symptoms.  [id number] of at least 4. Echocardiogram performed on 1/10 demonstrating severely reduced left ventricular systolic function and a EF of 26%, Moderate to severe mitral regurgitation.  -Continue diuresis with goal of net negative -2L over 24 hours till euvolemia is achieved   -Continue on Heparin drip  -Plan to perform Cardiac Catheterization with possible percutaneous coronary invention when euvolemic, possibly 1/13 vs 1/14. Discussed risk and benefits   -Fluid restrict 2L Daily, strict I and Os, Daily weights  -On oral metoprolol SR 25mg daily  -Possible Cardioversion when appropriate          Disposition:   Cardiology will continue to follow.    Please contact us if there are any questions or concerns.    Events/Subjective:   Azeb Major is a 83  year-old female with a past medical history of bipoloar disorder Hypertension, CKD 3b, hypothyroidism.  Patient presented from primary care clinic due to concerns of atrial fibrillation with rapid ventricular response, 3 days of orthopnea.Patient states she had a an episode of severe left sided chest/shoulder pain 3 days before presenting to the hospital. Admitted with concerns of atrial fibrillation with rapid ventricular response, decompensated heart failure started on diuresis and oral metoprolol.      1/10- creatinine  Called pt for labs in lobby at this time with no response.    increased from 1.35 to 1.99 overnight.Still appears volume overloaded  1/11- Improving lower extremity edema and improving JVD. Continuing on IV Diuresis.   Echocardiogram performed on 1/10 demonstrating severely reduced left ventricular systolic function and a EF of 26%, Moderate to severe mitral regurgitation. Remains in atrial fibrillation .  1/12- Creatinine downtrending, UOP 1350 in last 24 hours, remains in atrial fibrillation 105-122. Continuing on IV Diuresis.  Medications:     Scheduled Medications   Medication Dose Frequency    metoprolol SR  25 mg Q DAY    nitroglycerin  0.5 Inch Q6HRS    divalproex ER  750 mg PM MEAL    levothyroxine  125 mcg AM ES    omeprazole  20 mg PM MEAL    QUEtiapine  175 mg Nightly    oxybutynin SR  10 mg QHS    venlafaxine XR  75 mg Q EVENING    senna-docusate  2 Tablet BID    furosemide  40 mg BID DIURETIC    aspirin  81 mg DAILY    atorvastatin  40 mg Q EVENING     Current Outpatient Medications   Medication Instructions    cefdinir (OMNICEF) 300 mg, Oral, 2 TIMES DAILY, X 5 days    Cranberry (CRANBEREX PO) 1 Tablet, Oral, EVERY BEDTIME    divalproex ER (DEPAKOTE ER) 250 MG TABLET SR 24 HR TAKE 3 TABLETS 1/2 HOUR AFTER DINNER    fexofenadine (ALLEGRA) 60 mg, Oral, EVERY BEDTIME    levothyroxine (SYNTHROID) 125 MCG Tab TAKE 1 TABLET EVERY MORNING ON AN EMPTY STOMACH    MAGNESIUM PO 1 Tablet, Oral, EVERY BEDTIME    MELATONIN PO 1 Capsule, Oral, EVERY BEDTIME    Multiple Vitamins-Minerals (OCUVITE-LUTEIN) Tab 1 Tablet, Oral, EVERY BEDTIME    nitrofurantoin (MACRODANTIN) 50 MG Cap TAKE 1 CAPSULE DAILY    omeprazole (PRILOSEC) 20 MG delayed-release capsule TAKE 1 CAPSULE DAILY    Probiotic Product (PROBIOTIC PO) 1 Tablet, Oral, EVERY BEDTIME    QUEtiapine (SEROQUEL) 25 mg, Oral, EVERY BEDTIME, (Seroquel 150 mg + 25 mg = 175 mg at bedtime)    QUEtiapine Fumarate 150 MG TABLET SR 24 HR TAKE 1 TABLET DAILY AT BEDTIME    Simethicone (GAS-X PO) 1 Tablet, Oral, NIGHTLY PRN     "tolterodine ER (DETROL LA) 4 MG CAPSULE SR 24 HR TAKE 1 CAPSULE DAILY    venlafaxine XR (EFFEXOR XR) 75 mg, Oral, DAILY       Vitals:   /66   Pulse (!) 102   Temp 36.3 °C (97.3 °F) (Temporal)   Resp 18   Ht 1.676 m (5' 6\")   Wt 77 kg (169 lb 12.1 oz)   SpO2 97%    BP Readings from Last 4 Encounters:   01/12/24 104/66   01/09/24 128/68   11/01/23 (!) 140/60   06/08/23 (!) 140/60     Wt Readings from Last 4 Encounters:   01/12/24 77 kg (169 lb 12.1 oz)   11/01/23 70.3 kg (155 lb)   06/08/23 70.8 kg (156 lb)   11/01/22 71.2 kg (157 lb)     Body mass index is 27.4 kg/m².    Physical Examination:   General: ill-appearing,  Eyes: Extraocular movements intact, anicteric  HENT: , neck full range of motion, improving bilateral jugular venous distension present  Pulmonary: Respiratory distress present, Rales to auscultation bilaterally  Cardiovascular: Regular rate and rhythm, no murmurs or gallops appreciated  Gastrointestinal: Soft, non-tender, non-distended  Extremities: Warm and well perfused, Lower extremity edema present up to knees, and at hips  Neurological: Alert and oriented, no gross focal motor deficits  Psychiatric: Normal affect    Laboratories:   Estimated Creatinine Clearance: 26 mL/min (A) (by C-G formula based on SCr of 1.66 mg/dL (H)).  Recent Labs     01/10/24  0554 01/10/24  1600 01/11/24  0337 01/12/24  0818   CREATININE 1.99* 2.06* 1.89* 1.66*   * 110* 99* 105*   POTASSIUM 5.6* 5.4 4.8 3.7   SODIUM 139 135 136 139   CALCIUM 8.6 8.3* 8.1* 8.3*   MAGNESIUM 2.8*  --  2.6*  --    CO2 20 17* 19* 25   ALBUMIN 3.3  --  3.1*  --      Recent Labs     01/10/24  1600 01/11/24  0337 01/12/24  0818   GLUCOSE 103* 82 99     Recent Labs     01/09/24  1229 01/10/24  0554 01/10/24  0731 01/11/24  0337   ASTSGOT  --  16  --  16   ALTSGPT  --  13  --  11   ALKPHOSPHAT  --  63  --  64   INR 1.16*  --  1.29*  --      Recent Labs     01/10/24  0554 01/11/24  0337   WBC 7.7 7.8   HEMOGLOBIN 11.1* 11.0* "   PLATELETCT 147* 140*     Recent Labs     01/09/24  1603 01/10/24  0554   TROPONINT 315* 437*     Lab Results   Component Value Date/Time    LDL 65 01/10/2024 0554    LDL 65 01/09/2024 0830     (H) 06/08/2023 1130     Lab Results   Component Value Date/Time    HDL 46 01/10/2024 0554    HDL 48 01/09/2024 0830    HDL 58 06/08/2023 1130       Lab Results   Component Value Date/Time    TRIGLYCERIDE 70 01/10/2024 0554    TRIGLYCERIDE 86 01/09/2024 0830    TRIGLYCERIDE 117 06/08/2023 1130       Lab Results   Component Value Date/Time    CHOLSTRLTOT 125 01/10/2024 0554    CHOLSTRLTOT 130 01/09/2024 0830    CHOLSTRLTOT 201 (H) 06/08/2023 1130       Telemetry:   Atrial Fibrillation 70-90    Cardiac Studies and Procedures:   Echocardiogram  TTE on 1/10  CONCLUSIONS  Normal left ventricular chamber size. Normal left ventricular wall   thickness. Severely reduced left ventricular systolic function. The   ejection fraction is measured to be 26% by Dominguez's biplane.  Normal right ventricular size with reduced systolic function.  Moderate-severe mitral regurgitation eccentric with coanda effect.  Mild tricuspid regurgitation.  No pericardial effusion.    Electrophysiology  EKG performed on 1/9  Atrial fibrillation with a rate of 108, left bundle branch block      Chana Moe MD  PGY-2 IM Resident  [employer]

## 2024-01-12 NOTE — PROGRESS NOTES
NOC CROSSCOVER    RN reported new chest pain 5/10, dull in the middle of her chest.  EKG concerning for an infarct, possibly acute.    I discussed with Cardiology, Dr Garcia, who ordered nitro paste.  We will continue to monitor.    TARUN Henao

## 2024-01-12 NOTE — PROGRESS NOTES
Report received from NOC RN. Patient A&OX4, complaints of discomfort in her abdomen. Patient denies chest pain at this time. Patient on 5L at 94%. Patient and daughter updated on POC. All needs and questions addressed at this time. Patient changed and new linens placed on bed. Q2 turns and fall precautions in place. Personal belongings and call light within reach. Heparin gtt running.

## 2024-01-12 NOTE — DISCHARGE PLANNING
Case Management Discharge Planning    Admission Date: 1/9/2024  GMLOS: 3.5  ALOS: 3    6-Clicks ADL Score: 16  6-Clicks Mobility Score: 18  PT and/or OT Eval ordered: Yes  Post-acute Referrals Ordered: Yes  Post-acute Choice Obtained: No  Has referral(s) been sent to post-acute provider:  Yes      Anticipated Discharge Dispo: Discharge Disposition: D/T to home under [facility] care in anticipation of covered skilled care (06)    DME Needed: No    Action(s) Taken: OTHER    LSW sent SNF referrals per protocol based on PT recommendation for post-acute placement.     Escalations Completed: None    Medically Clear: No    Next Steps:  f/u with pt and medical team to discuss dc needs and barriers.     Barriers to Discharge: Medical clearance and Pending Placement    Is the patient up for discharge tomorrow: No

## 2024-01-12 NOTE — PROGRESS NOTES
Hospital Medicine Daily Progress Note    Date of Service  1/11/2024    Chief Complaint  Azeb Major is a 83 y.o. female admitted 1/9/2024 with dyspnea    Hospital Course  83-year-old female with history of hearing loss, hypertension, bipolar and chronic kidney disease who presented 1/9 with chest pain and shortness of breath.  Patient has significant hearing loss and history was taken from the patient and family, patient has had worsening shortness of breath and chest pain for last couple days, also noticed swelling on her legs, on the day of admission patient was evaluated by PCP and they found the new atrial fibrillation and patient was transferred to emergency.  Patient was alert oriented x 4, patient has pressure chest pain, goes to her shoulders, with worsening shortness of breath, no nausea or vomiting or other symptoms and no fever.  EKG showed new atrial fibrillation with no significant ischemic changes, patient had a crackles with lower extremity edema, chest x-ray showed pulmonary edema, BNP was more than 27,000, troponin was elevated more than 200, cardiology was consulted, heparin with aspirin and metoprolol were initiated.  Also Lasix was initiated for heart failure.     Patient lives by herself and she usually does all daily activities by herself, no significant memory deficit however patient has bipolar which is a stable    Interval Problem Update  1/10  Patient was examined in her inpatient room multiple times throughout the day.   She is afebrile today heart rate from 90s to 120s systolic blood pressure overnight was in the 80s while sleeping throughout the day was in the 90s to low 100s.  Remains on 3 L nasal cannula, she is still volume overloaded. Cautious parameters for diuresis and heart rate control, no need for aggressive heart rate control, suspected she has severely reduced LV function which was confirmed later with echo, shows EF 25% with moderate to severe mitral valve regurg  which is new.    She has no respiratory distress and respiratory status has been stable, no chest pain, feeling weak and generally unwell however overall stable, and actually improved throughout the day in regards to cap refill, extremity temperature, color.    Initial Lasix dose was held due to parameters and cardiology wanted additional Lasix dose in the evening to be held, too aggressive diuresis will be detrimental and she may require inotropic support if renal function does not improve.    Hyperkalemia improved on repeat BMP in the afternoon, renal function was only slightly worse.  On heparin drip.  Developed small hand hematoma from waking IV site after PT, dressing applied and bleeding stopped.  Hand remained neurovascularly intact.  Cardiology continues to follow, family updated at bedside multiple times throughout the day.    1/11  Patient is afebrile heart rate today from 108-122 respiratory rate 16-18 systolic blood pressure from  pulse ox 91 to 97% on 3 L nasal cannula.  Continuing IV diuresis.  Followed by cardiology.  Hemoglobin stable, leukocytosis platelets stable at 140, trending down however no bleeding, bicarb 19 serum creatinine is improving down to 1.89 normal liver function magnesium.  Continue heparin drip.  She is still volume overloaded, no respiratory distress or dyspnea at rest, low work of breathing.  She looks better today, color improved, warm extremities, she is more perky and reports feeling better as well.  Never had cardiac problems and quality of life was rather good prior to this, cardiology has offered coronary angiogram when her renal function improves and then LUCY/DCCV and I, the patient and family agree.  Continue IV diuresis, heparin drip, monitor respiratory and renal function status closely, watch urinary output and adjust diuretics as needed and allowable with BP.  Patient and family updated at bedside.  Appreciate cardiology involvement.     I have discussed this  patient's plan of care and discharge plan at IDT rounds today with Case Management, Nursing, Nursing leadership, and other members of the IDT team.    Consultants/Specialty  cardiology    Code Status  Full Code    Disposition  Medically Cleared  I have placed the appropriate orders for post-discharge needs.    Review of Systems  ROS   Review of Systems   Constitutional:  Positive for malaise/fatigue. Negative for chills, fever and weight loss.   HENT:  Negative for ear pain, hearing loss and tinnitus.    Eyes:  Negative for blurred vision, double vision and photophobia.   Respiratory:  Positive for shortness of breath. Negative for cough and hemoptysis.    Cardiovascular:  Positive for chest pain. Negative for palpitations, orthopnea and claudication.   Gastrointestinal:  Negative for abdominal pain, constipation, diarrhea, nausea and vomiting.   Genitourinary:  Negative for dysuria, frequency and urgency.   Musculoskeletal:  Negative for myalgias and neck pain.   Skin:  Negative for rash.   Neurological:  Negative for dizziness, speech change and weakness    Physical Exam  Temp:  [36.3 °C (97.3 °F)-36.8 °C (98.2 °F)] 36.5 °C (97.7 °F)  Pulse:  [102-122] 115  Resp:  [16-18] 18  BP: ()/(62-79) 114/70  SpO2:  [91 %-97 %] 91 %    Physical Exam  Vitals and nursing note reviewed. Exam conducted with a chaperone present.   Constitutional:       General: She is not in acute distress.     Appearance: She is not ill-appearing or toxic-appearing.      Comments: Appears weak   HENT:      Head: Normocephalic and atraumatic.      Nose: Nose normal. No rhinorrhea.      Mouth/Throat:      Mouth: Mucous membranes are moist.      Pharynx: Oropharynx is clear.   Eyes:      General: No scleral icterus.     Extraocular Movements: Extraocular movements intact.      Conjunctiva/sclera: Conjunctivae normal.   Neck:      Comments: JVD  Cardiovascular:      Rate and Rhythm: Tachycardia present. Rhythm irregular.      Heart sounds:  Murmur heard.   Pulmonary:      Effort: No respiratory distress.      Breath sounds: Rales present. No wheezing or rhonchi.      Comments: Low work of breathing.  On 3 L NC, rales present  Abdominal:      General: There is distension.      Palpations: Abdomen is soft.      Tenderness: There is no abdominal tenderness. There is no guarding or rebound.   Musculoskeletal:         General: Normal range of motion.      Cervical back: Normal range of motion and neck supple. No rigidity or tenderness.      Right lower leg: Edema present.      Left lower leg: Edema present.   Skin:     General: Skin is warm and dry.      Capillary Refill: Capillary refill takes less than 2 seconds.      Comments: Cooler extremities in the morning but warmed throughout the day, warm dry skin, cap refill improved  Swelling to left hand   Neurological:      General: No focal deficit present.      Mental Status: She is alert. Mental status is at baseline.      Cranial Nerves: No cranial nerve deficit.      Sensory: No sensory deficit.      Motor: No weakness.   Psychiatric:         Mood and Affect: Mood normal.         Behavior: Behavior normal.         Thought Content: Thought content normal.         Judgment: Judgment normal.         Fluids    Intake/Output Summary (Last 24 hours) at 1/12/2024 0320  Last data filed at 1/11/2024 2152  Gross per 24 hour   Intake 350 ml   Output 1350 ml   Net -1000 ml         Laboratory  Recent Labs     01/09/24  1003 01/10/24  0554 01/11/24  0337   WBC 8.9 7.7 7.8   RBC 3.73* 3.66* 3.52*   HEMOGLOBIN 11.6* 11.1* 11.0*   HEMATOCRIT 35.1* 35.6* 34.3*   MCV 94.1 97.3 97.4   MCH 31.1 30.3 31.3   MCHC 33.0 31.2* 32.1*   RDW 49.5 53.0* 51.6*   PLATELETCT 160* 147* 140*   MPV 11.4 11.9 11.7       Recent Labs     01/10/24  0554 01/10/24  1600 01/11/24  0337   SODIUM 139 135 136   POTASSIUM 5.6* 5.4 4.8   CHLORIDE 105 104 103   CO2 20 17* 19*   GLUCOSE 88 103* 82   * 110* 99*   CREATININE 1.99* 2.06* 1.89*    CALCIUM 8.6 8.3* 8.1*       Recent Labs     01/09/24  1229 01/10/24  0731   APTT 24.5* >240.0*   INR 1.16* 1.29*           Recent Labs     01/09/24  0830 01/10/24  0554   TRIGLYCERIDE 86 70   HDL 48 46   LDL 65 65         Imaging  EC-ECHOCARDIOGRAM COMPLETE W/ CONT   Final Result      DX-CHEST-PORTABLE (1 VIEW)   Final Result      1.  Increased diffuse interstitial edema with layering small right effusion.   2.  Bilateral basilar atelectasis and/or consolidation. Underlying infection is possible.   3.  Mild enlargement of the cardiomediastinal silhouette.           Assessment/Plan  * A-fib (HCC)- (present on admission)  Assessment & Plan  New diagnosis with A-fib and RVR  Came with signs of heart failure  TSH is normal  Check echo  Start with metoprolol 25 mg twice daily  No history of smoking or drugs  Telemetry      CHF exacerbation (HCC)  Assessment & Plan  Patient has shortness of breath with crackles and lower extremity edema  BNP more than 27,000  Heart failure likely related to uncontrolled A-fib  Started with IV Lasix 40 mg twice daily with heparin drip and metoprolol  Close monitoring and start with ACE inhibitors and spironolactone if needed  Echo is pending  Telemetry    ACP (advance care planning)  Assessment & Plan  1/9 plan of care was discussed in detail with the patient and family, discussed all treatment options, discussed the CODE STATUS, discussed all images and labs, answered all their questions, they want to be full code, all treatment, time 30 minutes    Chest pain- (present on admission)  Assessment & Plan  The ASCVD Risk score (Breann DK, et al., 2019) failed to calculate for the following reasons:    The 2019 ASCVD risk score is only valid for ages 40 to 79  Pressure chest pain goes to her shoulders  Blood pressure is at the same and both arms and no signs of dissection on chest x-ray  With a new A-fib and a new heart failure  Patient is a high risk for coronary artery disease however her  chest pain possibly related to demand  Trending troponin and telemetry  Elevated troponin  Echo  Cardiology was consulted since patient has a new heart failure  Aspirin and statin  Check A1c and lipid panel    Bipolar disorder, in partial remission, most recent episode depressed (HCC)- (present on admission)  Assessment & Plan  Stable  Continue home dose of Seroquel and venlafaxine    Hypothyroid- (present on admission)  Assessment & Plan  TSH 6.4  Continue levothyroxine 125 mcg daily         VTE prophylaxis:   SCDs/TEDs   therapeutic anticoagulation with weight-based heparin gtt, pharmacy to adjust PRN      I have performed a physical exam and reviewed and updated ROS and Plan today (1/11/2024). In review of yesterday's note (1/10/2024), there are no changes except as documented above.  Total time spent 58 minutes. I spent greater than 50% of the time for patient care, counseling, and coordination on this date, including unit/floor time, and face-to-face time with the patient as per interval events, my own review of patient's imaging and lab analysis and developing my assessment and plan above.

## 2024-01-13 LAB
ANION GAP SERPL CALC-SCNC: 11 MMOL/L (ref 7–16)
BASOPHILS # BLD AUTO: 0.3 % (ref 0–1.8)
BASOPHILS # BLD: 0.02 K/UL (ref 0–0.12)
BUN SERPL-MCNC: 97 MG/DL (ref 8–22)
CALCIUM SERPL-MCNC: 8.4 MG/DL (ref 8.5–10.5)
CHLORIDE SERPL-SCNC: 103 MMOL/L (ref 96–112)
CO2 SERPL-SCNC: 26 MMOL/L (ref 20–33)
CREAT SERPL-MCNC: 1.53 MG/DL (ref 0.5–1.4)
EOSINOPHIL # BLD AUTO: 0.1 K/UL (ref 0–0.51)
EOSINOPHIL NFR BLD: 1.4 % (ref 0–6.9)
ERYTHROCYTE [DISTWIDTH] IN BLOOD BY AUTOMATED COUNT: 47.6 FL (ref 35.9–50)
GFR SERPLBLD CREATININE-BSD FMLA CKD-EPI: 33 ML/MIN/1.73 M 2
GLUCOSE SERPL-MCNC: 88 MG/DL (ref 65–99)
HCT VFR BLD AUTO: 34.4 % (ref 37–47)
HGB BLD-MCNC: 11 G/DL (ref 12–16)
IMM GRANULOCYTES # BLD AUTO: 0.02 K/UL (ref 0–0.11)
IMM GRANULOCYTES NFR BLD AUTO: 0.3 % (ref 0–0.9)
LYMPHOCYTES # BLD AUTO: 1.83 K/UL (ref 1–4.8)
LYMPHOCYTES NFR BLD: 25.1 % (ref 22–41)
MAGNESIUM SERPL-MCNC: 2 MG/DL (ref 1.5–2.5)
MCH RBC QN AUTO: 30.1 PG (ref 27–33)
MCHC RBC AUTO-ENTMCNC: 32 G/DL (ref 32.2–35.5)
MCV RBC AUTO: 94 FL (ref 81.4–97.8)
MONOCYTES # BLD AUTO: 1.26 K/UL (ref 0–0.85)
MONOCYTES NFR BLD AUTO: 17.3 % (ref 0–13.4)
NEUTROPHILS # BLD AUTO: 4.07 K/UL (ref 1.82–7.42)
NEUTROPHILS NFR BLD: 55.6 % (ref 44–72)
NRBC # BLD AUTO: 0.02 K/UL
NRBC BLD-RTO: 0.3 /100 WBC (ref 0–0.2)
PLATELET # BLD AUTO: 149 K/UL (ref 164–446)
PMV BLD AUTO: 11.2 FL (ref 9–12.9)
POTASSIUM SERPL-SCNC: 3.8 MMOL/L (ref 3.6–5.5)
RBC # BLD AUTO: 3.66 M/UL (ref 4.2–5.4)
SODIUM SERPL-SCNC: 140 MMOL/L (ref 135–145)
UFH PPP CHRO-ACNC: 0.41 IU/ML
WBC # BLD AUTO: 7.3 K/UL (ref 4.8–10.8)

## 2024-01-13 PROCEDURE — 700102 HCHG RX REV CODE 250 W/ 637 OVERRIDE(OP): Performed by: INTERNAL MEDICINE

## 2024-01-13 PROCEDURE — 85520 HEPARIN ASSAY: CPT

## 2024-01-13 PROCEDURE — 80048 BASIC METABOLIC PNL TOTAL CA: CPT

## 2024-01-13 PROCEDURE — 99232 SBSQ HOSP IP/OBS MODERATE 35: CPT | Performed by: INTERNAL MEDICINE

## 2024-01-13 PROCEDURE — 700111 HCHG RX REV CODE 636 W/ 250 OVERRIDE (IP): Performed by: INTERNAL MEDICINE

## 2024-01-13 PROCEDURE — 99233 SBSQ HOSP IP/OBS HIGH 50: CPT | Performed by: STUDENT IN AN ORGANIZED HEALTH CARE EDUCATION/TRAINING PROGRAM

## 2024-01-13 PROCEDURE — A9270 NON-COVERED ITEM OR SERVICE: HCPCS | Performed by: NURSE PRACTITIONER

## 2024-01-13 PROCEDURE — A9270 NON-COVERED ITEM OR SERVICE: HCPCS | Performed by: INTERNAL MEDICINE

## 2024-01-13 PROCEDURE — 700102 HCHG RX REV CODE 250 W/ 637 OVERRIDE(OP): Performed by: NURSE PRACTITIONER

## 2024-01-13 PROCEDURE — 770020 HCHG ROOM/CARE - TELE (206)

## 2024-01-13 PROCEDURE — 36415 COLL VENOUS BLD VENIPUNCTURE: CPT

## 2024-01-13 PROCEDURE — 83735 ASSAY OF MAGNESIUM: CPT

## 2024-01-13 PROCEDURE — 85025 COMPLETE CBC W/AUTO DIFF WBC: CPT

## 2024-01-13 RX ORDER — ASPIRIN 81 MG/1
81 TABLET ORAL DAILY
Status: DISCONTINUED | OUTPATIENT
Start: 2024-01-13 | End: 2024-01-19 | Stop reason: HOSPADM

## 2024-01-13 RX ORDER — SPIRONOLACTONE 25 MG/1
25 TABLET ORAL
Status: DISCONTINUED | OUTPATIENT
Start: 2024-01-13 | End: 2024-01-19 | Stop reason: HOSPADM

## 2024-01-13 RX ADMIN — FUROSEMIDE 40 MG: 10 INJECTION, SOLUTION INTRAMUSCULAR; INTRAVENOUS at 06:03

## 2024-01-13 RX ADMIN — METOPROLOL SUCCINATE 25 MG: 25 TABLET, EXTENDED RELEASE ORAL at 20:30

## 2024-01-13 RX ADMIN — SPIRONOLACTONE 25 MG: 25 TABLET ORAL at 09:00

## 2024-01-13 RX ADMIN — OXYBUTYNIN CHLORIDE 10 MG: 10 TABLET, EXTENDED RELEASE ORAL at 20:31

## 2024-01-13 RX ADMIN — VENLAFAXINE HYDROCHLORIDE 75 MG: 75 CAPSULE, EXTENDED RELEASE ORAL at 18:21

## 2024-01-13 RX ADMIN — OMEPRAZOLE 20 MG: 20 CAPSULE, DELAYED RELEASE ORAL at 18:21

## 2024-01-13 RX ADMIN — FUROSEMIDE 40 MG: 10 INJECTION, SOLUTION INTRAMUSCULAR; INTRAVENOUS at 16:21

## 2024-01-13 RX ADMIN — ASPIRIN 81 MG: 81 TABLET, COATED ORAL at 13:08

## 2024-01-13 RX ADMIN — DIVALPROEX SODIUM 750 MG: 500 TABLET, EXTENDED RELEASE ORAL at 19:46

## 2024-01-13 RX ADMIN — DOCUSATE SODIUM 50 MG AND SENNOSIDES 8.6 MG 2 TABLET: 8.6; 5 TABLET, FILM COATED ORAL at 18:21

## 2024-01-13 RX ADMIN — QUETIAPINE FUMARATE 175 MG: 50 TABLET ORAL at 20:32

## 2024-01-13 RX ADMIN — ATORVASTATIN CALCIUM 40 MG: 40 TABLET, FILM COATED ORAL at 18:21

## 2024-01-13 RX ADMIN — METOPROLOL SUCCINATE 25 MG: 25 TABLET, EXTENDED RELEASE ORAL at 09:00

## 2024-01-13 ASSESSMENT — PAIN DESCRIPTION - PAIN TYPE: TYPE: ACUTE PAIN;CHRONIC PAIN

## 2024-01-13 ASSESSMENT — FIBROSIS 4 INDEX: FIB4 SCORE: 2.75

## 2024-01-13 NOTE — PROGRESS NOTES
Hospital Medicine Daily Progress Note    Date of Service  1/12/2024    Chief Complaint  Azeb Major is a 83 y.o. female admitted 1/9/2024 with dyspnea    Hospital Course  83-year-old female with history of hearing loss, hypertension, bipolar and chronic kidney disease who presented 1/9 with chest pain and shortness of breath.  Patient has significant hearing loss and history was taken from the patient and family, patient has had worsening shortness of breath and chest pain for last couple days, also noticed swelling on her legs, on the day of admission patient was evaluated by PCP and they found the new atrial fibrillation and patient was transferred to emergency.  Patient was alert oriented x 4, patient has pressure chest pain, goes to her shoulders, with worsening shortness of breath, no nausea or vomiting or other symptoms and no fever.  EKG showed new atrial fibrillation with no significant ischemic changes, patient had a crackles with lower extremity edema, chest x-ray showed pulmonary edema, BNP was more than 27,000, troponin was elevated more than 200, cardiology was consulted, heparin with aspirin and metoprolol were initiated.  Also Lasix was initiated for heart failure.     Patient lives by herself and she usually does all daily activities by herself, no significant memory deficit however patient has bipolar which is a stable    Interval Problem Update  1/10  Patient was examined in her inpatient room multiple times throughout the day.   She is afebrile today heart rate from 90s to 120s systolic blood pressure overnight was in the 80s while sleeping throughout the day was in the 90s to low 100s.  Remains on 3 L nasal cannula, she is still volume overloaded. Cautious parameters for diuresis and heart rate control, no need for aggressive heart rate control, suspected she has severely reduced LV function which was confirmed later with echo, shows EF 25% with moderate to severe mitral valve regurg  which is new.    She has no respiratory distress and respiratory status has been stable, no chest pain, feeling weak and generally unwell however overall stable, and actually improved throughout the day in regards to cap refill, extremity temperature, color.    Initial Lasix dose was held due to parameters and cardiology wanted additional Lasix dose in the evening to be held, too aggressive diuresis will be detrimental and she may require inotropic support if renal function does not improve.    Hyperkalemia improved on repeat BMP in the afternoon, renal function was only slightly worse.  On heparin drip.  Developed small hand hematoma from waking IV site after PT, dressing applied and bleeding stopped.  Hand remained neurovascularly intact.  Cardiology continues to follow, family updated at bedside multiple times throughout the day.    1/11  Patient is afebrile heart rate today from 108-122 respiratory rate 16-18 systolic blood pressure from  pulse ox 91 to 97% on 3 L nasal cannula.  Continuing IV diuresis.  Followed by cardiology.  Hemoglobin stable, leukocytosis platelets stable at 140, trending down however no bleeding, bicarb 19 serum creatinine is improving down to 1.89 normal liver function magnesium.  Continue heparin drip.  She is still volume overloaded, no respiratory distress or dyspnea at rest, low work of breathing.  She looks better today, color improved, warm extremities, she is more perky and reports feeling better as well.  Never had cardiac problems and quality of life was rather good prior to this, cardiology has offered coronary angiogram when her renal function improves and then LUCY/DCCV and I, the patient and family agree.  Continue IV diuresis, heparin drip, monitor respiratory and renal function status closely, watch urinary output and adjust diuretics as needed and allowable with BP.  Patient and family updated at bedside.  Appreciate cardiology involvement.     1/12  Afebrile, HR  102-116 -118 RR 18 SpO2 94-97% on 4 L NC.  Scr improving down to 1.66. Had chest pain overnight, nitro placed, repeat EKG not showing new ischemia. Chest pain resolved.  No dyspnea.  Cardiology evaluated, planning on catheterization in 1-2 days followed by LUCY/DCCV.  She is mildly volume overloaded, continuing IV diuresis.    I have discussed this patient's plan of care and discharge plan at IDT rounds today with Case Management, Nursing, Nursing leadership, and other members of the IDT team.    Consultants/Specialty  cardiology    Code Status  Full Code    Disposition  Medically Cleared  I have placed the appropriate orders for post-discharge needs.    Review of Systems  ROS   Review of Systems   Constitutional:  Positive for malaise/fatigue. Negative for chills, fever and weight loss.   HENT:  Negative for ear pain, hearing loss and tinnitus.    Eyes:  Negative for blurred vision, double vision and photophobia.   Respiratory:  Positive for shortness of breath. Negative for cough and hemoptysis.    Cardiovascular:  Positive for chest pain. Negative for palpitations, orthopnea and claudication.   Gastrointestinal:  Negative for abdominal pain, constipation, diarrhea, nausea and vomiting.   Genitourinary:  Negative for dysuria, frequency and urgency.   Musculoskeletal:  Negative for myalgias and neck pain.   Skin:  Negative for rash.   Neurological:  Negative for dizziness, speech change and weakness    Physical Exam  Temp:  [36.2 °C (97.2 °F)-36.8 °C (98.2 °F)] 36.3 °C (97.3 °F)  Pulse:  [102-122] 110  Resp:  [16-18] 18  BP: (104-118)/(66-85) 118/85  SpO2:  [91 %-97 %] 95 %    Physical Exam  Vitals and nursing note reviewed. Exam conducted with a chaperone present.   Constitutional:       General: She is not in acute distress.     Appearance: She is not ill-appearing or toxic-appearing.      Comments: Appears weak   HENT:      Head: Normocephalic and atraumatic.      Nose: Nose normal. No rhinorrhea.       Mouth/Throat:      Mouth: Mucous membranes are moist.      Pharynx: Oropharynx is clear.   Eyes:      General: No scleral icterus.     Extraocular Movements: Extraocular movements intact.      Conjunctiva/sclera: Conjunctivae normal.   Neck:      Comments: JVD  Cardiovascular:      Rate and Rhythm: Tachycardia present. Rhythm irregular.      Heart sounds: Murmur heard.   Pulmonary:      Effort: Pulmonary effort is normal. No respiratory distress.      Breath sounds: Rales present. No wheezing or rhonchi.      Comments: Low work of breathing.  On 3 L NC, rales present  Abdominal:      General: There is distension.      Palpations: Abdomen is soft.      Tenderness: There is no abdominal tenderness. There is no guarding or rebound.   Musculoskeletal:         General: Normal range of motion.      Cervical back: Normal range of motion and neck supple. No rigidity or tenderness.      Right lower leg: Edema present.      Left lower leg: Edema present.   Skin:     General: Skin is warm and dry.      Capillary Refill: Capillary refill takes less than 2 seconds.      Findings: No rash.      Comments: Cooler extremities in the morning but warmed throughout the day, warm dry skin, cap refill improved  Swelling to left hand   Neurological:      General: No focal deficit present.      Mental Status: She is alert. Mental status is at baseline.      Cranial Nerves: No cranial nerve deficit.      Sensory: No sensory deficit.      Motor: No weakness.   Psychiatric:         Mood and Affect: Mood normal.         Behavior: Behavior normal.         Thought Content: Thought content normal.         Judgment: Judgment normal.         Fluids    Intake/Output Summary (Last 24 hours) at 1/12/2024 1744  Last data filed at 1/12/2024 1130  Gross per 24 hour   Intake 1000 ml   Output 1400 ml   Net -400 ml         Laboratory  Recent Labs     01/10/24  0554 01/11/24  0337   WBC 7.7 7.8   RBC 3.66* 3.52*   HEMOGLOBIN 11.1* 11.0*   HEMATOCRIT 35.6*  34.3*   MCV 97.3 97.4   MCH 30.3 31.3   MCHC 31.2* 32.1*   RDW 53.0* 51.6*   PLATELETCT 147* 140*   MPV 11.9 11.7       Recent Labs     01/10/24  1600 01/11/24  0337 01/12/24  0818   SODIUM 135 136 139   POTASSIUM 5.4 4.8 3.7   CHLORIDE 104 103 101   CO2 17* 19* 25   GLUCOSE 103* 82 99   * 99* 105*   CREATININE 2.06* 1.89* 1.66*   CALCIUM 8.3* 8.1* 8.3*       Recent Labs     01/10/24  0731   APTT >240.0*   INR 1.29*           Recent Labs     01/10/24  0554   TRIGLYCERIDE 70   HDL 46   LDL 65         Imaging  EC-ECHOCARDIOGRAM COMPLETE W/ CONT   Final Result      DX-CHEST-PORTABLE (1 VIEW)   Final Result      1.  Increased diffuse interstitial edema with layering small right effusion.   2.  Bilateral basilar atelectasis and/or consolidation. Underlying infection is possible.   3.  Mild enlargement of the cardiomediastinal silhouette.      EC-LUCY W/ CONT    (Results Pending)   CL-CARDIOVERSION    (Results Pending)        Assessment/Plan  * A-fib (HCC)- (present on admission)  Assessment & Plan  New diagnosis with A-fib and RVR  Came with signs of heart failure  TSH is normal  Check echo  Start with metoprolol 25 mg twice daily  No history of smoking or drugs  Telemetry      CHF exacerbation (HCC)  Assessment & Plan  Patient has shortness of breath with crackles and lower extremity edema  BNP more than 27,000  Heart failure likely related to uncontrolled A-fib  Started with IV Lasix 40 mg twice daily with heparin drip and metoprolol  Close monitoring and start with ACE inhibitors and spironolactone if needed  Echo is pending  Telemetry    ACP (advance care planning)  Assessment & Plan  1/9 plan of care was discussed in detail with the patient and family, discussed all treatment options, discussed the CODE STATUS, discussed all images and labs, answered all their questions, they want to be full code, all treatment, time 30 minutes    Chest pain- (present on admission)  Assessment & Plan  The ASCVD Risk score  (Breann MONTAGUE, et al., 2019) failed to calculate for the following reasons:    The 2019 ASCVD risk score is only valid for ages 40 to 79  Pressure chest pain goes to her shoulders  Blood pressure is at the same and both arms and no signs of dissection on chest x-ray  With a new A-fib and a new heart failure  Patient is a high risk for coronary artery disease however her chest pain possibly related to demand  Trending troponin and telemetry  Elevated troponin  Echo  Cardiology was consulted since patient has a new heart failure  Aspirin and statin  Check A1c and lipid panel    Bipolar disorder, in partial remission, most recent episode depressed (HCC)- (present on admission)  Assessment & Plan  Stable  Continue home dose of Seroquel and venlafaxine    Hypothyroid- (present on admission)  Assessment & Plan  TSH 6.4  Continue levothyroxine 125 mcg daily         VTE prophylaxis:   SCDs/TEDs   therapeutic anticoagulation with weight-based heparin gtt, pharmacy to adjust PRN      I have performed a physical exam and reviewed and updated ROS and Plan today (1/12/2024). In review of yesterday's note (1/11/2024), there are no changes except as documented above.  Total time spent 53 minutes. I spent greater than 50% of the time for patient care, counseling, and coordination on this date, including unit/floor time, and face-to-face time with the patient as per interval events, my own review of patient's imaging and lab analysis and developing my assessment and plan above.

## 2024-01-13 NOTE — PROGRESS NOTES
CARDIOLOGY PROGRESS NOTE      Date: 1/13/2024      Patient Name: Azeb Major  YOB: 1940  MRN: [id number]    Assessment/Recommendations:   # New onset trial fibrillation with rapid ventricular response  # Acute decompensated systolic heart failure, acute hypoxic respiratory failure due to pulmonary edema  # Moderate to severe mitral regurgitation  # Acute on chronic kidney injury due to cardiorenal syndrome  # New left bundle branch block, unclear chronicity  # Chest pain, elevated troponin likely due to demand ischemia  # Aortic atherosclerosis and coronary artery disease (by CT)    She is approaching euvolemia, but still has some mild residual lower extremity edema and elevation in her neck veins.  Her creatinine has continued to improve with further diuresis.    Assessment/Recommendations:   -Continue intravenous diuresis today and then stop pending invasive assessment of LVEDP  -Plan for cardiac catheterization with possible PCI tomorrow to evaluate chest pain, elevated troponin, acute systolic heart failure, and new left bundle branch block  -Continue aspirin and heparin drip pending cardiac catheterization  -Plan for LUCY/DCCV on Monday pending cardiac catheterization results  -Increase metoprolol XL to 25 mg twice daily  -Start spironolactone 25 mg daily    Disposition:   Cardiology will continue to follow.    Please contact us if there are any questions or concerns.    Events/Subjective:   Tired this morning, no acute complaints.    Medications:     Scheduled Medications   Medication Dose Frequency    spironolactone  25 mg Q DAY    metoprolol SR  25 mg BID    nitroglycerin  0.5 Inch Q6HRS    divalproex ER  750 mg PM MEAL    levothyroxine  125 mcg AM ES    omeprazole  20 mg PM MEAL    QUEtiapine  175 mg Nightly    oxybutynin SR  10 mg QHS    venlafaxine XR  75 mg Q EVENING    senna-docusate  2 Tablet BID    furosemide  40 mg BID DIURETIC    aspirin  81 mg DAILY    atorvastatin  40 mg  "Q EVENING     Current Outpatient Medications   Medication Instructions    cefdinir (OMNICEF) 300 mg, Oral, 2 TIMES DAILY, X 5 days    Cranberry (CRANBEREX PO) 1 Tablet, Oral, EVERY BEDTIME    divalproex ER (DEPAKOTE ER) 250 MG TABLET SR 24 HR TAKE 3 TABLETS 1/2 HOUR AFTER DINNER    fexofenadine (ALLEGRA) 60 mg, Oral, EVERY BEDTIME    levothyroxine (SYNTHROID) 125 MCG Tab TAKE 1 TABLET EVERY MORNING ON AN EMPTY STOMACH    MAGNESIUM PO 1 Tablet, Oral, EVERY BEDTIME    MELATONIN PO 1 Capsule, Oral, EVERY BEDTIME    Multiple Vitamins-Minerals (OCUVITE-LUTEIN) Tab 1 Tablet, Oral, EVERY BEDTIME    nitrofurantoin (MACRODANTIN) 50 MG Cap TAKE 1 CAPSULE DAILY    omeprazole (PRILOSEC) 20 MG delayed-release capsule TAKE 1 CAPSULE DAILY    Probiotic Product (PROBIOTIC PO) 1 Tablet, Oral, EVERY BEDTIME    QUEtiapine (SEROQUEL) 25 mg, Oral, EVERY BEDTIME, (Seroquel 150 mg + 25 mg = 175 mg at bedtime)    QUEtiapine Fumarate 150 MG TABLET SR 24 HR TAKE 1 TABLET DAILY AT BEDTIME    Simethicone (GAS-X PO) 1 Tablet, Oral, NIGHTLY PRN    tolterodine ER (DETROL LA) 4 MG CAPSULE SR 24 HR TAKE 1 CAPSULE DAILY    venlafaxine XR (EFFEXOR XR) 75 mg, Oral, DAILY       Vitals:   /78   Pulse (!) 113   Temp 36.5 °C (97.7 °F) (Temporal)   Resp 20   Ht 1.676 m (5' 6\")   Wt 74.2 kg (163 lb 9.3 oz)   SpO2 96%    BP Readings from Last 4 Encounters:   01/13/24 119/78   01/09/24 128/68   11/01/23 (!) 140/60   06/08/23 (!) 140/60     Wt Readings from Last 4 Encounters:   01/13/24 74.2 kg (163 lb 9.3 oz)   11/01/23 70.3 kg (155 lb)   06/08/23 70.8 kg (156 lb)   11/01/22 71.2 kg (157 lb)     Body mass index is 26.4 kg/m².    Physical Examination:   General: Frail, chronically ill-appearing, but in no acute distress  Eyes: Extraocular movements intact, anicteric  HENT: Neck full range of motion, JVD at approximately 12 cm  Pulmonary: No respiratory distress, remains on supplemental oxygen  Cardiovascular: Irregular rhythm, borderline " "tachycardic  Gastrointestinal: Thin, less distended  Extremities: Warm and well-perfused, trace residual lower extremity edema  Neurological: Lethargic, but alert and oriented, very hard of hearing    Laboratories:   Estimated Creatinine Clearance: 27.7 mL/min (A) (by C-G formula based on SCr of 1.53 mg/dL (H)).  Recent Labs     01/11/24 0337 01/12/24 0818 01/13/24  0024   CREATININE 1.89* 1.66* 1.53*   BUN 99* 105* 97*   POTASSIUM 4.8 3.7 3.8   SODIUM 136 139 140   CALCIUM 8.1* 8.3* 8.4*   MAGNESIUM 2.6*  --  2.0   CO2 19* 25 26   ALBUMIN 3.1*  --   --        Recent Labs     01/11/24 0337 01/12/24 0818 01/13/24  0024   GLUCOSE 82 99 88       Recent Labs     01/11/24 0337   ASTSGOT 16   ALTSGPT 11   ALKPHOSPHAT 64       Recent Labs     01/11/24 0337 01/13/24  0024   WBC 7.8 7.3   HEMOGLOBIN 11.0* 11.0*   PLATELETCT 140* 149*       No results for input(s): \"TROPONINT\", \"NTPROBNP\", \"HBA1C\" in the last 72 hours.    Lab Results   Component Value Date/Time    LDL 65 01/10/2024 0554    LDL 65 01/09/2024 0830     (H) 06/08/2023 1130     Lab Results   Component Value Date/Time    HDL 46 01/10/2024 0554    HDL 48 01/09/2024 0830    HDL 58 06/08/2023 1130       Lab Results   Component Value Date/Time    TRIGLYCERIDE 70 01/10/2024 0554    TRIGLYCERIDE 86 01/09/2024 0830    TRIGLYCERIDE 117 06/08/2023 1130       Lab Results   Component Value Date/Time    CHOLSTRLTOT 125 01/10/2024 0554    CHOLSTRLTOT 130 01/09/2024 0830    CHOLSTRLTOT 201 (H) 06/08/2023 1130       Telemetry:   Atrial Fibrillation 70-90    Cardiac Studies and Procedures:   TTE (1/10/2014)  Normal left ventricular chamber size. Normal left ventricular wall   thickness.  Severely reduced left ventricular systolic function.  The ejection fraction is measured to be 26% by Dominguez's biplane.  Normal right ventricular size with reduced systolic function.  Eccentric, at least moderate severe mitral regurgitation with Coanda effect.  Mild tricuspid " regurgitation.  No pericardial effusion.    ECG (1/9/2014)  Atrial fibrillation  Left bundle branch block      Jose Sawyer MD  Interventional Cardiology

## 2024-01-13 NOTE — PROGRESS NOTES
Report received from NOC RN. Patient A&Ox3, disoriented to situation. Denies pain at this time. Patient oxygen titrated down to 2L with saturations at 96%. Patient and daughter updated on POC. All questions addressed. Patient cleaned and linens changed. All fall precautions and Q2 turns in place. Personal belongings and call light within reach. Bed is locked in lowest position. Hourly rounding initiated.

## 2024-01-13 NOTE — PROGRESS NOTES
Monitor Summary:     Rhythm: Afib  Rate: 107-118  Ectopy: (o) PVC  Measurements: -/0.012/0.49           12 Hour Chart Check

## 2024-01-14 ENCOUNTER — APPOINTMENT (OUTPATIENT)
Dept: CARDIOLOGY | Facility: MEDICAL CENTER | Age: 84
DRG: 323 | End: 2024-01-14
Attending: INTERNAL MEDICINE
Payer: MEDICARE

## 2024-01-14 LAB
ACT BLD: 244 SEC (ref 74–137)
ACT BLD: 271 SEC (ref 74–137)
ACT BLD: 277 SEC (ref 74–137)
ANION GAP SERPL CALC-SCNC: 14 MMOL/L (ref 7–16)
BASOPHILS # BLD AUTO: 0.3 % (ref 0–1.8)
BASOPHILS # BLD: 0.02 K/UL (ref 0–0.12)
BUN SERPL-MCNC: 85 MG/DL (ref 8–22)
CALCIUM SERPL-MCNC: 9 MG/DL (ref 8.5–10.5)
CHLORIDE SERPL-SCNC: 102 MMOL/L (ref 96–112)
CO2 SERPL-SCNC: 26 MMOL/L (ref 20–33)
CREAT SERPL-MCNC: 1.25 MG/DL (ref 0.5–1.4)
EKG IMPRESSION: NORMAL
EOSINOPHIL # BLD AUTO: 0.17 K/UL (ref 0–0.51)
EOSINOPHIL NFR BLD: 2.2 % (ref 0–6.9)
ERYTHROCYTE [DISTWIDTH] IN BLOOD BY AUTOMATED COUNT: 48.2 FL (ref 35.9–50)
GFR SERPLBLD CREATININE-BSD FMLA CKD-EPI: 42 ML/MIN/1.73 M 2
GLUCOSE SERPL-MCNC: 71 MG/DL (ref 65–99)
HCT VFR BLD AUTO: 35.9 % (ref 37–47)
HGB BLD-MCNC: 11.7 G/DL (ref 12–16)
IMM GRANULOCYTES # BLD AUTO: 0.03 K/UL (ref 0–0.11)
IMM GRANULOCYTES NFR BLD AUTO: 0.4 % (ref 0–0.9)
LYMPHOCYTES # BLD AUTO: 2.31 K/UL (ref 1–4.8)
LYMPHOCYTES NFR BLD: 30.4 % (ref 22–41)
MAGNESIUM SERPL-MCNC: 2.1 MG/DL (ref 1.5–2.5)
MCH RBC QN AUTO: 30.5 PG (ref 27–33)
MCHC RBC AUTO-ENTMCNC: 32.6 G/DL (ref 32.2–35.5)
MCV RBC AUTO: 93.7 FL (ref 81.4–97.8)
MONOCYTES # BLD AUTO: 1.24 K/UL (ref 0–0.85)
MONOCYTES NFR BLD AUTO: 16.3 % (ref 0–13.4)
NEUTROPHILS # BLD AUTO: 3.84 K/UL (ref 1.82–7.42)
NEUTROPHILS NFR BLD: 50.4 % (ref 44–72)
NRBC # BLD AUTO: 0 K/UL
NRBC BLD-RTO: 0 /100 WBC (ref 0–0.2)
PHOSPHATE SERPL-MCNC: 3.1 MG/DL (ref 2.5–4.5)
PLATELET # BLD AUTO: 141 K/UL (ref 164–446)
PMV BLD AUTO: 11 FL (ref 9–12.9)
POTASSIUM SERPL-SCNC: 3.7 MMOL/L (ref 3.6–5.5)
RBC # BLD AUTO: 3.83 M/UL (ref 4.2–5.4)
SODIUM SERPL-SCNC: 142 MMOL/L (ref 135–145)
UFH PPP CHRO-ACNC: 0.42 IU/ML
WBC # BLD AUTO: 7.6 K/UL (ref 4.8–10.8)

## 2024-01-14 PROCEDURE — A9270 NON-COVERED ITEM OR SERVICE: HCPCS | Performed by: NURSE PRACTITIONER

## 2024-01-14 PROCEDURE — 700101 HCHG RX REV CODE 250

## 2024-01-14 PROCEDURE — 700102 HCHG RX REV CODE 250 W/ 637 OVERRIDE(OP): Performed by: INTERNAL MEDICINE

## 2024-01-14 PROCEDURE — 36415 COLL VENOUS BLD VENIPUNCTURE: CPT

## 2024-01-14 PROCEDURE — 93010 ELECTROCARDIOGRAM REPORT: CPT | Performed by: INTERNAL MEDICINE

## 2024-01-14 PROCEDURE — 700117 HCHG RX CONTRAST REV CODE 255: Performed by: INTERNAL MEDICINE

## 2024-01-14 PROCEDURE — 83735 ASSAY OF MAGNESIUM: CPT

## 2024-01-14 PROCEDURE — 99152 MOD SED SAME PHYS/QHP 5/>YRS: CPT | Performed by: INTERNAL MEDICINE

## 2024-01-14 PROCEDURE — 93005 ELECTROCARDIOGRAM TRACING: CPT | Performed by: INTERNAL MEDICINE

## 2024-01-14 PROCEDURE — 92978 ENDOLUMINL IVUS OCT C 1ST: CPT

## 2024-01-14 PROCEDURE — 700102 HCHG RX REV CODE 250 W/ 637 OVERRIDE(OP)

## 2024-01-14 PROCEDURE — A9270 NON-COVERED ITEM OR SERVICE: HCPCS | Performed by: INTERNAL MEDICINE

## 2024-01-14 PROCEDURE — 770020 HCHG ROOM/CARE - TELE (206)

## 2024-01-14 PROCEDURE — 700111 HCHG RX REV CODE 636 W/ 250 OVERRIDE (IP): Performed by: INTERNAL MEDICINE

## 2024-01-14 PROCEDURE — 93458 L HRT ARTERY/VENTRICLE ANGIO: CPT | Mod: 26,59 | Performed by: INTERNAL MEDICINE

## 2024-01-14 PROCEDURE — 4A023N7 MEASUREMENT OF CARDIAC SAMPLING AND PRESSURE, LEFT HEART, PERCUTANEOUS APPROACH: ICD-10-PCS | Performed by: INTERNAL MEDICINE

## 2024-01-14 PROCEDURE — 700102 HCHG RX REV CODE 250 W/ 637 OVERRIDE(OP): Performed by: NURSE PRACTITIONER

## 2024-01-14 PROCEDURE — 700111 HCHG RX REV CODE 636 W/ 250 OVERRIDE (IP): Mod: JZ

## 2024-01-14 PROCEDURE — 80048 BASIC METABOLIC PNL TOTAL CA: CPT

## 2024-01-14 PROCEDURE — A9270 NON-COVERED ITEM OR SERVICE: HCPCS

## 2024-01-14 PROCEDURE — 92978 ENDOLUMINL IVUS OCT C 1ST: CPT | Mod: 26,RC | Performed by: INTERNAL MEDICINE

## 2024-01-14 PROCEDURE — 84100 ASSAY OF PHOSPHORUS: CPT

## 2024-01-14 PROCEDURE — 85520 HEPARIN ASSAY: CPT

## 2024-01-14 PROCEDURE — 85025 COMPLETE CBC W/AUTO DIFF WBC: CPT

## 2024-01-14 PROCEDURE — B240ZZ3 ULTRASONOGRAPHY OF SINGLE CORONARY ARTERY, INTRAVASCULAR: ICD-10-PCS | Performed by: INTERNAL MEDICINE

## 2024-01-14 PROCEDURE — 99233 SBSQ HOSP IP/OBS HIGH 50: CPT | Performed by: STUDENT IN AN ORGANIZED HEALTH CARE EDUCATION/TRAINING PROGRAM

## 2024-01-14 PROCEDURE — B2111ZZ FLUOROSCOPY OF MULTIPLE CORONARY ARTERIES USING LOW OSMOLAR CONTRAST: ICD-10-PCS | Performed by: INTERNAL MEDICINE

## 2024-01-14 PROCEDURE — 027034Z DILATION OF CORONARY ARTERY, ONE ARTERY WITH DRUG-ELUTING INTRALUMINAL DEVICE, PERCUTANEOUS APPROACH: ICD-10-PCS | Performed by: INTERNAL MEDICINE

## 2024-01-14 PROCEDURE — 92928 PRQ TCAT PLMT NTRAC ST 1 LES: CPT | Mod: GC | Performed by: INTERNAL MEDICINE

## 2024-01-14 PROCEDURE — 85347 COAGULATION TIME ACTIVATED: CPT

## 2024-01-14 RX ORDER — VERAPAMIL HYDROCHLORIDE 2.5 MG/ML
INJECTION, SOLUTION INTRAVENOUS
Status: COMPLETED
Start: 2024-01-14 | End: 2024-01-14

## 2024-01-14 RX ORDER — SODIUM CHLORIDE 9 MG/ML
INJECTION, SOLUTION INTRAVENOUS CONTINUOUS
Status: ACTIVE | OUTPATIENT
Start: 2024-01-14 | End: 2024-01-14

## 2024-01-14 RX ORDER — CLOPIDOGREL 300 MG/1
TABLET, FILM COATED ORAL
Status: COMPLETED
Start: 2024-01-14 | End: 2024-01-14

## 2024-01-14 RX ORDER — HEPARIN SODIUM 200 [USP'U]/100ML
INJECTION, SOLUTION INTRAVENOUS
Status: COMPLETED
Start: 2024-01-14 | End: 2024-01-14

## 2024-01-14 RX ORDER — CLOPIDOGREL BISULFATE 75 MG/1
75 TABLET ORAL DAILY
Status: DISCONTINUED | OUTPATIENT
Start: 2024-01-15 | End: 2024-01-19 | Stop reason: HOSPADM

## 2024-01-14 RX ORDER — HEPARIN SODIUM 1000 [USP'U]/ML
INJECTION, SOLUTION INTRAVENOUS; SUBCUTANEOUS
Status: COMPLETED
Start: 2024-01-14 | End: 2024-01-14

## 2024-01-14 RX ORDER — MIDAZOLAM HYDROCHLORIDE 1 MG/ML
INJECTION INTRAMUSCULAR; INTRAVENOUS
Status: COMPLETED
Start: 2024-01-14 | End: 2024-01-14

## 2024-01-14 RX ORDER — LIDOCAINE HYDROCHLORIDE 20 MG/ML
INJECTION, SOLUTION INFILTRATION; PERINEURAL
Status: COMPLETED
Start: 2024-01-14 | End: 2024-01-14

## 2024-01-14 RX ORDER — CLOPIDOGREL 300 MG/1
600 TABLET, FILM COATED ORAL ONCE
Status: DISCONTINUED | OUTPATIENT
Start: 2024-01-14 | End: 2024-01-14

## 2024-01-14 RX ORDER — PHENYLEPHRINE HCL IN 0.9% NACL 0.5 MG/5ML
SYRINGE (ML) INTRAVENOUS
Status: COMPLETED
Start: 2024-01-14 | End: 2024-01-14

## 2024-01-14 RX ORDER — NOREPINEPHRINE BITARTRATE 1 MG/ML
INJECTION, SOLUTION INTRAVENOUS
Status: COMPLETED
Start: 2024-01-14 | End: 2024-01-14

## 2024-01-14 RX ADMIN — FENTANYL CITRATE 50 MCG: 50 INJECTION, SOLUTION INTRAMUSCULAR; INTRAVENOUS at 08:03

## 2024-01-14 RX ADMIN — ATORVASTATIN CALCIUM 40 MG: 40 TABLET, FILM COATED ORAL at 18:31

## 2024-01-14 RX ADMIN — HEPARIN SODIUM 14 UNITS/KG/HR: 5000 INJECTION, SOLUTION INTRAVENOUS at 11:13

## 2024-01-14 RX ADMIN — ASPIRIN 81 MG: 81 TABLET, COATED ORAL at 06:06

## 2024-01-14 RX ADMIN — IOHEXOL 54 ML: 350 INJECTION, SOLUTION INTRAVENOUS at 08:04

## 2024-01-14 RX ADMIN — METOPROLOL SUCCINATE 25 MG: 25 TABLET, EXTENDED RELEASE ORAL at 09:27

## 2024-01-14 RX ADMIN — OMEPRAZOLE 20 MG: 20 CAPSULE, DELAYED RELEASE ORAL at 18:31

## 2024-01-14 RX ADMIN — CLOPIDOGREL BISULFATE 600 MG: 300 TABLET, FILM COATED ORAL at 08:05

## 2024-01-14 RX ADMIN — CLOPIDOGREL 600 MG: 300 TABLET, FILM COATED ORAL at 08:05

## 2024-01-14 RX ADMIN — DOCUSATE SODIUM 50 MG AND SENNOSIDES 8.6 MG 2 TABLET: 8.6; 5 TABLET, FILM COATED ORAL at 06:00

## 2024-01-14 RX ADMIN — LIDOCAINE HYDROCHLORIDE: 20 INJECTION, SOLUTION INFILTRATION; PERINEURAL at 06:55

## 2024-01-14 RX ADMIN — HEPARIN SODIUM 14 UNITS/KG/HR: 5000 INJECTION, SOLUTION INTRAVENOUS at 02:44

## 2024-01-14 RX ADMIN — NITROGLYCERIN 0.5 INCH: 20 OINTMENT TOPICAL at 18:31

## 2024-01-14 RX ADMIN — VENLAFAXINE HYDROCHLORIDE 75 MG: 75 CAPSULE, EXTENDED RELEASE ORAL at 18:31

## 2024-01-14 RX ADMIN — DOCUSATE SODIUM 50 MG AND SENNOSIDES 8.6 MG 2 TABLET: 8.6; 5 TABLET, FILM COATED ORAL at 18:31

## 2024-01-14 RX ADMIN — LEVOTHYROXINE SODIUM 125 MCG: 0.12 TABLET ORAL at 06:06

## 2024-01-14 RX ADMIN — OXYBUTYNIN CHLORIDE 10 MG: 10 TABLET, EXTENDED RELEASE ORAL at 20:38

## 2024-01-14 RX ADMIN — NITROGLYCERIN 0.5 INCH: 20 OINTMENT TOPICAL at 06:13

## 2024-01-14 RX ADMIN — NITROGLYCERIN 0.5 INCH: 20 OINTMENT TOPICAL at 13:00

## 2024-01-14 RX ADMIN — SPIRONOLACTONE 25 MG: 25 TABLET ORAL at 06:06

## 2024-01-14 RX ADMIN — ACETAMINOPHEN 650 MG: 325 TABLET, FILM COATED ORAL at 12:28

## 2024-01-14 RX ADMIN — HEPARIN SODIUM 2000 UNITS: 200 INJECTION, SOLUTION INTRAVENOUS at 07:01

## 2024-01-14 RX ADMIN — FUROSEMIDE 40 MG: 10 INJECTION, SOLUTION INTRAMUSCULAR; INTRAVENOUS at 06:07

## 2024-01-14 RX ADMIN — MIDAZOLAM HYDROCHLORIDE 0.5 MG: 1 INJECTION, SOLUTION INTRAMUSCULAR; INTRAVENOUS at 08:04

## 2024-01-14 RX ADMIN — NITROGLYCERIN 10 ML: 20 INJECTION INTRAVENOUS at 06:55

## 2024-01-14 RX ADMIN — DIVALPROEX SODIUM 750 MG: 500 TABLET, EXTENDED RELEASE ORAL at 18:33

## 2024-01-14 RX ADMIN — QUETIAPINE FUMARATE 175 MG: 50 TABLET ORAL at 20:38

## 2024-01-14 RX ADMIN — HEPARIN SODIUM: 1000 INJECTION, SOLUTION INTRAVENOUS; SUBCUTANEOUS at 06:55

## 2024-01-14 RX ADMIN — VERAPAMIL HYDROCHLORIDE 5 MG: 2.5 INJECTION, SOLUTION INTRAVENOUS at 06:55

## 2024-01-14 RX ADMIN — METOPROLOL SUCCINATE 25 MG: 25 TABLET, EXTENDED RELEASE ORAL at 20:38

## 2024-01-14 ASSESSMENT — PAIN DESCRIPTION - PAIN TYPE
TYPE: ACUTE PAIN;CHRONIC PAIN
TYPE: ACUTE PAIN

## 2024-01-14 ASSESSMENT — FIBROSIS 4 INDEX: FIB4 SCORE: 2.91

## 2024-01-14 NOTE — PROGRESS NOTES
Monitor Summary:     Rhythm: A-fib  Rate: 102-116  Ectopy: pvc coup  Measurements: -/0.08/0.35           12 Hour Chart Check

## 2024-01-14 NOTE — CARE PLAN
The patient is Stable - Low risk of patient condition declining or worsening    Shift Goals  Clinical Goals: NPO at midnight, monitor HR and BP, rest  Patient Goals: sleep  Family Goals: NA    Progress made toward(s) clinical / shift goals:      Problem: Knowledge Deficit - Standard  Goal: Patient and family/care givers will demonstrate understanding of plan of care, disease process/condition, diagnostic tests and medications  Outcome: Progressing     Problem: Fall Risk  Goal: Patient will remain free from falls  Outcome: Progressing     Problem: Skin Integrity  Goal: Skin integrity is maintained or improved  Outcome: Progressing     Problem: Psychosocial  Goal: Patient's level of anxiety will decrease  Outcome: Progressing       Patient is not progressing towards the following goals:

## 2024-01-14 NOTE — THERAPY
Speech Language Therapy Contact Note    Patient Name: Azeb Major  Age:  83 y.o., Sex:  female  Medical Record #: [id number]  Today's Date: 1/14/2024 01/14/24 0711   Treatment Variance   Reason For Missed Therapy Medical - Patient  in Procedure;Medical - Other (Please Comment)   Initial Contact Note    Initial Contact Note  Order Received and Verified, Speech Therapy Evaluation in Progress with Full Report to Follow.   Interdisciplinary Plan of Care Collaboration   IDT Collaboration with  Other (See Comments)  (EMR)   Collaboration Comments Per EMR - pt is NPO this morning for likely LUCY/DCCV. Will follow as time allows when pt is medically cleared to participate with PO.     - Colette Johnston, LISETTE-SLP

## 2024-01-14 NOTE — DIETARY
Nutrition Services: Cardiac Education Consult   Day 5 of admit.  Azeb Major is an 83 y.o. female with admitting DX of A-fib    RD able to visit pt at bedside to provide heart healthy diet education. RD discussed heart healthy diet modifications, though pt had some difficulty hearing. She accepted the education handout and reviewed it. Pt demonstrated some readiness and some evidence of learning. RD able to answer all questions to patient's satisfaction.     No other education needs identified at this time. Consider referral to outpatient nutrition services for continuation of education as indicated or per pt preferences.     Please re-consult RD as indicated.

## 2024-01-14 NOTE — PROGRESS NOTES
Hospital Medicine Daily Progress Note    Date of Service  1/13/2024    Chief Complaint  Azeb Major is a 83 y.o. female admitted 1/9/2024 with dyspnea    Hospital Course  83-year-old female with history of hearing loss, hypertension, bipolar and chronic kidney disease who presented 1/9 with chest pain and shortness of breath.  Patient has significant hearing loss and history was taken from the patient and family, patient has had worsening shortness of breath and chest pain for last couple days, also noticed swelling on her legs, on the day of admission patient was evaluated by PCP and they found the new atrial fibrillation and patient was transferred to emergency.  Patient was alert oriented x 4, patient has pressure chest pain, goes to her shoulders, with worsening shortness of breath, no nausea or vomiting or other symptoms and no fever.  EKG showed new atrial fibrillation with no significant ischemic changes, patient had a crackles with lower extremity edema, chest x-ray showed pulmonary edema, BNP was more than 27,000, troponin was elevated more than 200, cardiology was consulted, heparin with aspirin and metoprolol were initiated.  Also Lasix was initiated for heart failure.     Patient lives by herself and she usually does all daily activities by herself, no significant memory deficit however patient has bipolar which is a stable    Interval Problem Update  1/10  Patient was examined in her inpatient room multiple times throughout the day.   She is afebrile today heart rate from 90s to 120s systolic blood pressure overnight was in the 80s while sleeping throughout the day was in the 90s to low 100s.  Remains on 3 L nasal cannula, she is still volume overloaded. Cautious parameters for diuresis and heart rate control, no need for aggressive heart rate control, suspected she has severely reduced LV function which was confirmed later with echo, shows EF 25% with moderate to severe mitral valve regurg  which is new.    She has no respiratory distress and respiratory status has been stable, no chest pain, feeling weak and generally unwell however overall stable, and actually improved throughout the day in regards to cap refill, extremity temperature, color.    Initial Lasix dose was held due to parameters and cardiology wanted additional Lasix dose in the evening to be held, too aggressive diuresis will be detrimental and she may require inotropic support if renal function does not improve.    Hyperkalemia improved on repeat BMP in the afternoon, renal function was only slightly worse.  On heparin drip.  Developed small hand hematoma from waking IV site after PT, dressing applied and bleeding stopped.  Hand remained neurovascularly intact.  Cardiology continues to follow, family updated at bedside multiple times throughout the day.    1/11  Patient is afebrile heart rate today from 108-122 respiratory rate 16-18 systolic blood pressure from  pulse ox 91 to 97% on 3 L nasal cannula.  Continuing IV diuresis.  Followed by cardiology.  Hemoglobin stable, leukocytosis platelets stable at 140, trending down however no bleeding, bicarb 19 serum creatinine is improving down to 1.89 normal liver function magnesium.  Continue heparin drip.  She is still volume overloaded, no respiratory distress or dyspnea at rest, low work of breathing.  She looks better today, color improved, warm extremities, she is more perky and reports feeling better as well.  Never had cardiac problems and quality of life was rather good prior to this, cardiology has offered coronary angiogram when her renal function improves and then LUCY/DCCV and I, the patient and family agree.  Continue IV diuresis, heparin drip, monitor respiratory and renal function status closely, watch urinary output and adjust diuretics as needed and allowable with BP.  Patient and family updated at bedside.  Appreciate cardiology involvement.     1/12  Afebrile, HR  102-116 -118 RR 18 SpO2 94-97% on 4 L NC.  Scr improving down to 1.66. Had chest pain overnight, nitro placed, repeat EKG not showing new ischemia. Chest pain resolved.  No dyspnea.  Cardiology evaluated, planning on catheterization in 1-2 days followed by LUCY/DCCV.  She is mildly volume overloaded, continuing IV diuresis.    1/13  Patient is afebrile pulse 103-119 respiratory rate 14-20 systolic blood pressure  pulse ox 91 to 96% weaned down to 2 L.  1.6 L urine output yesterday.  Labs show stable anemia mild thrombocytopenia BMP shows improving renal function serum creatinine down to 1.53 GFR up to 33 magnesium 2.0.  Heparin drip, continues, with improvement of her renal function should be going for coronary angiogram tomorrow with cardiology.  The following day she will likely go for LUCY/DCCV.  Volume status improving, supplemental oxygen requirements decreasing.  She remains well-perfused.  Cardiology is increased metoprolol 25 mg twice daily and started spironolactone 25 mg daily.  Patient and family updated at bedside.    I have discussed this patient's plan of care and discharge plan at IDT rounds today with Case Management, Nursing, Nursing leadership, and other members of the IDT team.    Consultants/Specialty  cardiology    Code Status  Full Code    Disposition  Medically Cleared  I have placed the appropriate orders for post-discharge needs.    Review of Systems  ROS   Review of Systems   Constitutional:  Positive for malaise/fatigue. Negative for chills, fever and weight loss.   HENT:  Negative for ear pain, hearing loss and tinnitus.    Eyes:  Negative for blurred vision, double vision and photophobia.   Respiratory:  Positive for shortness of breath. Negative for cough and hemoptysis.    Cardiovascular:  Positive for chest pain. Negative for palpitations, orthopnea and claudication.   Gastrointestinal:  Negative for abdominal pain, constipation, diarrhea, nausea and vomiting.    Genitourinary:  Negative for dysuria, frequency and urgency.   Musculoskeletal:  Negative for myalgias and neck pain.   Skin:  Negative for rash.   Neurological:  Negative for dizziness, speech change and weakness    Physical Exam  Temp:  [36 °C (96.8 °F)-36.5 °C (97.7 °F)] 36.2 °C (97.2 °F)  Pulse:  [102-119] 108  Resp:  [14-20] 16  BP: ()/(57-84) 97/57  SpO2:  [91 %-96 %] 95 %    Physical Exam  Vitals and nursing note reviewed. Exam conducted with a chaperone present.   Constitutional:       General: She is not in acute distress.     Appearance: She is not ill-appearing or toxic-appearing.      Comments: Appears weak   HENT:      Head: Normocephalic and atraumatic.      Nose: Nose normal. No rhinorrhea.      Mouth/Throat:      Mouth: Mucous membranes are moist.      Pharynx: Oropharynx is clear.   Eyes:      General: No scleral icterus.     Extraocular Movements: Extraocular movements intact.      Conjunctiva/sclera: Conjunctivae normal.   Neck:      Comments: JVD  Cardiovascular:      Rate and Rhythm: Tachycardia present. Rhythm irregular.      Heart sounds: Murmur heard.   Pulmonary:      Effort: Pulmonary effort is normal. No respiratory distress.      Breath sounds: Rales present. No wheezing or rhonchi.      Comments: Low work of breathing.  On 2 L NC, bibasilar rales  Abdominal:      General: There is distension.      Palpations: Abdomen is soft.      Tenderness: There is no abdominal tenderness. There is no guarding or rebound.   Musculoskeletal:         General: Normal range of motion.      Cervical back: Normal range of motion and neck supple. No rigidity or tenderness.      Right lower leg: Edema present.      Left lower leg: Edema present.   Skin:     General: Skin is warm and dry.      Capillary Refill: Capillary refill takes less than 2 seconds.      Findings: Bruising present. No rash.      Comments: warm dry skin   Neurological:      General: No focal deficit present.      Mental Status: She  is alert. Mental status is at baseline.      Cranial Nerves: No cranial nerve deficit.      Sensory: No sensory deficit.      Motor: No weakness.   Psychiatric:         Mood and Affect: Mood normal.         Behavior: Behavior normal.         Thought Content: Thought content normal.         Judgment: Judgment normal.         Fluids    Intake/Output Summary (Last 24 hours) at 1/14/2024 0329  Last data filed at 1/13/2024 2126  Gross per 24 hour   Intake 780 ml   Output 1650 ml   Net -870 ml         Laboratory  Recent Labs     01/11/24  0337 01/13/24  0024   WBC 7.8 7.3   RBC 3.52* 3.66*   HEMOGLOBIN 11.0* 11.0*   HEMATOCRIT 34.3* 34.4*   MCV 97.4 94.0   MCH 31.3 30.1   MCHC 32.1* 32.0*   RDW 51.6* 47.6   PLATELETCT 140* 149*   MPV 11.7 11.2       Recent Labs     01/11/24  0337 01/12/24  0818 01/13/24  0024   SODIUM 136 139 140   POTASSIUM 4.8 3.7 3.8   CHLORIDE 103 101 103   CO2 19* 25 26   GLUCOSE 82 99 88   BUN 99* 105* 97*   CREATININE 1.89* 1.66* 1.53*   CALCIUM 8.1* 8.3* 8.4*                         Imaging  EC-ECHOCARDIOGRAM COMPLETE W/ CONT   Final Result      DX-CHEST-PORTABLE (1 VIEW)   Final Result      1.  Increased diffuse interstitial edema with layering small right effusion.   2.  Bilateral basilar atelectasis and/or consolidation. Underlying infection is possible.   3.  Mild enlargement of the cardiomediastinal silhouette.      EC-LUCY W/ CONT    (Results Pending)   CL-CARDIOVERSION    (Results Pending)        Assessment/Plan  * A-fib (HCC)- (present on admission)  Assessment & Plan  New diagnosis with A-fib and RVR  Came with signs of heart failure  TSH is normal  Check echo  Start with metoprolol 25 mg twice daily  No history of smoking or drugs  Telemetry      CHF exacerbation (HCC)  Assessment & Plan  Patient has shortness of breath with crackles and lower extremity edema  BNP more than 27,000  Heart failure likely related to uncontrolled A-fib  Started with IV Lasix 40 mg twice daily with heparin  drip and metoprolol  Close monitoring and start with ACE inhibitors and spironolactone if needed  Echo is pending  Telemetry    ACP (advance care planning)  Assessment & Plan  1/9 plan of care was discussed in detail with the patient and family, discussed all treatment options, discussed the CODE STATUS, discussed all images and labs, answered all their questions, they want to be full code, all treatment, time 30 minutes    Chest pain- (present on admission)  Assessment & Plan  The ASCVD Risk score (Breann DK, et al., 2019) failed to calculate for the following reasons:    The 2019 ASCVD risk score is only valid for ages 40 to 79  Pressure chest pain goes to her shoulders  Blood pressure is at the same and both arms and no signs of dissection on chest x-ray  With a new A-fib and a new heart failure  Patient is a high risk for coronary artery disease however her chest pain possibly related to demand  Trending troponin and telemetry  Elevated troponin  Echo  Cardiology was consulted since patient has a new heart failure  Aspirin and statin  Check A1c and lipid panel    Bipolar disorder, in partial remission, most recent episode depressed (HCC)- (present on admission)  Assessment & Plan  Stable  Continue home dose of Seroquel and venlafaxine    Hypothyroid- (present on admission)  Assessment & Plan  TSH 6.4  Continue levothyroxine 125 mcg daily         VTE prophylaxis:    therapeutic anticoagulation with weight-based heparin gtt, pharmacy to adjust PRN      I have performed a physical exam and reviewed and updated ROS and Plan today (1/13/2024). In review of yesterday's note (1/12/2024), there are no changes except as documented above.  Total time spent 54 minutes. I spent greater than 50% of the time for patient care, counseling, and coordination on this date, including unit/floor time, and face-to-face time with the patient as per interval events, my own review of patient's imaging and lab analysis and developing my  assessment and plan above.

## 2024-01-14 NOTE — PROCEDURES
Cardiac Catheterization Procedure Note    DATE: 1/14/2024    : Jose Sawyer MD    PROCEDURES PERFORMED:  Left heart catheterization  Coronary angiography  Intravascular ultrasound of the right coronary artery  Percutaneous coronary intervention to the mid right coronary artery  Moderate conscious sedation    INDICATIONS:  The patient is an 83-year-old woman referred for cardiac catheterization to evaluate an acute episode of chest pain with an elevated troponin and newly discovered left ventricular systolic dysfunction.    CONSENT:  The complete alternatives, risks, and benefits of the procedure were explained to the patient. Signed informed consent was obtained and placed in the chart prior to the procedure.  A timeout was performed prior to beginning the procedure.    MEDICATIONS:  Lidocaine  Fentanyl  Midazolam  Nitroglycerin  Verapamil  Heparin  Clopidogrel    MODERATE CONSCIOUS SEDATION:  I personally supervised the administration of moderate conscious sedation by the nursing staff for 46 minutes.  Sedation start time: 7:17 AM  Sedation end time: 8:03 AM    CONTRAST: Omnipaque 54 cc    ACCESS:   4-[place]erratian Glidesheath in the right radial artery upsized to a 6-Montserratian Glidesheath.    ESTIMATED BLOOD LOSS: 20 cc    COMPLICATIONS: None    PROCEDURE IN DETAIL:  The patient was brought to the cardiac catheterization laboratory in the fasting state.  The skin over the right wrist was prepped and draped in the usual sterile fashion. Lidocaine infiltration was used to anesthetize the tissue over the right radial artery.  Using the micropuncture technique, a 4-[place]ian Glidesheath was inserted in the right radial artery.  A 4-[place]ian JR4 diagnostic catheter was then advanced over a standard J-wire into the left ventricular cavity where it was gently aspirated, flushed, and then withdrawn across the aortic valve with sequential pressures measured.  This catheter was then used to engage the ostium of the right  "coronary artery and cineangiograms were obtained in multiple projections for complete evaluation of the right coronary system.  This catheter was then exchanged for a 4-Bahraini JL3.5 diagnostic catheter, which was used to engage the ostium of the left main coronary artery and and cineangiograms were obtained in multiple projections for complete evaluation of the left coronary system.  Following completion of coronary angiography, we proceeded with intravascular ultrasound for stent planning.    The 4-Bahraini sheath was exchanged for a 6-Bahraini sheath and then the left main coronary artery was reengaged with a 6-B[place]raini Ikari-1.0 guide catheter.  A 0.014\" Prowater wire was then advanced into the distal aspect of the right coronary artery.  An [place] IVUS catheter was then advanced over the guidewire and IVUS was performed for stent planning purposes.  The IVUS catheter could not be advanced beyond the most significant area of stenosis, however, at that point IVUS demonstrated mostly fibrotic plaque with only mild calcification.  The proximal reference vessel had mild eccentric plaque with a true vessel diameter of approximately 3.6 mm.  Following intravascular ultrasound, we proceeded with percutaneous coronary intervention.    A 2.5 x 15 mm compliant balloon was then advanced over the guidewire and was used to predilate the lesion at a maximum pressure of 12 georgie.  Following predilation, a 2.5 x 24 mm Synergy drug-eluting stent was advanced over the guidewire and was deployed across the lesion at a maximum pressure of 14 georgie.  After deployment, the stent was evaluated by IVUS, which demonstrated that the distal edge of the stent had landed in the intended zone and was well-expanded.  Beyond the distal stent edge, the ongoing vessel had extensive concentric plaque and with a minimal luminal area of approximately 3.0 mm².  The stent was mildly underexpanded at the proximal edge, which had landed just short of the " intended zone in an area of mild to moderate plaque, but with less than 50% plaque burden.  The stent was then postdilated with a 3.25 x 15 mm noncompliant balloon at a maximum pressure of 16 georgie proximally.  Final cineangiograms were then obtained in orthogonal views, which demonstrated good stent expansion and apposition with no evidence of wire perforation, thrombus or dissection. At the completion of the case, all wires, catheters, and sheaths were removed. A TR band was placed using the patent hemostasis technique.    HEMODYNAMICS:   Aortic pressure: 117/67 mmHg  Pre A-wave pressure: 12 mmHg  No significant aortic gradient on pullback    CORONARY ANGIOGRAPHY:  The left main coronary artery has luminal disease and bifurcates into the left anterior descending and left circumflex coronary arteries.  The left anterior descending coronary artery is a large, transapical vessel with a hazy ostium of unclear severity, sequential calcified proximal 90% and mid 70% lesions, and distal luminal irregularities.  It supplies two small diagonal branches with luminal irregularities  The left circumflex coronary artery is a large, dominant vessel with a focal mid 80% and otherwise minimal luminal irregularities.  It supplies a moderate first obtuse marginal branch, a large second obtuse marginal branch, a large third obtuse marginal branch, and a moderate posterolateral branch with luminal irregularities.  The right coronary artery is a moderate, dominant vessel with mid 95% disease prior to a high bifurcation of small to moderate caliber posterior descending and posterolateral branches that appear diffusely narrowed.    INTRAVASCULAR ULTRASOUND:  See IVUS findings and procedure details above.    PERCUTANEOUS CORONARY INTERVENTION:  Lesion location: Mid right coronary artery  Lesion type: A  Lesion length: 20 mm  Pre-intervention SOLIS flow: 3  Post-intervention SOLIS flow: 3  Pre-intervention stenosis: 95%  Post-intervention  stenosis: 5%  Stent: 2.5 x 24 mm Synergy drug-eluting stent    IMPRESSION:  Severe obstructive three-vessel coronary artery disease.  Successful percutaneous coronary intervention to the mid right coronary artery with one 2.5 x 24 mm Synergy drug-eluting stent postdilated to 3.3 mm proximally.  Residual focal, but calcified severe disease in the left anterior descending and left circumflex coronaries.  Upper normal left heart filling pressures.    RECOMMENDATIONS:  Return to floor with continued care per primary service.  TR band release per protocol.  Restart heparin drip in 2 hours at previous rate if no access site bleeding complications.  Post-procedure fluids for renal protection.  Hold further intravenous diuresis today.  Plan for staged intervention to the LAD and circumflex in 2-3 days if no bleeding or renal complications following initial intervention.  Delay LUCY/DCCV until after completion of revascularization.    NOTIFICATION:  The patient's family was called and notified of the results of her cardiac catheterization.

## 2024-01-14 NOTE — PROGRESS NOTES
Bedside report received at 1900 and  assumed care of patient. Patient is resting in bed, denies pain at this time. Patient is A&Ox3- disoriented to situation and on 2L NC. Tele monitoring in place. Educated on fall risk, all fall precautions in place. Call light and belongings within reach, bed locked and in lowest position, denied other needs at this time. Hourly rounding and q2 turns in place.

## 2024-01-14 NOTE — DOCUMENTATION QUERY
Critical access hospital                                                                       Query Response Note      PATIENT:               SHAMEKA TEJADA  ACCT #:                  [id number]  MRN:                     [id number]  :                      1940  ADMIT DATE:       2024 9:28 AM  DISCH DATE:          RESPONDING  PROVIDER #:        [id number]           QUERY TEXT:    Atrial fibrillation with RVR is documented in the Medical Record.      Please specify the type:      The patient's Clinical Indicators include:  Findings:  --Patient admitted for atrial fibrillation with RVR and AE systolic CHF  --Per H&P, ''New diagnosis with A-fib and RVR'' documented  --EKG on  admission:  Atrial fibrillation, LBBB, rate of 108  --Echocardiogram from 01/10:  Normal left ventricular chamber size. Normal left ventricular wall thickness.      Severely reduced left ventricular systolic function. The ejection fraction is measured to be 26%    Treatment:  --EKGs  --Echocardiogram  --Tele monitoring  --Cardiology consult  --Heparin gtt  --Metoprolol IV 5mg every 5 mins prn with parameters  --Metoprolol tablet 25mg oral bid  --Possible pending heart cath and/or cardioversion    Risk Factors:  --AE systolic CHF  --LBBB  --Hyperkalemia    Thank you,  Marilee Rebolledo RN, BSN  Clinical   Connect via [employer]  Options provided:   -- Paroxysmal atrial fibrillation (self-terminating or intermittent spontaneously or with intervention within 7 days of onset)   -- Permanent atrial fibrillation (persistent or longstanding persistent AF where cardioversion cannot or will not be performed   -- Other persistent atrial fibrillation (AF that does not terminate within 7 days or that requires repeat pharmacological or electrical cardioversion.   -- Longstanding persistent atrial fibrillation (AF that is persistent and continuous, lasting longer  than 1 year)   -- Chronic atrial fibrillation, unspecified (may refer to persistent, longstanding persistent or permanent AF.  A more specific descriptive term is preferred over the nonspecific AF   -- Atrial fibrillation unspecified   -- Other explanation, (please specify other explanation)      Query created by: Marilee Rebolledo on 1/11/2024 11:23 AM    RESPONSE TEXT:    Other persistent atrial fibrillation (AF that does not terminate within 7 days or that requires repeat pharmacological or electrical cardioversion.          Electronically signed by:  AAMIR MAYFIELD MD 1/14/2024 3:30 AM

## 2024-01-15 ENCOUNTER — APPOINTMENT (OUTPATIENT)
Dept: CARDIOLOGY | Facility: MEDICAL CENTER | Age: 84
DRG: 323 | End: 2024-01-15
Attending: NURSE PRACTITIONER
Payer: MEDICARE

## 2024-01-15 LAB
ANION GAP SERPL CALC-SCNC: 14 MMOL/L (ref 7–16)
BUN SERPL-MCNC: 73 MG/DL (ref 8–22)
CALCIUM SERPL-MCNC: 9.2 MG/DL (ref 8.5–10.5)
CHLORIDE SERPL-SCNC: 102 MMOL/L (ref 96–112)
CO2 SERPL-SCNC: 26 MMOL/L (ref 20–33)
CREAT SERPL-MCNC: 1.25 MG/DL (ref 0.5–1.4)
ERYTHROCYTE [DISTWIDTH] IN BLOOD BY AUTOMATED COUNT: 46.2 FL (ref 35.9–50)
GFR SERPLBLD CREATININE-BSD FMLA CKD-EPI: 42 ML/MIN/1.73 M 2
GLUCOSE SERPL-MCNC: 80 MG/DL (ref 65–99)
HCT VFR BLD AUTO: 35.3 % (ref 37–47)
HGB BLD-MCNC: 11.8 G/DL (ref 12–16)
MCH RBC QN AUTO: 30.6 PG (ref 27–33)
MCHC RBC AUTO-ENTMCNC: 33.4 G/DL (ref 32.2–35.5)
MCV RBC AUTO: 91.7 FL (ref 81.4–97.8)
PLATELET # BLD AUTO: 162 K/UL (ref 164–446)
PMV BLD AUTO: 10.9 FL (ref 9–12.9)
POTASSIUM SERPL-SCNC: 3.8 MMOL/L (ref 3.6–5.5)
RBC # BLD AUTO: 3.85 M/UL (ref 4.2–5.4)
SODIUM SERPL-SCNC: 142 MMOL/L (ref 135–145)
UFH PPP CHRO-ACNC: 0.53 IU/ML
WBC # BLD AUTO: 7.8 K/UL (ref 4.8–10.8)

## 2024-01-15 PROCEDURE — 700102 HCHG RX REV CODE 250 W/ 637 OVERRIDE(OP): Performed by: INTERNAL MEDICINE

## 2024-01-15 PROCEDURE — 99232 SBSQ HOSP IP/OBS MODERATE 35: CPT | Performed by: INTERNAL MEDICINE

## 2024-01-15 PROCEDURE — 85520 HEPARIN ASSAY: CPT

## 2024-01-15 PROCEDURE — A9270 NON-COVERED ITEM OR SERVICE: HCPCS | Performed by: INTERNAL MEDICINE

## 2024-01-15 PROCEDURE — 92610 EVALUATE SWALLOWING FUNCTION: CPT

## 2024-01-15 PROCEDURE — 85027 COMPLETE CBC AUTOMATED: CPT

## 2024-01-15 PROCEDURE — 80048 BASIC METABOLIC PNL TOTAL CA: CPT

## 2024-01-15 PROCEDURE — 99233 SBSQ HOSP IP/OBS HIGH 50: CPT | Performed by: STUDENT IN AN ORGANIZED HEALTH CARE EDUCATION/TRAINING PROGRAM

## 2024-01-15 PROCEDURE — 700102 HCHG RX REV CODE 250 W/ 637 OVERRIDE(OP): Performed by: NURSE PRACTITIONER

## 2024-01-15 PROCEDURE — 700111 HCHG RX REV CODE 636 W/ 250 OVERRIDE (IP): Performed by: INTERNAL MEDICINE

## 2024-01-15 PROCEDURE — 36415 COLL VENOUS BLD VENIPUNCTURE: CPT

## 2024-01-15 PROCEDURE — 770020 HCHG ROOM/CARE - TELE (206)

## 2024-01-15 PROCEDURE — A9270 NON-COVERED ITEM OR SERVICE: HCPCS | Performed by: NURSE PRACTITIONER

## 2024-01-15 RX ADMIN — DOCUSATE SODIUM 50 MG AND SENNOSIDES 8.6 MG 2 TABLET: 8.6; 5 TABLET, FILM COATED ORAL at 05:26

## 2024-01-15 RX ADMIN — SPIRONOLACTONE 25 MG: 25 TABLET ORAL at 05:26

## 2024-01-15 RX ADMIN — DIVALPROEX SODIUM 750 MG: 500 TABLET, EXTENDED RELEASE ORAL at 18:15

## 2024-01-15 RX ADMIN — NITROGLYCERIN 0.5 INCH: 20 OINTMENT TOPICAL at 18:15

## 2024-01-15 RX ADMIN — ASPIRIN 81 MG: 81 TABLET, COATED ORAL at 05:26

## 2024-01-15 RX ADMIN — NITROGLYCERIN 0.5 INCH: 20 OINTMENT TOPICAL at 05:25

## 2024-01-15 RX ADMIN — METOPROLOL SUCCINATE 25 MG: 25 TABLET, EXTENDED RELEASE ORAL at 20:46

## 2024-01-15 RX ADMIN — ATORVASTATIN CALCIUM 40 MG: 40 TABLET, FILM COATED ORAL at 18:15

## 2024-01-15 RX ADMIN — LEVOTHYROXINE SODIUM 125 MCG: 0.12 TABLET ORAL at 05:26

## 2024-01-15 RX ADMIN — QUETIAPINE FUMARATE 175 MG: 50 TABLET ORAL at 20:46

## 2024-01-15 RX ADMIN — HEPARIN SODIUM 14 UNITS/KG/HR: 5000 INJECTION, SOLUTION INTRAVENOUS at 10:06

## 2024-01-15 RX ADMIN — METOPROLOL SUCCINATE 25 MG: 25 TABLET, EXTENDED RELEASE ORAL at 10:04

## 2024-01-15 RX ADMIN — NITROGLYCERIN 0.5 INCH: 20 OINTMENT TOPICAL at 00:03

## 2024-01-15 RX ADMIN — NITROGLYCERIN 0.5 INCH: 20 OINTMENT TOPICAL at 13:45

## 2024-01-15 RX ADMIN — OMEPRAZOLE 20 MG: 20 CAPSULE, DELAYED RELEASE ORAL at 18:15

## 2024-01-15 RX ADMIN — CLOPIDOGREL BISULFATE 75 MG: 75 TABLET ORAL at 05:26

## 2024-01-15 RX ADMIN — NITROGLYCERIN 0.5 INCH: 20 OINTMENT TOPICAL at 23:55

## 2024-01-15 RX ADMIN — OXYBUTYNIN CHLORIDE 10 MG: 10 TABLET, EXTENDED RELEASE ORAL at 20:46

## 2024-01-15 ASSESSMENT — PAIN DESCRIPTION - PAIN TYPE
TYPE: ACUTE PAIN

## 2024-01-15 ASSESSMENT — ENCOUNTER SYMPTOMS
SHORTNESS OF BREATH: 0
CHEST TIGHTNESS: 0

## 2024-01-15 ASSESSMENT — FIBROSIS 4 INDEX: FIB4 SCORE: 2.53

## 2024-01-15 NOTE — THERAPY
Physical Therapy Contact Note    Patient Name: Azeb Major  Age:  83 y.o., Sex:  female  Medical Record #: [id number]  Today's Date: 1/15/2024    Discussed missed therapy with RN, MD       01/15/24 2754   Interdisciplinary Plan of Care Collaboration   Collaboration Comments One stent done, plan is staged, more stents planned for 1/16, will hold until procedures complete.

## 2024-01-15 NOTE — DISCHARGE PLANNING
Case Management Discharge Planning    Admission Date: 1/9/2024  GMLOS: 3.8  ALOS: 6    6-Clicks ADL Score: 16  6-Clicks Mobility Score: 18  PT and/or OT Eval ordered: Yes  Post-acute Referrals Ordered: Yes  Post-acute Choice Obtained: Yes  Has referral(s) been sent to post-acute provider:  Yes      Anticipated Discharge Dispo: Discharge Disposition: D/T to home under [facility]HA care in anticipation of covered skilled care (06)    DME Needed: No    Action(s) Taken: Updated Provider/Nurse on Discharge Plan    Escalations Completed: None    Medically Clear: No    Next Steps: Discussed in rounds. Cath scheduled for tomorrow. Anticipate medical cleareance in a couple of days. Pt will need SNF, will not likely be able to tolerate 3 hrs of intense Rehab therapy daily. Was referred to all local SNFs and all have accepted. Called daughter, Olive. She will discuss with siblings, 5 of them. SNF choice form left at bedside.    Barriers to Discharge: Medical clearance and Pending Placement    Is the patient up for discharge tomorrow: No

## 2024-01-15 NOTE — PROGRESS NOTES
Cardiology Follow Up Progress Note    Date of Service  1/15/2024    Attending Physician  Sunny Rodriguez M.D.    JIM Major is a 83 y.o. female admitted 1/9/2024 with a past medical history of bipolar disorder, CKD and hypothyroidism found to have atrial fibrillation with rapid ventricular response and primary care clinic referred to [facility]    Interim Events  1/15: /83.  HR 90s.  Alert.  Hard of hearing but can read lips and answer questions.  Assisted at the bedside with her daughter.  No cardiac symptoms.    Review of Systems  Review of Systems   Respiratory:  Negative for chest tightness and shortness of breath.        Vital signs in last 24 hours  Temp:  [36.1 °C (97 °F)-36.8 °C (98.2 °F)] 36.3 °C (97.3 °F)  Pulse:  [] 95  Resp:  [15-21] 15  BP: (110-135)/(70-81) 110/70  SpO2:  [94 %-98 %] 94 %    Physical Exam  Physical Exam  Constitutional:       General: She is not in acute distress.     Comments: Frail.  Cachectic.  Hard of hearing   Cardiovascular:      Rate and Rhythm: Normal rate and regular rhythm.      Pulses:           Radial pulses are 1+ on the right side.      Heart sounds: Normal heart sounds, S1 normal and S2 normal. No murmur heard.     No friction rub. No gallop.   Pulmonary:      Effort: Pulmonary effort is normal.      Breath sounds: Normal breath sounds. No wheezing, rhonchi or rales.   Musculoskeletal:      Right lower leg: No edema.      Left lower leg: No edema.   Skin:     General: Skin is warm and dry.   Neurological:      General: No focal deficit present.      Mental Status: She is alert.   Psychiatric:         Behavior: Behavior normal.         Lab Review  Lab Results   Component Value Date/Time    WBC 7.8 01/15/2024 12:11 AM    WBC 4.8 03/14/2011 11:05 AM    RBC 3.85 (L) 01/15/2024 12:11 AM    RBC 4.34 03/14/2011 11:05 AM    HEMOGLOBIN 11.8 (L) 01/15/2024 12:11 AM    HEMATOCRIT 35.3 (L) 01/15/2024 12:11 AM    MCV 91.7 01/15/2024 12:11 AM    MCV 95  "03/14/2011 11:05 AM    MCH 30.6 01/15/2024 12:11 AM    MCH 31.6 03/14/2011 11:05 AM    MCHC 33.4 01/15/2024 12:11 AM    MPV 10.9 01/15/2024 12:11 AM      Lab Results   Component Value Date/Time    SODIUM 142 01/15/2024 12:11 AM    POTASSIUM 3.8 01/15/2024 12:11 AM    CHLORIDE 102 01/15/2024 12:11 AM    CO2 26 01/15/2024 12:11 AM    GLUCOSE 80 01/15/2024 12:11 AM    BUN 73 (H) 01/15/2024 12:11 AM    CREATININE 1.25 01/15/2024 12:11 AM    CREATININE 1.38 (H) 03/14/2011 11:05 AM    BUNCREATRAT 30 (H) 09/20/2017 08:11 AM    BUNCREATRAT 22 03/14/2011 11:05 AM    GLOMRATE 38 (L) 03/14/2011 11:05 AM      Lab Results   Component Value Date/Time    ASTSGOT 16 01/11/2024 03:37 AM    ALTSGPT 11 01/11/2024 03:37 AM     Lab Results   Component Value Date/Time    CHOLSTRLTOT 125 01/10/2024 05:54 AM    LDL 65 01/10/2024 05:54 AM    HDL 46 01/10/2024 05:54 AM    TRIGLYCERIDE 70 01/10/2024 05:54 AM    TROPONINT 437 (H) 01/10/2024 05:54 AM       No results for input(s): \"NTPROBNP\" in the last 72 hours.    Cardiac Imaging and Procedures Review  EKG:  My personal interpretation of the EKG dated 1/14/2024 is atrial fibrillation, rate 109, left bundle branch block  Rhythm: My personal interpretation rhythm dated 1/15/2024 is atrial fibrillation, rate 90s    ECHOCARDIOGRAM 1/10/2024  Normal left ventricular chamber size. Normal left ventricular wall   thickness. Severely reduced left ventricular systolic function. The   ejection fraction is measured to be 26% by Dominguez's biplane.  Normal right ventricular size with reduced systolic function.  Moderate-severe mitral regurgitation eccentric with coanda effect.  Mild tricuspid regurgitation.  No pericardial effusion.  Compared to the prior study on 01/05/15, now severely reduced left   ventricular systolic function with mod-sev mitral valve regurgitation.     CARDIAC CATHETERIZATION 1/40/2024  CORONARY ANGIOGRAPHY:  The left main coronary artery has luminal disease and bifurcates into the " left anterior descending and left circumflex coronary arteries.  The left anterior descending coronary artery is a large, transapical vessel with a hazy ostium of unclear severity, sequential calcified proximal 90% and mid 70% lesions, and distal luminal irregularities.  It supplies two small diagonal branches with luminal irregularities  The left circumflex coronary artery is a large, dominant vessel with a focal mid 80% and otherwise minimal luminal irregularities.  It supplies a moderate first obtuse marginal branch, a large second obtuse marginal branch, a large third obtuse marginal branch, and a moderate posterolateral branch with luminal irregularities.  The right coronary artery is a moderate, dominant vessel with mid 95% disease prior to a high bifurcation of small to moderate caliber posterior descending and posterolateral branches that appear diffusely narrowed.  MPRESSION:  Severe obstructive three-vessel coronary artery disease.  Successful percutaneous coronary intervention to the mid right coronary artery with one 2.5 x 24 mm Synergy drug-eluting stent postdilated to 3.3 mm proximally.  Residual focal, but calcified severe disease in the left anterior descending and left circumflex coronaries.  Upper normal left heart filling pressures.     Imaging  Chest X-Ray: 1/9/2024  1.  Increased diffuse interstitial edema with layering small right effusion.  2.  Bilateral basilar atelectasis and/or consolidation. Underlying infection is possible.  3.  Mild enlargement of the cardiomediastinal silhouette    Assessment  Atrial fibrillation, rapid ventricular response, newly diagnosed duration unknown  HFrEF, 26%  Ischemic cardiomyopathy  PCI mid RCA 2.5 x 24 mm ANDRES 1/14/2024, residual significant LAD and circumflex disease  Acute hypoxic respiratory failure with pulmonary edema  Mitral regurgitation, moderate-severe  Acute on chronic kidney injury due to cardiorenal syndrome  Left bundle branch block, new  diagnosis, duration unknown  Chest pain, elevated troponin likely due to demand ischemia  Hypothyroidism  Bipolar disorder    Recommendation Discussion  Clinically stable from a cardiac standpoint with no recurrent ischemic or heart failure symptoms.  Atrial fibrillation rate recently controlled on metoprolol  Renal function has stabilized  Staged PCI of the LAD and circumflex arteries tomorrow 1/16/2024  N.p.o. after midnight  Currently not requiring loop diuretics  Continue Nitropaste  Continue IV heparin  Continue DAPT with clopidogrel  Continue atorvastatin  Continue spironolactone, metoprolol  Follow-up renal function  Will defer additional GDMT for heart failure until revascularization completed  LUCY guided cardioversion on hold until coronary revascularization completed can be arranged as an outpatient  Discussed treatment plan with daughter at the bedside and with Sunny Brantley MD    Thank you for allowing me to participate in the care of this patient.      Please contact me with any questions.    Scot Lawler M.D.   Cardiologist, [facility]  [phone]

## 2024-01-15 NOTE — DISCHARGE PLANNING
Received call from pt's daughter. Family's choice for SNF is [facility]. They have accepted but will need to verify when pt medically cleared.

## 2024-01-15 NOTE — PROGRESS NOTES
Telemetry Shift Summary     Rhythm: Atrial Fib  HR: 102-123  Ectopy: PVC- couplet    Measurements: -/.12/-              Normal Values  Rhythm: SR  HR:   Measurements: 0.12-0.20/0.08-0.10/0.30-0.52

## 2024-01-15 NOTE — CARE PLAN
The patient is Stable - Low risk of patient condition declining or worsening    Shift Goals  Clinical Goals: hemodynamic stability, hep gtt, preserve skin integrity  Patient Goals: comfort  Family Goals: Updates    Progress made toward(s) clinical / shift goals:    Problem: Knowledge Deficit - Standard  Goal: Patient and family/care givers will demonstrate understanding of plan of care, disease process/condition, diagnostic tests and medications  Description: Target End Date:  1-3 days or as soon as patient condition allows    Document in Patient Education    1.  Patient and family/caregiver oriented to unit, equipment, visitation policy and means for communicating concern  2.  Complete/review Learning Assessment  3.  Assess knowledge level of disease process/condition, treatment plan, diagnostic tests and medications  4.  Explain disease process/condition, treatment plan, diagnostic tests and medications  Outcome: Progressing     Problem: Depression  Goal: Patient and family/caregiver will verbalize accurate information about at least two of the possible causes of depression, three-four of the signs and symptoms of depression  Description: Target End Date:  1 to 3 days    1.  Assess the patient's and family/caregiver's knowledge regarding depression and its causes.  2.  Explain to the patient and family/caregiver regarding the major symptoms of depression.  3.  Inform the patient and family/caregiver that depression can be treated through medications and psychotherapy.  4.  Allow the patient to express feelings and perceptions  5.  Express hope to the patient with realistic comments about the patient's strengths and resources.  5.  Give positive feedback after a tasks are achieved.  6.  Encourage identification of positive aspects of self.  7.  Educate the patient about crisis intervention services such as suicide hotlines and other resources.  Outcome: Progressing     Problem: Fall Risk  Goal: Patient will remain  free from falls  Description: Target End Date:  Prior to discharge or change in level of care    Document interventions on the David Bubba Fall Risk Assessment    1.  Assess for fall risk factors  2.  Implement fall precautions  Outcome: Progressing     Problem: Skin Integrity  Goal: Skin integrity is maintained or improved  Description: Target End Date:  Prior to discharge or change in level of care    Document interventions on Skin Risk/Cristofer flowsheet groups and corresponding LDA    1.  Assess and monitor skin integrity, appearance and/or temperature  2.  Assess risk factors for impaired skin integrity and/or pressures ulcers  3.  Implement precautions to protect skin integrity in collaboration with interdisciplinary team  4.  Implement pressure ulcer prevention protocol if at risk for skin breakdown  5.  Confirm wound care consult if at risk for skin breakdown  6.  Ensure patient use of pressure relieving devices  (Low air loss bed, waffle overlay, heel protectors, ROHO cushion, etc)  Outcome: Progressing     Problem: Pain - Standard  Goal: Alleviation of pain or a reduction in pain to the patient’s comfort goal  Description: Target End Date:  Prior to discharge or change in level of care    Document on Vitals flowsheet    1.  Document pain using the appropriate pain scale per order or unit policy  2.  Educate and implement non-pharmacologic comfort measures (i.e. relaxation, distraction, massage, cold/heat therapy, etc.)  3.  Pain management medications as ordered  4.  Reassess pain after pain med administration per policy  5.  If opiods administered assess patient's response to pain medication is appropriate per POSS sedation scale  6.  Follow pain management plan developed in collaboration with patient and interdisciplinary team (including palliative care or pain specialists if applicable)  Outcome: Progressing     Problem: Psychosocial  Goal: Patient's level of anxiety will decrease  Description: Target  End Date:  1-3 days or as soon as patient condition allows    1.  Collaborate with patient and family/caregiver to identify triggers and develop strategies to cope with anxiety  2.  Implement stimuli reduction, calming techniques  3.  Pharmacologic management per provider order  4.  Encourage patient/family/care giver participation  5.  Collaborate with interdisciplinary team including Psychologist or Behavioral Health Team as needed  Outcome: Progressing     Problem: Hemodynamics  Goal: Patient's hemodynamics, fluid balance and neurologic status will be stable or improve  Description: Target End Date:  Prior to discharge or change in level of care    Document on Assessment and I/O flowsheet templates    1.  Monitor vital signs, pulse oximetry and cardiac monitor per provider order and/or policy  2.  Maintain blood pressure per provider order  3.  Hemodynamic monitoring per provider order  4.  Manage IV fluids and IV infusions  5.  Monitor intake and output  6.  Daily weights per unit policy or provider order  7.  Assess peripheral pulses and capillary refill  8.  Assess color and body temperature  9.  Position patient for maximum circulation/cardiac output  10. Monitor for signs/symptoms of excessive bleeding  11. Assess mental status, restlessness and changes in level of consciousness  12. Monitor temperature and report fever or hypothermia to provider immediately. Consideration of targeted temperature management.  Outcome: Progressing       Patient is not progressing towards the following goals:      Problem: Communication  Goal: The ability to communicate needs accurately and effectively will improve  Description: Target End Date:  End of day 1    1.  Assess ability to communicate and understand  2.  Provide augmentative or alternative methods of communication devices  3.  Use /language line as appropriate  4.  Collaborate with Speech Therapy as needed  Outcome: Not Progressing

## 2024-01-15 NOTE — PROGRESS NOTES
Hospital Medicine Daily Progress Note    Date of Service  1/14/2024    Chief Complaint  Azeb Major is a 83 y.o. female admitted 1/9/2024 with dyspnea    Hospital Course  83-year-old female with history of hearing loss, hypertension, bipolar and chronic kidney disease who presented 1/9 with chest pain and shortness of breath.  Patient has significant hearing loss and history was taken from the patient and family, patient has had worsening shortness of breath and chest pain for last couple days, also noticed swelling on her legs, on the day of admission patient was evaluated by PCP and they found the new atrial fibrillation and patient was transferred to emergency.  Patient was alert oriented x 4, patient has pressure chest pain, goes to her shoulders, with worsening shortness of breath, no nausea or vomiting or other symptoms and no fever.  EKG showed new atrial fibrillation with no significant ischemic changes, patient had a crackles with lower extremity edema, chest x-ray showed pulmonary edema, BNP was more than 27,000, troponin was elevated more than 200, cardiology was consulted, heparin with aspirin and metoprolol were initiated.  Also Lasix was initiated for heart failure.     Patient lives by herself and she usually does all daily activities by herself, no significant memory deficit however patient has bipolar which is a stable    Interval Problem Update  1/10  Patient was examined in her inpatient room multiple times throughout the day.   She is afebrile today heart rate from 90s to 120s systolic blood pressure overnight was in the 80s while sleeping throughout the day was in the 90s to low 100s.  Remains on 3 L nasal cannula, she is still volume overloaded. Cautious parameters for diuresis and heart rate control, no need for aggressive heart rate control, suspected she has severely reduced LV function which was confirmed later with echo, shows EF 25% with moderate to severe mitral valve regurg  which is new.    She has no respiratory distress and respiratory status has been stable, no chest pain, feeling weak and generally unwell however overall stable, and actually improved throughout the day in regards to cap refill, extremity temperature, color.    Initial Lasix dose was held due to parameters and cardiology wanted additional Lasix dose in the evening to be held, too aggressive diuresis will be detrimental and she may require inotropic support if renal function does not improve.    Hyperkalemia improved on repeat BMP in the afternoon, renal function was only slightly worse.  On heparin drip.  Developed small hand hematoma from waking IV site after PT, dressing applied and bleeding stopped.  Hand remained neurovascularly intact.  Cardiology continues to follow, family updated at bedside multiple times throughout the day.    1/11  Patient is afebrile heart rate today from 108-122 respiratory rate 16-18 systolic blood pressure from  pulse ox 91 to 97% on 3 L nasal cannula.  Continuing IV diuresis.  Followed by cardiology.  Hemoglobin stable, leukocytosis platelets stable at 140, trending down however no bleeding, bicarb 19 serum creatinine is improving down to 1.89 normal liver function magnesium.  Continue heparin drip.  She is still volume overloaded, no respiratory distress or dyspnea at rest, low work of breathing.  She looks better today, color improved, warm extremities, she is more perky and reports feeling better as well.  Never had cardiac problems and quality of life was rather good prior to this, cardiology has offered coronary angiogram when her renal function improves and then LUCY/DCCV and I, the patient and family agree.  Continue IV diuresis, heparin drip, monitor respiratory and renal function status closely, watch urinary output and adjust diuretics as needed and allowable with BP.  Patient and family updated at bedside.  Appreciate cardiology involvement.     1/12  Afebrile, HR  102-116 -118 RR 18 SpO2 94-97% on 4 L NC.  Scr improving down to 1.66. Had chest pain overnight, nitro placed, repeat EKG not showing new ischemia. Chest pain resolved.  No dyspnea.  Cardiology evaluated, planning on catheterization in 1-2 days followed by LUCY/DCCV.  She is mildly volume overloaded, continuing IV diuresis.    1/13  Patient is afebrile pulse 103-119 respiratory rate 14-20 systolic blood pressure  pulse ox 91 to 96% weaned down to 2 L.  1.6 L urine output yesterday.  Labs show stable anemia mild thrombocytopenia BMP shows improving renal function serum creatinine down to 1.53 GFR up to 33 magnesium 2.0.  Heparin drip, continues, with improvement of her renal function should be going for coronary angiogram tomorrow with cardiology.  The following day she will likely go for LUCY/DCCV.  Volume status improving, supplemental oxygen requirements decreasing.  She remains well-perfused.  Cardiology is increased metoprolol 25 mg twice daily and started spironolactone 25 mg daily.  Patient and family updated at bedside.    1/14  Afebrile HR 90's-120's 's-130's, RR 18-21 SpO2 94-98% 2-3 L NC.  Renal function improved essentially to baseline, lytes normal. Went for coronary angiogram found to have severe three vessel disease with successful PCI of the mid-right coronary artery with one ANDRES.   Brought back to the floors and restarted on heparin gtt, holding diuresis today and received IVF post procedure.   Plan for staged PCI to LAD and circumflex in 2-3 days followed by LUCY/DCCV.  Patient feels improved, more energetic, family notes she is acting more like herself.  Watch renal function and UOP, respiratory status and BP. Monitor for bleeding at access site.     I have discussed this patient's plan of care and discharge plan at IDT rounds today with Case Management, Nursing, Nursing leadership, and other members of the IDT team.    Consultants/Specialty  cardiology    Code Status  Full  Code    Disposition  Medically Cleared  I have placed the appropriate orders for post-discharge needs.    Review of Systems  ROS   Review of Systems   Constitutional:  Positive for malaise/fatigue. Negative for chills, fever and weight loss.   HENT:  Negative for ear pain, hearing loss and tinnitus.    Eyes:  Negative for blurred vision, double vision and photophobia.   Respiratory:  Positive for shortness of breath. Negative for cough and hemoptysis.    Cardiovascular:  Positive for chest pain. Negative for palpitations, orthopnea and claudication.   Gastrointestinal:  Negative for abdominal pain, constipation, diarrhea, nausea and vomiting.   Genitourinary:  Negative for dysuria, frequency and urgency.   Musculoskeletal:  Negative for myalgias and neck pain.   Skin:  Negative for rash.   Neurological:  Negative for dizziness, speech change and weakness    Physical Exam  Temp:  [36.1 °C (97 °F)-36.8 °C (98.2 °F)] 36.3 °C (97.4 °F)  Pulse:  [] 109  Resp:  [18-21] 18  BP: (114-135)/(70-81) 115/72  SpO2:  [94 %-98 %] 97 %    Physical Exam  Vitals and nursing note reviewed. Exam conducted with a chaperone present.   Constitutional:       General: She is not in acute distress.     Appearance: She is not ill-appearing or toxic-appearing.      Comments: Appears weak   HENT:      Head: Normocephalic and atraumatic.      Nose: Nose normal. No rhinorrhea.      Mouth/Throat:      Mouth: Mucous membranes are moist.      Pharynx: Oropharynx is clear. No posterior oropharyngeal erythema.   Eyes:      General: No scleral icterus.     Extraocular Movements: Extraocular movements intact.      Conjunctiva/sclera: Conjunctivae normal.   Neck:      Comments: JVD  Cardiovascular:      Rate and Rhythm: Tachycardia present. Rhythm irregular.      Heart sounds: Murmur heard.   Pulmonary:      Effort: Pulmonary effort is normal. No respiratory distress.      Breath sounds: Rales present. No wheezing or rhonchi.      Comments: Low  work of breathing.  On 2 L NC, bibasilar rales  Abdominal:      Palpations: Abdomen is soft.      Tenderness: There is no abdominal tenderness. There is no guarding or rebound.   Musculoskeletal:         General: Normal range of motion.      Cervical back: Normal range of motion and neck supple. No rigidity or tenderness.      Right lower leg: Edema present.      Left lower leg: Edema present.   Skin:     General: Skin is warm and dry.      Capillary Refill: Capillary refill takes less than 2 seconds.      Findings: Bruising present. No rash.      Comments: warm dry skin   Neurological:      General: No focal deficit present.      Mental Status: She is alert. Mental status is at baseline.      Cranial Nerves: No cranial nerve deficit.      Sensory: No sensory deficit.      Motor: No weakness.   Psychiatric:         Mood and Affect: Mood normal.         Behavior: Behavior normal.         Thought Content: Thought content normal.         Judgment: Judgment normal.         Fluids    Intake/Output Summary (Last 24 hours) at 1/15/2024 0326  Last data filed at 1/14/2024 1826  Gross per 24 hour   Intake --   Output 1050 ml   Net -1050 ml         Laboratory  Recent Labs     01/13/24  0024 01/14/24  0231 01/15/24  0011   WBC 7.3 7.6 7.8   RBC 3.66* 3.83* 3.85*   HEMOGLOBIN 11.0* 11.7* 11.8*   HEMATOCRIT 34.4* 35.9* 35.3*   MCV 94.0 93.7 91.7   MCH 30.1 30.5 30.6   MCHC 32.0* 32.6 33.4   RDW 47.6 48.2 46.2   PLATELETCT 149* 141* 162*   MPV 11.2 11.0 10.9       Recent Labs     01/13/24  0024 01/14/24  0231 01/15/24  0011   SODIUM 140 142 142   POTASSIUM 3.8 3.7 3.8   CHLORIDE 103 102 102   CO2 26 26 26   GLUCOSE 88 71 80   BUN 97* 85* 73*   CREATININE 1.53* 1.25 1.25   CALCIUM 8.4* 9.0 9.2                         Imaging  EC-ECHOCARDIOGRAM COMPLETE W/ CONT   Final Result      DX-CHEST-PORTABLE (1 VIEW)   Final Result      1.  Increased diffuse interstitial edema with layering small right effusion.   2.  Bilateral basilar  atelectasis and/or consolidation. Underlying infection is possible.   3.  Mild enlargement of the cardiomediastinal silhouette.      EC-LUCY W/ CONT    (Results Pending)   CL-CARDIOVERSION    (Results Pending)   CL-LEFT HEART CATHETERIZATION WITH POSSIBLE INTERVENTION    (Results Pending)        Assessment/Plan  * A-fib (HCC)- (present on admission)  Assessment & Plan  New diagnosis with A-fib and RVR  Came with signs of heart failure  TSH is normal  Check echo  Start with metoprolol 25 mg twice daily  No history of smoking or drugs  Telemetry      CHF exacerbation (HCC)  Assessment & Plan  Patient has shortness of breath with crackles and lower extremity edema  BNP more than 27,000  Heart failure likely related to uncontrolled A-fib  Started with IV Lasix 40 mg twice daily with heparin drip and metoprolol  Close monitoring and start with ACE inhibitors and spironolactone if needed  Echo is pending  Telemetry    ACP (advance care planning)  Assessment & Plan  1/9 plan of care was discussed in detail with the patient and family, discussed all treatment options, discussed the CODE STATUS, discussed all images and labs, answered all their questions, they want to be full code, all treatment, time 30 minutes    Chest pain- (present on admission)  Assessment & Plan  The ASCVD Risk score (Breann DK, et al., 2019) failed to calculate for the following reasons:    The 2019 ASCVD risk score is only valid for ages 40 to 79  Pressure chest pain goes to her shoulders  Blood pressure is at the same and both arms and no signs of dissection on chest x-ray  With a new A-fib and a new heart failure  Patient is a high risk for coronary artery disease however her chest pain possibly related to demand  Trending troponin and telemetry  Elevated troponin  Echo  Cardiology was consulted since patient has a new heart failure  Aspirin and statin  Check A1c and lipid panel    Bipolar disorder, in partial remission, most recent episode  depressed (HCC)- (present on admission)  Assessment & Plan  Stable  Continue home dose of Seroquel and venlafaxine    Hypothyroid- (present on admission)  Assessment & Plan  TSH 6.4  Continue levothyroxine 125 mcg daily         VTE prophylaxis:    therapeutic anticoagulation with weight-based heparin gtt, pharmacy to adjust PRN      I have performed a physical exam and reviewed and updated ROS and Plan today (1/14/2024). In review of yesterday's note (1/13/2024), there are no changes except as documented above.  Total time spent 53 minutes. I spent greater than 50% of the time for patient care, counseling, and coordination on this date, including unit/floor time, and face-to-face time with the patient as per interval events, my own review of patient's imaging and lab analysis and developing my assessment and plan above.

## 2024-01-15 NOTE — THERAPY
Speech Language Pathology   Clinical Swallow Evaluation     Patient Name: Azeb Major  AGE:  83 y.o., SEX:  female  Medical Record #: [id number]  Date of Service: 1/15/2024      History of Present Illness  83 y.o. female who presented on 1/9 with chest pain and SOB.     CXR: Increased diffuse interstitial edema with layering small right effusion, Bilateral basilar atelectasis and/or consolidation. Underlying infection is possible, Mild enlargement of the cardiomediastinal silhouette.    CMHx: A-fib, CHF exacerbation    PMHx: Hearing loss, HTN, chronic kidney disease, bipolar      General Information:  Vitals  O2 (LPM): 3  O2 Delivery Device: Silicone Nasal Cannula  Level of Consciousness: Alert, Awake  Orientation: Oriented x 4  Follows Directives: Yes    Prior Living Situation & Level of Function:  Prior Services: None  Housing / Facility: 2 Story House  Steps Into Home: 1  Equipment Owned: Front-Wheel Walker  Lives with - Patient's Self Care Capacity: Alone and Able to Care For Self  Communication: WFL; hearing impairment  Swallowing: WFL; hx of feeding tube 2/2 aspiration pneumonia     Oral Mechanism Evaluation:  Dentition: Edentulous (full dentures, not wearing in today's session)   Facial Symmetry: Equal  Facial Sensation: Equal     Labial Observations: WFL   Lingual Observations: Midline  Motor Speech: WFL       Laryngeal Function:  Secretion Management: Adequate  Voice Quality: Whisper (presbyphonia)    Subjective  Pt A&Ox4. Pt sitting up in bed upon arrival with daughter in room throughout session. Pt benefits from clinician removing mask and speaking loud and clear 2/2 hearing impairment. Pt reported no difficulty with swallowing. Pt's daughter works as a RD at [employer]. Pt's daughter endorsed preference for soft and bite sized and thin liquid diet for the least restrictive diet as pt is refusing to wear dentures. She denied any hx of dysphagia following hospitalization in 2014.    Assessment  Current  Method of Nutrition: Oral diet (regular/thin liquid diet)  Positioning: Alexander's (60-90 degrees)  Bolus Administration: Patient  O2 (LPM): 3 O2 Delivery Device: Silicone Nasal Cannula  Factor(s) Affecting Performance: None  Tracheostomy : No    Swallowing Trials:  Swallowing Trials  Thin Liquid (TN0): WFL  Pureed (PU4): WFL    Comments: No overt s/sx of aspiration or inadequate airway protection with PO trials of thin liquid and puree. Pt declined trials of soft solids 2/2 declining to wear dentures. Adequate lip seal and oral bolus acceptance via cup and utensil. Adequate oral bolus containment. Complete AP transfer without notable oral bolus residue upon oral inspection. Vocal quality remained stable throughout PO intake.       Clinical Impressions  Pt presenting with functional swallow without s/sx of aspiration. Recommend continuation of a cardiac soft and bite sized/thin liquid diet 2/2 edentulism and patient preference of no dentures.    Collaborated with pt's daughter who is requesting soft and bite size diet as pt is declining use of dentures at this time. Pt's daughter with good insight into foods that fall within diet parameters and s/sx of aspiration. Pt's daughter verbalized good understanding of SLP education and recommendations.         Recommendations  Diet Consistency: soft and bite sized/thin liquids  Instrumentation: None indicated at this time  Medication: One pill at a time, As tolerated  Supervision: Independent, Assist with meal tray set up  Positioning: Fully upright and midline during oral intake  Risk Management : Small bites/sips  Oral Care: Q8h    SLP Treatment Plan  Treatment Plan: Dysphagia Treatment  SLP Frequency: 3x Per Week  Estimated Duration: Until Therapy Goals Met    Anticipated Discharge Needs  Discharge Recommendations: Anticipate that the patient will have no further speech therapy needs after discharge from the hospital   Therapy Recommendations Upon DC: Not Indicated   "    Patient / Family Goals  Patient / Family Goal #1: \"I don't want to restrict her\"-daughter  Short Term Goals  Short Term Goal # 1: Pt will consume a soft and bite sized and thin liquid diet without overt s/sx of aspiration or inadequate airway protection    Shea Frankenberger, SLP Student   "

## 2024-01-15 NOTE — PROGRESS NOTES
Monitor Summary:     Rhythm: A-fib  Rate:   Ectopy: (r)pvc, (r)coup  Measurements: -/0.08/0.35

## 2024-01-16 ENCOUNTER — APPOINTMENT (OUTPATIENT)
Dept: CARDIOLOGY | Facility: MEDICAL CENTER | Age: 84
DRG: 323 | End: 2024-01-16
Attending: NURSE PRACTITIONER
Payer: MEDICARE

## 2024-01-16 ENCOUNTER — APPOINTMENT (OUTPATIENT)
Dept: CARDIOLOGY | Facility: MEDICAL CENTER | Age: 84
DRG: 323 | End: 2024-01-16
Payer: MEDICARE

## 2024-01-16 LAB
ACT BLD: 255 SEC (ref 74–137)
ACT BLD: 304 SEC (ref 74–137)
ACT BLD: 407 SEC (ref 74–137)
ANION GAP SERPL CALC-SCNC: 13 MMOL/L (ref 7–16)
BUN SERPL-MCNC: 62 MG/DL (ref 8–22)
CALCIUM SERPL-MCNC: 9 MG/DL (ref 8.5–10.5)
CHLORIDE SERPL-SCNC: 103 MMOL/L (ref 96–112)
CO2 SERPL-SCNC: 25 MMOL/L (ref 20–33)
CREAT SERPL-MCNC: 1.04 MG/DL (ref 0.5–1.4)
EKG IMPRESSION: NORMAL
GFR SERPLBLD CREATININE-BSD FMLA CKD-EPI: 52 ML/MIN/1.73 M 2
GLUCOSE SERPL-MCNC: 91 MG/DL (ref 65–99)
POTASSIUM SERPL-SCNC: 4.2 MMOL/L (ref 3.6–5.5)
SODIUM SERPL-SCNC: 141 MMOL/L (ref 135–145)
UFH PPP CHRO-ACNC: 0.56 IU/ML

## 2024-01-16 PROCEDURE — 700111 HCHG RX REV CODE 636 W/ 250 OVERRIDE (IP): Mod: JZ

## 2024-01-16 PROCEDURE — 770020 HCHG ROOM/CARE - TELE (206)

## 2024-01-16 PROCEDURE — B2111ZZ FLUOROSCOPY OF MULTIPLE CORONARY ARTERIES USING LOW OSMOLAR CONTRAST: ICD-10-PCS | Performed by: INTERNAL MEDICINE

## 2024-01-16 PROCEDURE — A9270 NON-COVERED ITEM OR SERVICE: HCPCS | Performed by: INTERNAL MEDICINE

## 2024-01-16 PROCEDURE — 92928 PRQ TCAT PLMT NTRAC ST 1 LES: CPT | Mod: LC | Performed by: INTERNAL MEDICINE

## 2024-01-16 PROCEDURE — 93005 ELECTROCARDIOGRAM TRACING: CPT | Performed by: INTERNAL MEDICINE

## 2024-01-16 PROCEDURE — 92978 ENDOLUMINL IVUS OCT C 1ST: CPT | Mod: LC

## 2024-01-16 PROCEDURE — 700111 HCHG RX REV CODE 636 W/ 250 OVERRIDE (IP): Mod: JZ,JG | Performed by: INTERNAL MEDICINE

## 2024-01-16 PROCEDURE — 85520 HEPARIN ASSAY: CPT

## 2024-01-16 PROCEDURE — 700102 HCHG RX REV CODE 250 W/ 637 OVERRIDE(OP): Performed by: INTERNAL MEDICINE

## 2024-01-16 PROCEDURE — 700101 HCHG RX REV CODE 250

## 2024-01-16 PROCEDURE — 027034Z DILATION OF CORONARY ARTERY, ONE ARTERY WITH DRUG-ELUTING INTRALUMINAL DEVICE, PERCUTANEOUS APPROACH: ICD-10-PCS | Performed by: INTERNAL MEDICINE

## 2024-01-16 PROCEDURE — 85347 COAGULATION TIME ACTIVATED: CPT

## 2024-01-16 PROCEDURE — 36415 COLL VENOUS BLD VENIPUNCTURE: CPT

## 2024-01-16 PROCEDURE — 92979 ENDOLUMINL IVUS OCT C EA: CPT | Mod: 26,LD | Performed by: INTERNAL MEDICINE

## 2024-01-16 PROCEDURE — 99233 SBSQ HOSP IP/OBS HIGH 50: CPT | Performed by: INTERNAL MEDICINE

## 2024-01-16 PROCEDURE — 99152 MOD SED SAME PHYS/QHP 5/>YRS: CPT | Performed by: INTERNAL MEDICINE

## 2024-01-16 PROCEDURE — 92978 ENDOLUMINL IVUS OCT C 1ST: CPT | Mod: 26,LC | Performed by: INTERNAL MEDICINE

## 2024-01-16 PROCEDURE — 700102 HCHG RX REV CODE 250 W/ 637 OVERRIDE(OP): Performed by: NURSE PRACTITIONER

## 2024-01-16 PROCEDURE — 92972 PERQ TRLUML CORONRY LITHOTRP: CPT | Mod: LC | Performed by: INTERNAL MEDICINE

## 2024-01-16 PROCEDURE — 02F03ZZ FRAGMENTATION IN CORONARY ARTERY, ONE ARTERY, PERCUTANEOUS APPROACH: ICD-10-PCS | Performed by: INTERNAL MEDICINE

## 2024-01-16 PROCEDURE — A9270 NON-COVERED ITEM OR SERVICE: HCPCS | Performed by: NURSE PRACTITIONER

## 2024-01-16 PROCEDURE — 700117 HCHG RX CONTRAST REV CODE 255: Performed by: INTERNAL MEDICINE

## 2024-01-16 PROCEDURE — 700105 HCHG RX REV CODE 258: Performed by: INTERNAL MEDICINE

## 2024-01-16 PROCEDURE — 93454 CORONARY ARTERY ANGIO S&I: CPT | Mod: XU

## 2024-01-16 PROCEDURE — 80048 BASIC METABOLIC PNL TOTAL CA: CPT

## 2024-01-16 PROCEDURE — 93010 ELECTROCARDIOGRAM REPORT: CPT | Performed by: INTERNAL MEDICINE

## 2024-01-16 PROCEDURE — B241ZZ3 ULTRASONOGRAPHY OF MULTIPLE CORONARY ARTERIES, INTRAVASCULAR: ICD-10-PCS | Performed by: INTERNAL MEDICINE

## 2024-01-16 RX ORDER — HEPARIN SODIUM 200 [USP'U]/100ML
INJECTION, SOLUTION INTRAVENOUS
Status: COMPLETED
Start: 2024-01-16 | End: 2024-01-16

## 2024-01-16 RX ORDER — LIDOCAINE HYDROCHLORIDE 20 MG/ML
INJECTION, SOLUTION INFILTRATION; PERINEURAL
Status: COMPLETED
Start: 2024-01-16 | End: 2024-01-16

## 2024-01-16 RX ORDER — NOREPINEPHRINE BITARTRATE 1 MG/ML
INJECTION, SOLUTION INTRAVENOUS
Status: COMPLETED
Start: 2024-01-16 | End: 2024-01-16

## 2024-01-16 RX ORDER — HEPARIN SODIUM 1000 [USP'U]/ML
INJECTION, SOLUTION INTRAVENOUS; SUBCUTANEOUS
Status: COMPLETED
Start: 2024-01-16 | End: 2024-01-16

## 2024-01-16 RX ORDER — MIDAZOLAM HYDROCHLORIDE 1 MG/ML
INJECTION INTRAMUSCULAR; INTRAVENOUS
Status: COMPLETED
Start: 2024-01-16 | End: 2024-01-16

## 2024-01-16 RX ORDER — SODIUM CHLORIDE 9 MG/ML
INJECTION, SOLUTION INTRAVENOUS CONTINUOUS
Status: ACTIVE | OUTPATIENT
Start: 2024-01-16 | End: 2024-01-16

## 2024-01-16 RX ADMIN — MORPHINE SULFATE 1 MG: 4 INJECTION, SOLUTION INTRAMUSCULAR; INTRAVENOUS at 21:14

## 2024-01-16 RX ADMIN — FENTANYL CITRATE 100 MCG: 50 INJECTION, SOLUTION INTRAMUSCULAR; INTRAVENOUS at 14:17

## 2024-01-16 RX ADMIN — NITROGLYCERIN 0.5 INCH: 20 OINTMENT TOPICAL at 05:25

## 2024-01-16 RX ADMIN — ACETAMINOPHEN 650 MG: 325 TABLET, FILM COATED ORAL at 08:06

## 2024-01-16 RX ADMIN — ATORVASTATIN CALCIUM 40 MG: 40 TABLET, FILM COATED ORAL at 17:43

## 2024-01-16 RX ADMIN — SPIRONOLACTONE 25 MG: 25 TABLET ORAL at 05:25

## 2024-01-16 RX ADMIN — VENLAFAXINE HYDROCHLORIDE 75 MG: 75 CAPSULE, EXTENDED RELEASE ORAL at 17:43

## 2024-01-16 RX ADMIN — NITROGLYCERIN 10 ML: 20 INJECTION INTRAVENOUS at 14:12

## 2024-01-16 RX ADMIN — CLOPIDOGREL BISULFATE 75 MG: 75 TABLET ORAL at 05:25

## 2024-01-16 RX ADMIN — METOPROLOL SUCCINATE 25 MG: 25 TABLET, EXTENDED RELEASE ORAL at 08:06

## 2024-01-16 RX ADMIN — SODIUM CHLORIDE: 9 INJECTION, SOLUTION INTRAVENOUS at 16:55

## 2024-01-16 RX ADMIN — ASPIRIN 81 MG: 81 TABLET, COATED ORAL at 05:25

## 2024-01-16 RX ADMIN — NITROGLYCERIN 0.5 INCH: 20 OINTMENT TOPICAL at 17:43

## 2024-01-16 RX ADMIN — MIDAZOLAM HYDROCHLORIDE 2 MG: 1 INJECTION, SOLUTION INTRAMUSCULAR; INTRAVENOUS at 14:52

## 2024-01-16 RX ADMIN — OMEPRAZOLE 20 MG: 20 CAPSULE, DELAYED RELEASE ORAL at 17:43

## 2024-01-16 RX ADMIN — HEPARIN SODIUM 2000 UNITS: 200 INJECTION, SOLUTION INTRAVENOUS at 14:12

## 2024-01-16 RX ADMIN — IOHEXOL 86 ML: 350 INJECTION, SOLUTION INTRAVENOUS at 15:22

## 2024-01-16 RX ADMIN — DOCUSATE SODIUM 50 MG AND SENNOSIDES 8.6 MG 2 TABLET: 8.6; 5 TABLET, FILM COATED ORAL at 17:43

## 2024-01-16 RX ADMIN — HEPARIN SODIUM 14 UNITS/KG/HR: 5000 INJECTION, SOLUTION INTRAVENOUS at 11:33

## 2024-01-16 RX ADMIN — FENTANYL CITRATE 50 MCG: 50 INJECTION, SOLUTION INTRAMUSCULAR; INTRAVENOUS at 15:27

## 2024-01-16 RX ADMIN — LEVOTHYROXINE SODIUM 125 MCG: 0.12 TABLET ORAL at 05:25

## 2024-01-16 RX ADMIN — OXYBUTYNIN CHLORIDE 10 MG: 10 TABLET, EXTENDED RELEASE ORAL at 21:13

## 2024-01-16 RX ADMIN — HEPARIN SODIUM: 1000 INJECTION, SOLUTION INTRAVENOUS; SUBCUTANEOUS at 14:11

## 2024-01-16 RX ADMIN — LIDOCAINE HYDROCHLORIDE: 20 INJECTION, SOLUTION INFILTRATION; PERINEURAL at 14:11

## 2024-01-16 RX ADMIN — NITROGLYCERIN 0.5 INCH: 20 OINTMENT TOPICAL at 11:34

## 2024-01-16 RX ADMIN — METOPROLOL SUCCINATE 25 MG: 25 TABLET, EXTENDED RELEASE ORAL at 21:13

## 2024-01-16 RX ADMIN — DIVALPROEX SODIUM 750 MG: 500 TABLET, EXTENDED RELEASE ORAL at 21:12

## 2024-01-16 RX ADMIN — QUETIAPINE FUMARATE 175 MG: 50 TABLET ORAL at 21:13

## 2024-01-16 ASSESSMENT — PAIN DESCRIPTION - PAIN TYPE: TYPE: ACUTE PAIN

## 2024-01-16 ASSESSMENT — FIBROSIS 4 INDEX: FIB4 SCORE: 2.53

## 2024-01-16 NOTE — DISCHARGE PLANNING
Would appreciate updated TX evals s/p staged PCI of the LAD and circumflex arteries that is planned for today once appropriate.

## 2024-01-16 NOTE — PROGRESS NOTES
Hospital Medicine Daily Progress Note    Date of Service  1/16/2024    Chief Complaint  Azeb Major is a 83 y.o. female admitted 1/9/2024 with dyspnea    Hospital Course  83-year-old female with history of hearing loss, hypertension, bipolar and chronic kidney disease who presented 1/9 with chest pain and shortness of breath.  Patient has significant hearing loss and history was taken from the patient and family, patient has had worsening shortness of breath and chest pain for last couple days, also noticed swelling on her legs, on the day of admission patient was evaluated by PCP and they found the new atrial fibrillation and patient was transferred to emergency.  Patient was alert oriented x 4, patient has pressure chest pain, goes to her shoulders, with worsening shortness of breath, no nausea or vomiting or other symptoms and no fever.  EKG showed new atrial fibrillation with no significant ischemic changes, patient had a crackles with lower extremity edema, chest x-ray showed pulmonary edema, BNP was more than 27,000, troponin was elevated more than 200, cardiology was consulted, heparin with aspirin and metoprolol were initiated.  Also Lasix was initiated for heart failure.     Patient lives by herself and she usually does all daily activities by herself, no significant memory deficit however patient has bipolar which is a stable.    Required 3 L nasal cannula. Diuresed with IV diuretics. echo, shows EF 25% with moderate to severe mitral valve regurg which is new.    She has no respiratory distress and respiratory status has been stable on 3 L.  Had SHANTELLE which improved.    Heparin gtt continued.  Developed small hand hematoma from waking IV site after PT, dressing applied and bleeding stopped.  Hand remained neurovascularly intact.  Volume status improving, supplemental oxygen requirements decreasing.  She remains well-perfused.  Cardiology is increased metoprolol 25 mg twice daily and started  spironolactone 25 mg daily.  Renal function improved essentially to baseline, lytes normal.   Went for coronary angiogram found to have severe three vessel disease with successful PCI of the mid-right coronary artery with one ANDRES.   Brought back to the floors and restarted on heparin gtt.   Plan for staged PCI to LAD and circumflex in 2-3 days followed by LUCY/DCCV.  Patient feels improved, more energetic, family notes she is acting more like herself.  Watch renal function and UOP, respiratory status and BP. Monitor for bleeding at access site.    Interval Problem Update  1/15  She remains afebrile heart rate from 100-120 respiratory rate 18 systolic blood pressure in the 110s to 120s, on 2 to 3 L nasal cannula.  No complaints, she continues on heparin drip as she waits for the second stage of her PCI which will occur on January 16, LUCY/DCCV initially planned for inpatient will be planned for outpatient.  Loop diuretics held by cardiology, continue Nitropaste, IV heparin, DAPT with clopidogrel, atorvastatin, spironolactone and metoprolol.  Follow renal function.  She is in a weakened and debilitated state following all of this although does seem to responding rather well to the treatments.  Daughter and patient updated at bedside, discussed with Dr. Lawler.  1/16: Patient seen and examined, resting in bed, denies chest pain, cardiology following and plan is for cardiac cath today  Case discussed with cardiology appreciate rec.   Cont on heparin drip   I have discussed this patient's plan of care and discharge plan at IDT rounds today with Case Management, Nursing, Nursing leadership, and other members of the IDT team.    Consultants/Specialty  cardiology    Code Status  Full Code    Disposition  The patient is not medically cleared for discharge to home or a post-acute facility.      I have placed the appropriate orders for post-discharge needs.    Review of Systems  ROS   Review of Systems   Constitutional:   Positive for malaise/fatigue. Negative for chills, fever and weight loss.   HENT:  Negative for ear pain, hearing loss and tinnitus.    Eyes:  Negative for blurred vision, double vision and photophobia.   Respiratory:  Positive for shortness of breath. Negative for cough and hemoptysis.    Cardiovascular:  Positive for chest pain. Negative for palpitations, orthopnea and claudication.   Gastrointestinal:  Negative for abdominal pain, constipation, diarrhea, nausea and vomiting.   Genitourinary:  Negative for dysuria, frequency and urgency.   Musculoskeletal:  Negative for myalgias and neck pain.   Skin:  Negative for rash.   Neurological:  Negative for dizziness, speech change and weakness    Physical Exam  Temp:  [36.4 °C (97.5 °F)-36.7 °C (98.1 °F)] 36.7 °C (98 °F)  Pulse:  [100-119] 119  Resp:  [18-20] 20  BP: (119-126)/(74-83) 124/75  SpO2:  [94 %-97 %] 95 %    Physical Exam  Vitals and nursing note reviewed. Exam conducted with a chaperone present.   Constitutional:       General: She is not in acute distress.     Appearance: She is not ill-appearing or toxic-appearing.      Comments: Appears weak   HENT:      Head: Normocephalic and atraumatic.      Nose: Nose normal. No rhinorrhea.      Mouth/Throat:      Mouth: Mucous membranes are moist.      Pharynx: Oropharynx is clear. No posterior oropharyngeal erythema.   Eyes:      General: No scleral icterus.     Extraocular Movements: Extraocular movements intact.      Conjunctiva/sclera: Conjunctivae normal.   Neck:      Comments: JVD  Cardiovascular:      Rate and Rhythm: Tachycardia present. Rhythm irregular.      Heart sounds: Murmur heard.   Pulmonary:      Effort: Pulmonary effort is normal. No respiratory distress.      Breath sounds: No wheezing, rhonchi or rales.      Comments: Low work of breathing.  On 2-3 L NC, bibasilar rales  Abdominal:      Palpations: Abdomen is soft.      Tenderness: There is no abdominal tenderness. There is no guarding or rebound.    Musculoskeletal:         General: Normal range of motion.      Cervical back: Normal range of motion and neck supple. No rigidity or tenderness.      Right lower leg: Edema present.      Left lower leg: Edema present.   Skin:     General: Skin is warm and dry.      Capillary Refill: Capillary refill takes less than 2 seconds.      Findings: Bruising present. No rash.      Comments: warm dry skin   Neurological:      General: No focal deficit present.      Mental Status: She is alert. Mental status is at baseline.      Cranial Nerves: No cranial nerve deficit.      Sensory: No sensory deficit.      Motor: No weakness.   Psychiatric:         Mood and Affect: Mood normal.         Behavior: Behavior normal.         Thought Content: Thought content normal.         Judgment: Judgment normal.         Fluids    Intake/Output Summary (Last 24 hours) at 1/16/2024 1331  Last data filed at 1/16/2024 0800  Gross per 24 hour   Intake 410 ml   Output 250 ml   Net 160 ml       Laboratory  Recent Labs     01/14/24  0231 01/15/24  0011   WBC 7.6 7.8   RBC 3.83* 3.85*   HEMOGLOBIN 11.7* 11.8*   HEMATOCRIT 35.9* 35.3*   MCV 93.7 91.7   MCH 30.5 30.6   MCHC 32.6 33.4   RDW 48.2 46.2   PLATELETCT 141* 162*   MPV 11.0 10.9     Recent Labs     01/14/24  0231 01/15/24  0011 01/16/24  0004   SODIUM 142 142 141   POTASSIUM 3.7 3.8 4.2   CHLORIDE 102 102 103   CO2 26 26 25   GLUCOSE 71 80 91   BUN 85* 73* 62*   CREATININE 1.25 1.25 1.04   CALCIUM 9.0 9.2 9.0                       Imaging  EC-ECHOCARDIOGRAM COMPLETE W/ CONT   Final Result      DX-CHEST-PORTABLE (1 VIEW)   Final Result      1.  Increased diffuse interstitial edema with layering small right effusion.   2.  Bilateral basilar atelectasis and/or consolidation. Underlying infection is possible.   3.  Mild enlargement of the cardiomediastinal silhouette.      EC-LUCY W/ CONT    (Results Pending)   CL-CARDIOVERSION    (Results Pending)   CL-LEFT HEART CATHETERIZATION WITH POSSIBLE  INTERVENTION    (Results Pending)   CL-LEFT HEART CATHETERIZATION WITH POSSIBLE INTERVENTION    (Results Pending)        Assessment/Plan  * A-fib (HCC)- (present on admission)  Assessment & Plan  New diagnosis with A-fib and RVR  Came with signs of heart failure  TSH is normal  Check echo  Start with metoprolol 25 mg twice daily  No history of smoking or drugs  Telemetry    1/15 heart rate improved on increased metoprolol, future LUCY/LUCY DCCV planned after second stage of staged PCI, cardioversion to happen outpatient.  Still on heparin drip    CHF exacerbation (HCC)  Assessment & Plan  Patient has shortness of breath with crackles and lower extremity edema  BNP more than 27,000  Heart failure likely related to uncontrolled A-fib  Started with IV Lasix 40 mg twice daily with heparin drip and metoprolol  Close monitoring and start with ACE inhibitors and spironolactone if needed  Echo is pending  Telemetry    1/15 still with respiratory failure, however no acute exacerbation symptoms, exacerbation resolved.  Now with ongoing HFpEF.  Diuresis currently held by cardiology.  Continue other GDMT measures.    ACP (advance care planning)  Assessment & Plan  1/9 plan of care was discussed in detail with the patient and family, discussed all treatment options, discussed the CODE STATUS, discussed all images and labs, answered all their questions, they want to be full code, all treatment, time 30 minutes    Chest pain- (present on admission)  Assessment & Plan  The ASCVD Risk score (Breann MONTAGUE, et al., 2019) failed to calculate for the following reasons:    The 2019 ASCVD risk score is only valid for ages 40 to 79  Pressure chest pain goes to her shoulders  Blood pressure is at the same and both arms and no signs of dissection on chest x-ray  With a new A-fib and a new heart failure  Patient is a high risk for coronary artery disease however her chest pain possibly related to demand  Trending troponin and telemetry  Elevated  troponin  Echo  Cardiology was consulted since patient has a new heart failure  Aspirin and statin  1/16: cardiology following case discussed plan for angiogram today     Bipolar disorder, in partial remission, most recent episode depressed (HCC)- (present on admission)  Assessment & Plan  Stable  Continue home dose of Seroquel and venlafaxine    Hypothyroid- (present on admission)  Assessment & Plan  TSH 6.4  Continue levothyroxine 125 mcg daily         VTE prophylaxis: heparin gtt    Greater than 51 minutes spent prepping to see patient (e.g. review of tests) obtaining and/or reviewing separately obtained history. Performing a medically appropriate examination and/ evaluation.  Counseling and educating the patient/family/caregiver.  Ordering medications, tests, or procedures.  Referring and communicating with other health care professionals.  Documenting clinical information in EPIC.  Independently interpreting results and communicating results to patient/family/caregiver.  Care coordination.     I have performed a physical exam and reviewed and updated ROS and Plan today (1/15/2024). In review of yesterday's note (1/14/2024), there are no changes except as documented above.

## 2024-01-16 NOTE — PROGRESS NOTES
Hospital Medicine Daily Progress Note    Date of Service  1/15/2024    Chief Complaint  Azeb Major is a 83 y.o. female admitted 1/9/2024 with dyspnea    Hospital Course  83-year-old female with history of hearing loss, hypertension, bipolar and chronic kidney disease who presented 1/9 with chest pain and shortness of breath.  Patient has significant hearing loss and history was taken from the patient and family, patient has had worsening shortness of breath and chest pain for last couple days, also noticed swelling on her legs, on the day of admission patient was evaluated by PCP and they found the new atrial fibrillation and patient was transferred to emergency.  Patient was alert oriented x 4, patient has pressure chest pain, goes to her shoulders, with worsening shortness of breath, no nausea or vomiting or other symptoms and no fever.  EKG showed new atrial fibrillation with no significant ischemic changes, patient had a crackles with lower extremity edema, chest x-ray showed pulmonary edema, BNP was more than 27,000, troponin was elevated more than 200, cardiology was consulted, heparin with aspirin and metoprolol were initiated.  Also Lasix was initiated for heart failure.     Patient lives by herself and she usually does all daily activities by herself, no significant memory deficit however patient has bipolar which is a stable.    Required 3 L nasal cannula. Diuresed with IV diuretics. echo, shows EF 25% with moderate to severe mitral valve regurg which is new.    She has no respiratory distress and respiratory status has been stable on 3 L.  Had SHANTELLE which improved.    Heparin gtt continued.  Developed small hand hematoma from waking IV site after PT, dressing applied and bleeding stopped.  Hand remained neurovascularly intact.  Volume status improving, supplemental oxygen requirements decreasing.  She remains well-perfused.  Cardiology is increased metoprolol 25 mg twice daily and started  spironolactone 25 mg daily.  Renal function improved essentially to baseline, lytes normal.   Went for coronary angiogram found to have severe three vessel disease with successful PCI of the mid-right coronary artery with one ANDRES.   Brought back to the floors and restarted on heparin gtt.   Plan for staged PCI to LAD and circumflex in 2-3 days followed by LUCY/DCCV.  Patient feels improved, more energetic, family notes she is acting more like herself.  Watch renal function and UOP, respiratory status and BP. Monitor for bleeding at access site.    Interval Problem Update  1/15  She remains afebrile heart rate from 100-120 respiratory rate 18 systolic blood pressure in the 110s to 120s, on 2 to 3 L nasal cannula.  No complaints, she continues on heparin drip as she waits for the second stage of her PCI which will occur on January 16, LUCY/DCCV initially planned for inpatient will be planned for outpatient.  Loop diuretics held by cardiology, continue Nitropaste, IV heparin, DAPT with clopidogrel, atorvastatin, spironolactone and metoprolol.  Follow renal function.  She is in a weakened and debilitated state following all of this although does seem to responding rather well to the treatments.  Daughter and patient updated at bedside, discussed with Dr. Lawler.    I have discussed this patient's plan of care and discharge plan at IDT rounds today with Case Management, Nursing, Nursing leadership, and other members of the IDT team.    Consultants/Specialty  cardiology    Code Status  Full Code    Disposition  Medically Cleared  I have placed the appropriate orders for post-discharge needs.    Review of Systems  ROS   Review of Systems   Constitutional:  Positive for malaise/fatigue. Negative for chills, fever and weight loss.   HENT:  Negative for ear pain, hearing loss and tinnitus.    Eyes:  Negative for blurred vision, double vision and photophobia.   Respiratory:  Positive for shortness of breath. Negative for  cough and hemoptysis.    Cardiovascular:  Positive for chest pain. Negative for palpitations, orthopnea and claudication.   Gastrointestinal:  Negative for abdominal pain, constipation, diarrhea, nausea and vomiting.   Genitourinary:  Negative for dysuria, frequency and urgency.   Musculoskeletal:  Negative for myalgias and neck pain.   Skin:  Negative for rash.   Neurological:  Negative for dizziness, speech change and weakness    Physical Exam  Temp:  [36.3 °C (97.3 °F)-36.8 °C (98.2 °F)] 36.6 °C (97.9 °F)  Pulse:  [100-119] 102  Resp:  [18] 18  BP: (111-123)/(68-83) 123/74  SpO2:  [94 %-96 %] 96 %    Physical Exam  Vitals and nursing note reviewed. Exam conducted with a chaperone present.   Constitutional:       General: She is not in acute distress.     Appearance: She is not ill-appearing or toxic-appearing.      Comments: Appears weak   HENT:      Head: Normocephalic and atraumatic.      Nose: Nose normal. No rhinorrhea.      Mouth/Throat:      Mouth: Mucous membranes are moist.      Pharynx: Oropharynx is clear. No posterior oropharyngeal erythema.   Eyes:      General: No scleral icterus.     Extraocular Movements: Extraocular movements intact.      Conjunctiva/sclera: Conjunctivae normal.   Neck:      Comments: JVD  Cardiovascular:      Rate and Rhythm: Tachycardia present. Rhythm irregular.      Heart sounds: Murmur heard.   Pulmonary:      Effort: Pulmonary effort is normal. No respiratory distress.      Breath sounds: No wheezing, rhonchi or rales.      Comments: Low work of breathing.  On 2-3 L NC, bibasilar rales  Abdominal:      Palpations: Abdomen is soft.      Tenderness: There is no abdominal tenderness. There is no guarding or rebound.   Musculoskeletal:         General: Normal range of motion.      Cervical back: Normal range of motion and neck supple. No rigidity or tenderness.      Right lower leg: Edema present.      Left lower leg: Edema present.   Skin:     General: Skin is warm and dry.       Capillary Refill: Capillary refill takes less than 2 seconds.      Findings: Bruising present. No rash.      Comments: warm dry skin   Neurological:      General: No focal deficit present.      Mental Status: She is alert. Mental status is at baseline.      Cranial Nerves: No cranial nerve deficit.      Sensory: No sensory deficit.      Motor: No weakness.   Psychiatric:         Mood and Affect: Mood normal.         Behavior: Behavior normal.         Thought Content: Thought content normal.         Judgment: Judgment normal.         Fluids    Intake/Output Summary (Last 24 hours) at 1/16/2024 0444  Last data filed at 1/16/2024 0352  Gross per 24 hour   Intake 960 ml   Output 250 ml   Net 710 ml         Laboratory  Recent Labs     01/14/24  0231 01/15/24  0011   WBC 7.6 7.8   RBC 3.83* 3.85*   HEMOGLOBIN 11.7* 11.8*   HEMATOCRIT 35.9* 35.3*   MCV 93.7 91.7   MCH 30.5 30.6   MCHC 32.6 33.4   RDW 48.2 46.2   PLATELETCT 141* 162*   MPV 11.0 10.9       Recent Labs     01/14/24  0231 01/15/24  0011   SODIUM 142 142   POTASSIUM 3.7 3.8   CHLORIDE 102 102   CO2 26 26   GLUCOSE 71 80   BUN 85* 73*   CREATININE 1.25 1.25   CALCIUM 9.0 9.2                         Imaging  EC-ECHOCARDIOGRAM COMPLETE W/ CONT   Final Result      DX-CHEST-PORTABLE (1 VIEW)   Final Result      1.  Increased diffuse interstitial edema with layering small right effusion.   2.  Bilateral basilar atelectasis and/or consolidation. Underlying infection is possible.   3.  Mild enlargement of the cardiomediastinal silhouette.      EC-LUCY W/ CONT    (Results Pending)   CL-CARDIOVERSION    (Results Pending)   CL-LEFT HEART CATHETERIZATION WITH POSSIBLE INTERVENTION    (Results Pending)   CL-LEFT HEART CATHETERIZATION WITH POSSIBLE INTERVENTION    (Results Pending)        Assessment/Plan  * A-fib (HCC)- (present on admission)  Assessment & Plan  New diagnosis with A-fib and RVR  Came with signs of heart failure  TSH is normal  Check echo  Start with  metoprolol 25 mg twice daily  No history of smoking or drugs  Telemetry    1/15 heart rate improved on increased metoprolol, future LUCY/LUCY DCCV planned after second stage of staged PCI, cardioversion to happen outpatient.  Still on heparin drip    CHF exacerbation (HCC)  Assessment & Plan  Patient has shortness of breath with crackles and lower extremity edema  BNP more than 27,000  Heart failure likely related to uncontrolled A-fib  Started with IV Lasix 40 mg twice daily with heparin drip and metoprolol  Close monitoring and start with ACE inhibitors and spironolactone if needed  Echo is pending  Telemetry    1/15 still with respiratory failure, however no acute exacerbation symptoms, exacerbation resolved.  Now with ongoing HFpEF.  Diuresis currently held by cardiology.  Continue other GDMT measures.    ACP (advance care planning)  Assessment & Plan  1/9 plan of care was discussed in detail with the patient and family, discussed all treatment options, discussed the CODE STATUS, discussed all images and labs, answered all their questions, they want to be full code, all treatment, time 30 minutes    Chest pain- (present on admission)  Assessment & Plan  The ASCVD Risk score (Breann DK, et al., 2019) failed to calculate for the following reasons:    The 2019 ASCVD risk score is only valid for ages 40 to 79  Pressure chest pain goes to her shoulders  Blood pressure is at the same and both arms and no signs of dissection on chest x-ray  With a new A-fib and a new heart failure  Patient is a high risk for coronary artery disease however her chest pain possibly related to demand  Trending troponin and telemetry  Elevated troponin  Echo  Cardiology was consulted since patient has a new heart failure  Aspirin and statin  Check A1c and lipid panel    Bipolar disorder, in partial remission, most recent episode depressed (HCC)- (present on admission)  Assessment & Plan  Stable  Continue home dose of Seroquel and  venlafaxine    Hypothyroid- (present on admission)  Assessment & Plan  TSH 6.4  Continue levothyroxine 125 mcg daily         VTE prophylaxis:    therapeutic anticoagulation with weight-based heparin gtt, pharmacy to adjust PRN      I have performed a physical exam and reviewed and updated ROS and Plan today (1/15/2024). In review of yesterday's note (1/14/2024), there are no changes except as documented above.  Total time spent 54 minutes. I spent greater than 50% of the time for patient care, counseling, and coordination on this date, including unit/floor time, and face-to-face time with the patient as per interval events, my own review of patient's imaging and lab analysis and developing my assessment and plan above.

## 2024-01-16 NOTE — PROGRESS NOTES
Monitor Summary:  Rhythm: AFIB  Rate: 100 - 117  Ectopy: PVC  0.-/ 0.11/ 0.-    *no monitor strip available

## 2024-01-16 NOTE — DISCHARGE PLANNING
Case Management Discharge Planning    Admission Date: 1/9/2024  GMLOS: 3.8  ALOS: 7    6-Clicks ADL Score: 16  6-Clicks Mobility Score: 18  PT and/or OT Eval ordered: Yes  Post-acute Referrals Ordered: Yes  Post-acute Choice Obtained: Yes  Has referral(s) been sent to post-acute provider:  Yes      Anticipated Discharge Dispo: Discharge Disposition: D/T to home under [facility] care in anticipation of covered skilled care (06)    DME Needed: No    Action(s) Taken: Updated Provider/Nurse on Discharge Plan    Escalations Completed: None    Medically Clear: No    Next Steps: PCI planned for today. [facility] accepted pending bed availability when medically cleared.     Barriers to Discharge: Medical clearance    Is the patient up for discharge tomorrow: No

## 2024-01-16 NOTE — PROGRESS NOTES
Report received from AM RN at 1900. Patient AOX4 at this time. Denies pain. Currently on waffle mattress, Q2H turns in place. POC dicussed with patient. Call light within reach. Bed alarm ON and verified.

## 2024-01-16 NOTE — THERAPY
Speech Language Therapy Contact Note    Patient Name: Azeb Major  Age:  83 y.o., Sex:  female  Medical Record #: [id number]  Today's Date: 1/16/2024 01/16/24 0817   Treatment Variance   Reason For Missed Therapy Medical - Other (Please Comment)   Interdisciplinary Plan of Care Collaboration   IDT Collaboration with  Other (See Comments)   Collaboration Comments Per EMR, pt currently NPO for procedure. SLP to follow when appropriate.

## 2024-01-16 NOTE — DISCHARGE PLANNING
0810  Agency/Facility Name: [facility]  Spoke To: [facility]   Outcome: [facility] called to notify Pt has been accepted and have availabitliy beds today if Pt is medically cleared and chooses to go to with [facility].     0811  Per [facility] response all [facility] referrals accepted.     MATTHEW RN Pat notified.

## 2024-01-16 NOTE — CARE PLAN
The patient is Stable - Low risk of patient condition declining or worsening    Shift Goals  Clinical Goals: VSS; safety, maintain skin integrity  Patient Goals: comfort  Family Goals: Updates    Progress made toward(s) clinical / shift goals:  Q2H turns in place. Heparin Xa therapeutic at this time.    Problem: Knowledge Deficit - Standard  Goal: Patient and family/care givers will demonstrate understanding of plan of care, disease process/condition, diagnostic tests and medications  Outcome: Progressing     Problem: Fall Risk  Goal: Patient will remain free from falls  Outcome: Progressing     Problem: Skin Integrity  Goal: Skin integrity is maintained or improved  Outcome: Progressing     Problem: Hemodynamics  Goal: Patient's hemodynamics, fluid balance and neurologic status will be stable or improve  Outcome: Progressing       Patient is not progressing towards the following goals:

## 2024-01-16 NOTE — CARE PLAN
The patient is Watcher - Medium risk of patient condition declining or worsening    Shift Goals  Clinical Goals: Patient will maintain hemodynamic stability through end of shift  Patient Goals: feel better  Family Goals: updated on poc    Progress made toward(s) clinical / shift goals:  Q2T. Family at bedside - updated on poc. Down for cath. Tolerating titration down to 0.5LNC.     Patient is not progressing towards the following goals:

## 2024-01-16 NOTE — THERAPY
01/15/24 1708   Interdisciplinary Plan of Care Collaboration   Collaboration Comments OT consult received. Pt s/p staged stent placeent with additional stent procedure planned for 1/16. Will follow up after procedures to complete eval as appropriate.

## 2024-01-16 NOTE — PROGRESS NOTES
4 Eyes Skin Assessment Completed by HARMEET Albrecht and Azael RN.    Head WDL  Ears WDL  Nose: red blister L nostril  Mouth WDL  Neck WDL  Breast/Chest WDL (nitro patch in place R chest)  Shoulder Blades WDL  Spine WDL  (R) Arm/Elbow/Hand Bruising  (L) Arm/Elbow/Hand Bruising  Abdomen WDL  Groin Redness, blackish bleeding scab Left inner labia  Scrotum/Coccyx/Buttocks Redness and Blanching  (R) Leg: Dusky, Flaky   (L) Leg: Dusky , Flaky  (R) Heel/Foot/Toe Redness and Blanching  (L) Heel/Foot/Toe Redness and Blanching (L lateral ankle redness/blanching)          Devices In Places Pulse Ox and Nasal Cannula, PIV x2, Purewick      Interventions In Place Gray Ear Foams, Heel Mepilex, Sacral Mepilex, Waffle Overlay, Pillows, and Q2 Turns    Possible Skin Injury Yes    Pictures Uploaded Into Epic Yes  Wound Consult Placed Yes  RN Wound Prevention Protocol Ordered Yes

## 2024-01-16 NOTE — PROCEDURES
Cardiac Catheterization Procedure Note    DATE: 1/16/2024    : Jose Sawyer MD    PROCEDURES PERFORMED:  Limited coronary angiography  Intravascular ultrasound of the left circumflex coronary artery  Intravascular ultrasound of the left anterior descending coronary artery  Intravascular ultrasound of the left main coronary artery  Intracoronary lithotripsy and percutaneous coronary intervention to the mid left circumflex coronary artery  Moderate conscious sedation    INDICATIONS:  The patient is an 83-year-old woman referred for staged percutaneous coronary intervention to the left circumflex and left anterior descending coronary arteries following recent percutaneous coronary intervention to the right coronary artery.    CONSENT:  The complete alternatives, risks, and benefits of the procedure were explained to the patient. Signed informed consent was obtained and placed in the chart prior to the procedure.  A timeout was performed prior to beginning the procedure.    MEDICATIONS:  Lidocaine  Fentanyl  Midazolam  Heparin    MODERATE CONSCIOUS SEDATION:  I personally supervised the administration of moderate conscious sedation by the nursing staff and I staff for a 83 minutes.  Sedation start time: 1:59 PM  Sedation end time: 3:22 PM    CONTRAST: Omnipaque 86 cc    ACCESS: 7-Tristanian [place] in the right femoral artery.    ESTIMATED BLOOD LOSS: 20 cc    COMPLICATIONS: None    PROCEDURE IN DETAIL:  The patient was brought to the cardiac catheterization laboratory in the fasting state.  The skin over the right groin was prepped and draped in the usual sterile fashion. Lidocaine infiltration was used to anesthetize the tissue over the right femoral artery.  Using the micropuncture technique and ultrasound guidance, a 7-Tristanian [place] was inserted in the right radial artery.  A 7-Tristanian EBU-3.5 guide catheter was then used to engage the ostium of the left main coronary artery and planning cineangiograms were  obtained.  A Whisper wire was then advanced across the lesion in the mid circumflex and was placed in the distal aspect of the vessel.  An [place] [facility] IVUS catheter was then advanced over the guidewire, however, the catheter was unable to pass beyond the lesion.  IVUS at that point appeared to show an eccentric calcium nodule, but no significant concentric calcification with a true vessel diameter of approximate 3.2 mm.  Following intravascular ultrasound, we proceeded with percutaneous coronary intervention.    A 2.5 x 15 mm compliant balloon was then advanced over the guidewire and was used to predilate the lesion without good yield.  A 2.75 x 12 mm noncompliant balloon was then used to further predilate the lesion with only slightly improved yield.  The lesion was then reevaluated by IVUS, however, the catheter again would not cross the lesion.  The true vessel diameter appeared to be approximately 3.5 mm at the ostium, therefore, we proceeded with intracoronary lithotripsy using a 3.5 mm Shockwave balloon.  A total of 40 pulses were delivered to the vessel, at which point the patient began to develop significant ST elevations and mild chest discomfort.  Following lithotripsy, the lesion was further predilated with a 3.0 x 12 mm noncompliant balloon with adequate lesion yield.  Therefore, a 2.5 x 16 mm Synergy drug-eluting stent was deployed across the lesion at a maximum pressure of 16 georgie.  Following stent placement, the IVUS catheter was still not cross the most severe aspect of the lesion, however, at the proximal edge of the stent it appeared to be well-expanded achieving diameter of approximately 3.9 x 2.5 mm.  The stent was then initially postdilated with a 3.25 x 12 mm noncompliant balloon, however, a significant waist remained.  This area was further postdilated with a 3.75 x 8 mm at 26 georgie, which finally resulted in good resolution of the residual waist.  However, there appeared to be a small contained  perforation after more aggressive post dilation, therefore, balloon tamponade was performed on this area using a 3.5 x 12 mm noncompliant balloon for 3 minutes.  Balloon tamponade resulted in resolution of the perforation and final cineangiograms were then obtained in orthogonal views, which demonstrated excellent stent expansion and apposition with no evidence of wire perforation, thrombus or dissection.      Following completion of intervention on the circumflex, we proceeded with evaluation of the LAD.  The Whisper wire was left in place and a Run-through wire was advanced into the distal aspect of the LAD.  The IVUS catheter was then advanced over the LAD wire, but could not be passed beyond the very ostium of the vessel.  IVUS at the ostium demonstrated a severe stenosis with approximate 180 degrees of calcification and a minimal luminal area of 3.9 mm².  The IVUS catheter was then pulled back to evaluate the left main coronary artery, which had a better preserved lumen at 7.5 mm², but with moderate concentric plaque at the distal aspect of the vessel.  As further intervention on the LAD was likely to require extensive stenting back into the left main with either further lithotripsy or atherectomy, the decision was made to defer additional intervention given that the patient had a contained perforation and had already received over 80 cc of contrast.  Final cineangiograms were performed in orthogonal views, which demonstrated no evidence of new dissection, perforation, or thrombus in the LAD.  At the completion of the case, all wires catheters and sheaths were removed.  An 8-Urdu Perclose device was deployed in the right femoral artery with good hemostasis.    HEMODYNAMICS:   Aortic pressure: 116/72 mmHg    LIMITED CORONARY ANGIOGRAPHY:  Limited coronary angiography redemonstrated a severe, approximately 80%, lesion in the mid circumflex at a calcific nodule.  The ostium of the left anterior descending  coronary was better visualized and appeared to have a focal 70% lesion followed by a tortuous and complex proximal 90% lesion and a mid 70% lesion.    INTRAVASCULAR ULTRASOUND:  See IVUS findings and procedure details above.    PERCUTANEOUS CORONARY INTERVENTION:  Lesion location: Mid left circumflex coronary artery  Lesion type: C  Lesion length: 8 mm  Pre-intervention SOLIS flow: 3  Post-intervention SOLIS flow: 3  Pre-intervention stenosis: 80%  Post-intervention stenosis: 10%  Stent: 2.5 x 16 mm Synergy drug-eluting stent    IMPRESSION:  Successful intracoronary lithotripsy and percutaneous coronary intervention to the mid left circumflex coronary with one 2.5 x 16 mm Synergy drug-eluting stent postdilated with a 3.75 x 8 mm noncompliant balloon at a maximum pressure of 26 georgie in the mid segment.  Residual sequential severe and calcified stenoses in the ostial, proximal, and mid left anterior descending coronary.    RECOMMENDATIONS:  Return to floor with continued care per primary service.  Flat for 4 hours with routine groin precautions.  Restart heparin drip at previous rate without bolus in 2 hours if no access site complications.  Continue dual antiplatelet therapy with aspirin and clopidogrel.  Depending on patient clinical status, renal function, and access site consider staged complex coronary intervention to the left anterior descending coronary, which will likely require stenting back into the left main coronary, either as an inpatient or outpatient.    NOTIFICATION:  The patient's family was called and notified of the results of her cardiac catheterization.

## 2024-01-17 LAB
ANION GAP SERPL CALC-SCNC: 11 MMOL/L (ref 7–16)
BUN SERPL-MCNC: 49 MG/DL (ref 8–22)
CALCIUM SERPL-MCNC: 9 MG/DL (ref 8.5–10.5)
CHLORIDE SERPL-SCNC: 109 MMOL/L (ref 96–112)
CO2 SERPL-SCNC: 22 MMOL/L (ref 20–33)
CREAT SERPL-MCNC: 1.09 MG/DL (ref 0.5–1.4)
ERYTHROCYTE [DISTWIDTH] IN BLOOD BY AUTOMATED COUNT: 49.4 FL (ref 35.9–50)
GFR SERPLBLD CREATININE-BSD FMLA CKD-EPI: 50 ML/MIN/1.73 M 2
GLUCOSE SERPL-MCNC: 152 MG/DL (ref 65–99)
HCT VFR BLD AUTO: 36.9 % (ref 37–47)
HGB BLD-MCNC: 11.7 G/DL (ref 12–16)
MCH RBC QN AUTO: 30.2 PG (ref 27–33)
MCHC RBC AUTO-ENTMCNC: 31.7 G/DL (ref 32.2–35.5)
MCV RBC AUTO: 95.1 FL (ref 81.4–97.8)
PLATELET # BLD AUTO: 132 K/UL (ref 164–446)
PMV BLD AUTO: 10.7 FL (ref 9–12.9)
POTASSIUM SERPL-SCNC: 4.2 MMOL/L (ref 3.6–5.5)
RBC # BLD AUTO: 3.88 M/UL (ref 4.2–5.4)
SODIUM SERPL-SCNC: 142 MMOL/L (ref 135–145)
UFH PPP CHRO-ACNC: 0.59 IU/ML
WBC # BLD AUTO: 7 K/UL (ref 4.8–10.8)

## 2024-01-17 PROCEDURE — A9270 NON-COVERED ITEM OR SERVICE: HCPCS | Performed by: NURSE PRACTITIONER

## 2024-01-17 PROCEDURE — 97535 SELF CARE MNGMENT TRAINING: CPT

## 2024-01-17 PROCEDURE — 700102 HCHG RX REV CODE 250 W/ 637 OVERRIDE(OP): Performed by: NURSE PRACTITIONER

## 2024-01-17 PROCEDURE — A9270 NON-COVERED ITEM OR SERVICE: HCPCS | Performed by: INTERNAL MEDICINE

## 2024-01-17 PROCEDURE — 99233 SBSQ HOSP IP/OBS HIGH 50: CPT | Performed by: INTERNAL MEDICINE

## 2024-01-17 PROCEDURE — 770020 HCHG ROOM/CARE - TELE (206)

## 2024-01-17 PROCEDURE — 36415 COLL VENOUS BLD VENIPUNCTURE: CPT

## 2024-01-17 PROCEDURE — 85027 COMPLETE CBC AUTOMATED: CPT

## 2024-01-17 PROCEDURE — 700102 HCHG RX REV CODE 250 W/ 637 OVERRIDE(OP): Performed by: INTERNAL MEDICINE

## 2024-01-17 PROCEDURE — 97530 THERAPEUTIC ACTIVITIES: CPT

## 2024-01-17 PROCEDURE — 700111 HCHG RX REV CODE 636 W/ 250 OVERRIDE (IP): Performed by: INTERNAL MEDICINE

## 2024-01-17 PROCEDURE — 85520 HEPARIN ASSAY: CPT

## 2024-01-17 PROCEDURE — 99232 SBSQ HOSP IP/OBS MODERATE 35: CPT | Performed by: INTERNAL MEDICINE

## 2024-01-17 PROCEDURE — 97166 OT EVAL MOD COMPLEX 45 MIN: CPT

## 2024-01-17 PROCEDURE — 97116 GAIT TRAINING THERAPY: CPT

## 2024-01-17 PROCEDURE — 80048 BASIC METABOLIC PNL TOTAL CA: CPT

## 2024-01-17 RX ORDER — AMMONIUM LACTATE 12 G/100G
LOTION TOPICAL 2 TIMES DAILY
Status: DISCONTINUED | OUTPATIENT
Start: 2024-01-17 | End: 2024-01-19 | Stop reason: HOSPADM

## 2024-01-17 RX ORDER — VENLAFAXINE HYDROCHLORIDE 75 MG/1
75 CAPSULE, EXTENDED RELEASE ORAL
Status: DISCONTINUED | OUTPATIENT
Start: 2024-01-18 | End: 2024-01-19 | Stop reason: HOSPADM

## 2024-01-17 RX ADMIN — METOPROLOL SUCCINATE 25 MG: 25 TABLET, EXTENDED RELEASE ORAL at 08:15

## 2024-01-17 RX ADMIN — NITROGLYCERIN 0.5 INCH: 20 OINTMENT TOPICAL at 01:06

## 2024-01-17 RX ADMIN — OMEPRAZOLE 20 MG: 20 CAPSULE, DELAYED RELEASE ORAL at 18:05

## 2024-01-17 RX ADMIN — SPIRONOLACTONE 25 MG: 25 TABLET ORAL at 05:51

## 2024-01-17 RX ADMIN — METOPROLOL SUCCINATE 25 MG: 25 TABLET, EXTENDED RELEASE ORAL at 20:37

## 2024-01-17 RX ADMIN — ASPIRIN 81 MG: 81 TABLET, COATED ORAL at 05:50

## 2024-01-17 RX ADMIN — NITROGLYCERIN 0.5 INCH: 20 OINTMENT TOPICAL at 05:50

## 2024-01-17 RX ADMIN — DIVALPROEX SODIUM 750 MG: 500 TABLET, EXTENDED RELEASE ORAL at 18:06

## 2024-01-17 RX ADMIN — CLOPIDOGREL BISULFATE 75 MG: 75 TABLET ORAL at 05:50

## 2024-01-17 RX ADMIN — HEPARIN SODIUM 14.01 UNITS/KG/HR: 5000 INJECTION, SOLUTION INTRAVENOUS at 14:15

## 2024-01-17 RX ADMIN — ATORVASTATIN CALCIUM 40 MG: 40 TABLET, FILM COATED ORAL at 18:04

## 2024-01-17 RX ADMIN — OXYBUTYNIN CHLORIDE 10 MG: 10 TABLET, EXTENDED RELEASE ORAL at 20:38

## 2024-01-17 RX ADMIN — LEVOTHYROXINE SODIUM 125 MCG: 0.12 TABLET ORAL at 05:51

## 2024-01-17 RX ADMIN — QUETIAPINE FUMARATE 175 MG: 50 TABLET ORAL at 20:37

## 2024-01-17 RX ADMIN — NITROGLYCERIN 0.5 INCH: 20 OINTMENT TOPICAL at 12:00

## 2024-01-17 RX ADMIN — DOCUSATE SODIUM 50 MG AND SENNOSIDES 8.6 MG 2 TABLET: 8.6; 5 TABLET, FILM COATED ORAL at 05:50

## 2024-01-17 RX ADMIN — NITROGLYCERIN 0.5 INCH: 20 OINTMENT TOPICAL at 18:00

## 2024-01-17 ASSESSMENT — COGNITIVE AND FUNCTIONAL STATUS - GENERAL
SUGGESTED CMS G CODE MODIFIER DAILY ACTIVITY: CK
DRESSING REGULAR LOWER BODY CLOTHING: A LOT
HELP NEEDED FOR BATHING: A LOT
STANDING UP FROM CHAIR USING ARMS: A LITTLE
CLIMB 3 TO 5 STEPS WITH RAILING: A LOT
MOVING FROM LYING ON BACK TO SITTING ON SIDE OF FLAT BED: A LITTLE
SUGGESTED CMS G CODE MODIFIER MOBILITY: CK
WALKING IN HOSPITAL ROOM: A LITTLE
EATING MEALS: A LITTLE
TURNING FROM BACK TO SIDE WHILE IN FLAT BAD: A LITTLE
MOVING TO AND FROM BED TO CHAIR: A LITTLE
TOILETING: A LOT
PERSONAL GROOMING: A LITTLE
DRESSING REGULAR UPPER BODY CLOTHING: A LITTLE
MOBILITY SCORE: 17
DAILY ACTIVITIY SCORE: 15

## 2024-01-17 ASSESSMENT — PAIN DESCRIPTION - PAIN TYPE
TYPE: ACUTE PAIN
TYPE: ACUTE PAIN

## 2024-01-17 ASSESSMENT — GAIT ASSESSMENTS
DISTANCE (FEET): 15
ASSISTIVE DEVICE: FRONT WHEEL WALKER
GAIT LEVEL OF ASSIST: MINIMAL ASSIST

## 2024-01-17 ASSESSMENT — ACTIVITIES OF DAILY LIVING (ADL): TOILETING: INDEPENDENT

## 2024-01-17 ASSESSMENT — ENCOUNTER SYMPTOMS
CHEST TIGHTNESS: 0
SHORTNESS OF BREATH: 0

## 2024-01-17 ASSESSMENT — FIBROSIS 4 INDEX: FIB4 SCORE: 3.11

## 2024-01-17 NOTE — CARE PLAN
The patient is Watcher - Medium risk of patient condition declining or worsening    Shift Goals  Clinical Goals: VSS, rest  Patient Goals: sleep  Family Goals: NA    Progress made toward(s) clinical / shift goals:      Problem: Knowledge Deficit - Standard  Goal: Patient and family/care givers will demonstrate understanding of plan of care, disease process/condition, diagnostic tests and medications  1/17/2024 0155 by Ruby Clay R.N.  Outcome: Progressing      Problem: Fall Risk  Goal: Patient will remain free from falls  1/17/2024 0155 by Ruby Clay R.N.  Outcome: Progressing    Problem: Skin Integrity  Goal: Skin integrity is maintained or improved  1/17/2024 0155 by Ruby Clay R.N.  Outcome: Progressing    Problem: Nutrition  Goal: Patient's nutritional and fluid intake will be adequate or improve  1/17/2024 0155 by Ruby Clay R.N.  Outcome: Progressing  1/17/2024 0153 by Ruby Clay R.N.  Outcome: Progressing     Patient is not progressing towards the following goals:    Problem: Hemodynamics  Goal: Patient's hemodynamics, fluid balance and neurologic status will be stable or improve  Outcome: Not Met   Heart rate and BP remain high

## 2024-01-17 NOTE — THERAPY
Physical Therapy   Daily Treatment     Patient Name: Azeb Major  Age:  83 y.o., Sex:  female  Medical Record #: [id number]  Today's Date: 1/17/2024     Precautions  Precautions: Fall Risk;Cardiac Precautions (See Comments)  Comments: 1/10/24: EF 26%, 2 stents complete as of 1/17, plan is for one more but not yet scheduled.    Assessment    Today, pt looks tired, daughter reports that she is sad/depressed. Pt is agreeable to participation, remains strong with sit to stand, but lacks endurance for longer distance walking. Pt tolerated ambulation around the bed, then seated rest, then walked around the bed to chair using FWW with min assist. Pt was cleaned up at wagner area, cream applied, see details below. Nsg would like to stop the pure wick for now due to bloody at site. Plan if for one more stent, but not clear when it will be done. Pt/family will need cardiac education once plan is clear. PT to continue.     Plan    Treatment Plan Status: Modify Current Treatment Plan  Type of Treatment: Bed Mobility, Gait Training, Neuro Re-Education / Balance, Therapeutic Activities  Treatment Frequency: 3 Times per Week  Treatment Duration: Until Therapy Goals Met    DC Equipment Recommendations: Unable to determine at this time  Discharge Recommendations: Recommend post-acute placement for additional physical therapy services prior to discharge home           Objective       01/17/24 1233   Charge Group   Charges  Yes   PT Gait Training (Units) 1   PT Therapeutic Activities (Units) 1   Total Time Spent   PT Total Time Yes   PT Gait Training Time Spent (Mins) 8   PT Therapeutic Activities Time Spent (Mins) 20   PT Total Time Spent (Calculated) 28   Precautions   Precautions Fall Risk;Cardiac Precautions (See Comments)   Comments 1/10/24: EF 26%, 2 stents complete as of 1/17, plan is for one more but not yet scheduled.   Vitals   O2 (LPM) 2   O2 Delivery Device Nasal Cannula   Pain 0 - 10 Group   Therapist Pain Assessment  During Activity;Nurse Notified;0   Cognition    Cognition / Consciousness X   Speech/ Communication Hard of Hearing  (hearing seems better today)   Level of Consciousness Alert   Comments best to speak slowly and face patient directly per family   Active ROM Lower Body    Active ROM Lower Body  WDL   Strength Lower Body   Lower Body Strength  X   Gross Strength Generalized Weakness, Equal Bilaterally   Comments strong with sit to stand, but lacks endurance.   Balance   Sitting Balance (Static) Fair   Sitting Balance (Dynamic) Fair -   Standing Balance (Static) Fair -   Standing Balance (Dynamic) Fair -   Weight Shift Sitting Fair   Weight Shift Standing Fair   Skilled Intervention Verbal Cuing;Sequencing   Comments with FWW   Bed Mobility    Supine to Sit   (up chair)   Sit to Supine   (up chair)   Scooting Supervised  (seated)   Skilled Intervention Verbal Cuing;Sequencing   Gait Analysis   Gait Level Of Assist Minimal Assist   Assistive Device Front Wheel Walker   Distance (Feet) 15   # of Times Distance was Traveled 2  (with one seated rest)   Deviation   (flexed posture, able to correct with cues)   Weight Bearing Status no restrictions   Skilled Intervention Verbal Cuing;Sequencing   Functional Mobility   Sit to Stand Contact Guard Assist   Bed, Chair, Wheelchair Transfer Minimal Assist   Transfer Method Stand Step   Skilled Intervention Verbal Cuing   Comments pt's purewick is dirty, clean up done to entire wagner area, bloody at anterior so cream applied and nsg updated, nsg will stop using Purewick for now. Pt was educated that she should let nsg know if she can feel when she needs to urinate.   How much difficulty does the patient currently have...   Turning over in bed (including adjusting bedclothes, sheets and blankets)? 3   Sitting down on and standing up from a chair with arms (e.g., wheelchair, bedside commode, etc.) 3   Moving from lying on back to sitting on the side of the bed? 3   How much help from  another person does the patient currently need...   Moving to and from a bed to a chair (including a wheelchair)? 3   Need to walk in a hospital room? 3   Climbing 3-5 steps with a railing? 2   6 clicks Mobility Score 17   Activity Tolerance   Sitting in Chair 20+   Standing 5   Short Term Goals    Short Term Goal # 1 Pt will perform bed mobility with supervision in 6 visits   Goal Outcome # 1 goal not met   Short Term Goal # 2 Pt will transfer with supervision in 6 visits to improve functional indep.   Goal Outcome # 2 Goal not met   Short Term Goal # 3 Pt will ambulate x 125 feet using fWW with supervision in 6 visits to improve functional indep.   Goal Outcome # 3 Goal not met   Education Group   Additional Comments ed done with family re posture when using fWW. Discussion/update with daughter who reports that pt needs much more assist during times when she is depressed and seems to be in that phase right now with all that is going on.   Physical Therapy Treatment Plan   Physical Therapy Treatment Plan Modify Current Treatment Plan   Treatment Plan  Bed Mobility;Gait Training;Neuro Re-Education / Balance;Therapeutic Activities   Treatment Frequency 3 Times per Week   Duration Until Therapy Goals Met   Anticipated Discharge Equipment and Recommendations   DC Equipment Recommendations Unable to determine at this time   Discharge Recommendations Recommend post-acute placement for additional physical therapy services prior to discharge home   Interdisciplinary Plan of Care Collaboration   IDT Collaboration with  Nursing   Patient Position at End of Therapy Seated;Call Light within Reach;Tray Table within Reach;Family / Friend in Room;Phone within Reach;Chair Alarm On   Collaboration Comments nsg updated   Session Information   Date / Session Number  1/17-2 (2/3, 1/23)  (will need cardiac education once all heart procedures are complete)

## 2024-01-17 NOTE — PROGRESS NOTES
Rounded with Ruby GA regarding concerns for patient WOB and Cath Lab access site.     Recommended giving scheduled medications that are within parameters and possibly addressing patient pain with PRN medications.

## 2024-01-17 NOTE — PROGRESS NOTES
At 2030, upon initial examination and palpation of RU quad of abdomen, the patient is complaining of 8/10 pain and nausea. Bowel sounds are normoactive in all 4 quads. Patient also has increased WOB intermittently with movement. SPO2 at 92%, respirations 30.    Called Rapid Nurse Levon Sanon, who rounded with myself and Candice Charge RN, and recommended giving scheduled medications and PRN pain medication within parameters. He is available if I have further concerns.     Patient was given her medications with water, Ensure, and small bites of pudding and is resting in bed presently with no complaints of nausea at this time. Respirations down to 20. Hourly rounding in place.

## 2024-01-17 NOTE — DISCHARGE PLANNING
Per physiatry patient is a candidate for IPR, following for updated therapy evals and medical clearance. Heparin gtt is a barrier.

## 2024-01-17 NOTE — PROGRESS NOTES
R groin site with no evidence of hematoma - VSS. Heparin restarted at 1730 per Dr Sawyer order. Patient sleepy but arousable.

## 2024-01-17 NOTE — PROGRESS NOTES
Cath lab RN transported pt to [id number] on zoll. Cath lab RN and bedside RN assessed R femoral groin site. Groin site soft without evidence of hematoma. Dressing is clean, dry, and intact. Pedal pulses are equal bilaterally. Cath lab RN reviewed groin management protocol with bedside RN. Pt to remain on bedrest for 4 hours. No further questions per bedside RN.

## 2024-01-17 NOTE — PROGRESS NOTES
Cardiology Follow Up Progress Note    Date of Service  1/17/2024    Attending Physician  Sunny Rodriguez M.D.    JIM Major is a 83 y.o. female admitted 1/9/2024 with a past medical history of bipolar disorder, CKD and hypothyroidism found to have atrial fibrillation with rapid ventricular response and primary care clinic referred to [facility]    Interim Events  1/15: /83.  HR 90s.  Alert.  Hard of hearing but can read lips and answer questions.  Assisted at the bedside with her daughter.  No cardiac symptoms.  1/16: Successful PCI circumflex with residual LAD the latter deferred due to complexity, length of procedure for circumflex intervention  1/17: /77.  .  A-fib.  The patient seen and examined with daughter at the bedside.  Specifically denies pain or shortness of breath.    Review of Systems  Review of Systems   Respiratory:  Negative for chest tightness and shortness of breath.        Vital signs in last 24 hours  Temp:  [36 °C (96.8 °F)-36.7 °C (98 °F)] 36.4 °C (97.5 °F)  Pulse:  [] 119  Resp:  [17-30] 24  BP: ()/() 118/77  SpO2:  [89 %-96 %] 96 %    Physical Exam  Physical Exam  Constitutional:       General: She is not in acute distress.     Comments: Frail.  Cachectic.  Hard of hearing   Cardiovascular:      Rate and Rhythm: Tachycardia present. Rhythm irregular.      Pulses:           Radial pulses are 1+ on the right side.      Heart sounds: Normal heart sounds, S1 normal and S2 normal. No murmur heard.     No friction rub. No gallop.   Pulmonary:      Effort: Pulmonary effort is normal.      Breath sounds: Normal breath sounds. No wheezing, rhonchi or rales.   Musculoskeletal:      Right lower leg: No edema.      Left lower leg: No edema.   Skin:     General: Skin is warm and dry.   Neurological:      General: No focal deficit present.      Mental Status: She is alert.   Psychiatric:         Behavior: Behavior normal.         Lab Review  Lab Results  "  Component Value Date/Time    WBC 7.0 01/17/2024 03:33 AM    WBC 4.8 03/14/2011 11:05 AM    RBC 3.88 (L) 01/17/2024 03:33 AM    RBC 4.34 03/14/2011 11:05 AM    HEMOGLOBIN 11.7 (L) 01/17/2024 03:33 AM    HEMATOCRIT 36.9 (L) 01/17/2024 03:33 AM    MCV 95.1 01/17/2024 03:33 AM    MCV 95 03/14/2011 11:05 AM    MCH 30.2 01/17/2024 03:33 AM    MCH 31.6 03/14/2011 11:05 AM    MCHC 31.7 (L) 01/17/2024 03:33 AM    MPV 10.7 01/17/2024 03:33 AM      Lab Results   Component Value Date/Time    SODIUM 142 01/17/2024 03:33 AM    POTASSIUM 4.2 01/17/2024 03:33 AM    CHLORIDE 109 01/17/2024 03:33 AM    CO2 22 01/17/2024 03:33 AM    GLUCOSE 152 (H) 01/17/2024 03:33 AM    BUN 49 (H) 01/17/2024 03:33 AM    CREATININE 1.09 01/17/2024 03:33 AM    CREATININE 1.38 (H) 03/14/2011 11:05 AM    BUNCREATRAT 30 (H) 09/20/2017 08:11 AM    BUNCREATRAT 22 03/14/2011 11:05 AM    GLOMRATE 38 (L) 03/14/2011 11:05 AM      Lab Results   Component Value Date/Time    ASTSGOT 16 01/11/2024 03:37 AM    ALTSGPT 11 01/11/2024 03:37 AM     Lab Results   Component Value Date/Time    CHOLSTRLTOT 125 01/10/2024 05:54 AM    LDL 65 01/10/2024 05:54 AM    HDL 46 01/10/2024 05:54 AM    TRIGLYCERIDE 70 01/10/2024 05:54 AM    TROPONINT 437 (H) 01/10/2024 05:54 AM       No results for input(s): \"NTPROBNP\" in the last 72 hours.    Cardiac Imaging and Procedures Review  EKG:  My personal interpretation of the EKG dated 1/14/2024 is atrial fibrillation, rate 109, left bundle branch block  Rhythm: My personal interpretation rhythm dated 1/15/2024 is atrial fibrillation, rate 90s    ECHOCARDIOGRAM 1/10/2024  Normal left ventricular chamber size. Normal left ventricular wall   thickness. Severely reduced left ventricular systolic function. The   ejection fraction is measured to be 26% by Dominguez's biplane.  Normal right ventricular size with reduced systolic function.  Moderate-severe mitral regurgitation eccentric with coanda effect.  Mild tricuspid regurgitation.  No " pericardial effusion.  Compared to the prior study on 01/05/15, now severely reduced left   ventricular systolic function with mod-sev mitral valve regurgitation.     CARDIAC CATHETERIZATION 1/40/2024  CORONARY ANGIOGRAPHY:  The left main coronary artery has luminal disease and bifurcates into the left anterior descending and left circumflex coronary arteries.  The left anterior descending coronary artery is a large, transapical vessel with a hazy ostium of unclear severity, sequential calcified proximal 90% and mid 70% lesions, and distal luminal irregularities.  It supplies two small diagonal branches with luminal irregularities  The left circumflex coronary artery is a large, dominant vessel with a focal mid 80% and otherwise minimal luminal irregularities.  It supplies a moderate first obtuse marginal branch, a large second obtuse marginal branch, a large third obtuse marginal branch, and a moderate posterolateral branch with luminal irregularities.  The right coronary artery is a moderate, dominant vessel with mid 95% disease prior to a high bifurcation of small to moderate caliber posterior descending and posterolateral branches that appear diffusely narrowed.  IMPRESSION:  Severe obstructive three-vessel coronary artery disease.  Successful percutaneous coronary intervention to the mid right coronary artery with one 2.5 x 24 mm Synergy drug-eluting stent postdilated to 3.3 mm proximally.  Residual focal, but calcified severe disease in the left anterior descending and left circumflex coronaries.  Upper normal left heart filling pressures.    CARDIAC CATHETERIZATION 1/16/2024  IMPRESSION:  Successful intracoronary lithotripsy and percutaneous coronary intervention to the mid left circumflex coronary with one 2.5 x 16 mm Synergy drug-eluting stent postdilated with a 3.75 x 8 mm noncompliant balloon at a maximum pressure of 26 georgie in the mid segment.  Residual sequential severe and calcified stenoses in the  ostial, proximal, and mid left anterior descending coronary.  RECOMMENDATIONS:  Return to floor with continued care per primary service.  Flat for 4 hours with routine groin precautions.  Restart heparin drip at previous rate without bolus in 2 hours if no access site complications.  Continue dual antiplatelet therapy with aspirin and clopidogrel.  Depending on patient clinical status, renal function, and access site consider staged complex coronary intervention to the left anterior descending coronary, which will likely require stenting back into the left main coronary, either as an inpatient or outpatient.    Imaging  Chest X-Ray: 1/9/2024  1.  Increased diffuse interstitial edema with layering small right effusion.  2.  Bilateral basilar atelectasis and/or consolidation. Underlying infection is possible.  3.  Mild enlargement of the cardiomediastinal silhouette    Assessment  Atrial fibrillation, rapid ventricular response, newly diagnosed duration unknown  HFrEF, 26%  Ischemic cardiomyopathy  PCI mid RCA 2.5 x 24 mm ANDRES 1/14/2024  PCI mid CX 3.75 x 16 mm ANDRES 1/60/2024  Residual proximal, mid LAD disease.  Acute hypoxic respiratory failure with pulmonary edema  Mitral regurgitation, moderate-severe  Acute on chronic kidney injury due to cardiorenal syndrome  Left bundle branch block, new diagnosis, duration unknown  Chest pain, elevated troponin likely due to demand ischemia  Hypothyroidism  Bipolar disorder    Recommendation Discussion  Clinically stable from a cardiac standpoint with no recurrent ischemic or heart failure symptoms.  Atrial fibrillation rate slightly elevated.  Renal function stable, BUN improving.  Stage PCI LAD timing to be determined  Not requiring loop diuretics  Continue Nitropaste  Continue IV heparin  Continue DAPT with clopidogrel  Continue atorvastatin  Continue spironolactone  Increase metoprolol for better rate A-fib control, if necessary will add digoxin  Follow-up renal  function  LUCY guided cardioversion on hold until coronary revascularization completed can be arranged as an outpatient  Discussed treatment plan with daughter at the bedside and bedside RN.    Thank you for allowing me to participate in the care of this patient.      Please contact me with any questions.    Scot Lawler M.D.   Cardiologist, [facility]  [phone] - [phone]

## 2024-01-17 NOTE — THERAPY
Occupational Therapy   Initial Evaluation     Patient Name: Azeb Major  Age:  83 y.o., Sex:  female  Medical Record #: [id number]  Today's Date: 1/17/2024     Precautions  Precautions: Fall Risk, Cardiac Precautions (See Comments)  Comments: 1/10/24: EF 26%, 2 stents complete as of 1/17, plan is for one more but not yet scheduled.    Assessment    Patient is 83 y.o. female admitted with dyspnea, found to be in afib. Pt also had lower extremity edema, pulmonary edema, and elevated troponin. Pt is s/p staged percutaneous coronary intervention to the L circumflex and L anteriro descending coronary arteries following recent percutaneous coronary intervention to the R coronary artery. Pt is pending further stent intervention depending on medical status as an inpatient vs outpatient status. Other pertinent medical history includes bipolar disorder, CHF, HTN, macular degeneration, and osteoporosis.     Pt seen for OT evaluation and treatment. Pt donned/doffed gown with min A, required total A for LB dressing, max A for toilet hygiene, and CGA-min A for ADL transfer. Pt HR noted to reach 136 with activity. Pt lives alone but has lots of support from her children who live nearby. Pt requires assist for IADLs most of the time and occasionally for ADLs when she is have a depressive episode of her bipolar disorder. Pt is very [place], however is able to read lips/written words for communication. Pt's daughter present/supportive at time of eval and assisted in providing history. Pt current functional performance limited by impaired activity tolerance, impaired balance, and generalized weakness. Pt will continue to benefit from skilled OT while admitted to acute care.     Plan    Occupational Therapy Initial Treatment Plan   Treatment Interventions: Self Care / Activities of Daily Living, Adaptive Equipment, Neuro Re-Education / Balance, Therapeutic Exercises, Therapeutic Activity  Treatment Frequency: 3 Times per  "Week  Duration: Until Therapy Goals Met    DC Equipment Recommendations: Unable to determine at this time  Discharge Recommendations: Recommend post-acute placement for additional occupational therapy services prior to discharge home     Subjective    \"What did you say?\"     Objective     01/17/24 1231   Prior Living Situation   Prior Services None   Housing / Facility 2 Story House   Steps Into Home 1   Steps In Home   (FOS, however pt generally does not climb the stairs to the basement)   Bathroom Set up Walk In Shower;Shower Chair;Grab Bars   Equipment Owned Front-Wheel Walker;Tub / Shower Seat;Grab Bar(s) By Toilet;Grab Bar(s) In Tub / Shower   Lives with - Patient's Self Care Capacity Alone and Able to Care For Self   Comments Pt lives alone but has lots of support from her family. Several adult children live nearby and come over at least once a day.   Prior Level of ADL Function   Self Feeding Independent   Grooming / Hygiene Independent   Bathing Independent   Dressing Independent   Toileting Independent   Comments generally I, however when in a low of bipolar, pt requires assist for all ADLs and IADLs   Prior Level of IADL Function   Medication Management Requires Assist   Laundry Requires Assist   Finances Requires Assist   Home Management Requires Assist   Shopping Dependent   Prior Level Of Mobility Independent With Device in Home   Driving / Transportation Relatives / Others Provide Transportation   Occupation (Pre-Hospital Vocational) Retired Due To Age   Leisure Interests Family   Precautions   Precautions Fall Risk;Cardiac Precautions (See Comments)   Vitals   Pulse (!) 115  (up to 135 with activity, recovered to 115 with rest)   Blood Pressure  120/80   Pulse Oximetry 93 %   O2 (LPM) 2   O2 Delivery Device Nasal Cannula   Pain   Pain Scales 0 to 10 Scale    Pain 0 - 10 Group   Therapist Pain Assessment Post Activity Pain Same as Prior to Activity;Nurse Notified  (not rated, agreeable to session) "   Non Verbal Descriptors   Non Verbal Scale  Calm   Cognition    Cognition / Consciousness X   Speech/ Communication Hard of Hearing   Level of Consciousness Alert   Comments Pt reads lips, so it is best to speak loudly and slowly and face pt directly. Pt also has a white board in the room and is able to read what is written.   Active ROM Upper Body   Active ROM Upper Body  WDL   Strength Upper Body   Comments Grossly 4-/5   Sensation Upper Body   Upper Extremity Sensation  WDL   Upper Body Muscle Tone   Upper Body Muscle Tone  WDL   Coordination Upper Body   Comments WFL, not formally assessed   Balance Assessment   Sitting Balance (Static) Fair   Sitting Balance (Dynamic) Fair -   Standing Balance (Static) Fair -   Standing Balance (Dynamic) Fair -   Weight Shift Sitting Fair   Weight Shift Standing Fair   Comments w/ FWW   Bed Mobility    Comments up to chair pre/post   ADL Assessment   Grooming Minimal Assist;Seated  (washed face, put on chapstick)   Upper Body Dressing Minimal Assist  (don/doff gown)   Toileting Total Assist   Functional Mobility   Sit to Stand Contact Guard Assist   Bed, Chair, Wheelchair Transfer Minimal Assist   Transfer Method Stand Step   Mobility Chair>stand for toilet hygiene>EOB>chair   Comments w/ FWW   Visual Perception   Visual Perception  Not Tested   Activity Tolerance   Sitting in Chair up to chair pre/post   Sitting Edge of Bed >5 min   Standing <5 min   Comments limited by weakness, fatigue, and therapist due to HR   Patient / Family Goals   Patient / Family Goal #1 to go home   Short Term Goals   Short Term Goal # 1 Pt will perform ADL transfer w/ supv   Short Term Goal # 2 Pt will perform LB dressing w/ supv and AE PRN   Short Term Goal # 3 Pt will perform standing g/h w/ supv   Education Group   Education Provided Role of Occupational Therapist;Activities of Daily Living;Energy Conservation   Role of Occupational Therapist Patient Response  Patient;Acceptance;Explanation;Verbal Demonstration   Energy Conservation Patient Response Patient;Acceptance;Explanation;Demonstration;Verbal Demonstration;Action Demonstration   ADL Patient Response Patient;Acceptance;Explanation;Demonstration;Verbal Demonstration;Action Demonstration   Occupational Therapy Initial Treatment Plan    Treatment Interventions Self Care / Activities of Daily Living;Adaptive Equipment;Neuro Re-Education / Balance;Therapeutic Exercises;Therapeutic Activity   Treatment Frequency 3 Times per Week   Duration Until Therapy Goals Met   Problem List   Problem List Decreased Active Daily Living Skills;Decreased Homemaking Skills;Decreased Functional Mobility;Decreased Activity Tolerance;Impaired Postural Control / Balance

## 2024-01-17 NOTE — PROGRESS NOTES
Bedside report received at 1900 and assumed care of patient. Resting in bed, and denied having pain at this time. A&O x4 on 0.5L NC. Patient returned from cath lab at 1730, 30 minute vitals in place. Femoral cath site is soft and non-tender with small amount of bruising outside the edges of the bandage. Tele monitoring in place. All fall precautions in place. Call light and belongings within reach, bed locked and in lowest position, denied other needs at this time. Hourly rounding and q2 turns in place at this time.

## 2024-01-17 NOTE — PROGRESS NOTES
Hospital Medicine Daily Progress Note    Date of Service  1/17/2024    Chief Complaint  Azeb Major is a 83 y.o. female admitted 1/9/2024 with dyspnea    Hospital Course  83-year-old female with history of hearing loss, hypertension, bipolar and chronic kidney disease who presented 1/9 with chest pain and shortness of breath.  Patient has significant hearing loss and history was taken from the patient and family, patient has had worsening shortness of breath and chest pain for last couple days, also noticed swelling on her legs, on the day of admission patient was evaluated by PCP and they found the new atrial fibrillation and patient was transferred to emergency.  Patient was alert oriented x 4, patient has pressure chest pain, goes to her shoulders, with worsening shortness of breath, no nausea or vomiting or other symptoms and no fever.  EKG showed new atrial fibrillation with no significant ischemic changes, patient had a crackles with lower extremity edema, chest x-ray showed pulmonary edema, BNP was more than 27,000, troponin was elevated more than 200, cardiology was consulted, heparin with aspirin and metoprolol were initiated.  Also Lasix was initiated for heart failure.     Patient lives by herself and she usually does all daily activities by herself, no significant memory deficit however patient has bipolar which is a stable.    Required 3 L nasal cannula. Diuresed with IV diuretics. echo, shows EF 25% with moderate to severe mitral valve regurg which is new.    She has no respiratory distress and respiratory status has been stable on 3 L.  Had SHANTELLE which improved.    Heparin gtt continued.  Developed small hand hematoma from waking IV site after PT, dressing applied and bleeding stopped.  Hand remained neurovascularly intact.  Volume status improving, supplemental oxygen requirements decreasing.  She remains well-perfused.  Cardiology is increased metoprolol 25 mg twice daily and started  spironolactone 25 mg daily.  Renal function improved essentially to baseline, lytes normal.   Went for coronary angiogram found to have severe three vessel disease with successful PCI of the mid-right coronary artery with one ANDRES.   Brought back to the floors and restarted on heparin gtt.   Plan for staged PCI to LAD and circumflex in 2-3 days followed by LUCY/DCCV.  Patient feels improved, more energetic, family notes she is acting more like herself.  Watch renal function and UOP, respiratory status and BP. Monitor for bleeding at access site.    Interval Problem Update  1/15  She remains afebrile heart rate from 100-120 respiratory rate 18 systolic blood pressure in the 110s to 120s, on 2 to 3 L nasal cannula.  No complaints, she continues on heparin drip as she waits for the second stage of her PCI which will occur on January 16, LUCY/DCCV initially planned for inpatient will be planned for outpatient.  Loop diuretics held by cardiology, continue Nitropaste, IV heparin, DAPT with clopidogrel, atorvastatin, spironolactone and metoprolol.  Follow renal function.  She is in a weakened and debilitated state following all of this although does seem to responding rather well to the treatments.  Daughter and patient updated at bedside, discussed with Dr. Lawler.  1/16: Patient seen and examined, resting in bed, denies chest pain, cardiology following and plan is for cardiac cath today  Case discussed with cardiology appreciate rec.   Cont on heparin drip   1/17: Patient seen and examined, resting in bed, afebrile, had angiogram and PCI yesterday will need staged PCI. Cardiology following case discussed appreciate rec.  Metoprolol dose increased today     I have discussed this patient's plan of care and discharge plan at IDT rounds today with Case Management, Nursing, Nursing leadership, and other members of the IDT team.    Consultants/Specialty  cardiology    Code Status  Full Code    Disposition  Medically  Cleared  I have placed the appropriate orders for post-discharge needs.    Review of Systems  ROS   Review of Systems   Constitutional:  Positive for malaise/fatigue. Negative for chills, fever and weight loss.   HENT:  Negative for ear pain, hearing loss and tinnitus.    Eyes:  Negative for blurred vision, double vision and photophobia.   Respiratory:  Positive for shortness of breath. Negative for cough and hemoptysis.    Cardiovascular:  Positive for chest pain. Negative for palpitations, orthopnea and claudication.   Gastrointestinal:  Negative for abdominal pain, constipation, diarrhea, nausea and vomiting.   Genitourinary:  Negative for dysuria, frequency and urgency.   Musculoskeletal:  Negative for myalgias and neck pain.   Skin:  Negative for rash.   Neurological:  Negative for dizziness, speech change and weakness    Physical Exam  Temp:  [36 °C (96.8 °F)-36.5 °C (97.7 °F)] 36.3 °C (97.3 °F)  Pulse:  [] 109  Resp:  [17-30] 24  BP: ()/() 120/80  SpO2:  [89 %-96 %] 96 %    Physical Exam  Vitals and nursing note reviewed. Exam conducted with a chaperone present.   Constitutional:       General: She is not in acute distress.     Appearance: She is not ill-appearing or toxic-appearing.      Comments: Appears weak   HENT:      Head: Normocephalic and atraumatic.      Nose: Nose normal. No rhinorrhea.      Mouth/Throat:      Mouth: Mucous membranes are moist.      Pharynx: Oropharynx is clear. No posterior oropharyngeal erythema.   Eyes:      General: No scleral icterus.     Extraocular Movements: Extraocular movements intact.      Conjunctiva/sclera: Conjunctivae normal.   Neck:      Comments: JVD  Cardiovascular:      Rate and Rhythm: Tachycardia present. Rhythm irregular.      Heart sounds: Murmur heard.   Pulmonary:      Effort: Pulmonary effort is normal. No respiratory distress.      Breath sounds: No wheezing, rhonchi or rales.      Comments: Low work of breathing.  On 2-3 L NC, bibasilar  rales  Abdominal:      Palpations: Abdomen is soft.      Tenderness: There is no abdominal tenderness. There is no guarding or rebound.   Musculoskeletal:         General: Normal range of motion.      Cervical back: Normal range of motion and neck supple. No rigidity or tenderness.      Right lower leg: Edema present.      Left lower leg: Edema present.   Skin:     General: Skin is warm and dry.      Capillary Refill: Capillary refill takes less than 2 seconds.      Findings: Bruising present. No rash.      Comments: warm dry skin   Neurological:      General: No focal deficit present.      Mental Status: She is alert. Mental status is at baseline.      Cranial Nerves: No cranial nerve deficit.      Sensory: No sensory deficit.      Motor: No weakness.   Psychiatric:         Mood and Affect: Mood normal.         Behavior: Behavior normal.         Thought Content: Thought content normal.         Judgment: Judgment normal.         Fluids    Intake/Output Summary (Last 24 hours) at 1/17/2024 1253  Last data filed at 1/17/2024 0720  Gross per 24 hour   Intake 607 ml   Output 600 ml   Net 7 ml       Laboratory  Recent Labs     01/15/24  0011 01/17/24  0333   WBC 7.8 7.0   RBC 3.85* 3.88*   HEMOGLOBIN 11.8* 11.7*   HEMATOCRIT 35.3* 36.9*   MCV 91.7 95.1   MCH 30.6 30.2   MCHC 33.4 31.7*   RDW 46.2 49.4   PLATELETCT 162* 132*   MPV 10.9 10.7     Recent Labs     01/15/24  0011 01/16/24  0004 01/17/24  0333   SODIUM 142 141 142   POTASSIUM 3.8 4.2 4.2   CHLORIDE 102 103 109   CO2 26 25 22   GLUCOSE 80 91 152*   BUN 73* 62* 49*   CREATININE 1.25 1.04 1.09   CALCIUM 9.2 9.0 9.0                       Imaging  EC-ECHOCARDIOGRAM COMPLETE W/ CONT   Final Result      DX-CHEST-PORTABLE (1 VIEW)   Final Result      1.  Increased diffuse interstitial edema with layering small right effusion.   2.  Bilateral basilar atelectasis and/or consolidation. Underlying infection is possible.   3.  Mild enlargement of the cardiomediastinal  silhouette.      EC-LUCY W/ CONT    (Results Pending)   CL-CARDIOVERSION    (Results Pending)   CL-LEFT HEART CATHETERIZATION WITH POSSIBLE INTERVENTION    (Results Pending)   CL-LEFT HEART CATHETERIZATION WITH POSSIBLE INTERVENTION    (Results Pending)        Assessment/Plan  * A-fib (HCC)- (present on admission)  Assessment & Plan  New diagnosis with A-fib and RVR  Came with signs of heart failure  TSH is normal  Check echo  Start with metoprolol 25 mg twice daily  No history of smoking or drugs  Telemetry    1/15 heart rate improved on increased metoprolol, future LUCY/LUCY DCCV planned after second stage of staged PCI, cardioversion to happen outpatient.  Still on heparin drip    CHF exacerbation (HCC)  Assessment & Plan  Patient has shortness of breath with crackles and lower extremity edema  BNP more than 27,000  Heart failure likely related to uncontrolled A-fib  Started with IV Lasix 40 mg twice daily with heparin drip and metoprolol  Close monitoring and start with ACE inhibitors and spironolactone if needed  Echo is pending  Telemetry    1/15 still with respiratory failure, however no acute exacerbation symptoms, exacerbation resolved.  Now with ongoing HFpEF.  Diuresis currently held by cardiology.  Continue other GDMT measures.    ACP (advance care planning)  Assessment & Plan  1/9 plan of care was discussed in detail with the patient and family, discussed all treatment options, discussed the CODE STATUS, discussed all images and labs, answered all their questions, they want to be full code, all treatment, time 30 minutes    Chest pain- (present on admission)  Assessment & Plan  The ASCVD Risk score (Breann DK, et al., 2019) failed to calculate for the following reasons:    The 2019 ASCVD risk score is only valid for ages 40 to 79  Pressure chest pain goes to her shoulders  Blood pressure is at the same and both arms and no signs of dissection on chest x-ray  With a new A-fib and a new heart  failure  Patient is a high risk for coronary artery disease however her chest pain possibly related to demand  Trending troponin and telemetry  Elevated troponin  Echo  Cardiology was consulted since patient has a new heart failure  Aspirin and statin  1/16: cardiology following case discussed plan for angiogram today     Bipolar disorder, in partial remission, most recent episode depressed (HCC)- (present on admission)  Assessment & Plan  Stable  Continue home dose of Seroquel and venlafaxine    Hypothyroid- (present on admission)  Assessment & Plan  TSH 6.4  Continue levothyroxine 125 mcg daily       Greater than 51 minutes spent prepping to see patient (e.g. review of tests) obtaining and/or reviewing separately obtained history. Performing a medically appropriate examination and/ evaluation.  Counseling and educating the patient/family/caregiver.  Ordering medications, tests, or procedures.  Referring and communicating with other health care professionals.  Documenting clinical information in EPIC.  Independently interpreting results and communicating results to patient/family/caregiver.  Care coordination.     VTE prophylaxis: heparin gtt    I have performed a physical exam and reviewed and updated ROS and Plan today (1/15/2024). In review of yesterday's note (1/14/2024), there are no changes except as documented above.

## 2024-01-17 NOTE — DISCHARGE PLANNING
Case Management Discharge Planning    Admission Date: 1/9/2024  GMLOS: 3.8  ALOS: 8    6-Clicks ADL Score: 16  6-Clicks Mobility Score: 18  PT and/or OT Eval ordered: Yes  Post-acute Referrals Ordered: Yes  Post-acute Choice Obtained: Yes  Has referral(s) been sent to post-acute provider:  Yes      Anticipated Discharge Dispo: Discharge Disposition: D/T to SNF with Medicare cert in anticipation of skilled care (03)    DME Needed: No    Action(s) Taken: Updated Provider/Nurse on Discharge Plan    Escalations Completed: None    Medically Clear: No    Next Steps: Discussed case with Dr. Moeller. Not medically cleared today. [facility]. Rehab looking at pt but Hep gtt barrier. Will cont to follow and await medical clearance for tx to SNF vs Rehab.    Barriers to Discharge: Medical clearance    Is the patient up for discharge tomorrow: No

## 2024-01-18 LAB
ANION GAP SERPL CALC-SCNC: 9 MMOL/L (ref 7–16)
ANISOCYTOSIS BLD QL SMEAR: ABNORMAL
BASOPHILS # BLD AUTO: 0 % (ref 0–1.8)
BASOPHILS # BLD: 0 K/UL (ref 0–0.12)
BUN SERPL-MCNC: 49 MG/DL (ref 8–22)
CALCIUM SERPL-MCNC: 8.7 MG/DL (ref 8.5–10.5)
CHLORIDE SERPL-SCNC: 109 MMOL/L (ref 96–112)
CO2 SERPL-SCNC: 24 MMOL/L (ref 20–33)
COMMENT NL1176: NORMAL
CREAT SERPL-MCNC: 0.98 MG/DL (ref 0.5–1.4)
EOSINOPHIL # BLD AUTO: 0.13 K/UL (ref 0–0.51)
EOSINOPHIL NFR BLD: 0.9 % (ref 0–6.9)
ERYTHROCYTE [DISTWIDTH] IN BLOOD BY AUTOMATED COUNT: 49 FL (ref 35.9–50)
ERYTHROCYTE [DISTWIDTH] IN BLOOD BY AUTOMATED COUNT: 49.6 FL (ref 35.9–50)
GFR SERPLBLD CREATININE-BSD FMLA CKD-EPI: 56 ML/MIN/1.73 M 2
GLUCOSE SERPL-MCNC: 108 MG/DL (ref 65–99)
HCT VFR BLD AUTO: 34.5 % (ref 37–47)
HCT VFR BLD AUTO: 37 % (ref 37–47)
HCT VFR BLD AUTO: 37.1 % (ref 37–47)
HGB BLD-MCNC: 11.1 G/DL (ref 12–16)
HGB BLD-MCNC: 12 G/DL (ref 12–16)
HGB BLD-MCNC: 12.3 G/DL (ref 12–16)
LYMPHOCYTES # BLD AUTO: 1.57 K/UL (ref 1–4.8)
LYMPHOCYTES NFR BLD: 11.3 % (ref 22–41)
MACROCYTES BLD QL SMEAR: ABNORMAL
MANUAL DIFF BLD: NORMAL
MCH RBC QN AUTO: 30.2 PG (ref 27–33)
MCH RBC QN AUTO: 31.2 PG (ref 27–33)
MCHC RBC AUTO-ENTMCNC: 32.2 G/DL (ref 32.2–35.5)
MCHC RBC AUTO-ENTMCNC: 33.2 G/DL (ref 32.2–35.5)
MCV RBC AUTO: 93.8 FL (ref 81.4–97.8)
MCV RBC AUTO: 93.9 FL (ref 81.4–97.8)
METAMYELOCYTES NFR BLD MANUAL: 0.9 %
MONOCYTES # BLD AUTO: 1.93 K/UL (ref 0–0.85)
MONOCYTES NFR BLD AUTO: 13.9 % (ref 0–13.4)
MORPHOLOGY BLD-IMP: NORMAL
NEUTROPHILS # BLD AUTO: 10.15 K/UL (ref 1.82–7.42)
NEUTROPHILS NFR BLD: 73 % (ref 44–72)
NRBC # BLD AUTO: 0 K/UL
NRBC BLD-RTO: 0 /100 WBC (ref 0–0.2)
OVALOCYTES BLD QL SMEAR: NORMAL
PLATELET # BLD AUTO: 122 K/UL (ref 164–446)
PLATELET # BLD AUTO: 144 K/UL (ref 164–446)
PLATELET BLD QL SMEAR: NORMAL
PMV BLD AUTO: 11.2 FL (ref 9–12.9)
PMV BLD AUTO: 11.5 FL (ref 9–12.9)
POIKILOCYTOSIS BLD QL SMEAR: NORMAL
POTASSIUM SERPL-SCNC: 4.5 MMOL/L (ref 3.6–5.5)
RBC # BLD AUTO: 3.68 M/UL (ref 4.2–5.4)
RBC # BLD AUTO: 3.94 M/UL (ref 4.2–5.4)
RBC BLD AUTO: PRESENT
SMUDGE CELLS BLD QL SMEAR: NORMAL
SODIUM SERPL-SCNC: 142 MMOL/L (ref 135–145)
STOMATOCYTES BLD QL SMEAR: NORMAL
UFH PPP CHRO-ACNC: 0.55 IU/ML
WBC # BLD AUTO: 10.4 K/UL (ref 4.8–10.8)
WBC # BLD AUTO: 13.9 K/UL (ref 4.8–10.8)

## 2024-01-18 PROCEDURE — 85520 HEPARIN ASSAY: CPT

## 2024-01-18 PROCEDURE — 36415 COLL VENOUS BLD VENIPUNCTURE: CPT

## 2024-01-18 PROCEDURE — 85014 HEMATOCRIT: CPT

## 2024-01-18 PROCEDURE — A9270 NON-COVERED ITEM OR SERVICE: HCPCS | Performed by: INTERNAL MEDICINE

## 2024-01-18 PROCEDURE — 700111 HCHG RX REV CODE 636 W/ 250 OVERRIDE (IP): Mod: JZ,JG | Performed by: INTERNAL MEDICINE

## 2024-01-18 PROCEDURE — 700102 HCHG RX REV CODE 250 W/ 637 OVERRIDE(OP): Performed by: NURSE PRACTITIONER

## 2024-01-18 PROCEDURE — 2Y41X5Z PACKING OF NASAL REGION USING PACKING MATERIAL: ICD-10-PCS | Performed by: INTERNAL MEDICINE

## 2024-01-18 PROCEDURE — 700102 HCHG RX REV CODE 250 W/ 637 OVERRIDE(OP): Performed by: INTERNAL MEDICINE

## 2024-01-18 PROCEDURE — 770020 HCHG ROOM/CARE - TELE (206)

## 2024-01-18 PROCEDURE — 99232 SBSQ HOSP IP/OBS MODERATE 35: CPT | Performed by: INTERNAL MEDICINE

## 2024-01-18 PROCEDURE — 99233 SBSQ HOSP IP/OBS HIGH 50: CPT | Performed by: INTERNAL MEDICINE

## 2024-01-18 PROCEDURE — 85018 HEMOGLOBIN: CPT

## 2024-01-18 PROCEDURE — 85027 COMPLETE CBC AUTOMATED: CPT

## 2024-01-18 PROCEDURE — 85007 BL SMEAR W/DIFF WBC COUNT: CPT

## 2024-01-18 PROCEDURE — A9270 NON-COVERED ITEM OR SERVICE: HCPCS | Performed by: NURSE PRACTITIONER

## 2024-01-18 PROCEDURE — 80048 BASIC METABOLIC PNL TOTAL CA: CPT

## 2024-01-18 RX ORDER — TRAZODONE HYDROCHLORIDE 50 MG/1
50 TABLET ORAL
Status: DISCONTINUED | OUTPATIENT
Start: 2024-01-18 | End: 2024-01-19 | Stop reason: HOSPADM

## 2024-01-18 RX ADMIN — Medication: at 06:22

## 2024-01-18 RX ADMIN — DIVALPROEX SODIUM 750 MG: 500 TABLET, EXTENDED RELEASE ORAL at 18:08

## 2024-01-18 RX ADMIN — SPIRONOLACTONE 25 MG: 25 TABLET ORAL at 06:24

## 2024-01-18 RX ADMIN — NITROGLYCERIN 0.5 INCH: 20 OINTMENT TOPICAL at 06:23

## 2024-01-18 RX ADMIN — VENLAFAXINE HYDROCHLORIDE 75 MG: 75 CAPSULE, EXTENDED RELEASE ORAL at 08:47

## 2024-01-18 RX ADMIN — ASPIRIN 81 MG: 81 TABLET, COATED ORAL at 06:24

## 2024-01-18 RX ADMIN — OMEPRAZOLE 20 MG: 20 CAPSULE, DELAYED RELEASE ORAL at 18:07

## 2024-01-18 RX ADMIN — OXYBUTYNIN CHLORIDE 10 MG: 10 TABLET, EXTENDED RELEASE ORAL at 20:36

## 2024-01-18 RX ADMIN — MORPHINE SULFATE 1 MG: 4 INJECTION, SOLUTION INTRAMUSCULAR; INTRAVENOUS at 20:01

## 2024-01-18 RX ADMIN — NITROGLYCERIN 0.5 INCH: 20 OINTMENT TOPICAL at 18:07

## 2024-01-18 RX ADMIN — MORPHINE SULFATE 1 MG: 4 INJECTION, SOLUTION INTRAMUSCULAR; INTRAVENOUS at 06:43

## 2024-01-18 RX ADMIN — ATORVASTATIN CALCIUM 40 MG: 40 TABLET, FILM COATED ORAL at 18:07

## 2024-01-18 RX ADMIN — CLOPIDOGREL BISULFATE 75 MG: 75 TABLET ORAL at 06:25

## 2024-01-18 RX ADMIN — NITROGLYCERIN 0.5 INCH: 20 OINTMENT TOPICAL at 00:59

## 2024-01-18 RX ADMIN — MORPHINE SULFATE 1 MG: 4 INJECTION, SOLUTION INTRAMUSCULAR; INTRAVENOUS at 15:40

## 2024-01-18 RX ADMIN — NITROGLYCERIN 0.5 INCH: 20 OINTMENT TOPICAL at 12:31

## 2024-01-18 RX ADMIN — QUETIAPINE FUMARATE 175 MG: 50 TABLET ORAL at 20:36

## 2024-01-18 RX ADMIN — PHENYLEPHRINE HYDROCHLORIDE 1 SPRAY: 0.25 SPRAY NASAL at 15:02

## 2024-01-18 RX ADMIN — Medication: at 18:13

## 2024-01-18 RX ADMIN — METOPROLOL SUCCINATE 25 MG: 25 TABLET, EXTENDED RELEASE ORAL at 08:47

## 2024-01-18 RX ADMIN — LEVOTHYROXINE SODIUM 125 MCG: 0.12 TABLET ORAL at 06:23

## 2024-01-18 RX ADMIN — METOPROLOL SUCCINATE 25 MG: 25 TABLET, EXTENDED RELEASE ORAL at 20:36

## 2024-01-18 RX ADMIN — DOCUSATE SODIUM 50 MG AND SENNOSIDES 8.6 MG 2 TABLET: 8.6; 5 TABLET, FILM COATED ORAL at 18:23

## 2024-01-18 ASSESSMENT — ENCOUNTER SYMPTOMS
CHEST TIGHTNESS: 0
SHORTNESS OF BREATH: 0

## 2024-01-18 ASSESSMENT — PAIN DESCRIPTION - PAIN TYPE
TYPE: ACUTE PAIN
TYPE: ACUTE PAIN

## 2024-01-18 ASSESSMENT — FIBROSIS 4 INDEX: FIB4 SCORE: 3.36

## 2024-01-18 NOTE — CARE PLAN
The patient is Watcher - Medium risk of patient condition declining or worsening    Shift Goals  Clinical Goals: VSS, rest  Patient Goals: sleep  Family Goals: NA    Progress made toward(s) clinical / shift goals:      Problem: Knowledge Deficit - Standard  Goal: Patient and family/care givers will demonstrate understanding of plan of care, disease process/condition, diagnostic tests and medications  Outcome: Progressing     Problem: Fall Risk  Goal: Patient will remain free from falls  Outcome: Progressing     Problem: Communication  Goal: The ability to communicate needs accurately and effectively will improve  Outcome: Progressing       Patient is not progressing towards the following goals:

## 2024-01-18 NOTE — PROGRESS NOTES
Hospital Medicine Daily Progress Note    Date of Service  1/18/2024    Chief Complaint  Azeb Major is a 83 y.o. female admitted 1/9/2024 with dyspnea    Hospital Course  83-year-old female with history of hearing loss, hypertension, bipolar and chronic kidney disease who presented 1/9 with chest pain and shortness of breath.  Patient has significant hearing loss and history was taken from the patient and family, patient has had worsening shortness of breath and chest pain for last couple days, also noticed swelling on her legs, on the day of admission patient was evaluated by PCP and they found the new atrial fibrillation and patient was transferred to emergency.  Patient was alert oriented x 4, patient has pressure chest pain, goes to her shoulders, with worsening shortness of breath, no nausea or vomiting or other symptoms and no fever.  EKG showed new atrial fibrillation with no significant ischemic changes, patient had a crackles with lower extremity edema, chest x-ray showed pulmonary edema, BNP was more than 27,000, troponin was elevated more than 200, cardiology was consulted, heparin with aspirin and metoprolol were initiated.  Also Lasix was initiated for heart failure.     Patient lives by herself and she usually does all daily activities by herself, no significant memory deficit however patient has bipolar which is a stable.    Required 3 L nasal cannula. Diuresed with IV diuretics. echo, shows EF 25% with moderate to severe mitral valve regurg which is new.    She has no respiratory distress and respiratory status has been stable on 3 L.  Had SHANTELLE which improved.    Heparin gtt continued.  Developed small hand hematoma from waking IV site after PT, dressing applied and bleeding stopped.  Hand remained neurovascularly intact.  Volume status improving, supplemental oxygen requirements decreasing.  She remains well-perfused.  Cardiology is increased metoprolol 25 mg twice daily and started  spironolactone 25 mg daily.  Renal function improved essentially to baseline, lytes normal.   Went for coronary angiogram found to have severe three vessel disease with successful PCI of the mid-right coronary artery with one ANDRES.   Brought back to the floors and restarted on heparin gtt.   Plan for staged PCI to LAD and circumflex in 2-3 days followed by LUCY/DCCV.  Patient feels improved, more energetic, family notes she is acting more like herself.  Watch renal function and UOP, respiratory status and BP. Monitor for bleeding at access site.    Interval Problem Update  1/15  She remains afebrile heart rate from 100-120 respiratory rate 18 systolic blood pressure in the 110s to 120s, on 2 to 3 L nasal cannula.  No complaints, she continues on heparin drip as she waits for the second stage of her PCI which will occur on January 16, LUCY/DCCV initially planned for inpatient will be planned for outpatient.  Loop diuretics held by cardiology, continue Nitropaste, IV heparin, DAPT with clopidogrel, atorvastatin, spironolactone and metoprolol.  Follow renal function.  She is in a weakened and debilitated state following all of this although does seem to responding rather well to the treatments.  Daughter and patient updated at bedside, discussed with Dr. Lawler.  1/16: Patient seen and examined, resting in bed, denies chest pain, cardiology following and plan is for cardiac cath today  Case discussed with cardiology appreciate rec.   Cont on heparin drip   1/17: Patient seen and examined, resting in bed, afebrile, had angiogram and PCI yesterday will need staged PCI. Cardiology following case discussed appreciate rec.  Metoprolol dose increased today   1/18: Patient resting in bed, afebrile, no nausea or vomiting. Cardiology following deciding regarding the staged PCI possible today appreciate rec.   Cont to monitor closely in tele     Addendum: Patient started having nose bleed on the right nostril around 1: 30- 1:45  so heparin has been d/c I have also placed a  rhino rocket on her right nostril. Have started on phenylephrine nasal spray   Will also check a CBC to monitor her hemoglobin     I have discussed this patient's plan of care and discharge plan at IDT rounds today with Case Management, Nursing, Nursing leadership, and other members of the IDT team.    Consultants/Specialty  cardiology    Code Status  Full Code    Disposition  The patient is not medically cleared for discharge to home or a post-acute facility.      I have placed the appropriate orders for post-discharge needs.    Review of Systems  ROS   Review of Systems   Constitutional:  Positive for malaise/fatigue. Negative for chills, fever and weight loss.   HENT:  Negative for ear pain, hearing loss and tinnitus.    Eyes:  Negative for blurred vision, double vision and photophobia.   Respiratory:  Positive for shortness of breath. Negative for cough and hemoptysis.    Cardiovascular:  Positive for chest pain. Negative for palpitations, orthopnea and claudication.   Gastrointestinal:  Negative for abdominal pain, constipation, diarrhea, nausea and vomiting.   Genitourinary:  Negative for dysuria, frequency and urgency.   Musculoskeletal:  Negative for myalgias and neck pain.   Skin:  Negative for rash.   Neurological:  Negative for dizziness, speech change and weakness    Physical Exam  Temp:  [36.4 °C (97.5 °F)-36.6 °C (97.9 °F)] 36.6 °C (97.9 °F)  Pulse:  [105-137] 137  Resp:  [16-26] 16  BP: (100-145)/(64-95) 130/95  SpO2:  [93 %-95 %] 95 %    Physical Exam  Vitals and nursing note reviewed. Exam conducted with a chaperone present.   Constitutional:       General: She is not in acute distress.     Appearance: She is not ill-appearing or toxic-appearing.      Comments: Appears weak   HENT:      Head: Normocephalic and atraumatic.      Nose: Nose normal. No rhinorrhea.      Mouth/Throat:      Mouth: Mucous membranes are moist.      Pharynx: Oropharynx is clear.  No posterior oropharyngeal erythema.   Eyes:      General: No scleral icterus.     Extraocular Movements: Extraocular movements intact.      Conjunctiva/sclera: Conjunctivae normal.   Neck:      Comments: JVD  Cardiovascular:      Rate and Rhythm: Tachycardia present. Rhythm irregular.      Heart sounds: Murmur heard.   Pulmonary:      Effort: Pulmonary effort is normal. No respiratory distress.      Breath sounds: No wheezing, rhonchi or rales.      Comments: Low work of breathing.  On 2-3 L NC, bibasilar rales  Abdominal:      Palpations: Abdomen is soft.      Tenderness: There is no abdominal tenderness. There is no guarding or rebound.   Musculoskeletal:         General: Normal range of motion.      Cervical back: Normal range of motion and neck supple. No rigidity or tenderness.      Right lower leg: Edema present.      Left lower leg: Edema present.   Skin:     General: Skin is warm and dry.      Capillary Refill: Capillary refill takes less than 2 seconds.      Findings: Bruising present. No rash.      Comments: warm dry skin   Neurological:      General: No focal deficit present.      Mental Status: She is alert. Mental status is at baseline.      Cranial Nerves: No cranial nerve deficit.      Sensory: No sensory deficit.      Motor: No weakness.   Psychiatric:         Mood and Affect: Mood normal.         Behavior: Behavior normal.         Thought Content: Thought content normal.         Judgment: Judgment normal.         Fluids    Intake/Output Summary (Last 24 hours) at 1/18/2024 1225  Last data filed at 1/18/2024 1156  Gross per 24 hour   Intake 125 ml   Output 1150 ml   Net -1025 ml       Laboratory  Recent Labs     01/17/24  0333 01/18/24  0307   WBC 7.0 10.4   RBC 3.88* 3.68*   HEMOGLOBIN 11.7* 11.1*   HEMATOCRIT 36.9* 34.5*   MCV 95.1 93.8   MCH 30.2 30.2   MCHC 31.7* 32.2   RDW 49.4 49.0   PLATELETCT 132* 122*   MPV 10.7 11.2     Recent Labs     01/16/24  0004 01/17/24  0333 01/18/24  0307   SODIUM  141 142 142   POTASSIUM 4.2 4.2 4.5   CHLORIDE 103 109 109   CO2 25 22 24   GLUCOSE 91 152* 108*   BUN 62* 49* 49*   CREATININE 1.04 1.09 0.98   CALCIUM 9.0 9.0 8.7                       Imaging  EC-ECHOCARDIOGRAM COMPLETE W/ CONT   Final Result      DX-CHEST-PORTABLE (1 VIEW)   Final Result      1.  Increased diffuse interstitial edema with layering small right effusion.   2.  Bilateral basilar atelectasis and/or consolidation. Underlying infection is possible.   3.  Mild enlargement of the cardiomediastinal silhouette.      CL-CARDIOVERSION    (Results Pending)   CL-LEFT HEART CATHETERIZATION WITH POSSIBLE INTERVENTION    (Results Pending)   CL-LEFT HEART CATHETERIZATION WITH POSSIBLE INTERVENTION    (Results Pending)   CL-LEFT HEART CATHETERIZATION WITH POSSIBLE INTERVENTION    (Results Pending)        Assessment/Plan  * A-fib (HCC)- (present on admission)  Assessment & Plan  New diagnosis with A-fib and RVR  Came with signs of heart failure  TSH is normal  Check echo  Start with metoprolol 25 mg twice daily  No history of smoking or drugs  Telemetry    1/15 heart rate improved on increased metoprolol, future LUCY/LUCY DCCV planned after second stage of staged PCI, cardioversion to happen outpatient.  Still on heparin drip    CHF exacerbation (HCC)  Assessment & Plan  Patient has shortness of breath with crackles and lower extremity edema  BNP more than 27,000  Heart failure likely related to uncontrolled A-fib  Started with IV Lasix 40 mg twice daily with heparin drip and metoprolol  Close monitoring and start with ACE inhibitors and spironolactone if needed  Echo is pending  Telemetry    1/15 still with respiratory failure, however no acute exacerbation symptoms, exacerbation resolved.  Now with ongoing HFpEF.  Diuresis currently held by cardiology.  Continue other GDMT measures.    1/18: Cardiology following on GDMT     ACP (advance care planning)  Assessment & Plan  1/9 plan of care was discussed in detail with  the patient and family, discussed all treatment options, discussed the CODE STATUS, discussed all images and labs, answered all their questions, they want to be full code, all treatment, time 30 minutes    Chest pain- (present on admission)  Assessment & Plan  The ASCVD Risk score (Breann MONTAGUE, et al., 2019) failed to calculate for the following reasons:    The 2019 ASCVD risk score is only valid for ages 40 to 79  Pressure chest pain goes to her shoulders  Blood pressure is at the same and both arms and no signs of dissection on chest x-ray  With a new A-fib and a new heart failure  Patient is a high risk for coronary artery disease however her chest pain possibly related to demand  Trending troponin and telemetry  Elevated troponin  Echo  Cardiology was consulted since patient has a new heart failure  Aspirin and statin  1/16: cardiology following case discussed plan for angiogram today   1/18: Patient had PCI on 1/16 and plan is for staged PCI possible today cardiology following appreciate rec.     Bipolar disorder, in partial remission, most recent episode depressed (HCC)- (present on admission)  Assessment & Plan  Stable  Continue home dose of Seroquel and venlafaxine    Hypothyroid- (present on admission)  Assessment & Plan  TSH 6.4  Continue levothyroxine 125 mcg daily       VTE prophylaxis: heparin gtt    Greater than 51 minutes spent prepping to see patient (e.g. review of tests) obtaining and/or reviewing separately obtained history. Performing a medically appropriate examination and/ evaluation.  Counseling and educating the patient/family/caregiver.  Ordering medications, tests, or procedures.  Referring and communicating with other health care professionals.  Documenting clinical information in EPIC.  Independently interpreting results and communicating results to patient/family/caregiver.  Care coordination.      I have performed a physical exam and reviewed and updated ROS and Plan today (1/15/2024). In  review of yesterday's note (1/14/2024), there are no changes except as documented above.
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
## 2024-01-18 NOTE — DISCHARGE PLANNING
Case Management Discharge Planning    Admission Date: 1/9/2024  GMLOS: 4.6  ALOS: 9    6-Clicks ADL Score: 15  6-Clicks Mobility Score: 17  PT and/or OT Eval ordered: Yes  Post-acute Referrals Ordered: Yes  Post-acute Choice Obtained: Yes  Has referral(s) been sent to post-acute provider:  Yes      Anticipated Discharge Dispo: Discharge Disposition: D/T to SNF with Medicare cert in anticipation of skilled care (03)    DME Needed: No    Action(s) Taken: Updated Provider/Nurse on Discharge Plan    Escalations Completed: None    Medically Clear: No    Next Steps: Discussed in rounds. Cards may proceed with intervention today. If goes well, might be cleared for post acute tomorrow. [facility] might have bed tomorrow, for sure on Monday. NO weekend admissions as [facility] has no weekend discharges planned. Rehab is considering pt. Will need to F/U tomorrow if medically cleared.    Barriers to Discharge: Medical clearance and Pending Placement    Is the patient up for discharge tomorrow: No        Will hold oral hypoglycemic agents.  Start premeal and qhs fingerstick blood sugar checks.    Start premeal and qhs sliding scale lispro. Adjust based on glycemic requirements. Pt has macrocytic anemia with Smudge cells noted.  Repeat CBC

## 2024-01-18 NOTE — CARE PLAN
Problem: Knowledge Deficit - Standard  Goal: Patient and family/care givers will demonstrate understanding of plan of care, disease process/condition, diagnostic tests and medications  Outcome: Progressing     Problem: Depression  Goal: Patient and family/caregiver will verbalize accurate information about at least two of the possible causes of depression, three-four of the signs and symptoms of depression  Outcome: Progressing     Problem: Fall Risk  Goal: Patient will remain free from falls  Outcome: Progressing     Problem: Skin Integrity  Goal: Skin integrity is maintained or improved  Outcome: Progressing     Problem: Pain - Standard  Goal: Alleviation of pain or a reduction in pain to the patient’s comfort goal  Outcome: Progressing     Problem: Psychosocial  Goal: Patient's level of anxiety will decrease  Outcome: Progressing  Goal: Patient's ability to verbalize feelings about condition will improve  Outcome: Progressing  Goal: Patient's ability to re-evaluate and adapt role responsibilities will improve  Outcome: Progressing  Goal: Patient and family will demonstrate ability to cope with life altering diagnosis and/or procedure  Outcome: Progressing  Goal: Spiritual and cultural needs incorporated into hospitalization  Outcome: Progressing     Problem: Communication  Goal: The ability to communicate needs accurately and effectively will improve  Outcome: Progressing     Problem: Discharge Barriers/Planning  Goal: Patient's continuum of care needs are met  Outcome: Progressing     Problem: Hemodynamics  Goal: Patient's hemodynamics, fluid balance and neurologic status will be stable or improve  Outcome: Progressing     Problem: Respiratory  Goal: Patient will achieve/maintain optimum respiratory ventilation and gas exchange  Outcome: Progressing     Problem: Dysphagia  Goal: Dysphagia will improve  Outcome: Progressing     Problem: Nutrition  Goal: Patient's nutritional and fluid intake will be adequate or  improve  Outcome: Progressing  Goal: Enteral nutrition will be maintained or improve  Outcome: Progressing  Goal: Enteral nutrition will be maintained or improve  Outcome: Progressing     Problem: Urinary Elimination  Goal: Establish and maintain regular urinary output  Outcome: Progressing     Problem: Bowel Elimination  Goal: Establish and maintain regular bowel function  Outcome: Progressing     Problem: Mobility  Goal: Patient's capacity to carry out activities will improve  Outcome: Progressing     Problem: Self Care  Goal: Patient will have the ability to perform ADLs independently or with assistance (bathe, groom, dress, toilet and feed)  Outcome: Progressing     Problem: Care Map:  Admission Optimal Outcome for the Heart Failure Patient  Goal: Admission:  Optimal Care of the heart failure patient  Outcome: Progressing     Problem: Care Map:  Day 1 Optimal Outcome for the Heart Failure Patient  Goal: Day 1:  Optimal Care of the heart failure patient  Outcome: Progressing     Problem: Care Map:  Day 2 Optimal Outcome for the Heart Failure Patient  Goal: Day 2:  Optimal Care of the heart failure patient  Outcome: Progressing     Problem: Care Map:  Day 3 Optimal Outcome for the Heart Failure Patient  Goal: Day 3:  Optimal Care of the heart failure patient  Outcome: Progressing     Problem: Care Map:  Day Before Discharge Optimal Outcome for the Heart Failure Patient  Goal: Day Before Discharge:  Optimal Care of the heart failure patient  Outcome: Progressing     Problem: Care Map:  Day of Discharge Optimal Outcome for the Heart Failure Patient  Goal: Day of Discharge:  Optimal Care of the heart failure patient  Outcome: Progressing   The patient is Watcher - Medium risk of patient condition declining or worsening    Shift Goals  Clinical Goals: VSS, Mobilization  Patient Goals: Comfort  Family Goals: Updates    Progress made toward(s) clinical / shift goals:  VSS    Patient is not progressing towards the  following goals:

## 2024-01-18 NOTE — WOUND TEAM
[facility]  Inpatient Services  Initial Wound and Skin Care Evaluation    Admission Date: 1/9/2024     Last order of IP CONSULT TO WOUND CARE was found on 1/15/2024 from Hospital Encounter on 1/9/2024     HPI, PMH, SH: Reviewed    Past Surgical History:   Procedure Laterality Date    HYSTERECTOMY ROBOTIC  3/18/2013    Performed by Juju Gonzales M.D. at SURGERY [facility] ORS    COLPOSACROPEXY ROBOTIC  3/18/2013    Performed by Juju Gonzales M.D. at SURGERY [facility] ORS    BLADDER SLING FEMALE  3/18/2013    Performed by Juju Gonzales M.D. at SURGERY [facility] ORS    CYSTOSCOPY  3/18/2013    Performed by Juju Gonzales M.D. at SURGERY [facility] ORS    VENTRAL HERNIA REPAIR  3/18/2010    Performed by TRAVIS PATTERSON at SURGERY [facility] ORS    TONSILLECTOMY       Social History     Tobacco Use    Smoking status: Former     Current packs/day: 0.25     Average packs/day: 0.3 packs/day for 1 year (0.3 ttl pk-yrs)     Types: Cigarettes    Smokeless tobacco: Never   Substance Use Topics    Alcohol use: No     Alcohol/week: 0.0 oz     Chief Complaint   Patient presents with    Sent by MD     Was seen at Dr office today and found to be in A. Fib with RVR - no history    Shortness of Breath     X3 days     Diagnosis: A-fib (HCC) [I48.91]    Unit where seen by Wound Team: T704/02     WOUND CONSULT RELATED TO:  L nose, L hand    WOUND TEAM PLAN OF CARE - Frequency of Follow-up:   Nursing to follow dressing orders written for wound care. Contact wound team if area fails to progress, deteriorates or with any questions/concerns if something comes up before next scheduled follow up (See below as to whether wound is following and frequency of wound follow up)   Weekly -      WOUND HISTORY:      Admitted for dyspnea and new onset Afib. Was found with severe vessel disease. Currently on heparin drip.     WOUND ASSESSMENT/LDA  Wound 01/16/24 Heel Right 1/17 DTI (Active)   Date First  Assessed/Time First Assessed: 01/16/24 0600   Location: Heel  Laterality: Right  Wound Description (Comments): 1/17 DTI      Assessments 1/17/2024  4:00 PM   Wound Image     Site Assessment Purple;Boggy   Periwound Assessment Dry   Margins Defined edges;Attached edges   Closure Open to air   Drainage Amount None   Treatments Offloading   Offloading/DME Heel float boot   Dressing Options Offloading Dressing - Heel   Dressing Change/Treatment Frequency Every 72 hrs, and As Needed   NEXT Dressing Change/Treatment Date 01/20/24   NEXT Weekly Photo (Inpatient Only) 01/24/24   Wound Team Following Weekly   Wound Length (cm) 1 cm   Wound Width (cm) 0.5 cm   Wound Surface Area (cm^2) 0.5 cm^2   Shape triangular   Wound Odor None   Pulses Right;2+   WOUND NURSE ONLY - Time Spent with Patient (mins) 30        Vascular:    MATHIEU:   No results found.    Lab Values:    Lab Results   Component Value Date/Time    WBC 7.0 01/17/2024 03:33 AM    WBC 4.8 03/14/2011 11:05 AM    RBC 3.88 (L) 01/17/2024 03:33 AM    RBC 4.34 03/14/2011 11:05 AM    HEMOGLOBIN 11.7 (L) 01/17/2024 03:33 AM    HEMATOCRIT 36.9 (L) 01/17/2024 03:33 AM    CREACTPROT 3.11 (H) 01/05/2015 04:53 AM    HBA1C 5.9 (H) 01/09/2024 10:03 AM         Culture Results show:  No results found for this or any previous visit (from the past 720 hour(s)).    Pain Level/Medicated:  Patient denies pain       INTERVENTIONS BY WOUND TEAM:  Chart and images reviewed. Discussed with bedside RN. All areas of concern (based on picture review, LDA review and discussion with bedside RN) have been thoroughly assessed. Documentation of areas based on significant findings. This RN in to assess patient. Performed standard wound care which includes appropriate positioning, dressing removal and non-selective debridement. Pictures and measurements obtained weekly if/when required.    Wound:  R heel    ** currently in heel float boots    Advanced Wound Care Discharge Planning  Number of Clinicians  necessary to complete wound care: 1  Is patient requiring IV pain medications for dressing changes:  No   Length of time for dressing change 20 min. (This does not include chart review, pre-medication time, set up, clean up or time spent charting.)    Interdisciplinary consultation: Patient, Bedside RN.  Pressure injury and staging reviewed with Mouna JUSTIN (Wound RN).    EVALUATION / RATIONALE FOR TREATMENT:     Date:  01/17/24  Wound Status:  Initial evaluation    Patient with non-blanching purple discoloration to right heel, likely a deep tissue pressure injury. Pt extremely debilitated and weak thus not mobilizing at her baseline. Offloaded with heel float boot at this time.          Goals: Steady decrease in wound area and depth weekly.    NURSING PLAN OF CARE ORDERS:  Skin care: See Skin Care orders    NUTRITION RECOMMENDATIONS   Wound Team Recommendations:  N/A    DIET ORDERS (From admission to next 24h)       Start     Ordered    01/17/24 1406  Diet Order Diet: Level 5 - Minced and Moist; Liquid level: Level 0 - Thin; Second Modifier: (optional): Cardiac  ALL MEALS        Question Answer Comment   Diet: Level 5 - Minced and Moist    Liquid level Level 0 - Thin    Second Modifier: (optional) Cardiac        01/17/24 1406    01/15/24 0857  Supplements  ONCE        Question Answer Comment   Which Supplement Ensure    Ensure: Ensure Plus Carton        01/15/24 0857                    PREVENTATIVE INTERVENTIONS:    Q shift Cristofer - performed per nursing policy  Q shift pressure point assessments - performed per nursing policy    Surface/Positioning  Reposition q 2 hours - Currently in Place  Waffle overlay  - Currently in Place    Offloading/Redistribution  Heel offloading dressing (Silicone dressing) - Ordered  Heel float boots (Prevalon boot) - Currently in Place      Mobilization      Not Mobilizing      Anticipated discharge plans:  TBD        Vac Discharge Needs:  Vac Discharge plan is purely a recommendation  from wound team and not a requirement for discharge unless otherwise stated by physician.  Not Applicable Pt not on a wound vac

## 2024-01-18 NOTE — PROGRESS NOTES
Cardiology Follow Up Progress Note    Date of Service  1/18/2024    Attending Physician  Sunny Rodriguez M.D.    JIM Major is a 83 y.o. female admitted 1/9/2024 with a past medical history of bipolar disorder, CKD and hypothyroidism found to have atrial fibrillation with rapid ventricular response and primary care clinic referred to [facility]    Interim Events  1/15: /83.  HR 90s.  Alert.  Hard of hearing but can read lips and answer questions.  Assisted at the bedside with her daughter.  No cardiac symptoms.  1/16: Successful PCI circumflex with residual LAD the latter deferred due to complexity, length of procedure for circumflex intervention  1/17: /77.  .  A-fib.  The patient seen and examined with daughter at the bedside.  Specifically denies pain or shortness of breath.  1/18: /73.  .  A-fib.  No cardiac events overnight.    Review of Systems  Review of Systems   Respiratory:  Negative for chest tightness and shortness of breath.        Vital signs in last 24 hours  Temp:  [36.3 °C (97.3 °F)-36.5 °C (97.7 °F)] 36.5 °C (97.7 °F)  Pulse:  [105-126] 113  Resp:  [18-26] 20  BP: (100-145)/(64-89) 111/73  SpO2:  [93 %-96 %] 93 %    Physical Exam  Physical Exam  Constitutional:       General: She is not in acute distress.     Comments: Frail.  Cachectic.  Hard of hearing   Cardiovascular:      Rate and Rhythm: Tachycardia present. Rhythm irregular.      Pulses:           Radial pulses are 1+ on the right side.      Heart sounds: Normal heart sounds, S1 normal and S2 normal. No murmur heard.     No friction rub. No gallop.   Pulmonary:      Effort: Pulmonary effort is normal.      Breath sounds: Normal breath sounds. No wheezing, rhonchi or rales.   Musculoskeletal:      Right lower leg: No edema.      Left lower leg: No edema.      Comments: Right groin site without hematoma.  Soft.   Skin:     General: Skin is warm and dry.   Neurological:      General: No focal  "deficit present.      Mental Status: She is alert.   Psychiatric:         Behavior: Behavior normal.         Lab Review  Lab Results   Component Value Date/Time    WBC 10.4 01/18/2024 03:07 AM    WBC 4.8 03/14/2011 11:05 AM    RBC 3.68 (L) 01/18/2024 03:07 AM    RBC 4.34 03/14/2011 11:05 AM    HEMOGLOBIN 11.1 (L) 01/18/2024 03:07 AM    HEMATOCRIT 34.5 (L) 01/18/2024 03:07 AM    MCV 93.8 01/18/2024 03:07 AM    MCV 95 03/14/2011 11:05 AM    MCH 30.2 01/18/2024 03:07 AM    MCH 31.6 03/14/2011 11:05 AM    MCHC 32.2 01/18/2024 03:07 AM    MPV 11.2 01/18/2024 03:07 AM      Lab Results   Component Value Date/Time    SODIUM 142 01/18/2024 03:07 AM    POTASSIUM 4.5 01/18/2024 03:07 AM    CHLORIDE 109 01/18/2024 03:07 AM    CO2 24 01/18/2024 03:07 AM    GLUCOSE 108 (H) 01/18/2024 03:07 AM    BUN 49 (H) 01/18/2024 03:07 AM    CREATININE 0.98 01/18/2024 03:07 AM    CREATININE 1.38 (H) 03/14/2011 11:05 AM    BUNCREATRAT 30 (H) 09/20/2017 08:11 AM    BUNCREATRAT 22 03/14/2011 11:05 AM    GLOMRATE 38 (L) 03/14/2011 11:05 AM      Lab Results   Component Value Date/Time    ASTSGOT 16 01/11/2024 03:37 AM    ALTSGPT 11 01/11/2024 03:37 AM     Lab Results   Component Value Date/Time    CHOLSTRLTOT 125 01/10/2024 05:54 AM    LDL 65 01/10/2024 05:54 AM    HDL 46 01/10/2024 05:54 AM    TRIGLYCERIDE 70 01/10/2024 05:54 AM    TROPONINT 437 (H) 01/10/2024 05:54 AM       No results for input(s): \"NTPROBNP\" in the last 72 hours.    Cardiac Imaging and Procedures Review  EKG:  My personal interpretation of the EKG dated 1/14/2024 is atrial fibrillation, rate 109, left bundle branch block  Rhythm: My personal interpretation rhythm dated 1/15/2024 is atrial fibrillation, rate 90s    Rhythm: My personal interpretation the rhythm dated 1/18/2024 is atrial fibrillation.    ECHOCARDIOGRAM 1/10/2024  Normal left ventricular chamber size. Normal left ventricular wall   thickness. Severely reduced left ventricular systolic function. The   ejection " fraction is measured to be 26% by Dominguez's biplane.  Normal right ventricular size with reduced systolic function.  Moderate-severe mitral regurgitation eccentric with coanda effect.  Mild tricuspid regurgitation.  No pericardial effusion.  Compared to the prior study on 01/05/15, now severely reduced left   ventricular systolic function with mod-sev mitral valve regurgitation.     CARDIAC CATHETERIZATION 1/40/2024  CORONARY ANGIOGRAPHY:  The left main coronary artery has luminal disease and bifurcates into the left anterior descending and left circumflex coronary arteries.  The left anterior descending coronary artery is a large, transapical vessel with a hazy ostium of unclear severity, sequential calcified proximal 90% and mid 70% lesions, and distal luminal irregularities.  It supplies two small diagonal branches with luminal irregularities  The left circumflex coronary artery is a large, dominant vessel with a focal mid 80% and otherwise minimal luminal irregularities.  It supplies a moderate first obtuse marginal branch, a large second obtuse marginal branch, a large third obtuse marginal branch, and a moderate posterolateral branch with luminal irregularities.  The right coronary artery is a moderate, dominant vessel with mid 95% disease prior to a high bifurcation of small to moderate caliber posterior descending and posterolateral branches that appear diffusely narrowed.  IMPRESSION:  Severe obstructive three-vessel coronary artery disease.  Successful percutaneous coronary intervention to the mid right coronary artery with one 2.5 x 24 mm Synergy drug-eluting stent postdilated to 3.3 mm proximally.  Residual focal, but calcified severe disease in the left anterior descending and left circumflex coronaries.  Upper normal left heart filling pressures.    CARDIAC CATHETERIZATION 1/16/2024  IMPRESSION:  Successful intracoronary lithotripsy and percutaneous coronary intervention to the mid left circumflex  coronary with one 2.5 x 16 mm Synergy drug-eluting stent postdilated with a 3.75 x 8 mm noncompliant balloon at a maximum pressure of 26 georgie in the mid segment.  Residual sequential severe and calcified stenoses in the ostial, proximal, and mid left anterior descending coronary.  RECOMMENDATIONS:  Return to floor with continued care per primary service.  Flat for 4 hours with routine groin precautions.  Restart heparin drip at previous rate without bolus in 2 hours if no access site complications.  Continue dual antiplatelet therapy with aspirin and clopidogrel.  Depending on patient clinical status, renal function, and access site consider staged complex coronary intervention to the left anterior descending coronary, which will likely require stenting back into the left main coronary, either as an inpatient or outpatient.    Imaging  Chest X-Ray: 1/9/2024  1.  Increased diffuse interstitial edema with layering small right effusion.  2.  Bilateral basilar atelectasis and/or consolidation. Underlying infection is possible.  3.  Mild enlargement of the cardiomediastinal silhouette    Assessment  Atrial fibrillation, rapid ventricular response, newly diagnosed duration unknown  HFrEF, 26%  Ischemic cardiomyopathy  PCI mid RCA 2.5 x 24 mm ANDRES 1/14/2024  PCI mid CX 3.75 x 16 mm ANDRES 1/60/2024  Residual proximal, mid LAD disease.  Acute hypoxic respiratory failure with pulmonary edema  Mitral regurgitation, moderate-severe  Acute on chronic kidney injury due to cardiorenal syndrome  Left bundle branch block, new diagnosis, duration unknown  Chest pain, elevated troponin likely due to demand ischemia  Hypothyroidism  Bipolar disorder    Recommendation Discussion  Clinically very stable from a cardiac standpoint with no recurrent ischemic or heart failure symptoms.  Atrial fibrillation rate slightly elevated.  Renal function stable.  Stage PCI LAD probably today while discuss with interventional cardiologist  Not requiring  loop diuretics  Continue Nitropaste  Continue IV heparin  Continue DAPT with clopidogrel  Continue atorvastatin  Continue spironolactone  Will further increase metoprolol for better A-fib rate  LUCY guided cardioversion on hold until coronary revascularization completed can be arranged as an outpatient  Discussed treatment plan with daughter at the bedside and bedside RN and Thomas Mae MD interventional cardiologist to review the case with Jose Sawyer MD interventional cardiologist.    Thank you for allowing me to participate in the care of this patient.      Please contact me with any questions.    Scot Lawler M.D.   Cardiologist, [facility]  [phone]

## 2024-01-18 NOTE — PROGRESS NOTES
Bedside report received at 1900 and assumed care of patient. Resting in bed, and denies pain at this time. Patient is A&O x.3, not oriented to situation and on 2L NC. Tele monitoring in place. Educated on fall risk, all fall precautions in place. Call light and belongings within reach, bed locked and in lowest position, denied other needs at this time. Q2 turns and hourly rounding in place.

## 2024-01-18 NOTE — DISCHARGE PLANNING
Hep gtt remains a barrier. Spoke with Karissa, daughter regarding [facility]/C resources/support.  She will speak with the rest of the family regarding choice.  Azeb will return to her 2LV home with 3ST to enter.  She will need to negotiate a FOS to access the BD/BR.  She has numerous family memebers that live nearby and are able to take turns to provide 24/7 physical assistance.  Karissa will call me back once a choice has been made.

## 2024-01-18 NOTE — DIETARY
Nutrition Services: Update   Day 9 of admit.  Azeb Major is a 83 y.o. female with admitting DX of A-fib (HCC) [I48.91]    Pt is currently NPO per RN for procedure today; previously on Level 5 - minced/moist, Level 0 - thin liquids Cardiac diet, Ensure Plus TID w/ meals, BID liquidized soups. Limited PO intake available for review: dinner 1/16 refused, NPO at breakfast. Nursing team unable to report on PO intake yesterday. Last day of documented adequate PO intake was on 1/15, average x3 meals was >50%.  RD visited pt at bedside. Pt's dtr present as well reports that pt drank at least 2 Ensure supplements yesterday. She also reports that pt seems to be preferring fluid sources of nutrition such as soups at this time. RD will increase liquidized soups to TID w/ meals, increase Ensure Plus to QID (+1400 kcal, +39 g protein daily) per discussion w/ dtr and based on pt's current preferences.  Likely stage PCI LAD today per chart notes. Anticipate pt to resume prior diet order post-procedure  Wt 1/18: 72.6 kg via bed scale - wts fluctuating this admit; down 1.3 kg from admit (1/11) stand up scale wt; 1.7% wt loss x 7 days is notable. Of note, pt with trace BLE edema; spironolactone ordered per MAR, was on lasix BID 1/9-1/14. Wt loss may be related to diuresis.  Labs: Glu 108, BUN 49, GFR 56    Recommendations/Plan:  Increase Ensure Plus to QID, liquidized soups to TID for when previous diet order resumes after procedure today  Encourage intake of meals/supplements ~50%.  Document intake of all meals as % taken in ADL's to provide interdisciplinary communication across all shifts.   Monitor weight.  Nutrition rep will continue to see patient for ongoing meal and snack preferences.    RD following.

## 2024-01-19 ENCOUNTER — APPOINTMENT (OUTPATIENT)
Dept: CARDIOLOGY | Facility: MEDICAL CENTER | Age: 84
DRG: 323 | End: 2024-01-19
Attending: INTERNAL MEDICINE
Payer: MEDICARE

## 2024-01-19 VITALS
BODY MASS INDEX: 24.8 KG/M2 | WEIGHT: 154.32 LBS | RESPIRATION RATE: 17 BRPM | TEMPERATURE: 97.9 F | OXYGEN SATURATION: 94 % | HEART RATE: 115 BPM | DIASTOLIC BLOOD PRESSURE: 76 MMHG | SYSTOLIC BLOOD PRESSURE: 124 MMHG | HEIGHT: 66 IN

## 2024-01-19 LAB
ANION GAP SERPL CALC-SCNC: 10 MMOL/L (ref 7–16)
BUN SERPL-MCNC: 60 MG/DL (ref 8–22)
CALCIUM SERPL-MCNC: 8.7 MG/DL (ref 8.5–10.5)
CHLORIDE SERPL-SCNC: 105 MMOL/L (ref 96–112)
CO2 SERPL-SCNC: 22 MMOL/L (ref 20–33)
CREAT SERPL-MCNC: 1.16 MG/DL (ref 0.5–1.4)
ERYTHROCYTE [DISTWIDTH] IN BLOOD BY AUTOMATED COUNT: 50.3 FL (ref 35.9–50)
GFR SERPLBLD CREATININE-BSD FMLA CKD-EPI: 46 ML/MIN/1.73 M 2
GLUCOSE SERPL-MCNC: 133 MG/DL (ref 65–99)
HCT VFR BLD AUTO: 35.1 % (ref 37–47)
HCT VFR BLD AUTO: 35.1 % (ref 37–47)
HGB BLD-MCNC: 10.9 G/DL (ref 12–16)
HGB BLD-MCNC: 11.1 G/DL (ref 12–16)
MCH RBC QN AUTO: 30.2 PG (ref 27–33)
MCHC RBC AUTO-ENTMCNC: 31.1 G/DL (ref 32.2–35.5)
MCV RBC AUTO: 97.2 FL (ref 81.4–97.8)
PLATELET # BLD AUTO: 119 K/UL (ref 164–446)
PMV BLD AUTO: 11.8 FL (ref 9–12.9)
POTASSIUM SERPL-SCNC: 5.4 MMOL/L (ref 3.6–5.5)
RBC # BLD AUTO: 3.61 M/UL (ref 4.2–5.4)
SODIUM SERPL-SCNC: 137 MMOL/L (ref 135–145)
UFH PPP CHRO-ACNC: <0.1 IU/ML
WBC # BLD AUTO: 12.1 K/UL (ref 4.8–10.8)

## 2024-01-19 PROCEDURE — A9270 NON-COVERED ITEM OR SERVICE: HCPCS | Performed by: INTERNAL MEDICINE

## 2024-01-19 PROCEDURE — 99232 SBSQ HOSP IP/OBS MODERATE 35: CPT | Performed by: INTERNAL MEDICINE

## 2024-01-19 PROCEDURE — 80048 BASIC METABOLIC PNL TOTAL CA: CPT

## 2024-01-19 PROCEDURE — A9270 NON-COVERED ITEM OR SERVICE: HCPCS | Performed by: NURSE PRACTITIONER

## 2024-01-19 PROCEDURE — 99239 HOSP IP/OBS DSCHRG MGMT >30: CPT | Performed by: INTERNAL MEDICINE

## 2024-01-19 PROCEDURE — 85520 HEPARIN ASSAY: CPT

## 2024-01-19 PROCEDURE — 85027 COMPLETE CBC AUTOMATED: CPT

## 2024-01-19 PROCEDURE — 36415 COLL VENOUS BLD VENIPUNCTURE: CPT

## 2024-01-19 PROCEDURE — 700102 HCHG RX REV CODE 250 W/ 637 OVERRIDE(OP): Performed by: INTERNAL MEDICINE

## 2024-01-19 PROCEDURE — 700102 HCHG RX REV CODE 250 W/ 637 OVERRIDE(OP): Performed by: NURSE PRACTITIONER

## 2024-01-19 PROCEDURE — 85014 HEMATOCRIT: CPT

## 2024-01-19 PROCEDURE — 85018 HEMOGLOBIN: CPT

## 2024-01-19 PROCEDURE — 92526 ORAL FUNCTION THERAPY: CPT

## 2024-01-19 RX ORDER — DIGOXIN 125 MCG
250 TABLET ORAL ONCE
Status: COMPLETED | OUTPATIENT
Start: 2024-01-19 | End: 2024-01-19

## 2024-01-19 RX ORDER — CLOPIDOGREL BISULFATE 75 MG/1
75 TABLET ORAL DAILY
Qty: 30 TABLET | Refills: 0 | Status: SHIPPED
Start: 2024-01-20

## 2024-01-19 RX ORDER — DIGOXIN 125 MCG
125 TABLET ORAL DAILY
Status: DISCONTINUED | OUTPATIENT
Start: 2024-01-20 | End: 2024-01-19 | Stop reason: HOSPADM

## 2024-01-19 RX ORDER — METOPROLOL SUCCINATE 25 MG/1
25 TABLET, EXTENDED RELEASE ORAL
Qty: 60 TABLET | Refills: 0 | Status: SHIPPED
Start: 2024-01-19 | End: 2024-01-19

## 2024-01-19 RX ORDER — DIGOXIN 125 MCG
125 TABLET ORAL DAILY
Qty: 30 TABLET | Refills: 0 | Status: SHIPPED
Start: 2024-01-20

## 2024-01-19 RX ORDER — SPIRONOLACTONE 25 MG/1
25 TABLET ORAL DAILY
Qty: 30 TABLET | Refills: 3 | Status: SHIPPED
Start: 2024-01-20 | End: 2024-01-26

## 2024-01-19 RX ORDER — ASPIRIN 81 MG/1
81 TABLET ORAL DAILY
Qty: 100 TABLET | Refills: 0 | Status: SHIPPED
Start: 2024-01-20

## 2024-01-19 RX ORDER — METOPROLOL SUCCINATE 50 MG/1
50 TABLET, EXTENDED RELEASE ORAL
Qty: 30 TABLET | Refills: 0 | Status: SHIPPED | OUTPATIENT
Start: 2024-01-19

## 2024-01-19 RX ORDER — NITROFURANTOIN 25; 75 MG/1; MG/1
100 CAPSULE ORAL DAILY
Status: DISCONTINUED | OUTPATIENT
Start: 2024-01-19 | End: 2024-01-19 | Stop reason: HOSPADM

## 2024-01-19 RX ORDER — ATORVASTATIN CALCIUM 40 MG/1
40 TABLET, FILM COATED ORAL EVERY EVENING
Qty: 30 TABLET | Refills: 0 | Status: SHIPPED | OUTPATIENT
Start: 2024-01-19

## 2024-01-19 RX ORDER — METOPROLOL SUCCINATE 50 MG/1
50 TABLET, EXTENDED RELEASE ORAL
Status: DISCONTINUED | OUTPATIENT
Start: 2024-01-19 | End: 2024-01-19 | Stop reason: HOSPADM

## 2024-01-19 RX ADMIN — METOPROLOL SUCCINATE 25 MG: 25 TABLET, EXTENDED RELEASE ORAL at 08:34

## 2024-01-19 RX ADMIN — CLOPIDOGREL BISULFATE 75 MG: 75 TABLET ORAL at 06:12

## 2024-01-19 RX ADMIN — NITROGLYCERIN 0.5 INCH: 20 OINTMENT TOPICAL at 06:12

## 2024-01-19 RX ADMIN — DIGOXIN 250 MCG: 0.12 TABLET ORAL at 13:32

## 2024-01-19 RX ADMIN — VENLAFAXINE HYDROCHLORIDE 75 MG: 75 CAPSULE, EXTENDED RELEASE ORAL at 08:44

## 2024-01-19 RX ADMIN — NITROGLYCERIN 0.5 INCH: 20 OINTMENT TOPICAL at 00:22

## 2024-01-19 RX ADMIN — ASPIRIN 81 MG: 81 TABLET, COATED ORAL at 06:12

## 2024-01-19 RX ADMIN — LEVOTHYROXINE SODIUM 125 MCG: 0.12 TABLET ORAL at 06:12

## 2024-01-19 RX ADMIN — SPIRONOLACTONE 25 MG: 25 TABLET ORAL at 06:12

## 2024-01-19 RX ADMIN — NITROGLYCERIN 0.5 INCH: 20 OINTMENT TOPICAL at 13:33

## 2024-01-19 ASSESSMENT — ENCOUNTER SYMPTOMS
CHEST TIGHTNESS: 0
SHORTNESS OF BREATH: 0

## 2024-01-19 ASSESSMENT — FIBROSIS 4 INDEX: FIB4 SCORE: 3.45

## 2024-01-19 ASSESSMENT — PAIN DESCRIPTION - PAIN TYPE: TYPE: ACUTE PAIN

## 2024-01-19 NOTE — PROGRESS NOTES
Monitor Summary:     Rhythm: Afib  Rate: 106-130  Ectopy: None  Measurements: -/.09/.33                     12 Hour Chart Check

## 2024-01-19 NOTE — CARE PLAN
The patient is Stable - Low risk of patient condition declining or worsening    Shift Goals  Clinical Goals: Manage nose bleed  Patient Goals: Rest  Family Goals: for pt to get good rest    Progress made toward(s) clinical / shift goals:    Problem: Skin Integrity  Goal: Skin integrity is maintained or improved  Outcome: Progressing   Pressure relieving mattress in use, turned q 2 hrs, barrier cream in use.   Problem: Pain - Standard  Goal: Alleviation of pain or a reduction in pain to the patient’s comfort goal  Outcome: Progressing   Pain managed well with PRN medication at this time.  Patient uses pharmacological and non pharmacological pain-relief strategies. Patient displays improvement in mood, coping.    Patient is not progressing towards the following goals:

## 2024-01-19 NOTE — PROGRESS NOTES
Discharged to other by medical transportation with self. Discharged via wheelchair, hospital escort: Refused.  Special equipment needed: Oxygen     Be sure to schedule a follow-up appointment with your primary care doctor or any specialists as instructed.      Discharge Plan:   Influenza Vaccine Indication: Indicated: 65 years and older     I understand that a diet low in cholesterol, fat, and sodium is recommended for good health. Unless I have been given specific instructions below for another diet, I accept this instruction as my diet prescription.   Other diet: Cardiac, level 5 minced and moist, thin liquids     Special Instructions: None     -Is this patient being discharged with medication to prevent blood clots?  Yes, Aspirin Aspirin Tablets

## 2024-01-19 NOTE — DISCHARGE PLANNING
Following for possible Cards intervention as well as for choice.     0926-ILEANA Magaña has medically cleared.    0955-Spoke with Karissa, daughter.  Karissa feels that Azeb is not medically cleared to attend rehabilitation @ this time. No choice @ this time.      1019-Dr. Atkins is recommending SNF.  [facility] will no longer follow.  Please reach out to myself with any questions.

## 2024-01-19 NOTE — THERAPY
Speech Language Pathology   Daily Treatment     Patient Name: Azeb Major  AGE:  83 y.o., SEX:  female  Medical Record #: [id number]  Date of Service: 1/19/2024      Precautions:  Precautions: Cardiac Precautions, Swallow Precautions     Subjective  Pt laying in bed with family at bedside upon arrival. Pt's daughter endorsed the pt is having a difficult day 2/2 depression and bipolar disorder. Pt's daughter reported reduced oral intake of food 2/2 reduced motivation, but getting nutrition from [employer]. No family report of difficulty with current soft and bite sized/thin liquid diet. Pt expressed thoughts and wishes of dying. Pt expressed that if she can sleep instead of participating in session, she will live. RN notified.     Assessment  No overt s/sx of aspiration with PO trials consisting of thin liquids via straw x2. PO trials fed by SLP 2/2 reduced pt motivation/participation. Pt displayed adequate lip seal, oral acceptance, and oral containment. Suspect complete AP transfer. Vocal quality remained stable with PO intake. Pt refused PO trials of other viscosities despite encouragement from this clinician and family members.      Clinical Impressions    Recommend continuation of soft&bite sized diet with thin liquids as there are no familial concerns related to current diet. Did not assess trials other than thin liquids 2/2 limited participation from pt despite familial encouragement. Educated family and pt on dysphagia with verbal acceptance from family and reinforcement needed for pt.     Recommendations  Treatment Completed: Dysphagia Treatment  Dysphagia Treatment  Diet Consistency: soft and bite sized/thin liquids, diet preferences per pt/family  Instrumentation: None indicated at this time  Medication: One pill at a time, As tolerated  Supervision: Assist with meal tray set up, Encourage self-feeding  Positioning: Fully upright and midline during oral intake  Risk Management : Small bites/sips  Oral  "Care: Q8h    SLP Treatment Plan  Treatment Plan: Dysphagia Treatment  SLP Frequency: 3x Per Week  Estimated Duration: Until Therapy Goals Met    Anticipated Discharge Needs  Discharge Recommendations: Recommend post-acute placement for additional speech therapy services prior to discharge home  Therapy Recommendations Upon DC: Dysphagia Training  Patient / Family Goals  Patient / Family Goal #1: \"I don't want to restrict her\"-daughter  Goal #1 Outcome: Goal met  Short Term Goals  Short Term Goal # 1: Pt will consume a soft and bite sized and thin liquid diet without overt s/sx of aspiration or inadequate airway protection  Goal Outcome # 1: Progressing as expected    Shea Frankenberger, Student  "

## 2024-01-19 NOTE — CARE PLAN
The patient is Watcher - Medium risk of patient condition declining or worsening    Shift Goals  Clinical Goals: manage pain, manage nose bleed, rest  Patient Goals: rest  Family Goals: for pt to get good rest    Progress made toward(s) clinical / shift goals:        Problem: Knowledge Deficit - Standard  Goal: Patient and family/care givers will demonstrate understanding of plan of care, disease process/condition, diagnostic tests and medications  Outcome: Progressing     Problem: Fall Risk  Goal: Patient will remain free from falls  Outcome: Progressing     Problem: Skin Integrity  Goal: Skin integrity is maintained or improved  Outcome: Progressing       Patient is not progressing towards the following goals:

## 2024-01-19 NOTE — DISCHARGE INSTRUCTIONS
Discharge Instructions    Discharged to other by medical transportation with self. Discharged via wheelchair, hospital escort: Refused.  Special equipment needed: Oxygen    Be sure to schedule a follow-up appointment with your primary care doctor or any specialists as instructed.     Discharge Plan:   Influenza Vaccine Indication: Indicated: 65 years and older    I understand that a diet low in cholesterol, fat, and sodium is recommended for good health. Unless I have been given specific instructions below for another diet, I accept this instruction as my diet prescription.   Other diet: Cardiac, level 5 minced and moist, thin liquids    Special Instructions: None    -Is this patient being discharged with medication to prevent blood clots?  Yes, Aspirin Aspirin Tablets  What is this medication?  ASPIRIN (AS pir in) lowers the risk of heart attack, stroke, or blood clot. It may also be used to treat mild to moderate pain, inflammation, or arthritis. It belongs to a group of medications called NSAIDs.  This medicine may be used for other purposes; ask your health care provider or pharmacist if you have questions.  COMMON BRAND NAME(S): Aspir-Low, Aspir-Bhavya, Aspirtab, Sohan Advanced Aspirin, Sohan Aspirin, Sohan Aspirin Extra Strength, Sohan Aspirin Plus, Sohan Extra Strength, Sohan Extra Strength Plus, Sohan Genuine Aspirin, SpectraScience Womens Aspirin, Bufferin, Bufferin Extra Strength, Bufferin Low Dose  What should I tell my care team before I take this medication?  They need to know if you have any of these conditions:  Anemia  Asthma  Bleeding problems  Diabetes  Gout  History of stomach ulcers or bleeding  If you often drink alcohol  Kidney disease  Liver disease  Low level of vitamin K  Lupus  Smoke tobacco  An unusual or allergic reaction to aspirin, tartrazine dye, other medications, dyes, or preservatives  Pregnant or trying to get pregnant  Breast-feeding  How should I use this medication?  Take this medication  by mouth with a glass of water. Follow the directions on the package or prescription label. You can take this medication with or without food. If it upsets your stomach, take it with food. Do not take it more often than directed.  Talk to your care team about the use of this medication in children. While this medication may be prescribed for children as young as 12 years of age for selected conditions, precautions do apply. Children and teenagers should not use this medication to treat chicken pox or flu symptoms unless directed by a care team.  Patients over 65 years old may have a stronger reaction and need a smaller dose.  Overdosage: If you think you have taken too much of this medicine contact a poison control center or emergency room at once.  NOTE: This medicine is only for you. Do not share this medicine with others.  What if I miss a dose?  If you are taking this medication on a regular schedule and miss a dose, take it as soon as you can. If it is almost time for your next dose, take only that dose. Do not take double or extra doses.  What may interact with this medication?  Do not take this medication with any of the following:  Cidofovir  Ketorolac  Probenecid  This medication may also interact with the following:  Alcohol  Alendronate  Bismuth subsalicylate  Flavocoxid  Herbal supplements like feverfew, garlic, ana laura, ginkgo biloba, horse chestnut  Medications for diabetes or glaucoma like acetazolamide, methazolamide  Medications for gout  Medications that prevent or treat blood clots like apixaban, clopidogrel, enoxaparin, heparin, rivaroxaban, warfarin  Other aspirin and aspirin-like medications  NSAIDs, medications for pain and inflammation, like ibuprofen or naproxen  Pemetrexed  Sulfinpyrazone  Varicella live vaccine  This list may not describe all possible interactions. Give your health care provider a list of all the medicines, herbs, non-prescription drugs, or dietary supplements you use. Also  tell them if you smoke, drink alcohol, or use illegal drugs. Some items may interact with your medicine.  What should I watch for while using this medication?  If you are treating yourself for pain, tell your doctor or health care provider if the pain lasts more than 10 days, if it gets worse, or if there is a new or different kind of pain. Tell your doctor if you see redness or swelling. Also, check with your doctor if you have a fever that lasts for more than 3 days. Only take this medication to prevent heart attacks or blood clotting if prescribed by your doctor or health care provider.  Do not take other medications that contain aspirin, ibuprofen, or naproxen with this medication. Side effects such as stomach upset, nausea, or ulcers may be more likely to occur. Many non-prescription medications contain aspirin, ibuprofen, or naproxen. Always read labels carefully.  This medication can cause serious ulcers and bleeding in the stomach. It can happen with no warning. Smoking, drinking alcohol, older age, and poor health can also increase risks. Call your health care provider right away if you have stomach pain or blood in your vomit or stool.  Alcohol may interfere with the effect of this medication. Avoid alcoholic drinks.  This medication may cause serious skin reactions. They can happen weeks to months after starting the medication. Contact your health care provider right away if you notice fevers or flu-like symptoms with a rash. The rash may be red or purple and then turn into blisters or peeling of the skin. Or, you might notice a red rash with swelling of the face, lips or lymph nodes in your neck or under your arms.  Talk to your health care provider if you are pregnant before taking this medication. Taking this medication between weeks 20 and 30 of pregnancy may harm your unborn baby. Your health care provider will monitor you closely if you need to take it. After 30 weeks of pregnancy, do not take this  medication.  Be careful brushing or flossing your teeth or using a toothpick because you may get an infection or bleed more easily. If you have any dental work done, tell your dentist you are receiving this medication.  This medication may make it more difficult to get pregnant. Talk to your health care provider if you are concerned about your fertility.  What side effects may I notice from receiving this medication?  Side effects that you should report to your care team as soon as possible:  Allergic reactions--skin rash, itching, hives, swelling of the face, lips, tongue, or throat  Bleeding--bloody or black, tar-like stools, vomiting blood or brown material that looks like coffee grounds, red or dark brown urine, red or purple spots on skin, unusual bruising or bleeding  Hearing loss, ringing in ears  Kidney injury--decrease in the amount of urine, swelling of the ankles, hands, or feet  Liver injury--right upper belly pain, loss of appetite, nausea, light-colored stool, dark yellow or brown urine, yellowing of the skin or eyes, unusual weakness, fatigue  Rash, fever, and swollen lymph nodes  Side effects that usually do not require medical attention (report to your care team if they continue or are bothersome):  Headache  Loss of appetite  Nausea  Upset stomach  This list may not describe all possible side effects. Call your doctor for medical advice about side effects. You may report side effects to FDA at 6-482-FDA-1656.  Where should I keep my medication?  Keep out of the reach of children and pets.  Store at room temperature between 15 and 30 degrees C (59 and 86 degrees F). Protect from heat and moisture. Get rid of any unused medication after the expiration date.  Do not use this medication if it has a strong vinegar smell.  To get rid of medications that are no longer needed or have :  Take the medication to a medication take-back program. Check with your pharmacy or law enforcement to find a  location.  If you cannot return the medication, check the label or package insert to see if the medication should be thrown out in the garbage or flushed down the toilet. If you are not sure, ask your care team. If it is safe to put it in the trash, empty the medication out of the container. Mix the medication with cat litter, dirt, coffee grounds, or other unwanted substance. Seal the mixture in a bag or container. Put it in the trash.  NOTE: This sheet is a summary. It may not cover all possible information. If you have questions about this medicine, talk to your doctor, pharmacist, or health care provider.  © 2023 [employer]/Gold Standard (2021-10-29 00:00:00)    Is patient discharged on Warfarin / Coumadin?   No       HF Patient Discharge Instructions  Monitor your weight daily, and maintain a weight chart, to track your weight changes.   Activity as tolerated, unless your Doctor has ordered otherwise.  Follow a low fat, low cholesterol, low salt diet unless instructed otherwise by your Doctor. Read the labels on the back of food products and track your intake of fat, cholesterol and salt.   Fluid Restriction No. If a Fluid Restriction has been ordered by your Doctor, measure fluids with a measuring cup to ensure that you are not exceeding the restriction.   No smoking.  Oxygen Yes. If your Doctor has ordered that you wear Oxygen at home, it is important to wear it as ordered.  Did you receive an explanation from staff on the importance of taking each of your medications and why it is necessary to keep taking them unless your doctor says to stop? Yes  Were all of your questions answered about how to manage your heart failure and what to do if you have increased signs and symptoms after you go home? Yes  Do you feel like your heart failure care team involved you in the care treatment plan and allowed you to make decisions regarding your care while in the hospital and addressed any discharge needs you might have?  Yes    See the educational handout provided at discharge for more information on monitoring your daily weight, activity and diet. This also explains more about Heart Failure, symptoms of a flare-up and some of the tests that you have undergone.     Warning Signs of a Flare-Up include:  Swelling in the ankles or lower legs.  Shortness of breath, while at rest, or while doing normal activities.   Shortness of breath at night when in bed, or coughing in bed.   Requiring more pillows to sleep at night, or needing to sit up at night to sleep.  Feeling weak, dizzy or fatigued.     When to call your Doctor:  Call your Primary Care Physician or Cardiologist if:   You experience any pain radiating to your jaw or neck.  You have any difficulty breathing.  You experience weight gain of 3 lbs in a day or 5 lbs in a week.   You feel any palpitations or irregular heartbeats.  You become dizzy or lose consciousness.   If you have had an angiogram or had a pacemaker or AICD placed, and experience:  Bleeding, drainage or swelling at the surgical / puncture site.  Fever greater than 100.0 F  Shock from internal defibrillator.  Cool and / or numb extremities.     Please access the [facility] My HF Guide/Heart Failure Interactive Workbook:   [email or web address]    Diagnosis:  Acute Coronary Syndrome (ACS) is a diagnosis that encompasses cardiac-related chest pain and heart attack. ACS occurs when the blood flow to the heart muscle is severely reduced or cut off completely due to a slow process called atherosclerosis.  Atherosclerosis is a disease in which the coronary arteries become narrow from a buildup of fat, cholesterol, and other substances that combine to form plaque. If the plaque breaks, a blood clot will form and block the blood flow to the heart muscle. This lack of blood flow can cause damage or death to the heart muscle which is called a heart attack or Myocardial Infarction (MI). There are two different types  of MIs:  ST Elevation Myocardial Infarction or STEMI (the most severe type of heart attack) and Non-ST Elevation Myocardial Infarction or NSTEMI.    Treatment Plan:  Cardiac Diet  - Low fat, low salt, low cholesterol   Cardiac Rehab  - Your doctor has ordered you a referral to [facility].  Call [phone] to schedule an appointment.  Attend my follow-up appointment with my Cardiologist.  Take my medications as prescribed by my doctor  Exercise daily  Lower my bad cholesterol and raise my good cholesterol, lower my blood pressure, and Reduce stress    Medications:  Certain medications are used to treat ACS.  Remember to always take medications as prescribed and never stop talking medications unless told by your doctor.    You have been prescribed the following medicatons:    Aspirin - Aspirin is used as a blood thinning medication and you will require this medication indefinitely.  Anti-platelet/blood thinner - Your Anti-platelet/Blood thinning medication is called clopidogrel, and is used in combination with aspirin to prevent clots from forming in your heart and/or around your stent.  Your doctor will determine how long you need to be on this medicine.  Beta-Blocker - Beta-Blocker metoprolol is used to lower blood pressure and heart rate, and/or helps your heart heal after a heart attack.  Statin - Statin atorvastatin is used to lower cholesterol.

## 2024-01-19 NOTE — DISCHARGE PLANNING
1021  Agency/Facility Name: [facility]   Spoke To: [facility]   Outcome: [facility] called to notify bed available today, DPA confirmed Pt medically cleared. [facility] to arrange transport at 1330 via [facility] van.     MATTHEW Cabezas notified.     1035  Agency/Facility Name: [facility]   Spoke To: Kin[facility]   Outcome: [facility] called to notify if they okay with Pt discharging with a rhino rocket, and that it should come off in three days. [facility] to verify and call DPA back.

## 2024-01-19 NOTE — PROGRESS NOTES
Cardiology Follow Up Progress Note    Date of Service  1/19/2024    Attending Physician  Sunny Rodriguez M.D.    HPI  Azeb Major is a 83 y.o. female admitted 1/9/2024 with a past medical history of bipolar disorder, CKD and hypothyroidism found to have atrial fibrillation with rapid ventricular response and primary care clinic referred to [facility]    Interim Events  1/15: /83.  HR 90s.  Alert.  Hard of hearing but can read lips and answer questions.  Assisted at the bedside with her daughter.  No cardiac symptoms.  1/16: Successful PCI circumflex with residual LAD the latter deferred due to complexity, length of procedure for circumflex intervention  1/17: /77.  .  A-fib.  The patient seen and examined with daughter at the bedside.  Specifically denies pain or shortness of breath.  1/18: /73.  .  A-fib.  No cardiac events overnight.  1/19: 132/71.. A-fib.  It was elected not to proceed with LAD PCI intervention yesterday.  Later in the afternoon developed severe epistaxis yesterday.  Required Rhino Rocket.  Had some respiratory difficulties which cleared up.  Anticoagulation discontinued.  Daughter is at the bedside.  The patient is having no chest pain or shortness of breath symptoms.    Review of Systems  Review of Systems   Respiratory:  Negative for chest tightness and shortness of breath.        Vital signs in last 24 hours  Temp:  [36.4 °C (97.5 °F)-36.6 °C (97.9 °F)] 36.5 °C (97.7 °F)  Pulse:  [] 125  Resp:  [16-28] 28  BP: ()/() 108/71  SpO2:  [94 %-95 %] 95 %    Physical Exam  Physical Exam  Constitutional:       General: She is not in acute distress.     Comments: Frail.  Cachectic.  Hard of hearing   HENT:      Head:      Comments: Rhino rocket right nostril  Cardiovascular:      Rate and Rhythm: Tachycardia present. Rhythm irregular.      Pulses:           Radial pulses are 1+ on the right side.      Heart sounds: Normal heart sounds, S1  "normal and S2 normal. No murmur heard.     No friction rub. No gallop.   Pulmonary:      Effort: Pulmonary effort is normal.      Breath sounds: Normal breath sounds. No wheezing, rhonchi or rales.   Musculoskeletal:      Right lower leg: No edema.      Left lower leg: No edema.      Comments:     Skin:     General: Skin is warm and dry.   Neurological:      General: No focal deficit present.      Mental Status: She is alert.   Psychiatric:         Behavior: Behavior normal.         Lab Review  Lab Results   Component Value Date/Time    WBC 12.1 (H) 01/19/2024 02:18 AM    WBC 4.8 03/14/2011 11:05 AM    RBC 3.61 (L) 01/19/2024 02:18 AM    RBC 4.34 03/14/2011 11:05 AM    HEMOGLOBIN 10.9 (L) 01/19/2024 02:18 AM    HEMATOCRIT 35.1 (L) 01/19/2024 02:18 AM    MCV 97.2 01/19/2024 02:18 AM    MCV 95 03/14/2011 11:05 AM    MCH 30.2 01/19/2024 02:18 AM    MCH 31.6 03/14/2011 11:05 AM    MCHC 31.1 (L) 01/19/2024 02:18 AM    MPV 11.8 01/19/2024 02:18 AM      Lab Results   Component Value Date/Time    SODIUM 137 01/19/2024 02:18 AM    POTASSIUM 5.4 01/19/2024 02:18 AM    CHLORIDE 105 01/19/2024 02:18 AM    CO2 22 01/19/2024 02:18 AM    GLUCOSE 133 (H) 01/19/2024 02:18 AM    BUN 60 (H) 01/19/2024 02:18 AM    CREATININE 1.16 01/19/2024 02:18 AM    CREATININE 1.38 (H) 03/14/2011 11:05 AM    BUNCREATRAT 30 (H) 09/20/2017 08:11 AM    BUNCREATRAT 22 03/14/2011 11:05 AM    GLOMRATE 38 (L) 03/14/2011 11:05 AM      Lab Results   Component Value Date/Time    ASTSGOT 16 01/11/2024 03:37 AM    ALTSGPT 11 01/11/2024 03:37 AM     Lab Results   Component Value Date/Time    CHOLSTRLTOT 125 01/10/2024 05:54 AM    LDL 65 01/10/2024 05:54 AM    HDL 46 01/10/2024 05:54 AM    TRIGLYCERIDE 70 01/10/2024 05:54 AM    TROPONINT 437 (H) 01/10/2024 05:54 AM       No results for input(s): \"NTPROBNP\" in the last 72 hours.    Cardiac Imaging and Procedures Review  EKG:  My personal interpretation of the EKG dated 1/14/2024 is atrial fibrillation, rate 109, " left bundle branch block  Rhythm: My personal interpretation rhythm dated 1/15/2024 is atrial fibrillation, rate 90s    Rhythm: My personal interpretation the rhythm dated 1/18/2024 is atrial fibrillation.    Rhythm: My personal interpretation the rhythm dated 1/19/2024 is atrial fibrillation    ECHOCARDIOGRAM 1/10/2024  Normal left ventricular chamber size. Normal left ventricular wall   thickness. Severely reduced left ventricular systolic function. The   ejection fraction is measured to be 26% by Dominguez's biplane.  Normal right ventricular size with reduced systolic function.  Moderate-severe mitral regurgitation eccentric with coanda effect.  Mild tricuspid regurgitation.  No pericardial effusion.  Compared to the prior study on 01/05/15, now severely reduced left   ventricular systolic function with mod-sev mitral valve regurgitation.     CARDIAC CATHETERIZATION 1/40/2024  CORONARY ANGIOGRAPHY:  The left main coronary artery has luminal disease and bifurcates into the left anterior descending and left circumflex coronary arteries.  The left anterior descending coronary artery is a large, transapical vessel with a hazy ostium of unclear severity, sequential calcified proximal 90% and mid 70% lesions, and distal luminal irregularities.  It supplies two small diagonal branches with luminal irregularities  The left circumflex coronary artery is a large, dominant vessel with a focal mid 80% and otherwise minimal luminal irregularities.  It supplies a moderate first obtuse marginal branch, a large second obtuse marginal branch, a large third obtuse marginal branch, and a moderate posterolateral branch with luminal irregularities.  The right coronary artery is a moderate, dominant vessel with mid 95% disease prior to a high bifurcation of small to moderate caliber posterior descending and posterolateral branches that appear diffusely narrowed.  IMPRESSION:  Severe obstructive three-vessel coronary artery  disease.  Successful percutaneous coronary intervention to the mid right coronary artery with one 2.5 x 24 mm Synergy drug-eluting stent postdilated to 3.3 mm proximally.  Residual focal, but calcified severe disease in the left anterior descending and left circumflex coronaries.  Upper normal left heart filling pressures.    CARDIAC CATHETERIZATION 1/16/2024  IMPRESSION:  Successful intracoronary lithotripsy and percutaneous coronary intervention to the mid left circumflex coronary with one 2.5 x 16 mm Synergy drug-eluting stent postdilated with a 3.75 x 8 mm noncompliant balloon at a maximum pressure of 26 georgie in the mid segment.  Residual sequential severe and calcified stenoses in the ostial, proximal, and mid left anterior descending coronary.  RECOMMENDATIONS:  Return to floor with continued care per primary service.  Flat for 4 hours with routine groin precautions.  Restart heparin drip at previous rate without bolus in 2 hours if no access site complications.  Continue dual antiplatelet therapy with aspirin and clopidogrel.  Depending on patient clinical status, renal function, and access site consider staged complex coronary intervention to the left anterior descending coronary, which will likely require stenting back into the left main coronary, either as an inpatient or outpatient.    Imaging  Chest X-Ray: 1/9/2024  1.  Increased diffuse interstitial edema with layering small right effusion.  2.  Bilateral basilar atelectasis and/or consolidation. Underlying infection is possible.  3.  Mild enlargement of the cardiomediastinal silhouette    Assessment  Atrial fibrillation, rapid ventricular response, newly diagnosed duration unknown  HFrEF, 26%  Ischemic cardiomyopathy  PCI mid RCA 2.5 x 24 mm ANDRES 1/14/2024  PCI mid CX 3.75 x 16 mm ANDRES 1/60/2024  Residual proximal, mid LAD disease.  Acute hypoxic respiratory failure with pulmonary edema  Mitral regurgitation, moderate-severe  Acute on chronic kidney  injury due to cardiorenal syndrome  Left bundle branch block, new diagnosis, duration unknown  Chest pain, elevated troponin likely due to demand ischemia  Hypothyroidism  Bipolar disorder    Recommendation Discussion  Clinically no ischemic or heart failure symptoms.  Atrial fibrillation rate still elevated  Will increase metoprolol ER 50 mg.  Will add digoxin  Renal function stable.  Continue Nitropaste  Continue IV heparin  Continue DAPT with clopidogrel  Continue atorvastatin  Continue spironolactone  Will arrange for staged PCI intervention as an outpatient with Dr. Harman Sawyer  LUCY guided cardioversion on hold until coronary revascularization completed.  Going to skilled care.  Discussed treatment plan with daughters at the bedside and bedside RN and Jhonatan Moeller MD    Thank you for allowing me to participate in the care of this patient.      Please contact me with any questions.    Scot Lawler M.D.   Cardiologist, [facility]  [phone]      To 50 mg twice daily

## 2024-01-19 NOTE — DISCHARGE SUMMARY
Discharge Summary    CHIEF COMPLAINT ON ADMISSION  Chief Complaint   Patient presents with    Sent by MD     Was seen at Dr office today and found to be in A. Fib with RVR - no history    Shortness of Breath     X3 days       Reason for Admission  ems    Admission Date  1/9/2024     CODE STATUS  Full Code    HPI & HOSPITAL COURSE  This is a 83 y.o. female e with history of hearing loss, hypertension, bipolar and chronic kidney disease who presented 1/9 with chest pain and shortness of breath.  Patient has significant hearing loss and history was taken from the patient and family, patient has had worsening shortness of breath and chest pain for last couple days, also noticed swelling on her legs, on the day of admission patient was evaluated by PCP and they found the new atrial fibrillation and patient was transferred to emergency.  Patient was alert oriented x 4, patient has pressure chest pain, goes to her shoulders, with worsening shortness of breath, no nausea or vomiting or other symptoms and no fever.  EKG showed new atrial fibrillation with no significant ischemic changes, patient had a crackles with lower extremity edema, chest x-ray showed pulmonary edema, BNP was more than 27,000, troponin was elevated more than 200, cardiology was consulted, heparin with aspirin and metoprolol were initiated.  Also Lasix was initiated for heart failure.  Diuresed with IV diuretics. echo, shows EF 25% with moderate to severe mitral valve regurg which is new.    She has no respiratory distress and respiratory status has been stable on 3 L.  Had SHANTELLE which improved.    Heparin gtt continued.  Developed small hand hematoma from waking IV site after PT, dressing applied and bleeding stopped.  Hand remained neurovascularly intact.  Cardiology  increased metoprolol 25 mg twice daily and started spironolactone 25 mg daily.  Renal function improved essentially to baseline, lytes normal.   Went for coronary angiogram found to have  severe three vessel disease with successful PCI of the mid-right coronary artery with one ANDRES on 1/14/24   She then had another PCI on 1/16/24  was coninued on heparin which was stopped cause patient started having nose bleed so phenylephrine was given and a rhino rocket was placed n 1/18/24 afternoon  will need to stay there for 3 days before can be removed   Patient feels improved, more energetic, family notes she is acting more like herself.  Regarding her next stage PCI and her LUCY/ guided cardioversion will need to be done as outpatient. She will need to follow up with cardiology also regarding her HF and GDMT   She will be continued on DAPT but no anticoagulation for her A.fibb at this time due to her nose bleed once no more epistaxis  can restart anticoagulation   Patient will be discharged to [facility] today     The patient met 2-midnight criteria for an inpatient stay at the time of discharge.      FOLLOW UP ITEMS POST DISCHARGE  Cardiology   PCP     DISCHARGE DIAGNOSES  Principal Problem:    A-fib (HCC) (POA: Yes)  Active Problems:    Hypothyroid (POA: Yes)      Overview: Currently on Synthroid 125 mcg daily    Bipolar disorder, in partial remission, most recent episode depressed (HCC) (POA: Yes)      Overview: She sees psychiatry at [facility]      She is supported by all 5 kids who live in town and they keep a close eye       on her    Chest pain (POA: Yes)    ACP (advance care planning) (POA: Unknown)    CHF exacerbation (HCC) (POA: Unknown)  Resolved Problems:    * No resolved hospital problems. *      FOLLOW UP  Future Appointments   Date Time Provider Department Center   2/12/2024 10:45 AM CASSIE Schmitt None     [facility]  [address]  [address]  [facility] [id number] [phone]  Call  Your doctor has referred you for Cardiac Rehab which is important in your recovery. Please call to make an appointment.    [facility] [place] [address]  [facility] [id number]  [phone]          MEDICATIONS ON DISCHARGE     Medication List        START taking these medications        Instructions   aspirin 81 MG EC tablet  Start taking on: January 20, 2024   Take 1 Tablet by mouth every day.  Dose: 81 mg     atorvastatin 40 MG Tabs  Commonly known as: Lipitor   Take 1 Tablet by mouth every evening.  Dose: 40 mg     clopidogrel 75 MG Tabs  Start taking on: January 20, 2024  Commonly known as: Plavix   Take 1 Tablet by mouth every day.  Dose: 75 mg     metoprolol SR 25 MG Tb24  Commonly known as: Toprol XL   Take 1 Tablet by mouth 2 (two) times a day.  Dose: 25 mg     spironolactone 25 MG Tabs  Start taking on: January 20, 2024  Commonly known as: Aldactone   Take 1 Tablet by mouth every day.  Dose: 25 mg            CHANGE how you take these medications        Instructions   QUEtiapine Fumarate 150 MG Tb24  What changed: Another medication with the same name was removed. Continue taking this medication, and follow the directions you see here.   Doctor's comments: (Seroquel 150 mg + 25 mg = 175 mg at bedtime)  TAKE 1 TABLET DAILY AT BEDTIME            CONTINUE taking these medications        Instructions   CRANBEREX PO   Take 1 Tablet by mouth at bedtime.  Dose: 1 Tablet     divalproex  MG Tb24  Commonly known as: Depakote ER   TAKE 3 TABLETS 1/2 HOUR AFTER DINNER     fexofenadine 60 MG Tabs  Commonly known as: Allegra   Take 60 mg by mouth at bedtime.  Dose: 60 mg     GAS-X PO   Take 1 Tablet by mouth at bedtime as needed (flatulence).  Dose: 1 Tablet     levothyroxine 125 MCG Tabs  Commonly known as: Synthroid   TAKE 1 TABLET EVERY MORNING ON AN EMPTY STOMACH     MAGNESIUM PO   Take 1 Tablet by mouth at bedtime.  Dose: 1 Tablet     MELATONIN PO   Take 1 Capsule by mouth at bedtime.  Dose: 1 Capsule     nitrofurantoin 50 MG Caps  Commonly known as: Macrodantin   TAKE 1 CAPSULE DAILY     Ocuvite-Lutein Tabs   Take 1 Tablet by mouth at bedtime.  Dose: 1  Tablet     omeprazole 20 MG delayed-release capsule  Commonly known as: PriLOSEC   TAKE 1 CAPSULE DAILY     PROBIOTIC PO   Take 1 Tablet by mouth at bedtime.  Dose: 1 Tablet     tolterodine ER 4 MG Cp24  Commonly known as: Detrol-LA   TAKE 1 CAPSULE DAILY     venlafaxine XR 75 MG Cp24  Commonly known as: Effexor XR   Take 1 Capsule by mouth every day.  Dose: 75 mg            STOP taking these medications      cefdinir 300 MG Caps  Commonly known as: Omnicef              Allergies  Allergies   Allergen Reactions    Levofloxacin Unspecified     Unidentified reaction. Per family it's been a long time       DIET  Orders Placed This Encounter   Procedures    Diet Order Diet: Level 5 - Minced and Moist; Liquid level: Level 0 - Thin; Second Modifier: (optional): Cardiac     Standing Status:   Standing     Number of Occurrences:   1     Order Specific Question:   Diet:     Answer:   Level 5 - Minced and Moist [24]     Order Specific Question:   Liquid level     Answer:   Level 0 - Thin     Order Specific Question:   Second Modifier: (optional)     Answer:   Cardiac [6]       ACTIVITY  As tolerated.  Weight bearing as tolerated    LINES, DRAINS, AND WOUNDS  This is an automated list. Peripheral IVs will be removed prior to discharge.  Peripheral IV 01/17/24 20 G Anterior;Left;Proximal Forearm (Active)   Site Assessment Clean;Dry;Intact 01/19/24 0800   Dressing Type Transparent 01/19/24 0800   Line Status Saline locked;Scrubbed the hub prior to access;Flushed 01/19/24 0800   Dressing Status Clean;Dry;Intact 01/19/24 0800   Dressing Intervention N/A 01/18/24 2000   Infiltration Grading ([facility], [facility]) 0 01/19/24 0800   Phlebitis Scale ([facility] Only) 0 01/19/24 0800     External Urinary Catheter (Female Wick) (Active)   Collection Container Suction container 01/16/24 0800   Suction Level (Female Wick Catheter) 100 mmHg 01/16/24 0800   Output (mL) 350 mL 01/16/24 1600      Wound 01/16/24 Heel Right 1/17 DTI (Active)   Wound Image    01/17/24 1600   Site Assessment Purple;Boggy 01/18/24 2000   Periwound Assessment Dry 01/18/24 2000   Margins Defined edges;Attached edges 01/19/24 0800   Closure Open to air 01/18/24 2000   Drainage Amount None 01/18/24 2000   Treatments Offloading 01/18/24 2000   Offloading/DME Heel float boot 01/18/24 2000   Dressing Status Clean;Dry;Intact 01/18/24 2000   Dressing Changed Observed 01/18/24 2000   Dressing Options Offloading Dressing - Heel 01/18/24 2000   Dressing Change/Treatment Frequency Every 72 hrs, and As Needed 01/19/24 0800   NEXT Dressing Change/Treatment Date 01/20/24 01/18/24 2000   NEXT Weekly Photo (Inpatient Only) 01/24/24 01/18/24 2000   Wound Team Following Weekly 01/17/24 1600   Wound Length (cm) 1 cm 01/17/24 1600   Wound Width (cm) 0.5 cm 01/17/24 1600   Wound Surface Area (cm^2) 0.5 cm^2 01/17/24 1600   Shape triangular 01/17/24 1600   Wound Odor None 01/17/24 1600   Pulses Right;2+ 01/17/24 1600   WOUND NURSE ONLY - Time Spent with Patient (mins) 30 01/17/24 1600       Wound 01/17/24 Pressure Injury Sacrum Bilateral red, non-blanching (Active)   Wound Image    01/18/24 0750   Site Assessment Intact;Red;Pink 01/18/24 2000   Periwound Assessment Red;Fragile 01/18/24 2000   Margins Attached edges 01/19/24 0800   Closure Open to air 01/18/24 2000   Drainage Amount None 01/18/24 2000   Treatments Offloading 01/18/24 2000   Wound Cleansing Foam Cleanser/Washcloth 01/18/24 2000   Periwound Protectant Mepitel 01/18/24 2000   Dressing Status Clean;Dry;Intact 01/18/24 2000       Peripheral IV 01/17/24 20 G Anterior;Left;Proximal Forearm (Active)   Site Assessment Clean;Dry;Intact 01/19/24 0800   Dressing Type Transparent 01/19/24 0800   Line Status Saline locked;Scrubbed the hub prior to access;Flushed 01/19/24 0800   Dressing Status Clean;Dry;Intact 01/19/24 0800   Dressing Intervention N/A 01/18/24 2000   Infiltration Grading ([facility], [facility]) 0 01/19/24 0800   Phlebitis Scale ([facility] Only) 0 01/19/24  0800               MENTAL STATUS ON TRANSFER             CONSULTATIONS  Cardiology     PROCEDURES  PCI mid RCA 2.5 x 24 mm ANDRES 1/14/2024  PCI mid CX 3.75 x 16 mm ANDRES 1/60/2024    LABORATORY  Lab Results   Component Value Date    SODIUM 137 01/19/2024    POTASSIUM 5.4 01/19/2024    CHLORIDE 105 01/19/2024    CO2 22 01/19/2024    GLUCOSE 133 (H) 01/19/2024    BUN 60 (H) 01/19/2024    CREATININE 1.16 01/19/2024    CREATININE 1.38 (H) 03/14/2011    GLOMRATE 38 (L) 03/14/2011        Lab Results   Component Value Date    WBC 12.1 (H) 01/19/2024    WBC 4.8 03/14/2011    HEMOGLOBIN 10.9 (L) 01/19/2024    HEMATOCRIT 35.1 (L) 01/19/2024    PLATELETCT 119 (L) 01/19/2024        Total time of the discharge process exceeds 35 minutes.

## 2024-01-19 NOTE — DISCHARGE PLANNING
RNCM has left voicemail for Dina and sent Voalte message to [facility]. Patient has rhino rocket for nosebleed. Per hospitalist- needs to stay in x 3 days. RNCM inquiring if either facility can take patient with rhino rocket. Waiting for reply.    10:36- DPA spoke with [facility]. They can take patient @ 13:30 via [facility].Per [facility]- they can care for rhino rocket. Patient, family, bedside RN and hospitalist all advised. [facility]  advised as well. Transport packet started.    12:30- D/C summary complete. Transport packet completed. Given to bedside RN.

## 2024-01-26 ENCOUNTER — HOSPITAL ENCOUNTER (EMERGENCY)
Facility: MEDICAL CENTER | Age: 84
End: 2024-01-26
Attending: EMERGENCY MEDICINE
Payer: MEDICARE

## 2024-01-26 VITALS
OXYGEN SATURATION: 98 % | HEIGHT: 66 IN | BODY MASS INDEX: 24.8 KG/M2 | HEART RATE: 61 BPM | RESPIRATION RATE: 16 BRPM | WEIGHT: 154.32 LBS | DIASTOLIC BLOOD PRESSURE: 101 MMHG | SYSTOLIC BLOOD PRESSURE: 159 MMHG | TEMPERATURE: 97.9 F

## 2024-01-26 DIAGNOSIS — E87.5 HYPERKALEMIA: ICD-10-CM

## 2024-01-26 LAB
ALBUMIN SERPL BCP-MCNC: 3.1 G/DL (ref 3.2–4.9)
ALBUMIN/GLOB SERPL: 0.8 G/DL
ALP SERPL-CCNC: 72 U/L (ref 30–99)
ALT SERPL-CCNC: 27 U/L (ref 2–50)
ANION GAP SERPL CALC-SCNC: 11 MMOL/L (ref 7–16)
AST SERPL-CCNC: 25 U/L (ref 12–45)
BILIRUB SERPL-MCNC: 0.3 MG/DL (ref 0.1–1.5)
BUN SERPL-MCNC: 53 MG/DL (ref 8–22)
CALCIUM ALBUM COR SERPL-MCNC: 9.4 MG/DL (ref 8.5–10.5)
CALCIUM SERPL-MCNC: 8.7 MG/DL (ref 8.5–10.5)
CHLORIDE SERPL-SCNC: 104 MMOL/L (ref 96–112)
CO2 SERPL-SCNC: 20 MMOL/L (ref 20–33)
CREAT SERPL-MCNC: 1.21 MG/DL (ref 0.5–1.4)
EKG IMPRESSION: NORMAL
GFR SERPLBLD CREATININE-BSD FMLA CKD-EPI: 44 ML/MIN/1.73 M 2
GLOBULIN SER CALC-MCNC: 3.7 G/DL (ref 1.9–3.5)
GLUCOSE SERPL-MCNC: 87 MG/DL (ref 65–99)
POTASSIUM SERPL-SCNC: 5.7 MMOL/L (ref 3.6–5.5)
PROT SERPL-MCNC: 6.8 G/DL (ref 6–8.2)
SODIUM SERPL-SCNC: 135 MMOL/L (ref 135–145)

## 2024-01-26 PROCEDURE — 36415 COLL VENOUS BLD VENIPUNCTURE: CPT

## 2024-01-26 PROCEDURE — 99284 EMERGENCY DEPT VISIT MOD MDM: CPT

## 2024-01-26 PROCEDURE — 93005 ELECTROCARDIOGRAM TRACING: CPT

## 2024-01-26 PROCEDURE — 93005 ELECTROCARDIOGRAM TRACING: CPT | Performed by: EMERGENCY MEDICINE

## 2024-01-26 PROCEDURE — 80053 COMPREHEN METABOLIC PANEL: CPT

## 2024-01-26 ASSESSMENT — FIBROSIS 4 INDEX: FIB4 SCORE: 3.45

## 2024-01-26 NOTE — ED PROVIDER NOTES
ER Provider Note    Scribed for Martin Cyr M.D. by Washington Mason. 1/26/2024   11:35 AM    Primary Care Provider: Olimpia Newsome M.D.    CHIEF COMPLAINT  Chief Complaint   Patient presents with    Abnormal Labs     Potassium 7.0      EXTERNAL RECORDS REVIEWED  Outpatient labs & studies Metabolic panel performed one week ago, K 5.4, normal renal function    HPI/ROS    LIMITATION TO HISTORY   Select: : None    Azeb Major is a 83 y.o. female who presents to the ED for evaluation of abnormal labs onset prior to arrival. She was sent by her SNF for reported potassium level of 7.0. She denies any other complaints at this time.     PAST MEDICAL HISTORY  Past Medical History:   Diagnosis Date    Bipolar disorder (HCC)     Bronchitis 02-26-13    in the past, not recent    CATARACT     bilat removed     Dental disorder     Frequent UTI 12/1/2010    Heart burn     History of falling     r/t vertigo    Hypertension     Hypothyroid 12/1/2010    Other specified symptom associated with female genital organs     Pneumonia     x2    Psychiatric disorder     bipolar    Renal disorder     daughter reports some renal insufficiency    Seasonal allergic reaction     Stress incontinence     Urinary bladder disorder        SURGICAL HISTORY  Past Surgical History:   Procedure Laterality Date    HYSTERECTOMY ROBOTIC  3/18/2013    Performed by Juju Gonzales M.D. at SURGERY [facility] ORS    COLPOSACROPEXY ROBOTIC  3/18/2013    Performed by Juju Gonzales M.D. at SURGERY [facility] ORS    BLADDER SLING FEMALE  3/18/2013    Performed by Juju Gonzales M.D. at SURGERY [facility] ORS    CYSTOSCOPY  3/18/2013    Performed by Juju Gonzales M.D. at SURGERY [facility] ORS    VENTRAL HERNIA REPAIR  3/18/2010    Performed by TRAVIS PATTERSON at SURGERY [facility] ORS    TONSILLECTOMY         FAMILY HISTORY  Family History   Problem Relation Age of Onset    Stroke Father     Heart Disease Sister     Heart  Disease Sister        SOCIAL HISTORY   reports that she has quit smoking. She has a 0.3 pack-year smoking history. She has never used smokeless tobacco. She reports that she does not drink alcohol and does not use drugs.    CURRENT MEDICATIONS  Current Discharge Medication List        CONTINUE these medications which have NOT CHANGED    Details   aspirin 81 MG EC tablet Take 1 Tablet by mouth every day.  Qty: 100 Tablet, Refills: 0    Associated Diagnoses: NSTEMI (non-ST elevated myocardial infarction) ([facility])      atorvastatin (LIPITOR) 40 MG Tab Take 1 Tablet by mouth every evening.  Qty: 30 Tablet, Refills: 0    Associated Diagnoses: NSTEMI (non-ST elevated myocardial infarction) ([facility])      clopidogrel (PLAVIX) 75 MG Tab Take 1 Tablet by mouth every day.  Qty: 30 Tablet, Refills: 0    Associated Diagnoses: NSTEMI (non-ST elevated myocardial infarction) ([facility])      digoxin (LANOXIN) 125 MCG Tab Take 1 Tablet by mouth every day at 6 PM.  Qty: 30 Tablet, Refills: 0    Associated Diagnoses: Persistent atrial fibrillation ([facility])      metoprolol SR (TOPROL XL) 50 MG TABLET SR 24 HR Take 1 Tablet by mouth 2 (two) times a day.  Qty: 30 Tablet, Refills: 0    Associated Diagnoses: Persistent atrial fibrillation (HCC)      MAGNESIUM PO Take 1 Tablet by mouth at bedtime.      MELATONIN PO Take 1 Capsule by mouth at bedtime.      Probiotic Product (PROBIOTIC PO) Take 1 Tablet by mouth at bedtime.      fexofenadine (ALLEGRA) 60 MG Tab Take 60 mg by mouth at bedtime.      Simethicone (GAS-X PO) Take 1 Tablet by mouth at bedtime as needed (flatulence).      Cranberry (CRANBEREX PO) Take 1 Tablet by mouth at bedtime.      Multiple Vitamins-Minerals (OCUVITE-LUTEIN) Tab Take 1 Tablet by mouth at bedtime.      venlafaxine XR (EFFEXOR XR) 75 MG CAPSULE SR 24 HR Take 1 Capsule by mouth every day.  Qty: 90 Capsule, Refills: 1    Associated Diagnoses: Bipolar disorder, in partial remission, most recent episode depressed (HCC)     "  QUEtiapine Fumarate 150 MG TABLET SR 24 HR TAKE 1 TABLET DAILY AT BEDTIME  Qty: 30 Tablet, Refills: 1    Comments: (Seroquel 150 mg + 25 mg = 175 mg at bedtime)  Associated Diagnoses: Bipolar disorder, in partial remission, most recent episode depressed (HCC)      divalproex ER (DEPAKOTE ER) 250 MG TABLET SR 24 HR TAKE 3 TABLETS 1/2 HOUR AFTER DINNER  Qty: 270 Tablet, Refills: 3    Associated Diagnoses: Bipolar disorder, in partial remission, most recent episode depressed (HCC)      omeprazole (PRILOSEC) 20 MG delayed-release capsule TAKE 1 CAPSULE DAILY  Qty: 90 Capsule, Refills: 3      nitrofurantoin (MACRODANTIN) 50 MG Cap TAKE 1 CAPSULE DAILY  Qty: 90 Capsule, Refills: 3    Associated Diagnoses: Frequent UTI      tolterodine ER (DETROL LA) 4 MG CAPSULE SR 24 HR TAKE 1 CAPSULE DAILY  Qty: 90 Capsule, Refills: 3    Associated Diagnoses: Mixed incontinence      levothyroxine (SYNTHROID) 125 MCG Tab TAKE 1 TABLET EVERY MORNING ON AN EMPTY STOMACH  Qty: 90 Tablet, Refills: 3             ALLERGIES  Allergies   Allergen Reactions    Levofloxacin Unspecified     Unidentified reaction. Per family it's been a long time        PHYSICAL EXAM  BP (!) 146/67   Pulse 61   Temp 36.6 °C (97.9 °F) (Temporal)   Resp 16   Ht 1.676 m (5' 6\")   Wt 70 kg (154 lb 5.1 oz)   SpO2 98%   BMI 24.91 kg/m²    Nursing note and vitals reviewed.  Constitutional: Well-developed and well-nourished. No distress.   HENT: Head is normocephalic and atraumatic. Oropharynx is clear and moist without exudate or erythema.   Eyes: Pupils are equal, round, and reactive to light. Conjunctiva are normal.   Cardiovascular: Normal rate and regular rhythm. No murmur heard. Normal radial pulses.  Pulmonary/Chest: Breath sounds normal. No wheezes or rales.   Abdominal: Soft and non-tender. No distention    Musculoskeletal: Extremities exhibit normal range of motion without edema or tenderness.   Neurological: Awake, alert and oriented to person, place, " and time. No focal deficits noted.  Skin: Skin is warm and dry. No rash.   Psychiatric: Normal mood and affect. Appropriate for clinical situation    DIAGNOSTIC STUDIES    Labs:   Results for orders placed or performed during the hospital encounter of 24   CMP   Result Value Ref Range    Sodium 135 135 - 145 mmol/L    Potassium 5.7 (H) 3.6 - 5.5 mmol/L    Chloride 104 96 - 112 mmol/L    Co2 20 20 - 33 mmol/L    Anion Gap 11.0 7.0 - 16.0    Glucose 87 65 - 99 mg/dL    Bun 53 (H) 8 - 22 mg/dL    Creatinine 1.21 0.50 - 1.40 mg/dL    Calcium 8.7 8.5 - 10.5 mg/dL    Correct Calcium 9.4 8.5 - 10.5 mg/dL    AST(SGOT) 25 12 - 45 U/L    ALT(SGPT) 27 2 - 50 U/L    Alkaline Phosphatase 72 30 - 99 U/L    Total Bilirubin 0.3 0.1 - 1.5 mg/dL    Albumin 3.1 (L) 3.2 - 4.9 g/dL    Total Protein 6.8 6.0 - 8.2 g/dL    Globulin 3.7 (H) 1.9 - 3.5 g/dL    A-G Ratio 0.8 g/dL   ESTIMATED GFR   Result Value Ref Range    GFR (CKD-EPI) 44 (A) >60 mL/min/1.73 m 2   EKG   Result Value Ref Range    Report       [facility] Emergency Dept.    Test Date:  2024  Pt Name:    SHAMEKA TEJADA                 Department: ER  MRN:        [id number]                      Room:        22  Gender:     Female                       Technician: [id number]  :        [id number]                   Requested By:ER TRIAGE PROTOCOL  Order #:    [id number]                    Reading MD:    Measurements  Intervals                                Axis  Rate:       58                           P:          44  NC:         206                          QRS:        -34  QRSD:       116                          T:          166  QT:         445  QTc:        438    Interpretive Statements  Sinus bradycardia  Incomplete left bundle branch block  LVH with secondary repolarization abnormality  Compared to ECG 2024 16:22:24  Left bundle-branch block now present  Left ventricular hypertrophy now present  Atrial fibrillation no longer present  Left-axis  deviation no longer present  Prolonged QT interval no l onger present       All labs reviewed by me.      EKG:   I have independently interpreted this EKG as detailed above.    INITIAL ASSESSMENT AND PLAN    11:35 AM - Patient was evaluated at bedside. She presents for abnormal labs, reported K of 7.0. Ordered for CMP and EKG to evaluate. Patient verbalizes understanding and support with my plan of care.     ED Observation Status? No; Patient does not meet criteria for ED Observation.      COURSE AND MEDICAL DECISION MAKING    12:17 PM - Metabolic panel resulted showing K of 5.7, I suspect the reported 7.0 was lab error. I will have her discontinue spironolactone due to possibility of this contributing to high potassium. I do not think immediate treatment is necessary.       DISPOSITION AND DISCUSSIONS    Discussion of management with other [facility] or appropriate source(s): None     Escalation of care considered, and ultimately not performed: acute inpatient care management, however at this time, the patient is most appropriate for outpatient management.    Barriers to care at this time, including but not limited to: None    DISPOSITION:  Patient will be discharged home in stable condition.    FOLLOW UP:  Olimpia Newsome M.D.  [address]  [address]  [place] [address]  [phone]    Schedule an appointment as soon as possible for a visit   Stop spironolactone. Discuss alternative treatment option if necessary.    [facility], Emergency Dept  [address] [id number] [phone]    If symptoms worsen    FINAL DIAGNOSIS  1. Hyperkalemia       Washington MC (Anne-Marie), am scribing for, and in the presence of, Martin Cyr M.D..    Electronically signed by: Washington Mason (Anne-Marie), 1/26/2024    Martin MC M.D. personally performed the services described in this documentation, as scribed by Washington Mason in my presence, and it is both accurate and complete.      The  note accurately reflects work and decisions made by me.  Martin Cyr M.D.  1/26/2024  1:00 PM

## 2024-01-26 NOTE — ED NOTES
"Chief Complaint   Patient presents with    Abnormal Labs     Potassium 7.0      BP (!) 146/67   Pulse 60   Temp 36.6 °C (97.9 °F) (Temporal)   Resp 16   Ht 1.676 m (5' 6\")   Wt 70 kg (154 lb 5.1 oz)   SpO2 98%   BMI 24.91 kg/m²     BIBA from [facility] for above, patient denies medical complaints, PIV established, blood drawn and sent to lab.    "

## 2024-01-26 NOTE — DISCHARGE PLANNING
SW updated that Pt is medically cleared to return to [facility].  SW called [facility] with [facility] and arranged transport for 1 pm. COBRA and transfer packet prepared (POLST placed in transfer packet) and given to ER RN.